# Patient Record
Sex: FEMALE | Race: BLACK OR AFRICAN AMERICAN | NOT HISPANIC OR LATINO | URBAN - METROPOLITAN AREA
[De-identification: names, ages, dates, MRNs, and addresses within clinical notes are randomized per-mention and may not be internally consistent; named-entity substitution may affect disease eponyms.]

---

## 2019-07-04 ENCOUNTER — EMERGENCY (EMERGENCY)
Facility: HOSPITAL | Age: 49
LOS: 1 days | Discharge: HOME | End: 2019-07-04
Attending: EMERGENCY MEDICINE | Admitting: EMERGENCY MEDICINE
Payer: OTHER MISCELLANEOUS

## 2019-07-04 VITALS
DIASTOLIC BLOOD PRESSURE: 84 MMHG | SYSTOLIC BLOOD PRESSURE: 130 MMHG | OXYGEN SATURATION: 100 % | TEMPERATURE: 98 F | RESPIRATION RATE: 16 BRPM | HEART RATE: 80 BPM

## 2019-07-04 VITALS
SYSTOLIC BLOOD PRESSURE: 136 MMHG | DIASTOLIC BLOOD PRESSURE: 83 MMHG | RESPIRATION RATE: 17 BRPM | TEMPERATURE: 98 F | HEART RATE: 75 BPM | OXYGEN SATURATION: 99 %

## 2019-07-04 LAB
ALBUMIN SERPL ELPH-MCNC: 4.5 G/DL — SIGNIFICANT CHANGE UP (ref 3.3–5)
ALP SERPL-CCNC: 137 U/L — HIGH (ref 40–120)
ALT FLD-CCNC: 20 U/L — SIGNIFICANT CHANGE UP (ref 4–33)
ANION GAP SERPL CALC-SCNC: 14 MMO/L — SIGNIFICANT CHANGE UP (ref 7–14)
AST SERPL-CCNC: 16 U/L — SIGNIFICANT CHANGE UP (ref 4–32)
BASE EXCESS BLDV CALC-SCNC: 6.1 MMOL/L — SIGNIFICANT CHANGE UP
BASOPHILS # BLD AUTO: 0.03 K/UL — SIGNIFICANT CHANGE UP (ref 0–0.2)
BASOPHILS NFR BLD AUTO: 0.4 % — SIGNIFICANT CHANGE UP (ref 0–2)
BILIRUB SERPL-MCNC: 0.3 MG/DL — SIGNIFICANT CHANGE UP (ref 0.2–1.2)
BLOOD GAS VENOUS - CREATININE: 0.51 MG/DL — SIGNIFICANT CHANGE UP (ref 0.5–1.3)
BLOOD GAS VENOUS - FIO2: 21 — SIGNIFICANT CHANGE UP
BUN SERPL-MCNC: 14 MG/DL — SIGNIFICANT CHANGE UP (ref 7–23)
CALCIUM SERPL-MCNC: 9.4 MG/DL — SIGNIFICANT CHANGE UP (ref 8.4–10.5)
CHLORIDE BLDV-SCNC: 101 MMOL/L — SIGNIFICANT CHANGE UP (ref 96–108)
CHLORIDE SERPL-SCNC: 98 MMOL/L — SIGNIFICANT CHANGE UP (ref 98–107)
CO2 SERPL-SCNC: 26 MMOL/L — SIGNIFICANT CHANGE UP (ref 22–31)
CREAT SERPL-MCNC: 0.46 MG/DL — LOW (ref 0.5–1.3)
EOSINOPHIL # BLD AUTO: 0.08 K/UL — SIGNIFICANT CHANGE UP (ref 0–0.5)
EOSINOPHIL NFR BLD AUTO: 1.1 % — SIGNIFICANT CHANGE UP (ref 0–6)
GAS PNL BLDV: 133 MMOL/L — LOW (ref 136–146)
GLUCOSE BLDV-MCNC: 338 MG/DL — HIGH (ref 70–99)
GLUCOSE SERPL-MCNC: 352 MG/DL — HIGH (ref 70–99)
HBA1C BLD-MCNC: 10.1 % — HIGH (ref 4–5.6)
HCG SERPL-ACNC: < 5 MIU/ML — SIGNIFICANT CHANGE UP
HCO3 BLDV-SCNC: 28 MMOL/L — HIGH (ref 20–27)
HCT VFR BLD CALC: 39.9 % — SIGNIFICANT CHANGE UP (ref 34.5–45)
HCT VFR BLDV CALC: 40 % — SIGNIFICANT CHANGE UP (ref 34.5–45)
HGB BLD-MCNC: 12.9 G/DL — SIGNIFICANT CHANGE UP (ref 11.5–15.5)
HGB BLDV-MCNC: 13 G/DL — SIGNIFICANT CHANGE UP (ref 11.5–15.5)
IMM GRANULOCYTES NFR BLD AUTO: 0.4 % — SIGNIFICANT CHANGE UP (ref 0–1.5)
LACTATE BLDV-MCNC: 2.2 MMOL/L — HIGH (ref 0.5–2)
LYMPHOCYTES # BLD AUTO: 1.9 K/UL — SIGNIFICANT CHANGE UP (ref 1–3.3)
LYMPHOCYTES # BLD AUTO: 25.5 % — SIGNIFICANT CHANGE UP (ref 13–44)
MCHC RBC-ENTMCNC: 25.6 PG — LOW (ref 27–34)
MCHC RBC-ENTMCNC: 32.3 % — SIGNIFICANT CHANGE UP (ref 32–36)
MCV RBC AUTO: 79.2 FL — LOW (ref 80–100)
MONOCYTES # BLD AUTO: 0.38 K/UL — SIGNIFICANT CHANGE UP (ref 0–0.9)
MONOCYTES NFR BLD AUTO: 5.1 % — SIGNIFICANT CHANGE UP (ref 2–14)
NEUTROPHILS # BLD AUTO: 5.04 K/UL — SIGNIFICANT CHANGE UP (ref 1.8–7.4)
NEUTROPHILS NFR BLD AUTO: 67.5 % — SIGNIFICANT CHANGE UP (ref 43–77)
NRBC # FLD: 0 K/UL — SIGNIFICANT CHANGE UP (ref 0–0)
PCO2 BLDV: 61 MMHG — HIGH (ref 41–51)
PH BLDV: 7.33 PH — SIGNIFICANT CHANGE UP (ref 7.32–7.43)
PLATELET # BLD AUTO: 181 K/UL — SIGNIFICANT CHANGE UP (ref 150–400)
PMV BLD: 12.2 FL — SIGNIFICANT CHANGE UP (ref 7–13)
PO2 BLDV: 32 MMHG — LOW (ref 35–40)
POTASSIUM BLDV-SCNC: SIGNIFICANT CHANGE UP MMOL/L (ref 3.4–4.5)
POTASSIUM SERPL-MCNC: 4.8 MMOL/L — SIGNIFICANT CHANGE UP (ref 3.5–5.3)
POTASSIUM SERPL-SCNC: 4.8 MMOL/L — SIGNIFICANT CHANGE UP (ref 3.5–5.3)
PROT SERPL-MCNC: 7 G/DL — SIGNIFICANT CHANGE UP (ref 6–8.3)
RBC # BLD: 5.04 M/UL — SIGNIFICANT CHANGE UP (ref 3.8–5.2)
RBC # FLD: 14.3 % — SIGNIFICANT CHANGE UP (ref 10.3–14.5)
SAO2 % BLDV: 55.3 % — LOW (ref 60–85)
SODIUM SERPL-SCNC: 138 MMOL/L — SIGNIFICANT CHANGE UP (ref 135–145)
WBC # BLD: 7.46 K/UL — SIGNIFICANT CHANGE UP (ref 3.8–10.5)
WBC # FLD AUTO: 7.46 K/UL — SIGNIFICANT CHANGE UP (ref 3.8–10.5)

## 2019-07-04 PROCEDURE — 73130 X-RAY EXAM OF HAND: CPT | Mod: 26,RT

## 2019-07-04 PROCEDURE — 99283 EMERGENCY DEPT VISIT LOW MDM: CPT

## 2019-07-04 PROCEDURE — 99053 MED SERV 10PM-8AM 24 HR FAC: CPT

## 2019-07-04 RX ORDER — ACETAMINOPHEN 500 MG
975 TABLET ORAL ONCE
Refills: 0 | Status: COMPLETED | OUTPATIENT
Start: 2019-07-04 | End: 2019-07-04

## 2019-07-04 RX ORDER — IBUPROFEN 200 MG
200 TABLET ORAL ONCE
Refills: 0 | Status: COMPLETED | OUTPATIENT
Start: 2019-07-04 | End: 2019-07-04

## 2019-07-04 RX ORDER — CYCLOBENZAPRINE HYDROCHLORIDE 10 MG/1
5 TABLET, FILM COATED ORAL ONCE
Refills: 0 | Status: COMPLETED | OUTPATIENT
Start: 2019-07-04 | End: 2019-07-04

## 2019-07-04 RX ORDER — SODIUM CHLORIDE 9 MG/ML
500 INJECTION INTRAMUSCULAR; INTRAVENOUS; SUBCUTANEOUS ONCE
Refills: 0 | Status: COMPLETED | OUTPATIENT
Start: 2019-07-04 | End: 2019-07-04

## 2019-07-04 RX ORDER — OXYCODONE HYDROCHLORIDE 5 MG/1
5 TABLET ORAL ONCE
Refills: 0 | Status: DISCONTINUED | OUTPATIENT
Start: 2019-07-04 | End: 2019-07-04

## 2019-07-04 RX ORDER — IBUPROFEN 200 MG
600 TABLET ORAL ONCE
Refills: 0 | Status: COMPLETED | OUTPATIENT
Start: 2019-07-04 | End: 2019-07-04

## 2019-07-04 RX ORDER — METFORMIN HYDROCHLORIDE 850 MG/1
1000 TABLET ORAL ONCE
Refills: 0 | Status: COMPLETED | OUTPATIENT
Start: 2019-07-04 | End: 2019-07-04

## 2019-07-04 RX ADMIN — METFORMIN HYDROCHLORIDE 1000 MILLIGRAM(S): 850 TABLET ORAL at 09:31

## 2019-07-04 RX ADMIN — Medication 600 MILLIGRAM(S): at 06:44

## 2019-07-04 RX ADMIN — SODIUM CHLORIDE 500 MILLILITER(S): 9 INJECTION INTRAMUSCULAR; INTRAVENOUS; SUBCUTANEOUS at 10:17

## 2019-07-04 RX ADMIN — Medication 200 MILLIGRAM(S): at 11:05

## 2019-07-04 RX ADMIN — Medication 975 MILLIGRAM(S): at 08:36

## 2019-07-04 RX ADMIN — Medication 600 MILLIGRAM(S): at 08:37

## 2019-07-04 RX ADMIN — Medication 975 MILLIGRAM(S): at 10:17

## 2019-07-04 RX ADMIN — SODIUM CHLORIDE 500 MILLILITER(S): 9 INJECTION INTRAMUSCULAR; INTRAVENOUS; SUBCUTANEOUS at 09:31

## 2019-07-04 RX ADMIN — OXYCODONE HYDROCHLORIDE 5 MILLIGRAM(S): 5 TABLET ORAL at 10:12

## 2019-07-04 RX ADMIN — OXYCODONE HYDROCHLORIDE 5 MILLIGRAM(S): 5 TABLET ORAL at 11:05

## 2019-07-04 RX ADMIN — Medication 200 MILLIGRAM(S): at 10:13

## 2019-07-04 RX ADMIN — SODIUM CHLORIDE 500 MILLILITER(S): 9 INJECTION INTRAMUSCULAR; INTRAVENOUS; SUBCUTANEOUS at 11:07

## 2019-07-04 RX ADMIN — CYCLOBENZAPRINE HYDROCHLORIDE 5 MILLIGRAM(S): 10 TABLET, FILM COATED ORAL at 06:43

## 2019-07-04 NOTE — ED PROVIDER NOTE - NSFOLLOWUPINSTRUCTIONS_ED_ALL_ED_FT
You were seen in the ER for right wrist/hand pain. We obtained an xray that showed no evidence of any broken bones. Based on your exam we believe your pain may be due to a mild wrist sprain. Please follow up with your Primary Care Doctor within 24-48 hours for further evaluation and management. You may take Tylenol up to 650 mg every 8 hours as needed for pain and/or Motrin up to 600 mg every 6 hours as needed for pain. Return to the ED if you develop new or worsening symptoms. Specific signs and symptoms to be vigilant of: numbness or tingling, weakness, fever, chills, inflammation of your joints, difficulty moving your joints, difficulty walking. Attached is referral to hand specialist for further evaluation and management if symptoms do not improve within the next week.    While here you were found to have elevated blood sugars. This is from your diabetes and that you have not been taking metformin consistently. You did not want insulin during this visit but your blood sugar improved with metformin dose and IV fluids. You had no other significant abnormalities on your labwork related to your diabetes. You should improve your diet as we discussed today, increase amount of time spent walking/exercising each day, and follow-up with your primary doctor within 24-48 hours for further evaluation and management of your diabetes/medications. Return to the hospital for any lightheadedness, difficulty breathing, abdominal pain, vomiting/inability to eat, or any other new or concerning symptoms.     Also, you mentioned your intermittent depressed mood while you were here due to life/social stressors. You did not feel you wanted to injure yourself or others. Provided in this paperwork is a referral to the [facility] for you to call for further discussion, evaluation and help with improving your mood. Return to the hospital immediately if you feel any worsening sadness/hopelessness, feelings of wanting to hurt yourself or others. You were seen in the ER for right wrist/hand pain. We obtained an xray that showed no evidence of any broken bones. Based on your exam we believe your pain may be due to a mild wrist sprain. Please follow up with your Primary Care Doctor within 24-48 hours for further evaluation and management. You may take Tylenol up to 650 mg every 8 hours as needed for pain and/or Motrin up to 600 mg every 6 hours as needed for pain. Return to the ED if you develop new or worsening symptoms. Specific signs and symptoms to be vigilant of: numbness or tingling, weakness, fever, chills, inflammation of your joints, difficulty moving your joints, difficulty walking. Attached is referral to hand specialist for further evaluation and management if symptoms do not improve within the next week.    While here you were found to have elevated blood sugars. This is from your diabetes and that you have not been taking metformin consistently. You did not want insulin during this visit but your blood sugar improved with metformin dose and IV fluids. You had no other significant abnormalities on your labwork related to your diabetes. You should improve your diet as we discussed today, increase amount of time spent walking/exercising each day, take your metformin as prescribed by your primary doctor, and follow-up with your primary doctor within 24-48 hours for further evaluation and management of your diabetes/medications. Return to the hospital for any lightheadedness, difficulty breathing, abdominal pain, vomiting/inability to eat, or any other new or concerning symptoms.     Also, you mentioned your intermittent depressed mood while you were here due to life/social stressors. You did not feel you wanted to injure yourself or others. Provided in this paperwork is a referral to the [facility] for you to call for further discussion, evaluation and help with improving your mood. Return to the hospital immediately if you feel any worsening sadness/hopelessness, feelings of wanting to hurt yourself or others.

## 2019-07-04 NOTE — ED PROVIDER NOTE - OBJECTIVE STATEMENT
47 y/o F PMH HTN, DM c/o cramping to rue digits 2-4 with pain radiating to wrist and distal forearm today. Pt states she was marinating chicken when she felt a pop in her hand and has not been able to move her 3 fingers or wrist without pain since. Denies fever, chills, CP, SOB, abdominal pain, n/v, numbness/tingling/weakness, trauma, h/o similar episode.

## 2019-07-04 NOTE — ED PROVIDER NOTE - ATTENDING CONTRIBUTION TO CARE
I performed a face to face evaluation of this patient and performed a full history and physical examination on the patient.  I agree with the resident's history, physical examination, and plan of the patient.  pt with acute cramping pain to right wrist/hand/fingers; possible secondary to strain after using hand  -pain control, xray, reassess  Also found with elevated glucose, nonadherence, and poor diabetes control- will check labs, ivf, patient declining insulin or cdu stay, understands risk of not giving meds to control or staying in observation unit for blood glucose monitoring and monitoring of hand/wrist.    Pt with hand/wrist FROM, no redness or step off, no ttp when distracted, rest of exam wnl, pt with obesity

## 2019-07-04 NOTE — ED ADULT NURSE REASSESSMENT NOTE - NS ED NURSE REASSESS COMMENT FT1
Receive pt. in Results Waiting alert and oriented x 4 with right hand swelling and c/o pain. Right hand is warm to touch mobile with limited mobility, medicated as ordered will continue to monitor.

## 2019-07-04 NOTE — ED ADULT NURSE NOTE - NSIMPLEMENTINTERV_GEN_ALL_ED
Implemented All Universal Safety Interventions:  [place] to call system. Call bell, personal items and telephone within reach. Instruct patient to call for assistance. Room bathroom lighting operational. Non-slip footwear when patient is off stretcher. Physically safe environment: no spills, clutter or unnecessary equipment. Stretcher in lowest position, wheels locked, appropriate side rails in place.

## 2019-07-04 NOTE — ED ADULT NURSE NOTE - OBJECTIVE STATEMENT
Received pt in intake. pt brought in by EMS. 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 Pt now c/o severe hand, wrist, middle and ring finger pain on the right hand. pt moaning, guarding hand. Pt appears uncomfortable but in no apparent distress. respirations are equal and unlabored, no distress noted. Denies any LOC or head trauma. pt stable, medicated as per orders. awaiting further plan

## 2019-07-04 NOTE — ED ADULT TRIAGE NOTE - CHIEF COMPLAINT QUOTE
Pt. fell on to R hand this morning while preparing to barbeque. Pt. unable to move middle and ring finger of R hand. Pt. fell on to R hand this morning while preparing to barbecue. Pt. unable to move middle and ring finger of R hand. Denies any LOC or head trauma.

## 2019-07-04 NOTE — ED PROVIDER NOTE - CARE PROVIDER_API CALL
Mitch Perry  [address], [place] [address]  Phone: [phone]  Fax: (   )    -  Follow Up Time:

## 2019-07-04 NOTE — ED ADULT NURSE REASSESSMENT NOTE - NS ED NURSE REASSESS COMMENT FT1
at this time, pt states she has not taken metformin x1wk, MD Goldberg aware. As per MD orders IV 20G was placed in LAC, labs drawn and sent. Pt stable and in NAD.

## 2019-07-04 NOTE — ED PROVIDER NOTE - CLINICAL SUMMARY MEDICAL DECISION MAKING FREE TEXT BOX
pt with acute cramping pain to right wrist/hand/fingers;  -pain control, xray, reassess pt with acute cramping pain to right wrist/hand/fingers; possible secondary to strain after using hand  -pain control, xray, reassess  Also found with elevated glucose, nonadherence, and poor diabetes control- will check labs, ivf, patient declining insulin or cdu stay, understands risk of not giving meds to control or staying in observation unit for blood glucose monitoring and monitoring of hand/wrist.    Pt with hand/wrist FROM, no redness or step off, no ttp when distracted, rest of exam wnl, pt with obesity

## 2019-07-04 NOTE — ED PROVIDER NOTE - UPPER EXTREMITY EXAM, RIGHT
no obvious swelling, erythema, or deformity, + TTP to generazlied wrist/hand and digits 2-4, FROM shoulder and elbow, limited ROM of wrist and digits 2-4, able to rnage digits 1 and 5 well, sensation and strength intact/LIMITED ROM/TENDERNESS

## 2019-07-04 NOTE — ED ADULT NURSE NOTE - CHIEF COMPLAINT QUOTE
Pt. fell on to R hand this morning while preparing to barbecue. Pt. unable to move middle and ring finger of R hand. Denies any LOC or head trauma.

## 2019-07-04 NOTE — ED PROVIDER NOTE - NSFOLLOWUPCLINICS_GEN_ALL_ED_FT
[facility] [place]  Orthopedics  .  [place]   Phone: [phone]  Fax:   Follow Up Time: Routine    [facility]  Psychiatry  [address] [place] [address]  Phone: [phone]  Fax:   Follow Up Time: 7-10 Days

## 2019-07-04 NOTE — ED PROVIDER NOTE - PROGRESS NOTE DETAILS
[facility] EM/IM PGY2: Pt signed out to me by overnight PA. Pt reports improved but persistent wrist and hand pain. No significant tenderness on my exam but some mild pain with ROM of R wrist. Given Tylenol with minimal relief. Will proceed with oxycodone 5mg. FS obtained 310. Pt refusing insulin at this time, will give home metformin dose 1000mg PO. Will add A1c onto labwork. Offered patient CDU observation for further monitoring of wrist/hand pain and endocrine follow-up for uncontrolled DM however pt prefers not to stay in the hospital tonight. Pt also endorses increased depressed mood 2/2 life/social stressors, however denies any SI/HI. Will likely d/c with referral for hand specialist, [facility] crisis center, and strict return precautions. [facility]ama EM/IM PGY2: Pt signed out to me by overnight PA. Pt reports improved but persistent wrist and hand pain. No significant tenderness on my exam but some mild pain with ROM of R wrist. Given Tylenol with minimal relief. Will proceed with oxycodone 5mg. FS obtained 310. Pt refusing insulin at this time, will give home metformin dose 1000mg PO (takes this inconsistently at home). Will add A1c onto labwork. Offered patient CDU observation for further monitoring of wrist/hand pain and endocrine follow-up for uncontrolled DM however pt prefers not to stay in the hospital tonight. Pt also endorses increased depressed mood 2/2 life/social stressors, however denies any SI/HI. Will likely d/c with referral for hand specialist, [facility] crisis center, and strict return precautions. Fama EM/IM PGY2: A1c 10.1. FS improved to 246 with metformin and fluids. Pt reports pain resolved with oxycodone. We advised pt to stay for further observation in the CDU, discussed with her the risk v benefit of receiving insulin, further observation in CDU v discharge home, pt still prefers to be discharged with outpatient follow-up with her PMD. Discussed the need and importance for improved diet, exercise and close follow-up with PMD for blood sugar management which pt verbalized understanding of and agrees with this assessment and plan. I instructed the patient to return to the emergency department with any alarming symptoms, any worsening symptoms, or any other concerning symptoms.  I instructed this patient to call their primary doctor today, to inform them of their visit to the emergency department, and to obtain a repeat evaluation from their primary doctor in the next 24-48 hours.  This patient understands and agrees with this plan for follow up and feels safe returning home.  At the time of discharge this patient remained in stable condition, remained in no acute distress, and their vital signs remained stable measured within normal limits.

## 2020-04-02 ENCOUNTER — INPATIENT (INPATIENT)
Facility: HOSPITAL | Age: 50
LOS: 4 days | Discharge: LEFT BEFORE DISCHARGE | End: 2020-04-07
Attending: INTERNAL MEDICINE | Admitting: INTERNAL MEDICINE
Payer: COMMERCIAL

## 2020-04-02 VITALS
SYSTOLIC BLOOD PRESSURE: 153 MMHG | TEMPERATURE: 100 F | RESPIRATION RATE: 28 BRPM | DIASTOLIC BLOOD PRESSURE: 76 MMHG | HEART RATE: 108 BPM | OXYGEN SATURATION: 69 %

## 2020-04-02 DIAGNOSIS — E11.9 TYPE 2 DIABETES MELLITUS WITHOUT COMPLICATIONS: ICD-10-CM

## 2020-04-02 DIAGNOSIS — I10 ESSENTIAL (PRIMARY) HYPERTENSION: ICD-10-CM

## 2020-04-02 DIAGNOSIS — J96.91 RESPIRATORY FAILURE, UNSPECIFIED WITH HYPOXIA: ICD-10-CM

## 2020-04-02 DIAGNOSIS — J96.01 ACUTE RESPIRATORY FAILURE WITH HYPOXIA: ICD-10-CM

## 2020-04-02 DIAGNOSIS — J45.909 UNSPECIFIED ASTHMA, UNCOMPLICATED: ICD-10-CM

## 2020-04-02 DIAGNOSIS — Z29.9 ENCOUNTER FOR PROPHYLACTIC MEASURES, UNSPECIFIED: ICD-10-CM

## 2020-04-02 DIAGNOSIS — Z02.9 ENCOUNTER FOR ADMINISTRATIVE EXAMINATIONS, UNSPECIFIED: ICD-10-CM

## 2020-04-02 LAB
ALBUMIN SERPL ELPH-MCNC: 4.1 G/DL — SIGNIFICANT CHANGE UP (ref 3.3–5)
ALP SERPL-CCNC: 68 U/L — SIGNIFICANT CHANGE UP (ref 40–120)
ALT FLD-CCNC: 29 U/L — SIGNIFICANT CHANGE UP (ref 4–33)
ANION GAP SERPL CALC-SCNC: 15 MMO/L — HIGH (ref 7–14)
APTT BLD: 30.7 SEC — SIGNIFICANT CHANGE UP (ref 27.5–36.3)
AST SERPL-CCNC: 32 U/L — SIGNIFICANT CHANGE UP (ref 4–32)
BASE EXCESS BLDV CALC-SCNC: 11.4 MMOL/L — SIGNIFICANT CHANGE UP
BASOPHILS # BLD AUTO: 0.02 K/UL — SIGNIFICANT CHANGE UP (ref 0–0.2)
BASOPHILS NFR BLD AUTO: 0.3 % — SIGNIFICANT CHANGE UP (ref 0–2)
BASOPHILS NFR SPEC: 0 % — SIGNIFICANT CHANGE UP (ref 0–2)
BILIRUB SERPL-MCNC: 0.6 MG/DL — SIGNIFICANT CHANGE UP (ref 0.2–1.2)
BLASTS # FLD: 0 % — SIGNIFICANT CHANGE UP (ref 0–0)
BLOOD GAS VENOUS - CREATININE: 0.46 MG/DL — LOW (ref 0.5–1.3)
BUN SERPL-MCNC: 8 MG/DL — SIGNIFICANT CHANGE UP (ref 7–23)
CALCIUM SERPL-MCNC: 9.3 MG/DL — SIGNIFICANT CHANGE UP (ref 8.4–10.5)
CHLORIDE BLDV-SCNC: 95 MMOL/L — LOW (ref 96–108)
CHLORIDE SERPL-SCNC: 89 MMOL/L — LOW (ref 98–107)
CK SERPL-CCNC: 165 U/L — SIGNIFICANT CHANGE UP (ref 25–170)
CO2 SERPL-SCNC: 31 MMOL/L — SIGNIFICANT CHANGE UP (ref 22–31)
CREAT SERPL-MCNC: 0.35 MG/DL — LOW (ref 0.5–1.3)
CRP SERPL-MCNC: 94.3 MG/L — HIGH
EOSINOPHIL # BLD AUTO: 0.16 K/UL — SIGNIFICANT CHANGE UP (ref 0–0.5)
EOSINOPHIL NFR BLD AUTO: 2.6 % — SIGNIFICANT CHANGE UP (ref 0–6)
EOSINOPHIL NFR FLD: 0 % — SIGNIFICANT CHANGE UP (ref 0–6)
ERYTHROCYTE [SEDIMENTATION RATE] IN BLOOD: 29 MM/HR — HIGH (ref 4–25)
FERRITIN SERPL-MCNC: 109.7 NG/ML — SIGNIFICANT CHANGE UP (ref 15–150)
GAS PNL BLDV: 134 MMOL/L — LOW (ref 136–146)
GIANT PLATELETS BLD QL SMEAR: PRESENT — SIGNIFICANT CHANGE UP
GLUCOSE BLDV-MCNC: 320 MG/DL — HIGH (ref 70–99)
GLUCOSE SERPL-MCNC: 318 MG/DL — HIGH (ref 70–99)
HCG SERPL-ACNC: < 5 MIU/ML — SIGNIFICANT CHANGE UP
HCO3 BLDV-SCNC: 33 MMOL/L — HIGH (ref 20–27)
HCT VFR BLD CALC: 40.9 % — SIGNIFICANT CHANGE UP (ref 34.5–45)
HCT VFR BLDV CALC: 40.8 % — SIGNIFICANT CHANGE UP (ref 34.5–45)
HGB BLD-MCNC: 13.1 G/DL — SIGNIFICANT CHANGE UP (ref 11.5–15.5)
HGB BLDV-MCNC: 13.3 G/DL — SIGNIFICANT CHANGE UP (ref 11.5–15.5)
IMM GRANULOCYTES NFR BLD AUTO: 0.5 % — SIGNIFICANT CHANGE UP (ref 0–1.5)
INR BLD: 1.2 — HIGH (ref 0.88–1.17)
LACTATE BLDV-MCNC: 2 MMOL/L — SIGNIFICANT CHANGE UP (ref 0.5–2)
LDH SERPL L TO P-CCNC: 446 U/L — HIGH (ref 135–225)
LYMPHOCYTES # BLD AUTO: 1.35 K/UL — SIGNIFICANT CHANGE UP (ref 1–3.3)
LYMPHOCYTES # BLD AUTO: 22.3 % — SIGNIFICANT CHANGE UP (ref 13–44)
LYMPHOCYTES NFR SPEC AUTO: 7.9 % — LOW (ref 13–44)
MACROCYTES BLD QL: SLIGHT — SIGNIFICANT CHANGE UP
MCHC RBC-ENTMCNC: 24.7 PG — LOW (ref 27–34)
MCHC RBC-ENTMCNC: 32 % — SIGNIFICANT CHANGE UP (ref 32–36)
MCV RBC AUTO: 77.2 FL — LOW (ref 80–100)
METAMYELOCYTES # FLD: 0 % — SIGNIFICANT CHANGE UP (ref 0–1)
MICROCYTES BLD QL: SIGNIFICANT CHANGE UP
MONOCYTES # BLD AUTO: 0.34 K/UL — SIGNIFICANT CHANGE UP (ref 0–0.9)
MONOCYTES NFR BLD AUTO: 5.6 % — SIGNIFICANT CHANGE UP (ref 2–14)
MONOCYTES NFR BLD: 6.1 % — SIGNIFICANT CHANGE UP (ref 2–9)
MYELOCYTES NFR BLD: 0 % — SIGNIFICANT CHANGE UP (ref 0–0)
NEUTROPHIL AB SER-ACNC: 71.1 % — SIGNIFICANT CHANGE UP (ref 43–77)
NEUTROPHILS # BLD AUTO: 4.16 K/UL — SIGNIFICANT CHANGE UP (ref 1.8–7.4)
NEUTROPHILS NFR BLD AUTO: 68.7 % — SIGNIFICANT CHANGE UP (ref 43–77)
NEUTS BAND # BLD: 7 % — HIGH (ref 0–6)
NRBC # BLD: 2 /100WBC — SIGNIFICANT CHANGE UP
NRBC # FLD: 0.03 K/UL — SIGNIFICANT CHANGE UP (ref 0–0)
NT-PROBNP SERPL-SCNC: 20.65 PG/ML — SIGNIFICANT CHANGE UP
OTHER - HEMATOLOGY %: 0 — SIGNIFICANT CHANGE UP
PCO2 BLDV: 67 MMHG — HIGH (ref 41–51)
PH BLDV: 7.37 PH — SIGNIFICANT CHANGE UP (ref 7.32–7.43)
PLATELET # BLD AUTO: 167 K/UL — SIGNIFICANT CHANGE UP (ref 150–400)
PLATELET COUNT - ESTIMATE: SIGNIFICANT CHANGE UP
PMV BLD: 11.5 FL — SIGNIFICANT CHANGE UP (ref 7–13)
PO2 BLDV: 60 MMHG — HIGH (ref 35–40)
POLYCHROMASIA BLD QL SMEAR: SLIGHT — SIGNIFICANT CHANGE UP
POTASSIUM BLDV-SCNC: 4.6 MMOL/L — HIGH (ref 3.4–4.5)
POTASSIUM SERPL-MCNC: 4.3 MMOL/L — SIGNIFICANT CHANGE UP (ref 3.5–5.3)
POTASSIUM SERPL-SCNC: 4.3 MMOL/L — SIGNIFICANT CHANGE UP (ref 3.5–5.3)
PROCALCITONIN SERPL-MCNC: 0.06 NG/ML — SIGNIFICANT CHANGE UP (ref 0.02–0.1)
PROMYELOCYTES # FLD: 0 % — SIGNIFICANT CHANGE UP (ref 0–0)
PROT SERPL-MCNC: 7.8 G/DL — SIGNIFICANT CHANGE UP (ref 6–8.3)
PROTHROM AB SERPL-ACNC: 13.7 SEC — HIGH (ref 9.8–13.1)
RBC # BLD: 5.3 M/UL — HIGH (ref 3.8–5.2)
RBC # FLD: 15.6 % — HIGH (ref 10.3–14.5)
REVIEW TO FOLLOW: YES — SIGNIFICANT CHANGE UP
SAO2 % BLDV: 88.7 % — HIGH (ref 60–85)
SARS-COV-2 RNA SPEC QL NAA+PROBE: DETECTED
SODIUM SERPL-SCNC: 135 MMOL/L — SIGNIFICANT CHANGE UP (ref 135–145)
TROPONIN T, HIGH SENSITIVITY: 10 NG/L — SIGNIFICANT CHANGE UP (ref ?–14)
TROPONIN T, HIGH SENSITIVITY: 10 NG/L — SIGNIFICANT CHANGE UP (ref ?–14)
VARIANT LYMPHS # BLD: 5.3 % — SIGNIFICANT CHANGE UP
WBC # BLD: 6.06 K/UL — SIGNIFICANT CHANGE UP (ref 3.8–10.5)
WBC # FLD AUTO: 6.06 K/UL — SIGNIFICANT CHANGE UP (ref 3.8–10.5)

## 2020-04-02 PROCEDURE — 71045 X-RAY EXAM CHEST 1 VIEW: CPT | Mod: 26

## 2020-04-02 RX ORDER — SITAGLIPTIN 50 MG/1
1 TABLET, FILM COATED ORAL
Qty: 0 | Refills: 0 | DISCHARGE

## 2020-04-02 RX ORDER — GLUCAGON INJECTION, SOLUTION 0.5 MG/.1ML
1 INJECTION, SOLUTION SUBCUTANEOUS ONCE
Refills: 0 | Status: DISCONTINUED | OUTPATIENT
Start: 2020-04-02 | End: 2020-04-07

## 2020-04-02 RX ORDER — ACETAMINOPHEN 500 MG
650 TABLET ORAL EVERY 6 HOURS
Refills: 0 | Status: DISCONTINUED | OUTPATIENT
Start: 2020-04-02 | End: 2020-04-07

## 2020-04-02 RX ORDER — BUDESONIDE AND FORMOTEROL FUMARATE DIHYDRATE 160; 4.5 UG/1; UG/1
2 AEROSOL RESPIRATORY (INHALATION)
Refills: 0 | Status: DISCONTINUED | OUTPATIENT
Start: 2020-04-02 | End: 2020-04-07

## 2020-04-02 RX ORDER — ENOXAPARIN SODIUM 100 MG/ML
40 INJECTION SUBCUTANEOUS DAILY
Refills: 0 | Status: DISCONTINUED | OUTPATIENT
Start: 2020-04-02 | End: 2020-04-07

## 2020-04-02 RX ORDER — ACETAMINOPHEN 500 MG
650 TABLET ORAL ONCE
Refills: 0 | Status: COMPLETED | OUTPATIENT
Start: 2020-04-02 | End: 2020-04-02

## 2020-04-02 RX ORDER — ALBUTEROL 90 UG/1
2 AEROSOL, METERED ORAL ONCE
Refills: 0 | Status: COMPLETED | OUTPATIENT
Start: 2020-04-02 | End: 2020-04-02

## 2020-04-02 RX ORDER — DEXTROSE 50 % IN WATER 50 %
15 SYRINGE (ML) INTRAVENOUS ONCE
Refills: 0 | Status: DISCONTINUED | OUTPATIENT
Start: 2020-04-02 | End: 2020-04-07

## 2020-04-02 RX ORDER — INSULIN LISPRO 100/ML
VIAL (ML) SUBCUTANEOUS AT BEDTIME
Refills: 0 | Status: DISCONTINUED | OUTPATIENT
Start: 2020-04-02 | End: 2020-04-03

## 2020-04-02 RX ORDER — FUROSEMIDE 40 MG
20 TABLET ORAL DAILY
Refills: 0 | Status: DISCONTINUED | OUTPATIENT
Start: 2020-04-03 | End: 2020-04-07

## 2020-04-02 RX ORDER — DEXTROSE 50 % IN WATER 50 %
12.5 SYRINGE (ML) INTRAVENOUS ONCE
Refills: 0 | Status: DISCONTINUED | OUTPATIENT
Start: 2020-04-02 | End: 2020-04-07

## 2020-04-02 RX ORDER — SODIUM CHLORIDE 9 MG/ML
1000 INJECTION, SOLUTION INTRAVENOUS
Refills: 0 | Status: DISCONTINUED | OUTPATIENT
Start: 2020-04-02 | End: 2020-04-07

## 2020-04-02 RX ORDER — SIMVASTATIN 20 MG/1
20 TABLET, FILM COATED ORAL AT BEDTIME
Refills: 0 | Status: DISCONTINUED | OUTPATIENT
Start: 2020-04-02 | End: 2020-04-07

## 2020-04-02 RX ORDER — ALBUTEROL 90 UG/1
2 AEROSOL, METERED ORAL EVERY 4 HOURS
Refills: 0 | Status: DISCONTINUED | OUTPATIENT
Start: 2020-04-02 | End: 2020-04-07

## 2020-04-02 RX ADMIN — ALBUTEROL 2 PUFF(S): 90 AEROSOL, METERED ORAL at 09:07

## 2020-04-02 RX ADMIN — BUDESONIDE AND FORMOTEROL FUMARATE DIHYDRATE 2 PUFF(S): 160; 4.5 AEROSOL RESPIRATORY (INHALATION) at 22:16

## 2020-04-02 RX ADMIN — Medication 650 MILLIGRAM(S): at 22:19

## 2020-04-02 RX ADMIN — SIMVASTATIN 20 MILLIGRAM(S): 20 TABLET, FILM COATED ORAL at 22:16

## 2020-04-02 RX ADMIN — Medication 650 MILLIGRAM(S): at 10:19

## 2020-04-02 NOTE — ED ADULT TRIAGE NOTE - NS ED TRIAGE AVPU SCALE
Alert-The patient is alert, awake and responds to voice. The patient is oriented to time, place, and person. The triage nurse is able to obtain subjective information. home/no skilled PT needs

## 2020-04-02 NOTE — ED PROVIDER NOTE - PHYSICAL EXAMINATION
Gen: AAOx3, NAD, obese  Head: NCAT  ENT: Airway patent, moist mucous membranes, nasal passageways clear, no pharyngeal erythema or exudates, no jvd  Cardiac: Normal rate, normal rhythm, no murmurs/rubs/gallops appreciated  Respiratory: basal crackles b/l; mild tachypnea, speaking in incomplete sentences but comfortable without accessory muscle use, O2 sat 89% on 2L; placed on 5L NC with improvement.   Gastrointestinal:  Abdomen soft, nontender, nondistended, no rebound, no guarding   MSK: No gross abnormalities, FROM of all four extremities, no edema  HEME: Extremities warm and well perfused, pulses intact  Skin: No rashes, no lesions  Neuro: No gross neurologic deficits

## 2020-04-02 NOTE — H&P ADULT - PROBLEM SELECTOR PLAN 6
Transitions of Care Status:  1.  Name of PCP:  2.  PCP Contacted on Admission: [ ] Y    [ x ] N    3.  PCP contacted at Discharge: [ ] Y    [ ] N    [ ] N/A  4.  Post-Discharge Appointment Date and Location:  5.  Summary of Handoff given to PCP:

## 2020-04-02 NOTE — ED ADULT TRIAGE NOTE - NSTRIAGECARE_GEN_A_ER
Face Mask/Supplemental Oxygen (see orders)/100 % NRB Face Mask/Supplemental Oxygen (see orders)/EKG/100 % NRB pt to Tra

## 2020-04-02 NOTE — H&P ADULT - NSHPREVIEWOFSYSTEMS_GEN_ALL_CORE
CONSTITUTIONAL: see above hpi   EYES/ENT: No visual changes;  no throat pain   NECK: No pain or stiffness  RESPIRATORY: see above hpi   CARDIOVASCULAR: No chest pain or palpitations  GASTROINTESTINAL: No abdominal or epigastric pain. No nausea, vomiting, or hematemesis; No diarrhea or constipation. No melena or hematochezia.  GENITOURINARY: No dysuria, change in frequency or hematuria  NEUROLOGICAL: No numbness or weakness  SKIN: No itching, burning, rashes, or lesions   Psych: No depression   MSK: no joint pain  Allergy: no urticaria

## 2020-04-02 NOTE — ED ADULT TRIAGE NOTE - CHIEF COMPLAINT QUOTE
Pt st" Since Friday I am coughing and coughing red with blood, I have fever....I am short of breath since yesterday...I have asthma. My Mother is intubated in hospital...she was tested + Covid....I was taking care of her." Pt appears very uncomfortable, restless, moaning difficulty talking in complete sentences..

## 2020-04-02 NOTE — ED PROVIDER NOTE - CARE PLAN
Principal Discharge DX:	Respiratory failure with hypoxia and hypercapnia  Secondary Diagnosis:	Pneumonia

## 2020-04-02 NOTE — ED PROVIDER NOTE - NS ED ROS FT
Gen: +fever  Eyes: No eye irritation or discharge  ENT: No ear pain, + congestion  Resp: + cough   Cardiovascular: No chest pain or palpitation  Gastroenteric: No nausea/vomiting, diarrhea, constipation  :  No change in urine output; no dysuria  MS: + myalgias   Skin: No rashes  Neuro: No headache; no abnormal movements  Remainder negative, except as per the HPI

## 2020-04-02 NOTE — ED PROVIDER NOTE - ATTENDING CONTRIBUTION TO CARE
50yo F morbidly obese, nonsmoker with PMHX asthma (no intubation hx), HTN, DM, p/w 1 week of worsening body aches, sob, fatigue, fevers, and dry cough.  Mother of patient is COVID+    In triage area O2 sat 69% on Room air  On 5L/min NC O2 mis-high 90s%    General: Patient in mild respiratory distress, AAO x 3  Skin: Dry and intact  HEENT: Head atraumatic. Oral mucosa moist. No pharyngeal exudates or tonsillar enlargement  Eyes: Conjunctiva normal  Cardiac: Regular rhythm and rate. No pretibial edema b/l  Respiratory: Lungs with bibasilar crackles, otherwise clear b/l and symmetric. Mild respiratory distress. Able to speak in complete sentences.  Gastrointestinal: Abdomen soft, nondistended, nontender. obese  Musculoskeletal: Moves all extremities spontaneously  Neurological: alert and oriented to person, place, and time  Psychiatric: Cooperative    a/p  concern for COVID-19 as pt lives in [facility] and +COVID19 contact  requiring oxygen so will require admission  COVID swab, cxr ,labs

## 2020-04-02 NOTE — ED ADULT NURSE REASSESSMENT NOTE - NS ED NURSE REASSESS COMMENT FT1
break coverage rn: patient continually asking to walk around and get out of stretcher. patient educated regarding importance of oxygen therapy as pt desaturated to 70% on room air. patient putting siderails down herself and standing, educated regarding risk for falls and risk for hypoxia, patient continually refusing to lay in stretcher despite being redirected and re-educated multiple times. nonrebreather back in place, patient now saturating 97%. call bell in reach, bed in lowest position will ctm closely. denies complaints of pain or discomfort.

## 2020-04-02 NOTE — ED ADULT NURSE NOTE - OBJECTIVE STATEMENT
Pt presents to the ED AxOx4, ambulatory, c/o shortness of breath, cough, fever since Friday. Pt states that she has been coughing up blood. Pt states her mom positive for Covid and is admitted to the hospital. Pt appears short of breath upon arrival to room. Pt placed on 5L nasal cannula. O2 sat 96%. Denies chest pain, nausea, vomiting. Respirations labored. Accessory muscles used. Pt leaning over when sitting on edge of the bed. Abdomen soft and non tender. Skin warm, dry and intact. 20 gauge IV placed in right hand. Blood drawn and sent to lab. Comfort measures provided. Awaiting further orders. Will continue to monitor.

## 2020-04-02 NOTE — ED PROVIDER NOTE - CLINICAL SUMMARY MEDICAL DECISION MAKING FREE TEXT BOX
49F p/w hypoxia, in setting of fevers, and myalgias 49F p/w hypoxia, in setting of fevers, and myalgias, suspect covid given significant hypoxia and preceding URI symptoms, will first eval w/ labs, cxr, and if inconsistent with viral pneumonia then will further eval other possibilities. ekg nonischemic.

## 2020-04-02 NOTE — ED PROVIDER NOTE - OBJECTIVE STATEMENT
49F hx of asthma, HTN, DM, presenting w/ myalgias, fevers, cough, without associated sob or chest pain, with cough improving with her inhalers at home, reportedly never intubated, does not use any cpap/bipap and has not come to the hospital before for an asthma exacerbation in the past. States her mother is intubated and was covid+. No n/v/d. Last tylenol taken this morning for fever. No travel. In waiting room, pt was hypoxic to 69% on room air and reportedly had difficulty breathing, on arrival to Trauma A, pt on nonrebreather and comfortable. 49F hx of asthma, HTN, DM, presenting w/ myalgias, fevers, cough, without associated sob or chest pain, with cough improving with her inhalers at home, is supposed to use a cpap at night but uses it occasionally, reportedly never intubated, and has not come to the hospital before for an asthma exacerbation in the past. States her mother is intubated and was covid+. No n/v/d. Last tylenol taken this morning for fever. No travel. In waiting room, pt was hypoxic to 69% on room air and reportedly had difficulty breathing, on arrival to Trauma A, pt on nonrebreather and comfortable. 49F hx of asthma, HTN, DM, presenting w/ myalgias, fevers, cough, without associated sob or chest pain, with cough improving with her inhalers at home, is supposed to use a cpap at night but uses it occasionally, reportedly never intubated, and has not come to the hospital before for an asthma exacerbation in the past. States her mother is intubated and was covid+. No n/v/d. Last tylenol taken this morning for fever. No travel. In waiting room, pt was hypoxic to 69% on room air and reportedly had difficulty breathing, on arrival to Trauma A, pt on nonrebreather and comfortable.    PMD: Dr Messina 49F hx of asthma, HTN, DM, presenting w/ myalgias, fevers, cough, x 4-5 days, without associated sob or chest pain, with cough improving with her inhalers at home, is supposed to use a cpap at night but uses it occasionally, reportedly never intubated, and has not come to the hospital before for an asthma exacerbation in the past. States her mother is intubated and was covid+. No n/v/d. Last tylenol taken this morning for fever. No travel. In waiting room, pt was hypoxic to 69% on room air and reportedly had difficulty breathing, on arrival to Trauma A, pt on nonrebreather and comfortable.    PMD: Dr Messina 49F hx of asthma, HTN, DM (metformin), presenting w/ myalgias, fevers, cough, x 4-5 days, without associated sob or chest pain, with cough improving with her inhalers at home, is supposed to use a cpap at night but uses it occasionally, reportedly never intubated, and has not come to the hospital before for an asthma exacerbation in the past. States her mother is intubated and was covid+. No n/v/d. Last tylenol taken this morning for fever. No travel. In waiting room, pt was hypoxic to 69% on room air and reportedly had difficulty breathing, on arrival to Trauma A, pt on nonrebreather and comfortable.    PMD: Dr Messina

## 2020-04-02 NOTE — H&P ADULT - HISTORY OF PRESENT ILLNESS
48 y/o F. PMH of Asthma, HTN, DM. Presenting with a chief complaint of shortness of breath Patient states that she has been experiencing myalgias, fevers, cough, x 4-5 days, without improving with her inhalers at home, is supposed to use a cpap at night but uses it occasionally, reportedly never intubated, and has not come to the hospital before for an asthma exacerbation in the past. States her mother is intubated and was covid+. No n/v/d. Last tylenol taken this morning for fever. No travel. In the ED pt was hypoxic to 69% on room air and reportedly had difficulty breathing.

## 2020-04-02 NOTE — ED PROVIDER NOTE - PROGRESS NOTE DETAILS
Ishan PGY3: pt stable on 5L NC, not c/o sob, Ishan PGY3: pt uptitrated to NRB for comfort as she desats with movement. [place] PGY3: pt reassessed, stable. tba Ishan PGY3: endorsed to hospitalist. Ishan PGY3: pt reassessed, stable. elevated glucose but pt is non insulin dependent diabetes, as she will be npo, will hold on insulin at current,  a [place] PGY3: can tolerate nasal canula when not moving, back on nrb now due to desats to 89 on NC

## 2020-04-02 NOTE — H&P ADULT - PROBLEM SELECTOR PLAN 1
Likely secondary to Viral URI, r/p COVID 19.   COVID 19 PCR Pending.  CXR showing mild to moderate bilateral infiltrates  Albuterol PRN SOB/WHeezing  Tylenol PRN Fever  Maintain O2% >90%, currently on NC. Likely secondary to Viral URI, r/p COVID 19.   COVID 19 PCR Pending.  CXR showing diffuse bilateral infiltrates R > L  Albuterol PRN SOB/WHeezing  Tylenol PRN Fever  Maintain O2% >90%, currently on NC.

## 2020-04-02 NOTE — H&P ADULT - ASSESSMENT
48 y/o female. PMH of Asthma, DM and HTN. Presenting with SOB, myalgias, fevers and cough. Found to be hypoxic in the ED Sat 69% on RA. Currently on a Nonrebreather Admitted for Acute Hypoxic respiratory failure r/o COVID 19.

## 2020-04-03 DIAGNOSIS — E78.3 HYPERCHYLOMICRONEMIA: ICD-10-CM

## 2020-04-03 DIAGNOSIS — B34.2 CORONAVIRUS INFECTION, UNSPECIFIED: ICD-10-CM

## 2020-04-03 DIAGNOSIS — E11.65 TYPE 2 DIABETES MELLITUS WITH HYPERGLYCEMIA: ICD-10-CM

## 2020-04-03 DIAGNOSIS — I10 ESSENTIAL (PRIMARY) HYPERTENSION: ICD-10-CM

## 2020-04-03 LAB
ALBUMIN SERPL ELPH-MCNC: 4.4 G/DL — SIGNIFICANT CHANGE UP (ref 3.3–5)
ALP SERPL-CCNC: 70 U/L — SIGNIFICANT CHANGE UP (ref 40–120)
ALT FLD-CCNC: 29 U/L — SIGNIFICANT CHANGE UP (ref 4–33)
ANION GAP SERPL CALC-SCNC: 11 MMO/L — SIGNIFICANT CHANGE UP (ref 7–14)
AST SERPL-CCNC: 28 U/L — SIGNIFICANT CHANGE UP (ref 4–32)
BASOPHILS # BLD AUTO: 0.03 K/UL — SIGNIFICANT CHANGE UP (ref 0–0.2)
BASOPHILS NFR BLD AUTO: 0.4 % — SIGNIFICANT CHANGE UP (ref 0–2)
BILIRUB SERPL-MCNC: 0.8 MG/DL — SIGNIFICANT CHANGE UP (ref 0.2–1.2)
BUN SERPL-MCNC: 10 MG/DL — SIGNIFICANT CHANGE UP (ref 7–23)
CALCIUM SERPL-MCNC: 9.7 MG/DL — SIGNIFICANT CHANGE UP (ref 8.4–10.5)
CHLORIDE SERPL-SCNC: 91 MMOL/L — LOW (ref 98–107)
CO2 SERPL-SCNC: 35 MMOL/L — HIGH (ref 22–31)
CREAT SERPL-MCNC: 0.48 MG/DL — LOW (ref 0.5–1.3)
EOSINOPHIL # BLD AUTO: 0.01 K/UL — SIGNIFICANT CHANGE UP (ref 0–0.5)
EOSINOPHIL NFR BLD AUTO: 0.1 % — SIGNIFICANT CHANGE UP (ref 0–6)
GLUCOSE SERPL-MCNC: 269 MG/DL — HIGH (ref 70–99)
HBA1C BLD-MCNC: 10.2 % — HIGH (ref 4–5.6)
HCT VFR BLD CALC: 44.7 % — SIGNIFICANT CHANGE UP (ref 34.5–45)
HGB BLD-MCNC: 13.7 G/DL — SIGNIFICANT CHANGE UP (ref 11.5–15.5)
IMM GRANULOCYTES NFR BLD AUTO: 0.3 % — SIGNIFICANT CHANGE UP (ref 0–1.5)
LYMPHOCYTES # BLD AUTO: 1.56 K/UL — SIGNIFICANT CHANGE UP (ref 1–3.3)
LYMPHOCYTES # BLD AUTO: 22.6 % — SIGNIFICANT CHANGE UP (ref 13–44)
MCHC RBC-ENTMCNC: 24.3 PG — LOW (ref 27–34)
MCHC RBC-ENTMCNC: 30.6 % — LOW (ref 32–36)
MCV RBC AUTO: 79.3 FL — LOW (ref 80–100)
MONOCYTES # BLD AUTO: 0.37 K/UL — SIGNIFICANT CHANGE UP (ref 0–0.9)
MONOCYTES NFR BLD AUTO: 5.4 % — SIGNIFICANT CHANGE UP (ref 2–14)
NEUTROPHILS # BLD AUTO: 4.92 K/UL — SIGNIFICANT CHANGE UP (ref 1.8–7.4)
NEUTROPHILS NFR BLD AUTO: 71.2 % — SIGNIFICANT CHANGE UP (ref 43–77)
NRBC # FLD: 0.02 K/UL — SIGNIFICANT CHANGE UP (ref 0–0)
PLATELET # BLD AUTO: 186 K/UL — SIGNIFICANT CHANGE UP (ref 150–400)
PMV BLD: 11.6 FL — SIGNIFICANT CHANGE UP (ref 7–13)
POTASSIUM SERPL-MCNC: 4.8 MMOL/L — SIGNIFICANT CHANGE UP (ref 3.5–5.3)
POTASSIUM SERPL-SCNC: 4.8 MMOL/L — SIGNIFICANT CHANGE UP (ref 3.5–5.3)
PROT SERPL-MCNC: 8.4 G/DL — HIGH (ref 6–8.3)
RBC # BLD: 5.64 M/UL — HIGH (ref 3.8–5.2)
RBC # FLD: 16.1 % — HIGH (ref 10.3–14.5)
SODIUM SERPL-SCNC: 137 MMOL/L — SIGNIFICANT CHANGE UP (ref 135–145)
WBC # BLD: 6.91 K/UL — SIGNIFICANT CHANGE UP (ref 3.8–10.5)
WBC # FLD AUTO: 6.91 K/UL — SIGNIFICANT CHANGE UP (ref 3.8–10.5)

## 2020-04-03 PROCEDURE — 99233 SBSQ HOSP IP/OBS HIGH 50: CPT

## 2020-04-03 PROCEDURE — 99222 1ST HOSP IP/OBS MODERATE 55: CPT

## 2020-04-03 PROCEDURE — 93010 ELECTROCARDIOGRAM REPORT: CPT

## 2020-04-03 RX ORDER — HYDROXYCHLOROQUINE SULFATE 200 MG
TABLET ORAL
Refills: 0 | Status: DISCONTINUED | OUTPATIENT
Start: 2020-04-03 | End: 2020-04-07

## 2020-04-03 RX ORDER — HYDROXYCHLOROQUINE SULFATE 200 MG
200 TABLET ORAL EVERY 12 HOURS
Refills: 0 | Status: DISCONTINUED | OUTPATIENT
Start: 2020-04-04 | End: 2020-04-07

## 2020-04-03 RX ORDER — HYDROXYCHLOROQUINE SULFATE 200 MG
400 TABLET ORAL EVERY 12 HOURS
Refills: 0 | Status: COMPLETED | OUTPATIENT
Start: 2020-04-03 | End: 2020-04-03

## 2020-04-03 RX ORDER — INSULIN LISPRO 100/ML
VIAL (ML) SUBCUTANEOUS
Refills: 0 | Status: DISCONTINUED | OUTPATIENT
Start: 2020-04-03 | End: 2020-04-04

## 2020-04-03 RX ORDER — INSULIN GLARGINE 100 [IU]/ML
25 INJECTION, SOLUTION SUBCUTANEOUS
Refills: 0 | Status: DISCONTINUED | OUTPATIENT
Start: 2020-04-03 | End: 2020-04-04

## 2020-04-03 RX ORDER — INSULIN LISPRO 100/ML
12 VIAL (ML) SUBCUTANEOUS
Refills: 0 | Status: DISCONTINUED | OUTPATIENT
Start: 2020-04-03 | End: 2020-04-05

## 2020-04-03 RX ADMIN — ENOXAPARIN SODIUM 40 MILLIGRAM(S): 100 INJECTION SUBCUTANEOUS at 11:24

## 2020-04-03 RX ADMIN — Medication 40 MILLIGRAM(S): at 13:13

## 2020-04-03 RX ADMIN — BUDESONIDE AND FORMOTEROL FUMARATE DIHYDRATE 2 PUFF(S): 160; 4.5 AEROSOL RESPIRATORY (INHALATION) at 21:00

## 2020-04-03 RX ADMIN — Medication 400 MILLIGRAM(S): at 13:14

## 2020-04-03 RX ADMIN — Medication 12 UNIT(S): at 18:22

## 2020-04-03 RX ADMIN — Medication 400 MILLIGRAM(S): at 22:19

## 2020-04-03 RX ADMIN — Medication 20 MILLIGRAM(S): at 06:29

## 2020-04-03 RX ADMIN — Medication 1: at 18:22

## 2020-04-03 RX ADMIN — Medication 40 MILLIGRAM(S): at 18:23

## 2020-04-03 RX ADMIN — Medication 650 MILLIGRAM(S): at 11:07

## 2020-04-03 RX ADMIN — INSULIN GLARGINE 25 UNIT(S): 100 INJECTION, SOLUTION SUBCUTANEOUS at 13:13

## 2020-04-03 RX ADMIN — SIMVASTATIN 20 MILLIGRAM(S): 20 TABLET, FILM COATED ORAL at 22:18

## 2020-04-03 RX ADMIN — Medication 12 UNIT(S): at 13:14

## 2020-04-03 NOTE — PROGRESS NOTE ADULT - PROBLEM SELECTOR PLAN 2
Not in exacerbation  Resuming home dose inhalers Albuterol and Symbicort  Albuterol PRN SOB/Wheezing

## 2020-04-03 NOTE — PROGRESS NOTE ADULT - ASSESSMENT
48 y/o female. PMH of Asthma, DM and HTN. Presenting with SOB, myalgias, fevers and cough. Found to be hypoxic in the ED Sat 69% on RA. Currently on a Nonrebreather Admitted for Acute Hypoxic respiratory failure secondary to COVID 19.

## 2020-04-03 NOTE — CONSULT NOTE ADULT - ATTENDING COMMENTS
Zac Holman MD  Please page [phone] with 10 digit call back number if any questions.    Temporary Work Cell ( via google voice) [phone]  For after hours or weekends please call [phone] or page fellow on call.

## 2020-04-03 NOTE — PROGRESS NOTE ADULT - PROBLEM SELECTOR PLAN 3
HbA1C 10.2  Starting ISS and Hypoglycemic Protocol  Endo recs appreciated- Lantus 25 units daily with Humalog TID

## 2020-04-03 NOTE — CONSULT NOTE ADULT - NS ED [facility] TELEPSYCH PROVIDER LOCATION
From: Suzanne Valencia  To: Leigh Ac DO  Sent: 9/4/2019 5:31 PM CDT  Subject: Medication Question    Hello, I just wanted to check and see if the medication request I sent in on the frida went through. Usually I send it in online VIA my computer and get a confirmation but I can't see anything that indicates it went through on the frida. I contacted my pharmacy Friday and again yesterday and they don't have a record of it. Thank you.  
CARMELO

## 2020-04-03 NOTE — CONSULT NOTE ADULT - SUBJECTIVE AND OBJECTIVE BOX
HPI:  48 y/o F. PMH of Asthma, HTN, DM. Presenting with a chief complaint of shortness of breath Patient states that she has been experiencing myalgias, fevers, cough, x 4-5 days, without improving with her inhalers at home, is supposed to use a cpap at night but uses it occasionally, reportedly never intubated, and has not come to the hospital before for an asthma exacerbation in the past. States her mother is intubated and was covid+. No n/v/d. Last tylenol taken this morning for fever. No travel. In the ED pt was hypoxic to 69% on room air and reportedly had difficulty breathing. (02 Apr 2020 18:41)    Per current hospital medicine emergency protocol, in an effort to reduce COVID exposures and also conserve PPE for necessary encounters, H&P below is obtained from chart review and nursing without direct patient contact unless acute changes.    I spoke with patient on the phone with her consent for telephonic interview    She was diagnosed with Type 2 DM for more than 5 years ago.  She follows with an endocrinologist, (unable to get her name due to poor phone reception).  She states that she has no diabetic retinopathy, neuropathy or nephropathy. EGFR 134  No history of CAD, CHF or CVA.    She checks her glucose twice daily, sometimes greater than 200s if she eats a lot of Carbs.  Tries to adhere to Carb consistent diet.   Patient lives alone.          PAST MEDICAL & SURGICAL HISTORY:  Asthma  DM (diabetes mellitus)  HTN (hypertension)  No significant past surgical history      FAMILY HISTORY:  Father - pacemaker  Mother - Diabetes mellitus.      Social History:  NO smoking  No alcohol  No drugs    Home Medications:  albuterol 90 mcg/inh inhalation aerosol: 2 puff(s) inhaled every 6 hours (02 Apr 2020 18:46)  furosemide 20 mg oral tablet: 1 tab(s) orally once a day (02 Apr 2020 18:46)  glimepiride 4 mg oral tablet: 1 tab(s) orally 2 times a day (02 Apr 2020 18:46)  metFORMIN 1000 mg oral tablet: 1 tab(s) orally 2 times a day (02 Apr 2020 18:46)  simvastatin 20 mg oral tablet: 1 tab(s) orally once a day (at bedtime) (02 Apr 2020 18:46)  Symbicort 80 mcg-4.5 mcg/inh inhalation aerosol: 2 puff(s) inhaled 2 times a day (02 Apr 2020 18:46)      MEDICATIONS  (STANDING):  budesonide  80 MICROgram(s)/formoterol 4.5 MICROgram(s) Inhaler 2 Puff(s) Inhalation two times a day  dextrose 5%. 1000 milliLiter(s) (50 mL/Hr) IV Continuous <Continuous>  dextrose 50% Injectable 12.5 Gram(s) IV Push once  enoxaparin Injectable 40 milliGRAM(s) SubCutaneous daily  furosemide    Tablet 20 milliGRAM(s) Oral daily  hydroxychloroquine   Oral   hydroxychloroquine 400 milliGRAM(s) Oral every 12 hours  insulin glargine Injectable (LANTUS) 25 Unit(s) SubCutaneous <User Schedule>  insulin lispro (HumaLOG) corrective regimen sliding scale   SubCutaneous three times a day before meals  insulin lispro Injectable (HumaLOG) 12 Unit(s) SubCutaneous three times a day with meals  methylPREDNISolone sodium succinate Injectable 40 milliGRAM(s) IV Push every 12 hours  simvastatin 20 milliGRAM(s) Oral at bedtime    MEDICATIONS  (PRN):  acetaminophen   Tablet .. 650 milliGRAM(s) Oral every 6 hours PRN Temp greater or equal to 38C (100.4F), Mild Pain (1 - 3)  ALBUTerol  90 MICROgram(s) HFA Inhaler - Peds 2 Puff(s) Inhalation every 4 hours PRN Shortness of Breath and/or Wheezing  dextrose 40% Gel 15 Gram(s) Oral once PRN Blood Glucose LESS THAN 70 milliGRAM(s)/deciliter  glucagon  Injectable 1 milliGRAM(s) IntraMuscular once PRN Glucose LESS THAN 70 milligrams/deciliter      Allergies    estrogens (Hives; Pruritus)  peanuts (Short breath)    Review of Systems:  Constitutional: No fever, she feels the room is warm.   Eyes: No blurry vision  Neuro: No tremors  HEENT: No pain  Cardiovascular: No chest pain, palpitations  Respiratory: mild SOB, no cough  GI: No nausea, vomiting, abdominal pain  : No dysuria  Skin: no rash  Psych: no depression  Endocrine: no polyuria, polydipsia  Hem/lymph: no swelling  Osteoporosis: no fractures    ALL OTHER SYSTEMS REVIEWED AND NEGATIVE      PHYSICAL EXAM:  -----------------------------  VITALS: T(C): 38.1 (04-03-20 @ 10:42)  T(F): 100.6 (04-03-20 @ 10:42), Max: 100.6 (04-03-20 @ 10:42)  HR: 79 (04-03-20 @ 10:42) (79 - 99)  BP: 142/82 (04-03-20 @ 10:42) (124/65 - 164/84)  RR:  (20 - 22)  SpO2:  (96% - 100%)  Wt(kg): --  Exam deferred       POCT Blood Glucose.: 228 mg/dL (04-03-20 @ 12:20)  POCT Blood Glucose.: 226 mg/dL (04-03-20 @ 09:50)  POCT Blood Glucose.: 207 mg/dL (04-02-20 @ 22:09)  POCT Blood Glucose.: 242 mg/dL (04-02-20 @ 18:48)               13.7   6.91  )-----------( 186      ( 03 Apr 2020 05:58 )             44.7       04-03    137  |  91<L>  |  10  ----------------------------<  269<H>  4.8   |  35<H>  |  0.48<L>    EGFR if : 134  EGFR if non : 116    Ca    9.7      04-03    TPro  8.4<H>  /  Alb  4.4  /  TBili  0.8  /  DBili  x   /  AST  28  /  ALT  29  /  AlkPhos  70  04-03    Thyroid Function Tests:      Hemoglobin A1C, Whole Blood: 10.2 % <H> [4.0 - 5.6] (04-03-20 @ 05:58)    Radiology:   ------------------------  < from: Xray Chest 1 View AP/PA (04.02.20 @ 09:41) >  EXAM:  XR CHEST AP OR PA 1V        PROCEDURE DATE:  Apr 2 2020         INTERPRETATION:  CHEST AP PORTABLE:    History: Shortness of Breath, Cough,  Fever.     Date and time of exam: 4/2/2020 9:40 AM.    Technique: A single AP view of the chest wasobtained.    Comparison exam: No prior exam.    Findings:  Diffuse bilateral interstitial infiltrates, right greater than left. Cardiac size cannot be evaluated in this projection. No definite hilar or mediastinal abnormality. No evidence of pleural effusion or pneumothorax..    Impression:  Diffuse bilateral interstitial infiltrates, right greater than left..    < end of copied text >

## 2020-04-03 NOTE — PROGRESS NOTE ADULT - PROBLEM SELECTOR PLAN 1
Secondary to COVID 19  CXR showing diffuse bilateral infiltrates R > L  Tylenol PRN Fever  Maintain O2% >90%, currently on NRB.  Hydroxychloroquine and methylprednisolone started

## 2020-04-03 NOTE — CONSULT NOTE ADULT - ASSESSMENT
FRANCESCA MCCORD  is a 50 yo F with uncontrolled Type 2 DM, HTN, HLD, asthma, presenting with COVID+, hypoxia. Endocrine consulted for hyperglycemia and planning to start steroid with Solumedrol 40mg bid.      #1 Type 2 DM  -Discussed with patient the importance of Carb consistent diet and exercise as tolerated.    -Recommend nutritional consultation  -Recommend DM education through RN staff (see physical to RN order)  -Discussed glycemic goal of a1c <7% to prevent microvascular complications of diabetes mellitus and reduce the risk of macrovascular complications.   -Recommend annual podiatry and ophthalmology follow up.   -Glucose goal of 80-180mg/dl while in patient.   -Recommend Lantus 25 now, can be given at noon with lunch to avoid additional exposure for nursing staff  -Recommend Humalog 12  tid with meals  -Recommend LDSS (1:50 above 150mg/dl)  -Will likely need less insulin once her steroid regimen is discontinued. Please contact endorine if change in her steroid regimen.      Discharge regimen:  Basal insulin plus metformin and glimepiride vs. resuming home regimen if her glucose is well controlled.  May have acute exacerbation of  A1C due to COVID19 infection.  Will discuss with patient further closer to discharge date.  Will have close outpatient follow up.     #2 HTN  BP goal 130/80  As per primary team     #3 HLD  Lipid goal <70mg/dl  May continue with statin therapy.

## 2020-04-03 NOTE — PROGRESS NOTE ADULT - SUBJECTIVE AND OBJECTIVE BOX
Patient is a 49y old  Female who presents with a chief complaint of Shortness of breath      SUBJECTIVE / OVERNIGHT EVENTS:  Remains SOB on nonbreather  + Fatigue and fever    MEDICATIONS  (STANDING):  budesonide  80 MICROgram(s)/formoterol 4.5 MICROgram(s) Inhaler 2 Puff(s) Inhalation two times a day  dextrose 5%. 1000 milliLiter(s) (50 mL/Hr) IV Continuous <Continuous>  dextrose 50% Injectable 12.5 Gram(s) IV Push once  enoxaparin Injectable 40 milliGRAM(s) SubCutaneous daily  furosemide    Tablet 20 milliGRAM(s) Oral daily  hydroxychloroquine   Oral   hydroxychloroquine 400 milliGRAM(s) Oral every 12 hours  insulin glargine Injectable (LANTUS) 25 Unit(s) SubCutaneous <User Schedule>  insulin lispro (HumaLOG) corrective regimen sliding scale   SubCutaneous three times a day before meals  insulin lispro Injectable (HumaLOG) 12 Unit(s) SubCutaneous three times a day with meals  methylPREDNISolone sodium succinate Injectable 40 milliGRAM(s) IV Push every 12 hours  simvastatin 20 milliGRAM(s) Oral at bedtime    MEDICATIONS  (PRN):  acetaminophen   Tablet .. 650 milliGRAM(s) Oral every 6 hours PRN Temp greater or equal to 38C (100.4F), Mild Pain (1 - 3)  ALBUTerol  90 MICROgram(s) HFA Inhaler - Peds 2 Puff(s) Inhalation every 4 hours PRN Shortness of Breath and/or Wheezing  dextrose 40% Gel 15 Gram(s) Oral once PRN Blood Glucose LESS THAN 70 milliGRAM(s)/deciliter  glucagon  Injectable 1 milliGRAM(s) IntraMuscular once PRN Glucose LESS THAN 70 milligrams/deciliter      T(C): 38.1 (04-03-20 @ 10:42), Max: 38.1 (04-03-20 @ 10:42)  HR: 79 (04-03-20 @ 10:42) (79 - 99)  BP: 142/82 (04-03-20 @ 10:42) (142/82 - 164/84)  RR: 20 (04-03-20 @ 10:42) (20 - 22)  SpO2: 99% (04-03-20 @ 10:42) (97% - 100%)  CAPILLARY BLOOD GLUCOSE      POCT Blood Glucose.: 228 mg/dL (03 Apr 2020 12:20)  POCT Blood Glucose.: 226 mg/dL (03 Apr 2020 09:50)  POCT Blood Glucose.: 207 mg/dL (02 Apr 2020 22:09)  POCT Blood Glucose.: 242 mg/dL (02 Apr 2020 18:48)    I&O's Summary      PHYSICAL EXAM:  GENERAL: morbidly obese  EYES: open, anicteric  CHEST/LUNG: tachypneic, speaking in full sentences with some difficulty  HEART: Not tachycardic, no lower extremity edema b/l  ABDOMEN: Soft, Nontender, Nondistended  EXTREMITIES: Warm and well perfused  NEUROLOGY: awake, alert, responds to Qs appropriately, no gross deficits  PSYCH: calm, cooperative  SKIN: No visible rashes or lesions    LABS:                        13.7   6.91  )-----------( 186      ( 03 Apr 2020 05:58 )             44.7     04-03    137  |  91<L>  |  10  ----------------------------<  269<H>  4.8   |  35<H>  |  0.48<L>    Ca    9.7      03 Apr 2020 05:58    TPro  8.4<H>  /  Alb  4.4  /  TBili  0.8  /  DBili  x   /  AST  28  /  ALT  29  /  AlkPhos  70  04-03    PT/INR - ( 02 Apr 2020 08:08 )   PT: 13.7 SEC;   INR: 1.20          PTT - ( 02 Apr 2020 08:08 )  PTT:30.7 SEC  CARDIAC MARKERS ( 02 Apr 2020 08:08 )  x     / x     / 165 u/L / x     / x              RADIOLOGY & ADDITIONAL TESTS:

## 2020-04-04 LAB
ALBUMIN SERPL ELPH-MCNC: 4 G/DL — SIGNIFICANT CHANGE UP (ref 3.3–5)
ALP SERPL-CCNC: 64 U/L — SIGNIFICANT CHANGE UP (ref 40–120)
ALT FLD-CCNC: 30 U/L — SIGNIFICANT CHANGE UP (ref 4–33)
ANION GAP SERPL CALC-SCNC: 10 MMO/L — SIGNIFICANT CHANGE UP (ref 7–14)
AST SERPL-CCNC: 30 U/L — SIGNIFICANT CHANGE UP (ref 4–32)
BILIRUB SERPL-MCNC: 0.7 MG/DL — SIGNIFICANT CHANGE UP (ref 0.2–1.2)
BUN SERPL-MCNC: 13 MG/DL — SIGNIFICANT CHANGE UP (ref 7–23)
CALCIUM SERPL-MCNC: 9.9 MG/DL — SIGNIFICANT CHANGE UP (ref 8.4–10.5)
CHLORIDE SERPL-SCNC: 89 MMOL/L — LOW (ref 98–107)
CO2 SERPL-SCNC: 39 MMOL/L — HIGH (ref 22–31)
CREAT SERPL-MCNC: 0.39 MG/DL — LOW (ref 0.5–1.3)
GLUCOSE SERPL-MCNC: 227 MG/DL — HIGH (ref 70–99)
HCT VFR BLD CALC: 42.2 % — SIGNIFICANT CHANGE UP (ref 34.5–45)
HGB BLD-MCNC: 12.9 G/DL — SIGNIFICANT CHANGE UP (ref 11.5–15.5)
MAGNESIUM SERPL-MCNC: 2.1 MG/DL — SIGNIFICANT CHANGE UP (ref 1.6–2.6)
MCHC RBC-ENTMCNC: 24.1 PG — LOW (ref 27–34)
MCHC RBC-ENTMCNC: 30.6 % — LOW (ref 32–36)
MCV RBC AUTO: 78.9 FL — LOW (ref 80–100)
NRBC # FLD: 0.02 K/UL — SIGNIFICANT CHANGE UP (ref 0–0)
PHOSPHATE SERPL-MCNC: 2.3 MG/DL — LOW (ref 2.5–4.5)
PLATELET # BLD AUTO: 214 K/UL — SIGNIFICANT CHANGE UP (ref 150–400)
PMV BLD: 12.2 FL — SIGNIFICANT CHANGE UP (ref 7–13)
POTASSIUM SERPL-MCNC: 4.7 MMOL/L — SIGNIFICANT CHANGE UP (ref 3.5–5.3)
POTASSIUM SERPL-SCNC: 4.7 MMOL/L — SIGNIFICANT CHANGE UP (ref 3.5–5.3)
PROT SERPL-MCNC: 7.7 G/DL — SIGNIFICANT CHANGE UP (ref 6–8.3)
RBC # BLD: 5.35 M/UL — HIGH (ref 3.8–5.2)
RBC # FLD: 15.9 % — HIGH (ref 10.3–14.5)
SODIUM SERPL-SCNC: 138 MMOL/L — SIGNIFICANT CHANGE UP (ref 135–145)
WBC # BLD: 6.03 K/UL — SIGNIFICANT CHANGE UP (ref 3.8–10.5)
WBC # FLD AUTO: 6.03 K/UL — SIGNIFICANT CHANGE UP (ref 3.8–10.5)

## 2020-04-04 PROCEDURE — 99233 SBSQ HOSP IP/OBS HIGH 50: CPT

## 2020-04-04 RX ORDER — INSULIN GLARGINE 100 [IU]/ML
27 INJECTION, SOLUTION SUBCUTANEOUS
Refills: 0 | Status: DISCONTINUED | OUTPATIENT
Start: 2020-04-05 | End: 2020-04-05

## 2020-04-04 RX ORDER — INSULIN LISPRO 100/ML
VIAL (ML) SUBCUTANEOUS
Refills: 0 | Status: DISCONTINUED | OUTPATIENT
Start: 2020-04-04 | End: 2020-04-07

## 2020-04-04 RX ADMIN — Medication 40 MILLIGRAM(S): at 18:28

## 2020-04-04 RX ADMIN — Medication 12 UNIT(S): at 18:28

## 2020-04-04 RX ADMIN — INSULIN GLARGINE 25 UNIT(S): 100 INJECTION, SOLUTION SUBCUTANEOUS at 13:10

## 2020-04-04 RX ADMIN — Medication 3: at 13:11

## 2020-04-04 RX ADMIN — BUDESONIDE AND FORMOTEROL FUMARATE DIHYDRATE 2 PUFF(S): 160; 4.5 AEROSOL RESPIRATORY (INHALATION) at 23:17

## 2020-04-04 RX ADMIN — Medication 2: at 10:15

## 2020-04-04 RX ADMIN — Medication 200 MILLIGRAM(S): at 13:10

## 2020-04-04 RX ADMIN — BUDESONIDE AND FORMOTEROL FUMARATE DIHYDRATE 2 PUFF(S): 160; 4.5 AEROSOL RESPIRATORY (INHALATION) at 13:10

## 2020-04-04 RX ADMIN — Medication 20 MILLIGRAM(S): at 06:00

## 2020-04-04 RX ADMIN — Medication 12 UNIT(S): at 10:15

## 2020-04-04 RX ADMIN — SIMVASTATIN 20 MILLIGRAM(S): 20 TABLET, FILM COATED ORAL at 23:17

## 2020-04-04 RX ADMIN — Medication 12 UNIT(S): at 13:11

## 2020-04-04 RX ADMIN — Medication 40 MILLIGRAM(S): at 06:00

## 2020-04-04 RX ADMIN — ENOXAPARIN SODIUM 40 MILLIGRAM(S): 100 INJECTION SUBCUTANEOUS at 13:10

## 2020-04-04 RX ADMIN — Medication 6: at 18:27

## 2020-04-04 RX ADMIN — Medication 200 MILLIGRAM(S): at 23:17

## 2020-04-04 NOTE — CHART NOTE - NSCHARTNOTEFT_GEN_A_CORE
FRANCESCA MCCORD  is a 48 yo F with uncontrolled Type 2 DM, HTN, HLD, asthma, presenting with COVID+, hypoxia. Endocrine consulted for hyperglycemia, on high dose steroids - Solumedrol 40mg BID    Patient remains ordered for steroids, Solumedrol 40 mg IV BID; however, noted she did not receive morning dose of Solumedrol at 6 AM  Most recent  mg/dl. Will adjust insulin regimen- plan outlined below    MEDICATIONS  (STANDING):  budesonide  80 MICROgram(s)/formoterol 4.5 MICROgram(s) Inhaler 2 Puff(s) Inhalation two times a day  dextrose 5%. 1000 milliLiter(s) (50 mL/Hr) IV Continuous <Continuous>  dextrose 50% Injectable 12.5 Gram(s) IV Push once  enoxaparin Injectable 40 milliGRAM(s) SubCutaneous daily  furosemide    Tablet 20 milliGRAM(s) Oral daily  hydroxychloroquine   Oral   hydroxychloroquine 200 milliGRAM(s) Oral every 12 hours  insulin glargine Injectable (LANTUS) 25 Unit(s) SubCutaneous <User Schedule>  insulin lispro (HumaLOG) corrective regimen sliding scale   SubCutaneous three times a day before meals  insulin lispro Injectable (HumaLOG) 12 Unit(s) SubCutaneous three times a day with meals  methylPREDNISolone sodium succinate Injectable 40 milliGRAM(s) IV Push every 12 hours  simvastatin 20 milliGRAM(s) Oral at bedtime    CAPILLARY BLOOD GLUCOSE    POCT Blood Glucose.: 256 mg/dL (04 Apr 2020 12:58)  POCT Blood Glucose.: 215 mg/dL (04 Apr 2020 10:09)  POCT Blood Glucose.: 218 mg/dL (03 Apr 2020 21:29)  POCT Blood Glucose.: 191 mg/dL (03 Apr 2020 17:28)    04-04    138  |  89<L>  |  13  ----------------------------<  227<H>  4.7   |  39<H>  |  0.39<L>    Ca    9.9      04 Apr 2020 07:26  Phos  2.3     04-04  Mg     2.1     04-04    TPro  7.7  /  Alb  4.0  /  TBili  0.7  /  DBili  x   /  AST  30  /  ALT  30  /  AlkPhos  64  04-04      Hemoglobin A1C, Whole Blood: 10.2 % <H> [4.0 - 5.6] (04-03-20 @ 05:58)    DM 2 follow up   Patient with uncontrolled DM 2, A1c 10.2%. Goal less than 7%. Patient currently with hyperglycemia exacerbated by steroids  Currently on Solumedrol 40 mg IV BID. Please contact endocrine if change in her steroid regimen.    Note- appears patient did not receive this AM dose of Solumedrol   Inpatient BG target 100-180 mg/dl, patient above target today  Will adjust Humalog correctional scale to MODERATE before meals  Ok to eliminate bedtime blood glucose check and bedtime correctional scale for now   Increase Lantus to 27 units SQ daily (given at lunchtime), starting tomorrow  Check BG before meals  Consistent carbohydrate diet, registered dieitian consult while inpatient if possible   Likely patient will need at least basal insulin on discharge, given HbA1c. Patient will be new to insulin. Please start insulin PEN teaching and glucometer teaching, as able.     Discharge Planning:  Basal + oral VS resuming home regimen (Metformin/Glimepiride) if off steroids and glucose is well controlled. May have acute exacerbation of  A1C due to COVID19 infection.  Regimen TBD. Will discuss with patient further closer to discharge date.   Followup with [facility], [address] [place] [address], [address], [facility] [address], [phone] FRANCESCA MCCORD  is a 48 yo F with uncontrolled Type 2 DM, HTN, HLD, asthma, presenting with COVID+, hypoxia. Endocrine consulted for hyperglycemia, on high dose steroids - Solumedrol 40mg BID    Patient remains ordered for steroids, Solumedrol 40 mg IV BID; however, noted she did not receive morning dose of Solumedrol at 6 AM  Most recent  mg/dl. Will adjust insulin regimen- plan outlined below    MEDICATIONS  (STANDING):  budesonide  80 MICROgram(s)/formoterol 4.5 MICROgram(s) Inhaler 2 Puff(s) Inhalation two times a day  dextrose 5%. 1000 milliLiter(s) (50 mL/Hr) IV Continuous <Continuous>  dextrose 50% Injectable 12.5 Gram(s) IV Push once  enoxaparin Injectable 40 milliGRAM(s) SubCutaneous daily  furosemide    Tablet 20 milliGRAM(s) Oral daily  hydroxychloroquine   Oral   hydroxychloroquine 200 milliGRAM(s) Oral every 12 hours  insulin glargine Injectable (LANTUS) 25 Unit(s) SubCutaneous <User Schedule>  insulin lispro (HumaLOG) corrective regimen sliding scale   SubCutaneous three times a day before meals  insulin lispro Injectable (HumaLOG) 12 Unit(s) SubCutaneous three times a day with meals  methylPREDNISolone sodium succinate Injectable 40 milliGRAM(s) IV Push every 12 hours  simvastatin 20 milliGRAM(s) Oral at bedtime    CAPILLARY BLOOD GLUCOSE    POCT Blood Glucose.: 256 mg/dL (04 Apr 2020 12:58)  POCT Blood Glucose.: 215 mg/dL (04 Apr 2020 10:09)  POCT Blood Glucose.: 218 mg/dL (03 Apr 2020 21:29)  POCT Blood Glucose.: 191 mg/dL (03 Apr 2020 17:28)    04-04    138  |  89<L>  |  13  ----------------------------<  227<H>  4.7   |  39<H>  |  0.39<L>    Ca    9.9      04 Apr 2020 07:26  Phos  2.3     04-04  Mg     2.1     04-04    TPro  7.7  /  Alb  4.0  /  TBili  0.7  /  DBili  x   /  AST  30  /  ALT  30  /  AlkPhos  64  04-04      Hemoglobin A1C, Whole Blood: 10.2 % <H> [4.0 - 5.6] (04-03-20 @ 05:58)    DM 2 follow up   Patient with uncontrolled DM 2, A1c 10.2%. Goal less than 7%. Patient currently with hyperglycemia exacerbated by steroids  Currently on Solumedrol 40 mg IV BID. Please contact endocrine if change in her steroid regimen.    Note- appears patient did not receive this AM dose of Solumedrol   Inpatient BG target 100-180 mg/dl, patient above target today  Will adjust Humalog correctional scale to MODERATE before meals  Continue Humalog 12 units SQ TID before meals (Hold if NPO/not eating meal)   Ok to eliminate bedtime blood glucose check and bedtime correctional scale for now   Increase Lantus to 27 units SQ daily (given at lunchtime), starting tomorrow  Check BG before meals  Consistent carbohydrate diet, registered dietician consult while inpatient if possible   Likely patient will need at least basal insulin on discharge, given HbA1c. Patient will be new to insulin. Please start insulin PEN teaching and glucometer teaching, as able.     Discharge Planning:  Basal + oral VS resuming home regimen (Metformin/Glimepiride) if off steroids and glucose is well controlled. May have acute exacerbation of  A1C due to COVID19 infection.  Regimen TBD. Will discuss with patient further closer to discharge date.   Followup with [facility], [address] [place] [address], [address], [facility] [address], [phone]

## 2020-04-04 NOTE — PROGRESS NOTE ADULT - PROBLEM SELECTOR PLAN 3
HbA1C 10.2  Starting ISS and Hypoglycemic Protocol  Endo recs appreciated- Lantus 25 units daily with Humalog TID  Monitor FS

## 2020-04-04 NOTE — DIETITIAN INITIAL EVALUATION ADULT. - ADD RECOMMEND
1. Monitor weights, labs, BM's, skin integrity, p.o. intake. 2. Rec add Low sodium restriction to current diet order (per patients request). 3. RD remains available, re-consult as needed.

## 2020-04-04 NOTE — PROGRESS NOTE ADULT - PROBLEM SELECTOR PLAN 1
Secondary to COVID 19  CXR showing diffuse bilateral infiltrates R > L  Tylenol PRN Fever  Maintain O2% >90%, currently on nasal cannula  Continue Hydroxychloroquine (5 days) and methylprednisolone (3-5 days)  Patient was advised that discharge can be planned when she can maintain O2 sats >90% on room air.  She was not previously using home O2.

## 2020-04-04 NOTE — DIETITIAN INITIAL EVALUATION ADULT. - PERTINENT MEDS FT
MEDICATIONS  (STANDING):  budesonide  80 MICROgram(s)/formoterol 4.5 MICROgram(s) Inhaler 2 Puff(s) Inhalation two times a day  dextrose 5%. 1000 milliLiter(s) (50 mL/Hr) IV Continuous <Continuous>  dextrose 50% Injectable 12.5 Gram(s) IV Push once  enoxaparin Injectable 40 milliGRAM(s) SubCutaneous daily  furosemide    Tablet 20 milliGRAM(s) Oral daily  hydroxychloroquine   Oral   hydroxychloroquine 200 milliGRAM(s) Oral every 12 hours  insulin lispro (HumaLOG) corrective regimen sliding scale   SubCutaneous three times a day before meals  insulin lispro Injectable (HumaLOG) 12 Unit(s) SubCutaneous three times a day with meals  methylPREDNISolone sodium succinate Injectable 40 milliGRAM(s) IV Push every 12 hours  simvastatin 20 milliGRAM(s) Oral at bedtime    MEDICATIONS  (PRN):  acetaminophen   Tablet .. 650 milliGRAM(s) Oral every 6 hours PRN Temp greater or equal to 38C (100.4F), Mild Pain (1 - 3)  ALBUTerol  90 MICROgram(s) HFA Inhaler - Peds 2 Puff(s) Inhalation every 4 hours PRN Shortness of Breath and/or Wheezing  dextrose 40% Gel 15 Gram(s) Oral once PRN Blood Glucose LESS THAN 70 milliGRAM(s)/deciliter  glucagon  Injectable 1 milliGRAM(s) IntraMuscular once PRN Glucose LESS THAN 70 milligrams/deciliter

## 2020-04-04 NOTE — DIETITIAN INITIAL EVALUATION ADULT. - PROBLEM SELECTOR PLAN 1
Likely secondary to Viral URI, r/p COVID 19.   COVID 19 PCR Pending.  CXR showing diffuse bilateral infiltrates R > L  Albuterol PRN SOB/WHeezing  Tylenol PRN Fever  Maintain O2% >90%, currently on NC.

## 2020-04-04 NOTE — PROGRESS NOTE ADULT - SUBJECTIVE AND OBJECTIVE BOX
Patient is a 49y old  Female who presents with a chief complaint of Shortness of breath    SUBJECTIVE / OVERNIGHT EVENTS:  Remains SOB but currently greater than 90% on nasal cannula (6L)  Patient wants to go home- uncle passed away and mother is in hospital from coronavirus  Tolerating PO  Denies diarrhea    MEDICATIONS  (STANDING):  budesonide  80 MICROgram(s)/formoterol 4.5 MICROgram(s) Inhaler 2 Puff(s) Inhalation two times a day  dextrose 5%. 1000 milliLiter(s) (50 mL/Hr) IV Continuous <Continuous>  dextrose 50% Injectable 12.5 Gram(s) IV Push once  enoxaparin Injectable 40 milliGRAM(s) SubCutaneous daily  furosemide    Tablet 20 milliGRAM(s) Oral daily  hydroxychloroquine   Oral   hydroxychloroquine 200 milliGRAM(s) Oral every 12 hours  insulin lispro (HumaLOG) corrective regimen sliding scale   SubCutaneous three times a day before meals  insulin lispro Injectable (HumaLOG) 12 Unit(s) SubCutaneous three times a day with meals  methylPREDNISolone sodium succinate Injectable 40 milliGRAM(s) IV Push every 12 hours  simvastatin 20 milliGRAM(s) Oral at bedtime    MEDICATIONS  (PRN):  acetaminophen   Tablet .. 650 milliGRAM(s) Oral every 6 hours PRN Temp greater or equal to 38C (100.4F), Mild Pain (1 - 3)  ALBUTerol  90 MICROgram(s) HFA Inhaler - Peds 2 Puff(s) Inhalation every 4 hours PRN Shortness of Breath and/or Wheezing  dextrose 40% Gel 15 Gram(s) Oral once PRN Blood Glucose LESS THAN 70 milliGRAM(s)/deciliter  glucagon  Injectable 1 milliGRAM(s) IntraMuscular once PRN Glucose LESS THAN 70 milligrams/deciliter    Vital Signs Last 24 Hrs  T(C): 36.9 (04 Apr 2020 09:30), Max: 36.9 (04 Apr 2020 09:30)  T(F): 98.5 (04 Apr 2020 09:30), Max: 98.5 (04 Apr 2020 09:30)  HR: 70 (04 Apr 2020 09:30) (70 - 86)  BP: 162/79 (04 Apr 2020 09:30) (161/87 - 162/79)  BP(mean): --  RR: 22 (04 Apr 2020 09:30) (20 - 22)  SpO2: 93% (04 Apr 2020 09:30) (93% - 94%)      PHYSICAL EXAM:  GENERAL: morbidly obese  EYES: open, anicteric  CHEST/LUNG: tachypneic, speaking in full sentences with some difficulty  HEART: Not tachycardic, no lower extremity edema b/l  ABDOMEN: Soft, Nontender, Nondistended  EXTREMITIES: Warm and well perfused  NEUROLOGY: awake, alert, responds to Qs appropriately, no gross deficits  PSYCH: calm, cooperative  SKIN: No visible rashes or lesions    LABS:                                   12.9   6.03  )-----------( 214      ( 04 Apr 2020 07:26 )             42.2     04-04    138  |  89<L>  |  13  ----------------------------<  227<H>  4.7   |  39<H>  |  0.39<L>    Ca    9.9      04 Apr 2020 07:26  Phos  2.3     04-04  Mg     2.1     04-04    TPro  7.7  /  Alb  4.0  /  TBili  0.7  /  DBili  x   /  AST  30  /  ALT  30  /  AlkPhos  64  04-04      RADIOLOGY & ADDITIONAL TESTS:

## 2020-04-04 NOTE — PROGRESS NOTE ADULT - ASSESSMENT
50 y/o female. PMH of Asthma, DM and HTN. Presenting with SOB, myalgias, fevers and cough. Found to be hypoxic in the ED Sat 69% on RA. Initially on a Nonrebreather Admitted for Acute Hypoxic respiratory failure secondary to COVID 19.

## 2020-04-04 NOTE — DIETITIAN INITIAL EVALUATION ADULT. - PERTINENT LABORATORY DATA
04-04 Na138 mmol/L Glu 227 mg/dL<H> K+ 4.7 mmol/L Cr  0.39 mg/dL<L> BUN 13 mg/dL 04-04 Phos 2.3 mg/dL<L> 04-04 Alb 4.0 g/dL 04-03 [id number] 10.2 %<H>

## 2020-04-04 NOTE — DIETITIAN INITIAL EVALUATION ADULT. - OTHER INFO
50 y/o female. PMH of Asthma, DM and HTN. Presenting with SOB, myalgias, fevers and cough. Found to be hypoxic in the ED Sat 69% on RA. Currently on a Nonrebreather Admitted for Acute Hypoxic respiratory failure secondary to COVID 19+. Unable to conduct a face to face interview or nutrition-focused physical exam due to limited contact restrictions related to patient's medical condition and isolation precautions. Obtained subjective information from extensive chart review. Spoke with patient on her cellphone [phone]. Per patient poor PO intake x 2 weeks PTA and at this time eating </=50% of meals due to lack of appetite and persistent coughing. Patient endorses UBW of 335 lbs and reports a recent weight loss of 15lbs. Current weight (4/3/20) 320lbs. 15lb weight loss noted.  Patient denies any nausea/vomiting/diarrhea/constipation or difficulty chewing and swallowing. Last BM 4/2/20. patient denies need for oral nutrition supplementation and requesting addition of low sodium diet 2/2 "the food is too salty". Patient agreeable to DM education. Provided verbal & printed nutrition education via Email re: therapeutic diet.  Discussed carbohydrate food sources, consistent [fiber dense] carbohydrate intake & carb. counting, hypoglycemia prevention/management,&  nutrition label reading.  Discouraged consumption of concentrated sweetened beverages.  Also encouraged heart healthy food choices, self glucose monitoring and emphasized endocrinology f/u.

## 2020-04-05 LAB
ALBUMIN SERPL ELPH-MCNC: 4.1 G/DL — SIGNIFICANT CHANGE UP (ref 3.3–5)
ALP SERPL-CCNC: 65 U/L — SIGNIFICANT CHANGE UP (ref 40–120)
ALT FLD-CCNC: 27 U/L — SIGNIFICANT CHANGE UP (ref 4–33)
ANION GAP SERPL CALC-SCNC: 11 MMO/L — SIGNIFICANT CHANGE UP (ref 7–14)
AST SERPL-CCNC: 21 U/L — SIGNIFICANT CHANGE UP (ref 4–32)
BASOPHILS # BLD AUTO: 0.02 K/UL — SIGNIFICANT CHANGE UP (ref 0–0.2)
BASOPHILS NFR BLD AUTO: 0.3 % — SIGNIFICANT CHANGE UP (ref 0–2)
BILIRUB SERPL-MCNC: 0.7 MG/DL — SIGNIFICANT CHANGE UP (ref 0.2–1.2)
BUN SERPL-MCNC: 16 MG/DL — SIGNIFICANT CHANGE UP (ref 7–23)
CALCIUM SERPL-MCNC: 10 MG/DL — SIGNIFICANT CHANGE UP (ref 8.4–10.5)
CHLORIDE SERPL-SCNC: 86 MMOL/L — LOW (ref 98–107)
CO2 SERPL-SCNC: 39 MMOL/L — HIGH (ref 22–31)
CREAT SERPL-MCNC: 0.5 MG/DL — SIGNIFICANT CHANGE UP (ref 0.5–1.3)
CRP SERPL-MCNC: 52.7 MG/L — HIGH
EOSINOPHIL # BLD AUTO: 0 K/UL — SIGNIFICANT CHANGE UP (ref 0–0.5)
EOSINOPHIL NFR BLD AUTO: 0 % — SIGNIFICANT CHANGE UP (ref 0–6)
FERRITIN SERPL-MCNC: 164.5 NG/ML — HIGH (ref 15–150)
GLUCOSE SERPL-MCNC: 285 MG/DL — HIGH (ref 70–99)
HCT VFR BLD CALC: 43.6 % — SIGNIFICANT CHANGE UP (ref 34.5–45)
HGB BLD-MCNC: 13.4 G/DL — SIGNIFICANT CHANGE UP (ref 11.5–15.5)
IMM GRANULOCYTES NFR BLD AUTO: 0.3 % — SIGNIFICANT CHANGE UP (ref 0–1.5)
LDH SERPL L TO P-CCNC: 371 U/L — HIGH (ref 135–225)
LYMPHOCYTES # BLD AUTO: 1.34 K/UL — SIGNIFICANT CHANGE UP (ref 1–3.3)
LYMPHOCYTES # BLD AUTO: 20.5 % — SIGNIFICANT CHANGE UP (ref 13–44)
MAGNESIUM SERPL-MCNC: 2 MG/DL — SIGNIFICANT CHANGE UP (ref 1.6–2.6)
MANUAL SMEAR VERIFICATION: SIGNIFICANT CHANGE UP
MCHC RBC-ENTMCNC: 23.9 PG — LOW (ref 27–34)
MCHC RBC-ENTMCNC: 30.7 % — LOW (ref 32–36)
MCV RBC AUTO: 77.9 FL — LOW (ref 80–100)
MONOCYTES # BLD AUTO: 0.28 K/UL — SIGNIFICANT CHANGE UP (ref 0–0.9)
MONOCYTES NFR BLD AUTO: 4.3 % — SIGNIFICANT CHANGE UP (ref 2–14)
NEUTROPHILS # BLD AUTO: 4.88 K/UL — SIGNIFICANT CHANGE UP (ref 1.8–7.4)
NEUTROPHILS NFR BLD AUTO: 74.6 % — SIGNIFICANT CHANGE UP (ref 43–77)
NRBC # FLD: 0.03 K/UL — SIGNIFICANT CHANGE UP (ref 0–0)
PHOSPHATE SERPL-MCNC: 2.4 MG/DL — LOW (ref 2.5–4.5)
PLATELET # BLD AUTO: 233 K/UL — SIGNIFICANT CHANGE UP (ref 150–400)
PMV BLD: 11.3 FL — SIGNIFICANT CHANGE UP (ref 7–13)
POTASSIUM SERPL-MCNC: 4.3 MMOL/L — SIGNIFICANT CHANGE UP (ref 3.5–5.3)
POTASSIUM SERPL-SCNC: 4.3 MMOL/L — SIGNIFICANT CHANGE UP (ref 3.5–5.3)
PROT SERPL-MCNC: 7.9 G/DL — SIGNIFICANT CHANGE UP (ref 6–8.3)
RBC # BLD: 5.6 M/UL — HIGH (ref 3.8–5.2)
RBC # FLD: 15.8 % — HIGH (ref 10.3–14.5)
SODIUM SERPL-SCNC: 136 MMOL/L — SIGNIFICANT CHANGE UP (ref 135–145)
WBC # BLD: 6.54 K/UL — SIGNIFICANT CHANGE UP (ref 3.8–10.5)
WBC # FLD AUTO: 6.54 K/UL — SIGNIFICANT CHANGE UP (ref 3.8–10.5)

## 2020-04-05 PROCEDURE — 99233 SBSQ HOSP IP/OBS HIGH 50: CPT

## 2020-04-05 RX ORDER — INSULIN LISPRO 100/ML
16 VIAL (ML) SUBCUTANEOUS
Refills: 0 | Status: DISCONTINUED | OUTPATIENT
Start: 2020-04-05 | End: 2020-04-07

## 2020-04-05 RX ORDER — INSULIN GLARGINE 100 [IU]/ML
30 INJECTION, SOLUTION SUBCUTANEOUS
Refills: 0 | Status: DISCONTINUED | OUTPATIENT
Start: 2020-04-05 | End: 2020-04-07

## 2020-04-05 RX ADMIN — BUDESONIDE AND FORMOTEROL FUMARATE DIHYDRATE 2 PUFF(S): 160; 4.5 AEROSOL RESPIRATORY (INHALATION) at 22:38

## 2020-04-05 RX ADMIN — Medication 6: at 09:40

## 2020-04-05 RX ADMIN — Medication 16 UNIT(S): at 14:41

## 2020-04-05 RX ADMIN — INSULIN GLARGINE 30 UNIT(S): 100 INJECTION, SOLUTION SUBCUTANEOUS at 14:38

## 2020-04-05 RX ADMIN — Medication 16 UNIT(S): at 17:11

## 2020-04-05 RX ADMIN — Medication 20 MILLIGRAM(S): at 05:49

## 2020-04-05 RX ADMIN — Medication 40 MILLIGRAM(S): at 05:49

## 2020-04-05 RX ADMIN — Medication 12 UNIT(S): at 09:39

## 2020-04-05 RX ADMIN — ENOXAPARIN SODIUM 40 MILLIGRAM(S): 100 INJECTION SUBCUTANEOUS at 14:41

## 2020-04-05 RX ADMIN — Medication 200 MILLIGRAM(S): at 22:39

## 2020-04-05 RX ADMIN — Medication 6: at 14:40

## 2020-04-05 RX ADMIN — Medication 40 MILLIGRAM(S): at 17:10

## 2020-04-05 RX ADMIN — Medication 6: at 17:12

## 2020-04-05 RX ADMIN — BUDESONIDE AND FORMOTEROL FUMARATE DIHYDRATE 2 PUFF(S): 160; 4.5 AEROSOL RESPIRATORY (INHALATION) at 09:41

## 2020-04-05 RX ADMIN — SIMVASTATIN 20 MILLIGRAM(S): 20 TABLET, FILM COATED ORAL at 22:39

## 2020-04-05 RX ADMIN — Medication 200 MILLIGRAM(S): at 14:42

## 2020-04-05 NOTE — PROGRESS NOTE ADULT - ASSESSMENT
48 y/o female. PMH of Asthma, DM and HTN. Presenting with SOB, myalgias, fevers and cough. Found to be hypoxic in the ED Sat 69% on RA. Initially on a Nonrebreather Admitted for Acute Hypoxic respiratory failure secondary to COVID 19.

## 2020-04-05 NOTE — PROGRESS NOTE ADULT - SUBJECTIVE AND OBJECTIVE BOX
Patient is a 49y old  Female who presents with a chief complaint of Shortness of breath    SUBJECTIVE / OVERNIGHT EVENTS:  - Mother  of COVID-19 at 8AM this morning at OSH.  - Patient wants to go home to take care of her  arrangements.  - She reports that she has been eating poorly and unable to sleep since she has been in the hospital for 3 days.  - Patient reports that she previously had home O2 and requesting it to be reinitiated as she feels she will need it even after recovery.    MEDICATIONS  (STANDING):  budesonide  80 MICROgram(s)/formoterol 4.5 MICROgram(s) Inhaler 2 Puff(s) Inhalation two times a day  dextrose 5%. 1000 milliLiter(s) (50 mL/Hr) IV Continuous <Continuous>  dextrose 50% Injectable 12.5 Gram(s) IV Push once  enoxaparin Injectable 40 milliGRAM(s) SubCutaneous daily  furosemide    Tablet 20 milliGRAM(s) Oral daily  hydroxychloroquine   Oral   hydroxychloroquine 200 milliGRAM(s) Oral every 12 hours  insulin glargine Injectable (LANTUS) 30 Unit(s) SubCutaneous <User Schedule>  insulin lispro (HumaLOG) corrective regimen sliding scale   SubCutaneous three times a day before meals  insulin lispro Injectable (HumaLOG) 16 Unit(s) SubCutaneous three times a day before meals  methylPREDNISolone sodium succinate Injectable 40 milliGRAM(s) IV Push every 12 hours  simvastatin 20 milliGRAM(s) Oral at bedtime    MEDICATIONS  (PRN):  acetaminophen   Tablet .. 650 milliGRAM(s) Oral every 6 hours PRN Temp greater or equal to 38C (100.4F), Mild Pain (1 - 3)  ALBUTerol  90 MICROgram(s) HFA Inhaler - Peds 2 Puff(s) Inhalation every 4 hours PRN Shortness of Breath and/or Wheezing  dextrose 40% Gel 15 Gram(s) Oral once PRN Blood Glucose LESS THAN 70 milliGRAM(s)/deciliter  glucagon  Injectable 1 milliGRAM(s) IntraMuscular once PRN Glucose LESS THAN 70 milligrams/deciliter    Vital Signs Last 24 Hrs  T(C): 37.2 (2020 14:36), Max: 37.3 (2020 23:12)  T(F): 98.9 (2020 14:36), Max: 99.1 (2020 23:12)  HR: 72 (2020 14:36) (72 - 85)  BP: 153/80 (2020 14:36) (153/80 - 154/59)  BP(mean): --  RR: 20 (2020 23:12) (20 - 22)  SpO2: 97% (2020 14:36) (92% - 97%)      PHYSICAL EXAM:  GENERAL: morbidly obese  EYES: open, anicteric  CHEST/LUNG: tachypneic, speaking in full sentences with some difficulty  HEART: Not tachycardic, no lower extremity edema b/l  ABDOMEN: Soft, Nontender, Nondistended  EXTREMITIES: Warm and well perfused  NEUROLOGY: awake, alert, responds to Qs appropriately, no gross deficits  PSYCH: calm, cooperative  SKIN: No visible rashes or lesions    LABS:                                   13.4   6.54  )-----------( 233      ( 2020 08:00 )             43.6     04-05    136  |  86<L>  |  16  ----------------------------<  285<H>  4.3   |  39<H>  |  0.50    Ca    10.0      2020 08:00  Phos  2.4     04-05  Mg     2.0     04-05    TPro  7.9  /  Alb  4.1  /  TBili  0.7  /  DBili  x   /  AST  21  /  ALT  27  /  AlkPhos  65  04-05        RADIOLOGY & ADDITIONAL TESTS:

## 2020-04-05 NOTE — PROGRESS NOTE ADULT - PROBLEM SELECTOR PLAN 3
HbA1C 10.2  Starting ISS and Hypoglycemic Protocol  Endo recs appreciated- insulin dosing increased due to steroid induced hyperglycemia, will readjust when methylprednisolone stopped

## 2020-04-05 NOTE — PROGRESS NOTE ADULT - PROBLEM SELECTOR PLAN 1
- Secondary to COVID 19, CXR showing diffuse bilateral infiltrates R > L  - Attempted to walk patient off O2 but saturation dropped to 70%.  - Tylenol PRN Fever  - Maintain O2% >90%, currently on 6L nasal cannula  - Continue Hydroxychloroquine (5 days) and methylprednisolone (3-5 days)  - Patient was advised that discharge can be planned when she can maintain O2 sats >90% on room air but patient insisting on going home to arrange mother's .  Patient agreeable to spend an additional day in the hospital.  However, she is requesting home O2 to be reinitiated.  She reports that she had been on it in the past, but has not used home O2 recently.

## 2020-04-05 NOTE — CHART NOTE - NSCHARTNOTEFT_GEN_A_CORE
FRANCESCA MCCORD  is a 50 yo F with uncontrolled Type 2 DM, HTN, HLD, asthma, presenting with COVID+, hypoxia. Endocrine consulted for hyperglycemia, on high dose steroids - Solumedrol 40mg BID    Patient remains ordered for steroids, Solumedrol 40 mg IV BID, with steroid induced hyperglycemia.  Most recent  mg/dl. Will adjust insulin regimen- plan outlined below    MEDICATIONS  (STANDING):  budesonide  80 MICROgram(s)/formoterol 4.5 MICROgram(s) Inhaler 2 Puff(s) Inhalation two times a day  dextrose 5%. 1000 milliLiter(s) (50 mL/Hr) IV Continuous <Continuous>  dextrose 50% Injectable 12.5 Gram(s) IV Push once  enoxaparin Injectable 40 milliGRAM(s) SubCutaneous daily  furosemide    Tablet 20 milliGRAM(s) Oral daily  hydroxychloroquine   Oral   hydroxychloroquine 200 milliGRAM(s) Oral every 12 hours  insulin glargine Injectable (LANTUS) 27 Unit(s) SubCutaneous <User Schedule>  insulin lispro (HumaLOG) corrective regimen sliding scale   SubCutaneous three times a day before meals  insulin lispro Injectable (HumaLOG) 12 Unit(s) SubCutaneous three times a day with meals  methylPREDNISolone sodium succinate Injectable 40 milliGRAM(s) IV Push every 12 hours  simvastatin 20 milliGRAM(s) Oral at bedtime    CAPILLARY BLOOD GLUCOSE    POCT Blood Glucose.: 260 mg/dL (05 Apr 2020 09:39)  POCT Blood Glucose.: 271 mg/dL (04 Apr 2020 21:27)  POCT Blood Glucose.: 268 mg/dL (04 Apr 2020 18:23)  POCT Blood Glucose.: 256 mg/dL (04 Apr 2020 12:58)    04-05    136  |  86<L>  |  16  ----------------------------<  285<H>  4.3   |  39<H>  |  0.50    Ca    10.0      05 Apr 2020 08:00  Phos  2.4     04-05  Mg     2.0     04-05    TPro  7.9  /  Alb  4.1  /  TBili  0.7  /  DBili  x   /  AST  21  /  ALT  27  /  AlkPhos  65  04-05      Hemoglobin A1C, Whole Blood: 10.2 % <H> [4.0 - 5.6] (04-03-20 @ 05:58)    DM 2 follow up   Patient with uncontrolled DM 2, A1c 10.2%. Goal less than 7%. Patient currently with hyperglycemia exacerbated by steroids  Currently on Solumedrol 40 mg IV BID. Please contact endocrine if change in her steroid regimen.    Inpatient BG target 100-180 mg/dl, patient above target today  Recommend:  Increase Humalog to 16 units SQ TID before meals (Hold if NPO/not eating meal)  Increase Lantus to 30 units SQ daily (give at lunchtime)   Continue Humalog correctional scale MODERATE before meals  Ok to eliminate bedtime blood glucose check and bedtime correctional scale for now to better cluster care  Check BG before meals  Consistent carbohydrate diet, registered dietician consult done  Likely patient will need at least basal insulin on discharge, given HbA1c. Patient will be new to insulin. Please start insulin PEN teaching and glucometer teaching, as able.     Discharge Planning:  Basal + oral VS resuming home regimen (Metformin/Glimepiride) if off steroids and glucose is well controlled. May have acute exacerbation of A1C due to COVID19 infection.  Regimen TBD. Will discuss with patient further closer to discharge date.   Followup with [facility], [address] [place] [address], [address], [facility] [address], [phone].

## 2020-04-06 LAB
ALBUMIN SERPL ELPH-MCNC: 4.3 G/DL — SIGNIFICANT CHANGE UP (ref 3.3–5)
ALP SERPL-CCNC: 64 U/L — SIGNIFICANT CHANGE UP (ref 40–120)
ALT FLD-CCNC: 26 U/L — SIGNIFICANT CHANGE UP (ref 4–33)
ANION GAP SERPL CALC-SCNC: 13 MMO/L — SIGNIFICANT CHANGE UP (ref 7–14)
AST SERPL-CCNC: 22 U/L — SIGNIFICANT CHANGE UP (ref 4–32)
BILIRUB SERPL-MCNC: 0.7 MG/DL — SIGNIFICANT CHANGE UP (ref 0.2–1.2)
BUN SERPL-MCNC: 14 MG/DL — SIGNIFICANT CHANGE UP (ref 7–23)
CALCIUM SERPL-MCNC: 10.1 MG/DL — SIGNIFICANT CHANGE UP (ref 8.4–10.5)
CHLORIDE SERPL-SCNC: 92 MMOL/L — LOW (ref 98–107)
CO2 SERPL-SCNC: 36 MMOL/L — HIGH (ref 22–31)
CREAT SERPL-MCNC: 0.52 MG/DL — SIGNIFICANT CHANGE UP (ref 0.5–1.3)
GLUCOSE SERPL-MCNC: 116 MG/DL — HIGH (ref 70–99)
HCT VFR BLD CALC: 46.1 % — HIGH (ref 34.5–45)
HGB BLD-MCNC: 14.4 G/DL — SIGNIFICANT CHANGE UP (ref 11.5–15.5)
MAGNESIUM SERPL-MCNC: 2.1 MG/DL — SIGNIFICANT CHANGE UP (ref 1.6–2.6)
MCHC RBC-ENTMCNC: 24.2 PG — LOW (ref 27–34)
MCHC RBC-ENTMCNC: 31.2 % — LOW (ref 32–36)
MCV RBC AUTO: 77.3 FL — LOW (ref 80–100)
NRBC # FLD: 0.02 K/UL — SIGNIFICANT CHANGE UP (ref 0–0)
PHOSPHATE SERPL-MCNC: 4.2 MG/DL — SIGNIFICANT CHANGE UP (ref 2.5–4.5)
PLATELET # BLD AUTO: 295 K/UL — SIGNIFICANT CHANGE UP (ref 150–400)
PMV BLD: 11.7 FL — SIGNIFICANT CHANGE UP (ref 7–13)
POTASSIUM SERPL-MCNC: 3.8 MMOL/L — SIGNIFICANT CHANGE UP (ref 3.5–5.3)
POTASSIUM SERPL-SCNC: 3.8 MMOL/L — SIGNIFICANT CHANGE UP (ref 3.5–5.3)
PROT SERPL-MCNC: 7.8 G/DL — SIGNIFICANT CHANGE UP (ref 6–8.3)
RBC # BLD: 5.96 M/UL — HIGH (ref 3.8–5.2)
RBC # FLD: 16.2 % — HIGH (ref 10.3–14.5)
SODIUM SERPL-SCNC: 141 MMOL/L — SIGNIFICANT CHANGE UP (ref 135–145)
WBC # BLD: 6.54 K/UL — SIGNIFICANT CHANGE UP (ref 3.8–10.5)
WBC # FLD AUTO: 6.54 K/UL — SIGNIFICANT CHANGE UP (ref 3.8–10.5)

## 2020-04-06 RX ADMIN — Medication 16 UNIT(S): at 13:21

## 2020-04-06 RX ADMIN — Medication 200 MILLIGRAM(S): at 13:21

## 2020-04-06 RX ADMIN — INSULIN GLARGINE 30 UNIT(S): 100 INJECTION, SOLUTION SUBCUTANEOUS at 13:21

## 2020-04-06 RX ADMIN — BUDESONIDE AND FORMOTEROL FUMARATE DIHYDRATE 2 PUFF(S): 160; 4.5 AEROSOL RESPIRATORY (INHALATION) at 09:45

## 2020-04-06 RX ADMIN — Medication 16 UNIT(S): at 16:59

## 2020-04-06 RX ADMIN — Medication 40 MILLIGRAM(S): at 05:38

## 2020-04-06 RX ADMIN — Medication 40 MILLIGRAM(S): at 16:59

## 2020-04-06 RX ADMIN — BUDESONIDE AND FORMOTEROL FUMARATE DIHYDRATE 2 PUFF(S): 160; 4.5 AEROSOL RESPIRATORY (INHALATION) at 23:43

## 2020-04-06 RX ADMIN — Medication 200 MILLIGRAM(S): at 23:43

## 2020-04-06 RX ADMIN — Medication 16 UNIT(S): at 09:42

## 2020-04-06 RX ADMIN — Medication 20 MILLIGRAM(S): at 05:38

## 2020-04-06 RX ADMIN — SIMVASTATIN 20 MILLIGRAM(S): 20 TABLET, FILM COATED ORAL at 23:43

## 2020-04-06 NOTE — PROGRESS NOTE ADULT - ASSESSMENT
49F w/ hx of asthma, DMII, HTN, MENA on CPAP at home, p/w SOB, fevers, myalgia, cough, satting 69% RA in ED. COVID-19 positive.     COVID-19 Test: + 4/2  Oxygen Support: room air  Hydroxychloroquine: starting 4/4  Steroids: starting 4/3  [place]: n/a    Problem/Plan:  #COVID-19 Infection  - ambulate off O2, if O2 sat > 85-90% can be discharged home w/ home O2 (pt previously on 4L), if > 90% can be discharged off O2  - supplemental O2 PRN while inpatient  - Tylenol PRN  - Albuterol PRN    #Asthma – no exacerbation  - albuterol PRN    #Chronic Medical Problems:  - DM: hold PO meds, ISS, f/u FS, f/u endo recs  - HTN: c/w home Lasix    #FEN/PPx  - lovenox SubQ  - regular diet    #FULL CODE  #Discharge planning: Criteria: if O2 sat > 85-90% can be discharged home w/ home O2 (pt previously on 4L), if > 90% can be discharged off O2; Meds: discharge without hydroxychloroquine or steroids, restart all home meds (including home diabetes regimen)    Gerardo Jain  Acting Medicine Hospitalist  (c) [phone]  (p) [phone]

## 2020-04-06 NOTE — PROGRESS NOTE ADULT - SUBJECTIVE AND OBJECTIVE BOX
Subjective/Interval Events: Pt feeling well, very upset about death of her mother. Worried that her father (hospitalized at OSH) will also pass away soon. Wants to leave. Satting 92% at rest on     Hospital Medications:  acetaminophen   Tablet .. 650 milliGRAM(s) Oral every 6 hours PRN  ALBUTerol  90 MICROgram(s) HFA Inhaler - Peds 2 Puff(s) Inhalation every 4 hours PRN  budesonide  80 MICROgram(s)/formoterol 4.5 MICROgram(s) Inhaler 2 Puff(s) Inhalation two times a day  dextrose 40% Gel 15 Gram(s) Oral once PRN  dextrose 5%. 1000 milliLiter(s) IV Continuous <Continuous>  dextrose 50% Injectable 12.5 Gram(s) IV Push once  enoxaparin Injectable 40 milliGRAM(s) SubCutaneous daily  furosemide    Tablet 20 milliGRAM(s) Oral daily  glucagon  Injectable 1 milliGRAM(s) IntraMuscular once PRN  hydroxychloroquine   Oral   hydroxychloroquine 200 milliGRAM(s) Oral every 12 hours  insulin glargine Injectable (LANTUS) 30 Unit(s) SubCutaneous <User Schedule>  insulin lispro (HumaLOG) corrective regimen sliding scale   SubCutaneous three times a day before meals  insulin lispro Injectable (HumaLOG) 16 Unit(s) SubCutaneous three times a day before meals  methylPREDNISolone sodium succinate Injectable 40 milliGRAM(s) IV Push every 12 hours  simvastatin 20 milliGRAM(s) Oral at bedtime      Physical Exam:  T(C): 37 (04-06-20 @ 13:24), Max: 37.2 (04-05-20 @ 14:36)  HR: 81 (04-06-20 @ 13:24) (66 - 81)  BP: 130/81 (04-06-20 @ 13:24) (130/81 - 153/80)  RR: 19 (04-06-20 @ 13:32) (19 - 19)  SpO2: 95% (04-06-20 @ 13:32) (85% - 99%)    Comfortable appearing, obese  On nasal cannula  No respiratory distress    Labs: reviewed                        14.4   6.54  )-----------( 295      ( 06 Apr 2020 06:45 )             46.1     04-06    141  |  92<L>  |  14  ----------------------------<  116<H>  3.8   |  36<H>  |  0.52    Ca    10.1      06 Apr 2020 06:45  Phos  4.2     04-06  Mg     2.1     04-06    TPro  7.8  /  Alb  4.3  /  TBili  0.7  /  DBili  x   /  AST  22  /  ALT  26  /  AlkPhos  64  04-06    LIVER FUNCTIONS - ( 06 Apr 2020 06:45 )  Alb: 4.3 g/dL / Pro: 7.8 g/dL / ALK PHOS: 64 u/L / ALT: 26 u/L / AST: 22 u/L / GGT: x               Diagnostic Studies: reviewed

## 2020-04-07 ENCOUNTER — TRANSCRIPTION ENCOUNTER (OUTPATIENT)
Age: 50
End: 2020-04-07

## 2020-04-07 VITALS — OXYGEN SATURATION: 82 % | RESPIRATION RATE: 19 BRPM

## 2020-04-07 LAB
ANION GAP SERPL CALC-SCNC: 13 MMO/L — SIGNIFICANT CHANGE UP (ref 7–14)
BUN SERPL-MCNC: 21 MG/DL — SIGNIFICANT CHANGE UP (ref 7–23)
CALCIUM SERPL-MCNC: 10 MG/DL — SIGNIFICANT CHANGE UP (ref 8.4–10.5)
CHLORIDE SERPL-SCNC: 90 MMOL/L — LOW (ref 98–107)
CO2 SERPL-SCNC: 34 MMOL/L — HIGH (ref 22–31)
CREAT SERPL-MCNC: 0.59 MG/DL — SIGNIFICANT CHANGE UP (ref 0.5–1.3)
GLUCOSE SERPL-MCNC: 130 MG/DL — HIGH (ref 70–99)
HCT VFR BLD CALC: 49.7 % — HIGH (ref 34.5–45)
HGB BLD-MCNC: 15.3 G/DL — SIGNIFICANT CHANGE UP (ref 11.5–15.5)
MAGNESIUM SERPL-MCNC: 2.1 MG/DL — SIGNIFICANT CHANGE UP (ref 1.6–2.6)
MCHC RBC-ENTMCNC: 24.1 PG — LOW (ref 27–34)
MCHC RBC-ENTMCNC: 30.8 % — LOW (ref 32–36)
MCV RBC AUTO: 78.1 FL — LOW (ref 80–100)
NRBC # FLD: 0 K/UL — SIGNIFICANT CHANGE UP (ref 0–0)
PHOSPHATE SERPL-MCNC: 5.5 MG/DL — HIGH (ref 2.5–4.5)
PLATELET # BLD AUTO: 290 K/UL — SIGNIFICANT CHANGE UP (ref 150–400)
PMV BLD: 11 FL — SIGNIFICANT CHANGE UP (ref 7–13)
POTASSIUM SERPL-MCNC: 3.8 MMOL/L — SIGNIFICANT CHANGE UP (ref 3.5–5.3)
POTASSIUM SERPL-SCNC: 3.8 MMOL/L — SIGNIFICANT CHANGE UP (ref 3.5–5.3)
RBC # BLD: 6.36 M/UL — HIGH (ref 3.8–5.2)
RBC # FLD: 17.3 % — HIGH (ref 10.3–14.5)
SODIUM SERPL-SCNC: 137 MMOL/L — SIGNIFICANT CHANGE UP (ref 135–145)
WBC # BLD: 7.23 K/UL — SIGNIFICANT CHANGE UP (ref 3.8–10.5)
WBC # FLD AUTO: 7.23 K/UL — SIGNIFICANT CHANGE UP (ref 3.8–10.5)

## 2020-04-07 PROCEDURE — 99231 SBSQ HOSP IP/OBS SF/LOW 25: CPT

## 2020-04-07 PROCEDURE — 99238 HOSP IP/OBS DSCHRG MGMT 30/<: CPT

## 2020-04-07 PROCEDURE — 93010 ELECTROCARDIOGRAM REPORT: CPT

## 2020-04-07 RX ORDER — INSULIN DETEMIR 100/ML (3)
20 INSULIN PEN (ML) SUBCUTANEOUS
Qty: 5 | Refills: 0
Start: 2020-04-07 | End: 2020-05-06

## 2020-04-07 RX ORDER — ALBUTEROL 90 UG/1
2 AEROSOL, METERED ORAL
Qty: 1 | Refills: 0
Start: 2020-04-07 | End: 2020-05-06

## 2020-04-07 RX ORDER — ENOXAPARIN SODIUM 100 MG/ML
15 INJECTION SUBCUTANEOUS
Qty: 5 | Refills: 0
Start: 2020-04-07 | End: 2020-05-06

## 2020-04-07 RX ORDER — ALBUTEROL 90 UG/1
2 AEROSOL, METERED ORAL
Qty: 0 | Refills: 0 | DISCHARGE

## 2020-04-07 RX ORDER — ACETAMINOPHEN 500 MG
2 TABLET ORAL
Qty: 0 | Refills: 0 | DISCHARGE
Start: 2020-04-07

## 2020-04-07 RX ORDER — ISOPROPYL ALCOHOL, BENZOCAINE .7; .06 ML/ML; ML/ML
1 SWAB TOPICAL
Qty: 100 | Refills: 1
Start: 2020-04-07 | End: 2020-05-26

## 2020-04-07 RX ORDER — SITAGLIPTIN 50 MG/1
1 TABLET, FILM COATED ORAL
Qty: 30 | Refills: 0
Start: 2020-04-07 | End: 2020-05-06

## 2020-04-07 RX ORDER — ACETAMINOPHEN 500 MG
1 TABLET ORAL
Qty: 90 | Refills: 0
Start: 2020-04-07 | End: 2020-05-06

## 2020-04-07 RX ORDER — GLIMEPIRIDE 1 MG
1 TABLET ORAL
Qty: 0 | Refills: 0 | DISCHARGE

## 2020-04-07 RX ORDER — ENOXAPARIN SODIUM 100 MG/ML
20 INJECTION SUBCUTANEOUS
Qty: 5 | Refills: 0
Start: 2020-04-07 | End: 2020-05-06

## 2020-04-07 RX ADMIN — Medication 16 UNIT(S): at 12:40

## 2020-04-07 RX ADMIN — INSULIN GLARGINE 30 UNIT(S): 100 INJECTION, SOLUTION SUBCUTANEOUS at 12:40

## 2020-04-07 RX ADMIN — Medication 2: at 12:40

## 2020-04-07 RX ADMIN — Medication 16 UNIT(S): at 09:39

## 2020-04-07 RX ADMIN — ENOXAPARIN SODIUM 40 MILLIGRAM(S): 100 INJECTION SUBCUTANEOUS at 12:38

## 2020-04-07 RX ADMIN — Medication 200 MILLIGRAM(S): at 12:38

## 2020-04-07 RX ADMIN — BUDESONIDE AND FORMOTEROL FUMARATE DIHYDRATE 2 PUFF(S): 160; 4.5 AEROSOL RESPIRATORY (INHALATION) at 09:39

## 2020-04-07 NOTE — CHART NOTE - NSCHARTNOTEFT_GEN_A_CORE
Discussed discharge w/ patient for the last 3 days. Pt understands that she continues to be hypoxic on room air and that prolonged hypoxia can lead to cardiac arrest and death. She is able to explain these risks back to me but feels that she would rather recover at home regardless. We discussed that if her shortness of breath got worse or if she felt very light-headed she should return to the hospital via 911 ambulance.     Pt is cleared for discharge given that she clearly understands the risks and is aware of what symptoms to monitor and when to return to the hospital.    Gerardo Jain MD  Acting Hopsitalist  (p) [phone]  (c) [phone]

## 2020-04-07 NOTE — ADVANCED PRACTICE NURSE CONSULT - ASSESSMENT
She was diagnosed with Type 2 DM for more than 5 years ago.  She follows with an endocrinologist, (unable to get her name due to poor phone reception).  She states that she has no diabetic retinopathy, neuropathy or nephropathy. EGFR 134  No history of CAD, CHF or CVA.    She checks her glucose twice daily, sometimes greater than 200s if she eats a lot of Carbs. Diet reivewed. Pat states she skips meals often and tries to adhere to Carb consistent diet.   Patient lives alone.    Patient is signing out AMA. Patient's mother recently passed from COVID 19 and wants to recover at home. Diabetes self management education provided including how to use an insulin pen, when to check blood sugars, location of injection sites and importance of rotating sites, s/s and proper treatment of hypoglycemia. portion sizes of foods, and A1C and blood sugar target. Patient demonstrated understanding of information taught and proper use of insulin pen through teach back. Case discussed with endocrine and primary RN Joaquín. She was diagnosed with Type 2 DM for more than 5 years ago.  She follows with an endocrinologist, (unable to get her name due to poor phone reception).  She states that she has no diabetic retinopathy, neuropathy or nephropathy. EGFR 134  No history of CAD, CHF or CVA.    She checks her glucose twice daily, sometimes greater than 200s if she eats a lot of Carbs. Diet reivewed. Pat states she skips meals often and tries to adhere to Carb consistent diet.   Patient lives alone.    Patient is signing out AMA. Patient's mother recently passed from COVID 19 and wants to recover at home. Diabetes self management education provided including how to use an insulin pen, when to check blood sugars, location of injection sites and importance of rotating sites, s/s and proper treatment of hypoglycemia. portion sizes of foods, and A1C and blood sugar target. Patient to be discharged on Lantus 20 units QHS (start on 4/8/20) and Metformin 1000 mg BID and Tradjenta 5 mg QD (or Januvia 100 mg QD), stop Glimepiride. Would not recommend Glimepiride with basal insulin given risk of hypoglycemia. Check FS 2-3x/day at home for goal  or .   Patient demonstrated understanding of information taught and proper use of insulin pen through teach back. Case discussed with endocrine and primary RN Joaquín.

## 2020-04-07 NOTE — DISCHARGE NOTE PROVIDER - NSFOLLOWUPCLINICS_GEN_ALL_ED_FT
[facility] General Internal Medicine  General Internal Medicine  [address] [place] [address] [id number]  Phone: [phone]  Fax:   Follow Up Time:

## 2020-04-07 NOTE — ADVANCED PRACTICE NURSE CONSULT - RECOMMEDATIONS
Patient to be discharged today. Patient instructed to follow up with MD. RN to reinforce diabetes education.

## 2020-04-07 NOTE — DISCHARGE NOTE PROVIDER - NSDCFUADDINST_GEN_ALL_CORE_FT
Follow up your PCP within1 -2 weeks; call for appointment.     Followup with [facility], [address], [address], [facility] [address], [phone].    You have been diagnosed with the COVID-19 virus during your hospital stay. You must self quarantine to complete a 14 day time period.  Monitor for fevers, shortness of breath and cough primarily.  Monitor your temperature daily to not any changes and increases.      It has been determined that you no longer need hospitalization and can recover while remaining in self-quarantine at home. You should follow the prevention steps below until a healthcare provider or local or state health department says you can return to your normal activities.    1. You should restrict activities outside your home, except for getting medical care.  2. Do not go to work, school, or public areas.  3. Avoid using public transportation, ride-sharing, or taxis.  4. Separate yourself from other people and animals in your home.  5. Call ahead before visiting your doctor.  6. Wear a facemask.  7. Cover your coughs and sneezes.  8. Clean your hands often.  9. Avoid sharing personal household items.  10. Clean all “high-touch” surfaces everyday.  11. Monitor your symptoms.  If you have a medical emergency and need to call 911, notify the dispatch personnel that you have COVID-19 If possible, put on a facemask before emergency medical services arrive.  12. Stopping home isolation.  Patients with confirmed COVID-19 should remain under home isolation precautions for 14 days since the positive COVID-19 test and until the risk of secondary transmission to others is thought to be low. The decision to discontinue home isolation precautions should be made on a case-by-case basis, in consultation with healthcare providers and state and local health departments. Your [facility] Department of Health can be reached at [phone] for further information about COVID-19.

## 2020-04-07 NOTE — DISCHARGE NOTE PROVIDER - PROVIDER TOKENS
FREE:[LAST:[[facility]],PHONE:[phone]],FAX:[(   )    -],ADDRESS:[[address] [place] [address], [place] [address]]]

## 2020-04-07 NOTE — DISCHARGE NOTE PROVIDER - NSDCMRMEDTOKEN_GEN_ALL_CORE_FT
acetaminophen 325 mg oral tablet: 2 tab(s) orally every 6 hours, As needed, Temp greater or equal to 38C (100.4F), Mild Pain (1 - 3)  albuterol 90 mcg/inh inhalation aerosol: 2 puff(s) inhaled every 6 hours  furosemide 20 mg oral tablet: 1 tab(s) orally once a day  glimepiride 4 mg oral tablet: 1 tab(s) orally 2 times a day  metFORMIN 1000 mg oral tablet: 1 tab(s) orally 2 times a day  simvastatin 20 mg oral tablet: 1 tab(s) orally once a day (at bedtime)  Symbicort 80 mcg-4.5 mcg/inh inhalation aerosol: 2 puff(s) inhaled 2 times a day acetaminophen 500 mg oral tablet: 1-2 tab(s) orally every 8 hours, As Needed for pain or fever  alcohol swabs : Apply topically to affected area 4 times a day   furosemide 20 mg oral tablet: 1 tab(s) orally once a day  glucometer (per patient&#x27;s insurance): Test blood sugars four times a day. Dispense #1 glucometer.  Insulin Pen Needles, 4mm: 1 application subcutaneously once a day (at bedtime) . ** Use with insulin pen **   Januvia 100 mg oral tablet: 1 tab(s) orally once a day   lancets: 1 application subcutaneously 4 times a day   Lantus Solostar Pen 100 units/mL subcutaneous solution: 20 unit(s) subcutaneous once a day (at bedtime)   metFORMIN 1000 mg oral tablet: 1 tab(s) orally 2 times a day  simvastatin 20 mg oral tablet: 1 tab(s) orally once a day (at bedtime)  Symbicort 80 mcg-4.5 mcg/inh inhalation aerosol: 2 puff(s) inhaled 2 times a day  test strips (per patient&#x27;s insurance): 1 application subcutaneously 4 times a day. ** Compatible with patient&#x27;s glucometer **  Ventolin HFA 90 mcg/inh inhalation aerosol: 2 puff(s) inhaled every 6 hours, As Needed -for shortness of breath and/or wheezing acetaminophen 500 mg oral tablet: 1-2 tab(s) orally every 8 hours, As Needed for pain or fever  alcohol swabs : Apply topically to affected area 4 times a day   furosemide 20 mg oral tablet: 1 tab(s) orally once a day  glucometer (per patient&#x27;s insurance): Test blood sugars four times a day. Dispense #1 glucometer.  insulin detemir 100 units/mL subcutaneous solution: 20 unit(s) subcutaneous once a day (at bedtime)   Insulin Pen Needles, 4mm: 1 application subcutaneously once a day (at bedtime) . ** Use with insulin pen **   Januvia 100 mg oral tablet: 1 tab(s) orally once a day   lancets: 1 application subcutaneously 4 times a day   metFORMIN 1000 mg oral tablet: 1 tab(s) orally 2 times a day  simvastatin 20 mg oral tablet: 1 tab(s) orally once a day (at bedtime)  Symbicort 80 mcg-4.5 mcg/inh inhalation aerosol: 2 puff(s) inhaled 2 times a day  test strips (per patient&#x27;s insurance): 1 application subcutaneously 4 times a day. ** Compatible with patient&#x27;s glucometer **  Ventolin HFA 90 mcg/inh inhalation aerosol: 2 puff(s) inhaled every 6 hours, As Needed -for shortness of breath and/or wheezing

## 2020-04-07 NOTE — DISCHARGE NOTE PROVIDER - CARE PROVIDER_API CALL
[facility],   [address] [place] [address], [address], [facility] [address]  Phone: [phone]  Fax: (   )    -  Follow Up Time:

## 2020-04-07 NOTE — CHART NOTE - NSCHARTNOTEFT_GEN_A_CORE
Spoke with primary team PA. Patient planned for discharge to home today vs. leaving AMA. T2DM with A1c 10.2% on oral agents at home (metformin and glimepiride). While in patient glucose are at goal 100-180 while on basal-bolus insulin (lantus 30 units QHS and humalog 16 units tid and moderate coverage) while also on solumedrol 40 mg BID. Plan is to discontinue steroids prior to discharge. At this time, unable to anticipate her glucose levels without steroids; however given A1c 10.2%, recommend basal + oral agent as best possible. Recommend Lantus 20 units QHS (start on 4/8/20) and Metformin 1000 mg BID and Tradjenta 5 mg QD (or Januvia 100 mg QD), stop Glimepiride. Would not recommend Glimepiride with basal insulin given risk of hypoglycemia. Check FS 2-3x/day at home for goal  or . As per initial consult note, she may have endocrinologist, recommend f/u after discharge. If no endocrinologist, recommend call [phone] for apt. Discussed with PA regarding insulin pen teaching by RN at bedside.     Landen Brown DO  Pager 9AM-5PM: [phone]  Pager Nights and Weekends: [phone]

## 2020-04-07 NOTE — ADVANCED PRACTICE NURSE CONSULT - REASON FOR CONSULT
50 y/o F. PMH of Asthma, HTN, DM. Presenting with a chief complaint of shortness of breath Patient states that she has been experiencing myalgias, fevers, cough, x 4-5 days, without improving with her inhalers at home, is supposed to use a cpap at night but uses it occasionally, reportedly never intubated, and has not come to the hospital before for an asthma exacerbation in the past. States her mother is intubated and was covid+. No n/v/d. Last tylenol taken this morning for fever. No travel. In the ED pt was hypoxic to 69% on room air and reportedly had difficulty breathing. (02 Apr 2020 18:41)    Patient referred for diabetes education bc pt will be new to insulin.

## 2020-04-07 NOTE — DISCHARGE NOTE PROVIDER - HOSPITAL COURSE
49F w/ hx of asthma, DMII, HTN, MENA on CPAP at home, p/w SOB, fevers, myalgia, cough, satting 69% RA in ED. COVID-19 positive.         COVID-19 Test: + 4/2    Oxygen Support: room air    Hydroxychloroquine: starting 4/4    Steroids: starting 4/3    Anakinra: n/a        Problem/Plan:    #COVID-19 Infection    - ambulate off O2, if O2 sat > 85-90% can be discharged home w/ home O2 (pt previously on 4L), if > 90% can be discharged off O2    - supplemental O2 PRN while inpatient    - Tylenol PRN    - Albuterol PRN        #Asthma – no exacerbation    - albuterol PRN        #Chronic Medical Problems:    - DM: hold PO meds, ISS, f/u FS, f/u endo recs    - HTN: c/w home Lasix        #FEN/PPx    - lovenox SubQ    - regular diet        #FULL CODE    #Discharge planning: Criteria: if O2 sat > 85-90% can be discharged home w/ home O2 (pt previously on 4L), if > 90% can be discharged off O2; Meds: discharge without hydroxychloroquine or steroids, restart all home meds (including home diabetes regimen)        Evaluated by endocrine for uncontrolled DM2 - HgA1C 10.2.     Discharge Planning:    Basal + oral VS resuming home regimen (Metformin/Glimepiride) if off steroids and glucose is well controlled. May have acute exacerbation of A1C due to COVID19 infection.  Regimen TBD. Will discuss with patient further closer to discharge date.     Followup with [facility], [address] [place] [address], [address], [facility] [address], [phone].        INCOMPLETE     Patient to f/u with PCP & endocrine. 49F w/ hx of asthma, DMII, HTN, MENA on CPAP at home, p/w SOB, fevers, myalgia, cough, satting 69% RA in ED. COVID-19 positive.         COVID-19 Test: + 4/2    Oxygen Support: room air    Hydroxychloroquine: starting 4/4    Steroids: starting 4/3    Anakinra: n/a        Problem/Plan:    #COVID-19 Infection    - ambulate off O2, if O2 sat > 85-90% can be discharged home w/ home O2 (pt previously on 4L), if > 90% can be discharged off O2    - supplemental O2 PRN while inpatient    - Tylenol PRN    - Albuterol PRN        #Asthma – no exacerbation    - albuterol PRN        #Chronic Medical Problems:    - DM: hold PO meds, ISS, f/u FS, f/u endo recs    - HTN: c/w home Lasix        #FEN/PPx    - lovenox SubQ    - regular diet        #FULL CODE    #Discharge planning: Criteria: if O2 sat > 85-90% can be discharged home w/ home O2 (pt previously on 4L), if > 90% can be discharged off O2; Meds: discharge without hydroxychloroquine or steroids, restart all home meds (including home diabetes regimen)        Evaluated by endocrine for uncontrolled DM2. Patient planned for discharge to home today vs. leaving AMA. T2DM with A1c 10.2% on oral agents at home (metformin and glimepiride). While in patient glucose are at goal 100-180 while on basal-bolus insulin (lantus 30 units QHS and humalog 16 units tid and moderate coverage) while also on solumedrol 40 mg BID. Plan is to discontinue steroids prior to discharge. At this time, unable to anticipate her glucose levels without steroids; however given A1c 10.2%, recommend basal + oral agent as best possible. Recommend Lantus 20 units QHS (start on 4/8/20) and Metformin 1000 mg BID and Tradjenta 5 mg QD (or Januvia 100 mg QD), stop Glimepiride. Would not recommend Glimepiride with basal insulin given risk of hypoglycemia. Check FS 2-3x/day at home for goal  or . As per initial consult note, she may have endocrinologist, recommend f/u after discharge. If no endocrinologist, recommend call [phone] for apt. Followup with [facility], [address], [address], [facility] [address], [phone].        INCOMPLETE     Patient to f/u with PCP & endocrine. 49F w/ hx of asthma, DMII, HTN, MENA on CPAP at home, p/w SOB, fevers, myalgia, cough, satting 69% RA in ED. COVID-19 positive.         COVID-19 Test: + 4/2    Hydroxychloroquine: starting 4/4    Steroids: starting 4/3    Anakinra: n/a        Problem/Plan:    #COVID-19 Infection    - treated with Plaquenil & IV steroids while inpatient. discharge off steroids as per attending MD.     - supplemental O2 PRN while inpatient    - Tylenol PRN    - Albuterol PRN        #Asthma – no exacerbation    - albuterol PRN        #Chronic Medical Problems:    - DM: hold PO meds, ISS, f/u FS, f/u endo recs    - HTN: c/w home Lasix        #FEN/PPx    - lovenox SubQ    - regular diet        #FULL CODE        Evaluated by endocrine for uncontrolled DM2. Patient planned for discharge to home today vs. leaving AMA. T2DM with A1c 10.2% on oral agents at home (metformin and glimepiride). While in patient glucose are at goal 100-180 while on basal-bolus insulin (lantus 30 units QHS and humalog 16 units tid and moderate coverage) while also on solumedrol 40 mg BID. Plan is to discontinue steroids prior to discharge. At this time, unable to anticipate her glucose levels without steroids; however given A1c 10.2%, recommend basal + oral agent as best possible. Recommend Lantus 20 units QHS (start on 4/8/20) and Metformin 1000 mg BID and Tradjenta 5 mg QD (or Januvia 100 mg QD), stop Glimepiride. Would not recommend Glimepiride with basal insulin given risk of hypoglycemia. Check FS 2-3x/day at home for goal  or . As per initial consult note, she may have endocrinologist, recommend f/u after discharge. If no endocrinologist, recommend call [phone] for apt. Followup with [facility], [address] [place] [address], [address], [facility] [address], [phone].        Patient wishes to leave the hospital against medical advice.  She is hypoxic on room air to 82%.  She does not qualify for home oxygen as per .  She understands the risks of leaving the hospital including further hypoxia and even sudden cardiac death.  Patient is A&O x3 and has full capacity to make her own medical decisions. Pt was informed of their medical conditions, benefits, and alternatives to treatment as well as the risks of refusing treatment and the seriousness of leaving against medical advice such as the risks of heart attack, stroke, seizure, and even death were fully explained to the patient.  After expressing full understanding, patient then signs out against medical advice witnessed by the nurse.  Attending Dr. Jain made aware. 49F w/ hx of asthma, DMII, HTN, MENA on CPAP at home, p/w SOB, fevers, myalgia, cough, satting 69% RA in ED. COVID-19 positive.         COVID-19 Test: + 4/2    Hydroxychloroquine: starting 4/4    Steroids: starting 4/3    Anakinra: n/a        Problem/Plan:    #COVID-19 Infection    - treated with Plaquenil & IV steroids while inpatient. discharge off steroids as per attending MD.     - supplemental O2 PRN while inpatient    - Tylenol PRN    - Albuterol PRN        #Asthma – no exacerbation    - albuterol PRN        #Chronic Medical Problems:    - DM: hold PO meds, ISS, f/u FS, f/u endo recs (see below)    - HTN: c/w home Lasix        #FEN/PPx    - lovenox SubQ    - regular diet        #FULL CODE        Evaluated by endocrine for uncontrolled DM2. Patient planned for discharge to home today vs. leaving AMA. T2DM with A1c 10.2% on oral agents at home (metformin and glimepiride). While in patient glucose are at goal 100-180 while on basal-bolus insulin (lantus 30 units QHS and humalog 16 units tid and moderate coverage) while also on solumedrol 40 mg BID. Plan is to discontinue steroids prior to discharge. At this time, unable to anticipate her glucose levels without steroids; however given A1c 10.2%, recommend basal + oral agent as best possible. Recommend Lantus 20 units QHS (start on 4/8/20) and Metformin 1000 mg BID and Tradjenta 5 mg QD (or Januvia 100 mg QD), stop Glimepiride. Would not recommend Glimepiride with basal insulin given risk of hypoglycemia. Check FS 2-3x/day at home for goal  or . As per initial consult note, she may have endocrinologist, recommend f/u after discharge. If no endocrinologist, recommend call [phone] for apt. Followup with [facility], [address] [place] [address], [address], [facility] [address], [phone].    Lantus not covered by patient insurance, rather levemir is.  Patient discharged on levemir 20units SQ QHS.        Patient wishes to leave the hospital against medical advice.  She is hypoxic on room air to 82%.  She does not qualify for home oxygen as per .  She understands the risks of leaving the hospital including further hypoxia and even sudden cardiac death.  Patient is A&O x3 and has full capacity to make her own medical decisions. Pt was informed of their medical conditions, benefits, and alternatives to treatment as well as the risks of refusing treatment and the seriousness of leaving against medical advice such as the risks of heart attack, stroke, seizure, and even death were fully explained to the patient.  After expressing full understanding, patient then signs out against medical advice witnessed by the nurse.  Attending Dr. Jain made aware. 49F w/ hx of asthma, DMII, HTN, MENA on CPAP at home, p/w SOB, fevers, myalgia, cough, satting 69% RA in ED. COVID-19 positive.         COVID-19 Test: + 4/2    Hydroxychloroquine: starting 4/4    Steroids: starting 4/3    Anakinra: n/a        Problem/Plan:    #COVID-19 Infection    - treated with Plaquenil & IV steroids while inpatient. discharge off steroids as per attending MD.     - supplemental O2 PRN while inpatient.  Patient still requiring oxygen via nasal cannula (3L).  She desaturates to 82% on room air.  Patient wants to sign out against medical advice.     - Tylenol PRN    - Albuterol PRN        #Asthma – no exacerbation    - albuterol PRN        #Chronic Medical Problems:    - DM: hold PO meds, ISS, f/u FS, f/u endo recs (see below)    - HTN: c/w home Lasix        #FEN/PPx    - lovenox SubQ    - regular diet        #FULL CODE        Evaluated by endocrine for uncontrolled DM2. Patient leaving AMA. T2DM with A1c 10.2% on oral agents at home (metformin and glimepiride). While in patient glucose are at goal 100-180 while on basal-bolus insulin (lantus 30 units QHS and humalog 16 units tid and moderate coverage) while also on solumedrol 40 mg BID. Plan is to discontinue steroids prior to discharge. At this time, unable to anticipate her glucose levels without steroids; however given A1c 10.2%, recommend basal + oral agent as best possible. Recommend Lantus 20 units QHS (start on 4/8/20) and Metformin 1000 mg BID and Januvia 100 mg QD, stop Glimepiride. Would not recommend Glimepiride with basal insulin given risk of hypoglycemia. Check FS 2-3x/day at home for goal  or . As per initial consult note, she may have endocrinologist, recommend f/u after discharge. If no endocrinologist, recommend call [phone] for apt. Followup with [facility], [address] [place] [address], [address], [place] [address], [phone].    Lantus not covered by patient insurance, rather levemir is.  Patient discharged on levemir 20units SQ QHS.        Patient wishes to leave the hospital against medical advice.  She is hypoxic on room air to 82%.  She does not qualify for home oxygen as per .  She understands the risks of leaving the hospital including further hypoxia and even sudden cardiac death.  Patient is A&O x3 and has full capacity to make her own medical decisions. Pt was informed of their medical conditions, benefits, and alternatives to treatment as well as the risks of refusing treatment and the seriousness of leaving against medical advice such as the risks of heart attack, stroke, seizure, and even death were fully explained to the patient.  After expressing full understanding, patient then signs out against medical advice witnessed by the nurse.  Attending Dr. Jain made aware.

## 2020-04-07 NOTE — DISCHARGE NOTE PROVIDER - NSDCCPCAREPLAN_GEN_ALL_CORE_FT
PRINCIPAL DISCHARGE DIAGNOSIS  Diagnosis: Infection due to 2019 novel coronavirus  Assessment and Plan of Treatment: You have been diagnosed with the COVID-19 virus during your hospital stay. You must self quarantine to complete a 14 day time period.  Monitor for fevers, shortness of breath and cough primarily.  Monitor your temperature daily to not any changes and increases.    It has been determined that you no longer need hospitalization and can recover while remaining in self-quarantine at home. You should follow the prevention steps below until a healthcare provider or local or state health department says you can return to your normal activities.  1. You should restrict activities outside your home, except for getting medical care.  2. Do not go to work, school, or public areas.  3. Avoid using public transportation, ride-sharing, or taxis.  4. Separate yourself from other people and animals in your home.  5. Call ahead before visiting your doctor.  6. Wear a facemask.  7. Cover your coughs and sneezes.  8. Clean your hands often.  9. Avoid sharing personal household items.  10. Clean all “high-touch” surfaces everyday.  11. Monitor your symptoms.  If you have a medical emergency and need to call 911, notify the dispatch personnel that you have COVID-19 If possible, put on a facemask before emergency medical services arrive.  12. Stopping home isolation.  Patients with confirmed COVID-19 should remain under home isolation precautions for 14 days since the positive COVID-19 test and until the risk of secondary transmission to others is thought to be low. The decision to discontinue home isolation precautions should be made on a case-by-case basis, in consultation with healthcare providers and state and local health departments. Your [facility] Department of Health can be reached at [phone] for further information about COVID-19.      SECONDARY DISCHARGE DIAGNOSES  Diagnosis: DM (diabetes mellitus)  Assessment and Plan of Treatment: Monitor finger sticks pre-meal and bedtime, low salt, fat and carbohydrate diet, minimize glucose intake.  Exercise daily for at least 30 minutes and weight loss.  Follow up with primary care physician and endocrinologist for routine Hemoglobin A1C checks and management.  Follow up with your ophthalmologist for routine yearly vision exams.   Followup with [facility], [address] [place] [address], [address], [facility] [address], [phone]. PRINCIPAL DISCHARGE DIAGNOSIS  Diagnosis: Infection due to 2019 novel coronavirus  Assessment and Plan of Treatment: You have been diagnosed with the COVID-19 virus during your hospital stay. You must self quarantine to complete a 14 day time period.  Monitor for fevers, shortness of breath and cough primarily.  Monitor your temperature daily to not any changes and increases.    It has been determined that you no longer need hospitalization and can recover while remaining in self-quarantine at home. You should follow the prevention steps below until a healthcare provider or local or state health department says you can return to your normal activities.  1. You should restrict activities outside your home, except for getting medical care.  2. Do not go to work, school, or public areas.  3. Avoid using public transportation, ride-sharing, or taxis.  4. Separate yourself from other people and animals in your home.  5. Call ahead before visiting your doctor.  6. Wear a facemask.  7. Cover your coughs and sneezes.  8. Clean your hands often.  9. Avoid sharing personal household items.  10. Clean all “high-touch” surfaces everyday.  11. Monitor your symptoms.  If you have a medical emergency and need to call 911, notify the dispatch personnel that you have COVID-19 If possible, put on a facemask before emergency medical services arrive.  12. Stopping home isolation.  Patients with confirmed COVID-19 should remain under home isolation precautions for 14 days since the positive COVID-19 test and until the risk of secondary transmission to others is thought to be low. The decision to discontinue home isolation precautions should be made on a case-by-case basis, in consultation with healthcare providers and state and local health departments. Your [facility] Department of Health can be reached at [phone] for further information about COVID-19.      SECONDARY DISCHARGE DIAGNOSES  Diagnosis: DM (diabetes mellitus)  Assessment and Plan of Treatment: Take medications as prescribed:  -Lantus 20 units subcutaneously at bedtime (start on 4/8/20)  -Metformin 1000mg by mouth every 12 hours  -Januvia 100mg by mouth daily  STOP taking glimepiride.  Monitor finger sticks pre-meal and bedtime for goal fingerstick of . , low salt, fat and carbohydrate diet, minimize glucose intake.  Exercise daily for at least 30 minutes and weight loss.  Follow up with primary care physician and endocrinologist for routine Hemoglobin A1C checks and management.  Follow up with your ophthalmologist for routine yearly vision exams.   Followup with [facility] within 1-2 weeks, [address], [address] [address], [phone]. PRINCIPAL DISCHARGE DIAGNOSIS  Diagnosis: Infection due to 2019 novel coronavirus  Assessment and Plan of Treatment: Your oxygen saturation was low on room air (off oxygen).  You opted to sign out against medical advice and understand the risks of doing so including sudden cardiac death.  Please return to the emergency department and call 911 is you have worsening symptoms including shortness of breath.    You have been diagnosed with the COVID-19 virus during your hospital stay. You must self quarantine to complete a 14 day time period.  Monitor for fevers, shortness of breath and cough primarily.  Monitor your temperature daily to not any changes and increases.    It has been determined that you no longer need hospitalization and can recover while remaining in self-quarantine at home. You should follow the prevention steps below until a healthcare provider or local or state health department says you can return to your normal activities.  1. You should restrict activities outside your home, except for getting medical care.  2. Do not go to work, school, or public areas.  3. Avoid using public transportation, ride-sharing, or taxis.  4. Separate yourself from other people and animals in your home.  5. Call ahead before visiting your doctor.  6. Wear a facemask.  7. Cover your coughs and sneezes.  8. Clean your hands often.  9. Avoid sharing personal household items.  10. Clean all “high-touch” surfaces everyday.  11. Monitor your symptoms.  If you have a medical emergency and need to call 911, notify the dispatch personnel that you have COVID-19 If possible, put on a facemask before emergency medical services arrive.  12. Stopping home isolation.  Patients with confirmed COVID-19 should remain under home isolation precautions for 14 days since the positive COVID-19 test and until the risk of secondary transmission to others is thought to be low. The decision to discontinue home isolation precautions should be made on a case-by-case basis, in consultation with healthcare providers and state and local health departments. Your [facility] Department of Health can be reached at [phone]      SECONDARY DISCHARGE DIAGNOSES  Diagnosis: DM (diabetes mellitus)  Assessment and Plan of Treatment: Take medications as prescribed:  -Lantus 20 units subcutaneously at bedtime (start on 4/8/20)  -Metformin 1000mg by mouth every 12 hours  -Januvia 100mg by mouth daily  STOP taking glimepiride.  Monitor finger sticks pre-meal and bedtime for goal fingerstick of . , low salt, fat and carbohydrate diet, minimize glucose intake.  Exercise daily for at least 30 minutes and weight loss.  Follow up with primary care physician and endocrinologist for routine Hemoglobin A1C checks and management.  Follow up with your ophthalmologist for routine yearly vision exams.   Followup with [facility] within 1-2 weeks, [address], [address], [facility] [address], [phone]. PRINCIPAL DISCHARGE DIAGNOSIS  Diagnosis: Infection due to 2019 novel coronavirus  Assessment and Plan of Treatment: Your oxygen saturation was low on room air (off oxygen).  You opted to sign out against medical advice and understand the risks of doing so including sudden cardiac death.  Please return to the emergency department and call 911 is you have worsening symptoms including shortness of breath.    You have been diagnosed with the COVID-19 virus during your hospital stay. You must self quarantine to complete a 14 day time period.  Monitor for fevers, shortness of breath and cough primarily.  Monitor your temperature daily to not any changes and increases.    It has been determined that you no longer need hospitalization and can recover while remaining in self-quarantine at home. You should follow the prevention steps below until a healthcare provider or local or state health department says you can return to your normal activities.  1. You should restrict activities outside your home, except for getting medical care.  2. Do not go to work, school, or public areas.  3. Avoid using public transportation, ride-sharing, or taxis.  4. Separate yourself from other people and animals in your home.  5. Call ahead before visiting your doctor.  6. Wear a facemask.  7. Cover your coughs and sneezes.  8. Clean your hands often.  9. Avoid sharing personal household items.  10. Clean all “high-touch” surfaces everyday.  11. Monitor your symptoms.  If you have a medical emergency and need to call 911, notify the dispatch personnel that you have COVID-19 If possible, put on a facemask before emergency medical services arrive.  12. Stopping home isolation.  Patients with confirmed COVID-19 should remain under home isolation precautions for 14 days since the positive COVID-19 test and until the risk of secondary transmission to others is thought to be low. The decision to discontinue home isolation precautions should be made on a case-by-case basis, in consultation with healthcare providers and state and local health departments. Your [facility] Department of Health can be reached at [phone]      SECONDARY DISCHARGE DIAGNOSES  Diagnosis: DM (diabetes mellitus)  Assessment and Plan of Treatment: Take medications as prescribed:  -Levemir 20 units subcutaneously at bedtime (start on 4/8/20)  -Metformin 1000mg by mouth every 12 hours  -Januvia 100mg by mouth daily  STOP taking glimepiride.  Monitor finger sticks pre-meal and bedtime for goal fingerstick of . , low salt, fat and carbohydrate diet, minimize glucose intake.  Exercise daily for at least 30 minutes and weight loss.  Follow up with primary care physician and endocrinologist for routine Hemoglobin A1C checks and management.  Follow up with your ophthalmologist for routine yearly vision exams.   Followup with [facility] within 1-2 weeks, [address], [address], [facility] [address], [phone].

## 2021-10-07 ENCOUNTER — INPATIENT (INPATIENT)
Facility: HOSPITAL | Age: 51
LOS: 5 days | Discharge: HOME | End: 2021-10-13
Attending: STUDENT IN AN ORGANIZED HEALTH CARE EDUCATION/TRAINING PROGRAM | Admitting: STUDENT IN AN ORGANIZED HEALTH CARE EDUCATION/TRAINING PROGRAM
Payer: MEDICAID

## 2021-10-07 VITALS
HEART RATE: 104 BPM | SYSTOLIC BLOOD PRESSURE: 136 MMHG | DIASTOLIC BLOOD PRESSURE: 95 MMHG | OXYGEN SATURATION: 100 % | TEMPERATURE: 98 F | RESPIRATION RATE: 16 BRPM | HEIGHT: 62 IN

## 2021-10-07 DIAGNOSIS — L03.90 CELLULITIS, UNSPECIFIED: ICD-10-CM

## 2021-10-07 LAB
ALBUMIN SERPL ELPH-MCNC: 4.6 G/DL — SIGNIFICANT CHANGE UP (ref 3.3–5)
ALP SERPL-CCNC: 110 U/L — SIGNIFICANT CHANGE UP (ref 40–120)
ALT FLD-CCNC: 31 U/L — SIGNIFICANT CHANGE UP (ref 4–33)
ANION GAP SERPL CALC-SCNC: 16 MMOL/L — HIGH (ref 7–14)
AST SERPL-CCNC: 26 U/L — SIGNIFICANT CHANGE UP (ref 4–32)
BASOPHILS # BLD AUTO: 0.05 K/UL — SIGNIFICANT CHANGE UP (ref 0–0.2)
BASOPHILS NFR BLD AUTO: 0.6 % — SIGNIFICANT CHANGE UP (ref 0–2)
BILIRUB SERPL-MCNC: 0.3 MG/DL — SIGNIFICANT CHANGE UP (ref 0.2–1.2)
BUN SERPL-MCNC: 11 MG/DL — SIGNIFICANT CHANGE UP (ref 7–23)
CALCIUM SERPL-MCNC: 9.6 MG/DL — SIGNIFICANT CHANGE UP (ref 8.4–10.5)
CHLORIDE SERPL-SCNC: 95 MMOL/L — LOW (ref 98–107)
CO2 SERPL-SCNC: 28 MMOL/L — SIGNIFICANT CHANGE UP (ref 22–31)
CREAT SERPL-MCNC: 0.69 MG/DL — SIGNIFICANT CHANGE UP (ref 0.5–1.3)
CRP SERPL-MCNC: 28.2 MG/L — HIGH
EOSINOPHIL # BLD AUTO: 0.14 K/UL — SIGNIFICANT CHANGE UP (ref 0–0.5)
EOSINOPHIL NFR BLD AUTO: 1.8 % — SIGNIFICANT CHANGE UP (ref 0–6)
ERYTHROCYTE [SEDIMENTATION RATE] IN BLOOD: 16 MM/HR — SIGNIFICANT CHANGE UP (ref 4–25)
GLUCOSE SERPL-MCNC: 297 MG/DL — HIGH (ref 70–99)
HCG SERPL-ACNC: <5 MIU/ML — SIGNIFICANT CHANGE UP
HCT VFR BLD CALC: 36.1 % — SIGNIFICANT CHANGE UP (ref 34.5–45)
HGB BLD-MCNC: 11.7 G/DL — SIGNIFICANT CHANGE UP (ref 11.5–15.5)
IANC: 3.73 K/UL — SIGNIFICANT CHANGE UP (ref 1.5–8.5)
IMM GRANULOCYTES NFR BLD AUTO: 0.1 % — SIGNIFICANT CHANGE UP (ref 0–1.5)
LYMPHOCYTES # BLD AUTO: 3.21 K/UL — SIGNIFICANT CHANGE UP (ref 1–3.3)
LYMPHOCYTES # BLD AUTO: 41.2 % — SIGNIFICANT CHANGE UP (ref 13–44)
MAGNESIUM SERPL-MCNC: 1.4 MG/DL — LOW (ref 1.6–2.6)
MCHC RBC-ENTMCNC: 25 PG — LOW (ref 27–34)
MCHC RBC-ENTMCNC: 32.4 GM/DL — SIGNIFICANT CHANGE UP (ref 32–36)
MCV RBC AUTO: 77.1 FL — LOW (ref 80–100)
MONOCYTES # BLD AUTO: 0.65 K/UL — SIGNIFICANT CHANGE UP (ref 0–0.9)
MONOCYTES NFR BLD AUTO: 8.3 % — SIGNIFICANT CHANGE UP (ref 2–14)
NEUTROPHILS # BLD AUTO: 3.73 K/UL — SIGNIFICANT CHANGE UP (ref 1.8–7.4)
NEUTROPHILS NFR BLD AUTO: 48 % — SIGNIFICANT CHANGE UP (ref 43–77)
NRBC # BLD: 0 /100 WBCS — SIGNIFICANT CHANGE UP
NRBC # FLD: 0 K/UL — SIGNIFICANT CHANGE UP
PHOSPHATE SERPL-MCNC: 3.1 MG/DL — SIGNIFICANT CHANGE UP (ref 2.5–4.5)
PLATELET # BLD AUTO: 393 K/UL — SIGNIFICANT CHANGE UP (ref 150–400)
POTASSIUM SERPL-MCNC: 4.2 MMOL/L — SIGNIFICANT CHANGE UP (ref 3.5–5.3)
POTASSIUM SERPL-SCNC: 4.2 MMOL/L — SIGNIFICANT CHANGE UP (ref 3.5–5.3)
PROT SERPL-MCNC: 8.1 G/DL — SIGNIFICANT CHANGE UP (ref 6–8.3)
RBC # BLD: 4.68 M/UL — SIGNIFICANT CHANGE UP (ref 3.8–5.2)
RBC # FLD: 15.3 % — HIGH (ref 10.3–14.5)
SODIUM SERPL-SCNC: 139 MMOL/L — SIGNIFICANT CHANGE UP (ref 135–145)
WBC # BLD: 7.79 K/UL — SIGNIFICANT CHANGE UP (ref 3.8–10.5)
WBC # FLD AUTO: 7.79 K/UL — SIGNIFICANT CHANGE UP (ref 3.8–10.5)

## 2021-10-07 PROCEDURE — 73560 X-RAY EXAM OF KNEE 1 OR 2: CPT | Mod: 26,LT

## 2021-10-07 PROCEDURE — 99285 EMERGENCY DEPT VISIT HI MDM: CPT

## 2021-10-07 PROCEDURE — 73590 X-RAY EXAM OF LOWER LEG: CPT | Mod: 26,LT

## 2021-10-07 PROCEDURE — 73600 X-RAY EXAM OF ANKLE: CPT | Mod: 26,LT

## 2021-10-07 PROCEDURE — 73620 X-RAY EXAM OF FOOT: CPT | Mod: 26,LT

## 2021-10-07 PROCEDURE — 99223 1ST HOSP IP/OBS HIGH 75: CPT

## 2021-10-07 PROCEDURE — 93971 EXTREMITY STUDY: CPT | Mod: 26,LT

## 2021-10-07 RX ORDER — VANCOMYCIN HCL 1 G
1000 VIAL (EA) INTRAVENOUS ONCE
Refills: 0 | Status: COMPLETED | OUTPATIENT
Start: 2021-10-07 | End: 2021-10-07

## 2021-10-07 RX ORDER — INSULIN LISPRO 100/ML
4 VIAL (ML) SUBCUTANEOUS ONCE
Refills: 0 | Status: COMPLETED | OUTPATIENT
Start: 2021-10-07 | End: 2021-10-07

## 2021-10-07 RX ADMIN — Medication 250 MILLIGRAM(S): at 22:46

## 2021-10-07 RX ADMIN — Medication 4 UNIT(S): at 23:09

## 2021-10-07 NOTE — ED ADULT TRIAGE NOTE - CHIEF COMPLAINT QUOTE
Pt morbidly obese c/o pain to Lt leg, pt has been treated for skin infection to Lt leg since 9/25/2021, pt has been compliant with antibitoic regimen but no relief in leg discomfort. Pt endorses pain to Lt leg and skin peeling and boiling, area is warm to touch.

## 2021-10-07 NOTE — ED PROVIDER NOTE - ATTENDING CONTRIBUTION TO CARE
Pt w/ h/o LE edema on torsemide, DM , asthma presents with LLE edema and erythema c/f cellulitis despite being on clindamycin. No c/f necrotizing fasciitis given well appearing, symptoms ongoing for 1 almost 2 weeks, no crepitus, no lactate, XR w/o gas. Plan for IV abx, admit, derm consult.

## 2021-10-07 NOTE — ED PROVIDER NOTE - EYES, MLM
----- Message from Ed Min MD sent at 11/3/2020  3:25 PM EST -----  Tell patient that the biopsies taken in her stomach showed gastritis/inflammation. No infection was noted. She should continue the pantoprazole 40 mg daily.
I called and spoke with Jaret Rivera, explained that Helen's  test showed that she has gastritis, no infection. Continue Pantoprazole. She said ok. She said Cynthia Armstrong was doing well. She will call if she needs appt before 12-7-2020.
Clear bilaterally, pupils equal, round and reactive to light.

## 2021-10-07 NOTE — ED ADULT NURSE NOTE - NSIMPLEMENTINTERV_GEN_ALL_ED
Implemented All Universal Safety Interventions:  [place] to call system. Call bell, personal items and telephone within reach. Instruct patient to call for assistance. Room bathroom lighting operational. Non-slip footwear when patient is off stretcher. Physically safe environment: no spills, clutter or unnecessary equipment. Stretcher in lowest position, wheels locked, appropriate side rails in place.

## 2021-10-07 NOTE — H&P ADULT - HISTORY OF PRESENT ILLNESS
52 yo f obestiy, kylee on cpap, reactive airway, DM2,  disease presenting with distal left leg swelling/erythema. Had been on clindamycin for several days and reports taking as prescribed with no improvement. Had been hospitalized at [facility] for 1 day of IV abx . Was discharged on cefpodoxime , reports taking one dose and shortly after developed diffuse scaling pruritic rash as well as focal blistering near cellulitic lesion. Subsequently switched to clindamycin. Denies fever, chills, nausea. Plain film of left leg with soft tissue swelling but no soft tissue gas 52 yo f obestiy, kylee on cpap, reactive airway, DM2,  disease presenting with distal left leg swelling/erythema. Had been on clindamycin for several days and reports taking as prescribed with no improvement. Had been hospitalized at [facility] for 1 day of IV abx . Was discharged on cefpodoxime , reports taking one dose and shortly after developed diffuse scaling pruritic rash as well as focal blistering near cellulitic lesion. Subsequently switched to clindamycin. Denies fever, chills, nausea. Plain film of left leg with soft tissue swelling but no soft tissue gas. Duplex negative for dvt

## 2021-10-07 NOTE — ED PROVIDER NOTE - OBJECTIVE STATEMENT
51F PMH DM and Asthma presents for rash. Rash started on 9/18. She also has bilateral lower extremity swelling and is on Torsemide for that which she says inst helping. Patient also got IV ABx and oral ABx (cefpodoxime) of which she only took one dose.  (9/24-9/25). OSH was treating her for presumed cellulitis but its unclear? 51F PMH DM and Asthma presents for left lower extremity swelling, redness and full body rash. Rash started on 9/18. She also has bilateral lower extremity swelling and is on Torsemide for that which she says inst helping. Patient also got IV ABx and oral ABx (cefpodoxime) of which she only took one dose, got an itchy rash within minutes, and self d'c'd from (9/24-9/25). Patient has been on clindamycin 450TID with no improvement in left leg swelling and redness. 51F PMH DM and Asthma presents for left lower extremity swelling, redness and pain. Rash started on 9/18. She also has bilateral lower extremity swelling and is on Torsemide for that which she says inst helping. Patient went to [facility] on 9/24 and got IV ABx and oral ABx clindamycin (also had taken one dose of cefpodoxime), got an itchy rash within minutes which is why she stopped the cefpodoxime, and self d'c'd from (9/24-9/25). Patient has been on clindamycin 450TID with no improvement in left leg swelling and redness. She noticed her skin on her legs has started to become flaky and peel. Denies mucosal lesions, fevers, sob, chest pain, abd pain, n/v.

## 2021-10-07 NOTE — ED PROVIDER NOTE - SKIN, MLM
Skin normal color for race, warm, dry and intact. Left lower leg: redness, swelling, ttp. Redness extends up to left thigh. No crepitus or gas on palpation.

## 2021-10-07 NOTE — ED PROVIDER NOTE - PROGRESS NOTE DETAILS
Stef- will cdu got iv abx for cellulitis as pt has diabetes and cellulitis covers a large area of pain. coffee/tea

## 2021-10-07 NOTE — ED PROVIDER NOTE - CLINICAL SUMMARY MEDICAL DECISION MAKING FREE TEXT BOX
R/o DVT, cellulitis, nec fasc. SJS/TEN  unlikely given time line. Patient appears with left lower extremity cellulitis. will R/o DVT, nec fasc. SJS/TEN  unlikely given time line of when she started Abx. pt also well appearing, non toxic. Patient appears with left lower extremity cellulitis. will R/o DVT, nec fasc. SJS/TEN  unlikely given time line of when she started Abx and pt very well appearing, non toxic, no mucosal lesions.

## 2021-10-07 NOTE — ED ADULT NURSE NOTE - OBJECTIVE STATEMENT
50y/o female A&ox4, ambulatory received in [facility]. pt c/o L lower extremity infection at this time. recently got toe nails done and now c/o skin peeling. compliant with abx regimen prescibred but pt notices little relief at this time. left LE red, swollen at this time. no open wounds noted, denies fevers at home. denies sob, chest pain, dizziness, lightheadedness, chills. 20g iv placed in L hand, labs drawn and sent. md at bedside for eval. bed in lowest position, side rails up, call bell in hand, safety maintained. awaiting further orders. will continue to monitor.

## 2021-10-07 NOTE — H&P ADULT - NSHPLABSRESULTS_GEN_ALL_CORE
11.7   7.79  )-----------( 393      ( 07 Oct 2021 19:42 )             36.1     10-07    139  |  95<L>  |  11  ----------------------------<  297<H>  4.2   |  28  |  0.69    Ca    9.6      07 Oct 2021 19:42  Phos  3.1     10-07  Mg     1.40     10-07    TPro  8.1  /  Alb  4.6  /  TBili  0.3  /  DBili  x   /  AST  26  /  ALT  31  /  AlkPhos  110  10-07    CAPILLARY BLOOD GLUCOSE      POCT Blood Glucose.: 237 mg/dL (07 Oct 2021 23:06)  POCT Blood Glucose.: 315 mg/dL (07 Oct 2021 18:25)        Vital Signs Last 24 Hrs  T(C): 36.9 (07 Oct 2021 18:21), Max: 36.9 (07 Oct 2021 18:21)  T(F): 98.5 (07 Oct 2021 18:21), Max: 98.5 (07 Oct 2021 18:21)  HR: 92 (07 Oct 2021 21:52) (92 - 104)  BP: 115/74 (07 Oct 2021 21:52) (115/74 - 136/95)  BP(mean): --  RR: 16 (07 Oct 2021 21:52) (16 - 16)  SpO2: 100% (07 Oct 2021 21:52) (100% - 100%)

## 2021-10-07 NOTE — H&P ADULT - NSHPREVIEWOFSYSTEMS_GEN_ALL_CORE
Review of Systems:   CONSTITUTIONAL: No fever, weight loss, or fatigue  EYES: No eye pain, visual disturbances, or discharge  ENMT:  No difficulty hearing, tinnitus, vertigo; No sinus or throat pain  NECK: No pain or stiffness  RESPIRATORY: No cough, wheezing, chills or hemoptysis; No shortness of breath  CARDIOVASCULAR: No chest pain, palpitations, dizziness, or leg swelling  GASTROINTESTINAL: No abdominal or epigastric pain. No nausea, vomiting, or hematemesis; No diarrhea or constipation. No melena or hematochezia.  GENITOURINARY: No dysuria, frequency, hematuria, or incontinence  NEUROLOGICAL: No headaches, memory loss, loss of strength, numbness, or tremors  SKIN: itchy rash  LYMPH NODES: No enlarged glands  ENDOCRINE: No heat or cold intolerance; No hair loss  MUSCULOSKELETAL: No joint pain or swelling; No muscle, back, or extremity pain

## 2021-10-07 NOTE — H&P ADULT - NSHPPHYSICALEXAM_GEN_ALL_CORE
PHYSICAL EXAM:      Constitutional: NAD, well-groomed, well-developed  HEENT: EOMI, Normal Hearing  Neck: No LAD, No JVD  Back: Normal spine flexure, No CVA tenderness  Respiratory: CTAB  Cardiovascular: S1 and S2, RRR  Gastrointestinal: BS+, soft, NT/ND  Vascular: 2+ peripheral pulses  Neurological: A/O x 3, no focal deficits  Psychiatric: Normal mood, normal affect  Musculoskeletal: 5/5 strength b/l upper and lower extremities  Skin: diffuse scaling pruritic rash as well as focal blistering near cellulitic lesion

## 2021-10-08 ENCOUNTER — TRANSCRIPTION ENCOUNTER (OUTPATIENT)
Age: 51
End: 2021-10-08

## 2021-10-08 DIAGNOSIS — E11.9 TYPE 2 DIABETES MELLITUS WITHOUT COMPLICATIONS: ICD-10-CM

## 2021-10-08 DIAGNOSIS — G47.33 OBSTRUCTIVE SLEEP APNEA (ADULT) (PEDIATRIC): ICD-10-CM

## 2021-10-08 DIAGNOSIS — L03.90 CELLULITIS, UNSPECIFIED: ICD-10-CM

## 2021-10-08 DIAGNOSIS — Z29.9 ENCOUNTER FOR PROPHYLACTIC MEASURES, UNSPECIFIED: ICD-10-CM

## 2021-10-08 PROBLEM — J45.909 UNSPECIFIED ASTHMA, UNCOMPLICATED: Chronic | Status: ACTIVE | Noted: 2020-04-02

## 2021-10-08 LAB
A1C WITH ESTIMATED AVERAGE GLUCOSE RESULT: 10.3 % — HIGH (ref 4–5.6)
ALBUMIN SERPL ELPH-MCNC: 4.2 G/DL — SIGNIFICANT CHANGE UP (ref 3.3–5)
ALP SERPL-CCNC: 101 U/L — SIGNIFICANT CHANGE UP (ref 40–120)
ALT FLD-CCNC: 30 U/L — SIGNIFICANT CHANGE UP (ref 4–33)
ANION GAP SERPL CALC-SCNC: 13 MMOL/L — SIGNIFICANT CHANGE UP (ref 7–14)
AST SERPL-CCNC: 30 U/L — SIGNIFICANT CHANGE UP (ref 4–32)
BASOPHILS # BLD AUTO: 0.04 K/UL — SIGNIFICANT CHANGE UP (ref 0–0.2)
BASOPHILS NFR BLD AUTO: 0.6 % — SIGNIFICANT CHANGE UP (ref 0–2)
BILIRUB SERPL-MCNC: 0.4 MG/DL — SIGNIFICANT CHANGE UP (ref 0.2–1.2)
BUN SERPL-MCNC: 10 MG/DL — SIGNIFICANT CHANGE UP (ref 7–23)
CALCIUM SERPL-MCNC: 9.7 MG/DL — SIGNIFICANT CHANGE UP (ref 8.4–10.5)
CHLORIDE SERPL-SCNC: 92 MMOL/L — LOW (ref 98–107)
CO2 SERPL-SCNC: 31 MMOL/L — SIGNIFICANT CHANGE UP (ref 22–31)
CREAT SERPL-MCNC: 0.55 MG/DL — SIGNIFICANT CHANGE UP (ref 0.5–1.3)
EOSINOPHIL # BLD AUTO: 0.1 K/UL — SIGNIFICANT CHANGE UP (ref 0–0.5)
EOSINOPHIL NFR BLD AUTO: 1.6 % — SIGNIFICANT CHANGE UP (ref 0–6)
ESTIMATED AVERAGE GLUCOSE: 249 — SIGNIFICANT CHANGE UP
GLUCOSE BLDC GLUCOMTR-MCNC: 229 MG/DL — HIGH (ref 70–99)
GLUCOSE BLDC GLUCOMTR-MCNC: 277 MG/DL — HIGH (ref 70–99)
GLUCOSE BLDC GLUCOMTR-MCNC: 298 MG/DL — HIGH (ref 70–99)
GLUCOSE BLDC GLUCOMTR-MCNC: 354 MG/DL — HIGH (ref 70–99)
GLUCOSE SERPL-MCNC: 340 MG/DL — HIGH (ref 70–99)
HCT VFR BLD CALC: 35.5 % — SIGNIFICANT CHANGE UP (ref 34.5–45)
HGB BLD-MCNC: 11.4 G/DL — LOW (ref 11.5–15.5)
IANC: 2.95 K/UL — SIGNIFICANT CHANGE UP (ref 1.5–8.5)
IMM GRANULOCYTES NFR BLD AUTO: 0.2 % — SIGNIFICANT CHANGE UP (ref 0–1.5)
LYMPHOCYTES # BLD AUTO: 2.61 K/UL — SIGNIFICANT CHANGE UP (ref 1–3.3)
LYMPHOCYTES # BLD AUTO: 41.4 % — SIGNIFICANT CHANGE UP (ref 13–44)
MAGNESIUM SERPL-MCNC: 1.5 MG/DL — LOW (ref 1.6–2.6)
MCHC RBC-ENTMCNC: 25 PG — LOW (ref 27–34)
MCHC RBC-ENTMCNC: 32.1 GM/DL — SIGNIFICANT CHANGE UP (ref 32–36)
MCV RBC AUTO: 77.9 FL — LOW (ref 80–100)
MONOCYTES # BLD AUTO: 0.59 K/UL — SIGNIFICANT CHANGE UP (ref 0–0.9)
MONOCYTES NFR BLD AUTO: 9.4 % — SIGNIFICANT CHANGE UP (ref 2–14)
NEUTROPHILS # BLD AUTO: 2.95 K/UL — SIGNIFICANT CHANGE UP (ref 1.8–7.4)
NEUTROPHILS NFR BLD AUTO: 46.8 % — SIGNIFICANT CHANGE UP (ref 43–77)
NRBC # BLD: 0 /100 WBCS — SIGNIFICANT CHANGE UP
NRBC # FLD: 0 K/UL — SIGNIFICANT CHANGE UP
PHOSPHATE SERPL-MCNC: 3.1 MG/DL — SIGNIFICANT CHANGE UP (ref 2.5–4.5)
PLATELET # BLD AUTO: 318 K/UL — SIGNIFICANT CHANGE UP (ref 150–400)
POTASSIUM SERPL-MCNC: 3.8 MMOL/L — SIGNIFICANT CHANGE UP (ref 3.5–5.3)
POTASSIUM SERPL-SCNC: 3.8 MMOL/L — SIGNIFICANT CHANGE UP (ref 3.5–5.3)
PROT SERPL-MCNC: 7.5 G/DL — SIGNIFICANT CHANGE UP (ref 6–8.3)
RBC # BLD: 4.56 M/UL — SIGNIFICANT CHANGE UP (ref 3.8–5.2)
RBC # FLD: 15.1 % — HIGH (ref 10.3–14.5)
SARS-COV-2 RNA SPEC QL NAA+PROBE: SIGNIFICANT CHANGE UP
SODIUM SERPL-SCNC: 136 MMOL/L — SIGNIFICANT CHANGE UP (ref 135–145)
WBC # BLD: 6.3 K/UL — SIGNIFICANT CHANGE UP (ref 3.8–10.5)
WBC # FLD AUTO: 6.3 K/UL — SIGNIFICANT CHANGE UP (ref 3.8–10.5)

## 2021-10-08 PROCEDURE — ZZZZZ: CPT

## 2021-10-08 PROCEDURE — 99233 SBSQ HOSP IP/OBS HIGH 50: CPT | Mod: GC

## 2021-10-08 RX ORDER — VANCOMYCIN HCL 1 G
1000 VIAL (EA) INTRAVENOUS EVERY 12 HOURS
Refills: 0 | Status: DISCONTINUED | OUTPATIENT
Start: 2021-10-08 | End: 2021-10-08

## 2021-10-08 RX ORDER — ACETAMINOPHEN 500 MG
650 TABLET ORAL ONCE
Refills: 0 | Status: COMPLETED | OUTPATIENT
Start: 2021-10-08 | End: 2021-10-08

## 2021-10-08 RX ORDER — LISINOPRIL 2.5 MG/1
20 TABLET ORAL DAILY
Refills: 0 | Status: DISCONTINUED | OUTPATIENT
Start: 2021-10-08 | End: 2021-10-13

## 2021-10-08 RX ORDER — DEXTROSE 50 % IN WATER 50 %
15 SYRINGE (ML) INTRAVENOUS ONCE
Refills: 0 | Status: DISCONTINUED | OUTPATIENT
Start: 2021-10-08 | End: 2021-10-13

## 2021-10-08 RX ORDER — VANCOMYCIN HCL 1 G
1500 VIAL (EA) INTRAVENOUS EVERY 12 HOURS
Refills: 0 | Status: DISCONTINUED | OUTPATIENT
Start: 2021-10-08 | End: 2021-10-09

## 2021-10-08 RX ORDER — MUPIROCIN 20 MG/G
1 OINTMENT TOPICAL
Refills: 0 | Status: DISCONTINUED | OUTPATIENT
Start: 2021-10-08 | End: 2021-10-13

## 2021-10-08 RX ORDER — DEXTROSE 50 % IN WATER 50 %
25 SYRINGE (ML) INTRAVENOUS ONCE
Refills: 0 | Status: DISCONTINUED | OUTPATIENT
Start: 2021-10-08 | End: 2021-10-13

## 2021-10-08 RX ORDER — ENOXAPARIN SODIUM 100 MG/ML
40 INJECTION SUBCUTANEOUS DAILY
Refills: 0 | Status: DISCONTINUED | OUTPATIENT
Start: 2021-10-08 | End: 2021-10-13

## 2021-10-08 RX ORDER — MAGNESIUM SULFATE 500 MG/ML
1 VIAL (ML) INJECTION ONCE
Refills: 0 | Status: COMPLETED | OUTPATIENT
Start: 2021-10-08 | End: 2021-10-08

## 2021-10-08 RX ORDER — GLUCAGON INJECTION, SOLUTION 0.5 MG/.1ML
1 INJECTION, SOLUTION SUBCUTANEOUS ONCE
Refills: 0 | Status: DISCONTINUED | OUTPATIENT
Start: 2021-10-08 | End: 2021-10-13

## 2021-10-08 RX ORDER — SODIUM CHLORIDE 9 MG/ML
1000 INJECTION, SOLUTION INTRAVENOUS
Refills: 0 | Status: DISCONTINUED | OUTPATIENT
Start: 2021-10-08 | End: 2021-10-13

## 2021-10-08 RX ORDER — DEXTROSE 50 % IN WATER 50 %
12.5 SYRINGE (ML) INTRAVENOUS ONCE
Refills: 0 | Status: DISCONTINUED | OUTPATIENT
Start: 2021-10-08 | End: 2021-10-13

## 2021-10-08 RX ORDER — BUDESONIDE AND FORMOTEROL FUMARATE DIHYDRATE 160; 4.5 UG/1; UG/1
2 AEROSOL RESPIRATORY (INHALATION)
Refills: 0 | Status: DISCONTINUED | OUTPATIENT
Start: 2021-10-08 | End: 2021-10-13

## 2021-10-08 RX ORDER — DIPHENHYDRAMINE HCL 50 MG
25 CAPSULE ORAL EVERY 4 HOURS
Refills: 0 | Status: DISCONTINUED | OUTPATIENT
Start: 2021-10-08 | End: 2021-10-13

## 2021-10-08 RX ORDER — PETROLATUM,WHITE
1 JELLY (GRAM) TOPICAL THREE TIMES A DAY
Refills: 0 | Status: DISCONTINUED | OUTPATIENT
Start: 2021-10-08 | End: 2021-10-13

## 2021-10-08 RX ORDER — INFLUENZA VIRUS VACCINE 15; 15; 15; 15 UG/.5ML; UG/.5ML; UG/.5ML; UG/.5ML
0.5 SUSPENSION INTRAMUSCULAR ONCE
Refills: 0 | Status: DISCONTINUED | OUTPATIENT
Start: 2021-10-08 | End: 2021-10-13

## 2021-10-08 RX ORDER — SOD,AMMONIUM,POTASSIUM LACTATE
1 CREAM (GRAM) TOPICAL
Refills: 0 | Status: DISCONTINUED | OUTPATIENT
Start: 2021-10-08 | End: 2021-10-13

## 2021-10-08 RX ORDER — INSULIN GLARGINE 100 [IU]/ML
4 INJECTION, SOLUTION SUBCUTANEOUS AT BEDTIME
Refills: 0 | Status: DISCONTINUED | OUTPATIENT
Start: 2021-10-08 | End: 2021-10-10

## 2021-10-08 RX ORDER — INSULIN LISPRO 100/ML
VIAL (ML) SUBCUTANEOUS AT BEDTIME
Refills: 0 | Status: DISCONTINUED | OUTPATIENT
Start: 2021-10-08 | End: 2021-10-11

## 2021-10-08 RX ORDER — FUROSEMIDE 40 MG
1 TABLET ORAL
Qty: 0 | Refills: 0 | DISCHARGE

## 2021-10-08 RX ORDER — INSULIN LISPRO 100/ML
VIAL (ML) SUBCUTANEOUS
Refills: 0 | Status: DISCONTINUED | OUTPATIENT
Start: 2021-10-08 | End: 2021-10-11

## 2021-10-08 RX ORDER — SIMVASTATIN 20 MG/1
20 TABLET, FILM COATED ORAL AT BEDTIME
Refills: 0 | Status: DISCONTINUED | OUTPATIENT
Start: 2021-10-08 | End: 2021-10-13

## 2021-10-08 RX ADMIN — Medication 100 GRAM(S): at 12:50

## 2021-10-08 RX ADMIN — Medication 1 APPLICATION(S): at 18:25

## 2021-10-08 RX ADMIN — Medication 300 MILLIGRAM(S): at 18:25

## 2021-10-08 RX ADMIN — Medication 1 APPLICATION(S): at 13:12

## 2021-10-08 RX ADMIN — Medication 650 MILLIGRAM(S): at 04:27

## 2021-10-08 RX ADMIN — Medication 3: at 22:29

## 2021-10-08 RX ADMIN — INSULIN GLARGINE 4 UNIT(S): 100 INJECTION, SOLUTION SUBCUTANEOUS at 22:27

## 2021-10-08 RX ADMIN — ENOXAPARIN SODIUM 40 MILLIGRAM(S): 100 INJECTION SUBCUTANEOUS at 12:54

## 2021-10-08 RX ADMIN — Medication 10 MILLIGRAM(S): at 05:58

## 2021-10-08 RX ADMIN — Medication 650 MILLIGRAM(S): at 22:07

## 2021-10-08 RX ADMIN — BUDESONIDE AND FORMOTEROL FUMARATE DIHYDRATE 2 PUFF(S): 160; 4.5 AEROSOL RESPIRATORY (INHALATION) at 12:51

## 2021-10-08 RX ADMIN — SIMVASTATIN 20 MILLIGRAM(S): 20 TABLET, FILM COATED ORAL at 21:24

## 2021-10-08 RX ADMIN — Medication 3: at 09:40

## 2021-10-08 RX ADMIN — BUDESONIDE AND FORMOTEROL FUMARATE DIHYDRATE 2 PUFF(S): 160; 4.5 AEROSOL RESPIRATORY (INHALATION) at 21:24

## 2021-10-08 RX ADMIN — MUPIROCIN 1 APPLICATION(S): 20 OINTMENT TOPICAL at 18:25

## 2021-10-08 RX ADMIN — Medication 300 MILLIGRAM(S): at 05:34

## 2021-10-08 RX ADMIN — Medication 2: at 18:24

## 2021-10-08 RX ADMIN — Medication 3: at 12:50

## 2021-10-08 RX ADMIN — Medication 1 APPLICATION(S): at 22:08

## 2021-10-08 RX ADMIN — LISINOPRIL 20 MILLIGRAM(S): 2.5 TABLET ORAL at 05:58

## 2021-10-08 NOTE — DISCHARGE NOTE PROVIDER - PROVIDER TOKENS
PROVIDER:[TOKEN:[[id number]],SCHEDULEDAPPT:[10/15/2021],SCHEDULEDAPPTTIME:[11:00 AM],ESTABLISHEDPATIENT:[T]]

## 2021-10-08 NOTE — CONSULT NOTE ADULT - ASSESSMENT
#bullae, LLE  likely 2/2 edema due to underlying cellulitis  LLE doppler 10/7 w/ no e/o DVT  s/p 7 day course clindamycin  currently on vanc  we have cleansed one intact bulla on LLE with alcohol pad and swabbed for bacterial cx and HSV/VSV PCR    At this time recommend:  - f/u results of bacterial cx and HSV/VZV pcr obtained 10/8  - apply mupirocin BID to open areas of skin on LLE until healed   - can cover in non stick dressings  - compression stockings/device as tolerated  - frequent leg elevation  - abx per primary team    #superficial desquamation without inflammation on trunk and extremities  Since the primary preceding rash has already resolved, leaving behind superficial desquamation, it is difficult to determine etiology of preceding rash. Favor Resolving id 2/2 cellulitis vs resolving morbilliform eruption 2/2 cefpodoxim or other abx given at [facility]    At this time:  -since rash is mostly resolved, and minimally pruritic, recommend applying triamcinolone 0.1% ointment BID to affected areas on trunk and extremities (aside from the LLE) for 1 week    The patient's chart was reviewed in addition to being seen and discussed with the dermatology attending Dr. Karol Chapa.  Recommendations were communicated with the primary team.  Please page [phone] for further related questions.    Katina Troy MD  Resident Physician, PGY3  [facility]  Pager: [phone]  Office: [phone]   #bullae, LLE  likely 2/2 edema due to underlying cellulitis  LLE doppler 10/7 w/ no e/o DVT  s/p 7 day course clindamycin  currently on vanc  we have cleansed one intact bulla on LLE with alcohol pad and swabbed for bacterial cx and HSV/VSV PCR    At this time recommend:  - f/u results of bacterial cx and HSV/VZV pcr obtained 10/8  - apply mupirocin BID to open areas of skin on LLE until healed   - can cover in non stick dressings  - compression stockings/device as tolerated  - frequent leg elevation  - abx per primary team    #superficial desquamation without inflammation on trunk and extremities  Since the primary preceding rash has already resolved, leaving behind superficial desquamation, it is difficult to determine etiology of preceding rash. Favor Resolving id 2/2 cellulitis vs resolving morbilliform eruption 2/2 cefpodoxim or other abx given at [facility]    At this time:  -since rash is mostly resolved, and minimally pruritic, recommend applying triamcinolone 0.1% ointment BID to affected areas on trunk and extremities (aside from the LLE) for 1 week    The patient's chart and photos taken with patient's permission were  reviewed in addition to being seen and discussed with the dermatology attending Dr. Karol Chapa.  Recommendations were communicated with the primary team.  Please page [phone] for further related questions.    Katina Troy MD  Resident Physician, PGY3  [facility]  Pager: [phone]  Office: [phone]   #bullae, LLE  likely 2/2 edema due to underlying cellulitis  LLE doppler 10/7 w/ no e/o DVT  s/p 7 day course clindamycin  currently on vanc  we have cleansed one intact bulla on LLE with alcohol pad and swabbed for bacterial cx and HSV/VSV PCR    At this time recommend:  - f/u results of bacterial cx and HSV/VZV pcr obtained 10/8  - apply mupirocin BID to open areas of skin on LLE until healed   - can cover in non stick dressings  - compression stockings/device as tolerated  - frequent leg elevation  - abx per primary team for likely underlying cellulitis    #superficial desquamation without inflammation on trunk and extremities  Since the primary preceding rash has already resolved, leaving behind superficial desquamation, it is difficult to determine etiology of preceding rash. Favor Resolving id 2/2 cellulitis vs resolving morbilliform eruption 2/2 cefpodoxim or other abx given at [facility]    At this time:  -since rash is mostly resolved, and minimally pruritic, recommend applying triamcinolone 0.1% ointment BID to affected areas on trunk and extremities (aside from the LLE) for 1 week    The patient's chart and photos taken with patient's permission were  reviewed in addition to being seen and discussed with the dermatology attending Dr. Karol Chapa.  Recommendations were communicated with the primary team.  Please page [phone] for further related questions.    Katina Troy MD  Resident Physician, PGY3  [facility]  Pager: [phone]  Office: [phone]   1. Bullae, LLE   Likely 2/2 edema due to underlying cellulitis, with overlying superinfection  LLE doppler 10/7/21 without e/o DVT  s/p 7 day course PO clindamycin  currently on vanc  - We have cleansed one intact bulla on LLE with alcohol pad and swabbed for bacterial cx and HSV/VSV PCR.     At this time recommend:  - f/u results of bacterial cx and viral PCR HSV/VZV obtained on 10/8/21  - Apply mupirocin BID to open areas of skin on LLE until healed, OK to cover with non-stick dressing  - Compression stockings/device as tolerated  - Frequent leg elevation  - Defer systemic abx per primary team for likely underlying cellulitis    2. Dermatitis/Superficial desquamation without inflammation on trunk and extremities  Since the primary preceding rash has already resolved, leaving behind superficial desquamation, it is difficult to determine etiology of preceding rash. DDx includes resolving id reaction 2/2 cellulitis vs. resolving morbilliform eruption 2/2 ?antibiotic given at [facility]    At this time recommend:  - Since rash is mostly resolved, and minimally pruritic, recommend applying triamcinolone 0.1% ointment BID to affected areas on trunk and extremities (avoid application on LLE) for 1 week, then STOP.     The patient's chart and photos taken with patient's permission were  reviewed in addition to being seen and discussed with the dermatology attending Dr. Karol Chapa.  Recommendations were communicated with the primary team.  Please page [phone] for further related questions.    Katina Troy MD  Resident Physician, PGY3  [facility]  Pager: [phone]  Office: [phone]

## 2021-10-08 NOTE — CONSULT NOTE ADULT - TIME BILLING
Nephrology To summarize, favor LLE cellulitis and resolving dermatitis on torso/UE.   - Will follow bacterial and viral swabs from LLE blisters  - Defer antibiotics to primary team  - Triamcinolone ointment BID PRN itch to resolving rash on torso and arms for 1 week    Patient was examined directly by dermatology resident physician Dr. Troy. Photos were taken with patient's permission, and shared with myself. Case and photos reviewed; amended treatment plan above.   This was an interprofessional telehealth encounter in which the patient was located in the hospital and I was located at home/office. Photos and chart were reviewed remotely via HIPAA-secure platform. The results of this consultation were communicated to the primary team.  Karol Chapa MD  Assistant Clinical Professor of Dermatology  [employer] [facility]

## 2021-10-08 NOTE — DISCHARGE NOTE PROVIDER - NSDCCPCAREPLAN_GEN_ALL_CORE_FT
PRINCIPAL DISCHARGE DIAGNOSIS  Diagnosis: Cellulitis  Assessment and Plan of Treatment: You presented with cellulitis. You were treated with IV antibiotics. You were also followed by the dermatology team, who_       PRINCIPAL DISCHARGE DIAGNOSIS  Diagnosis: Cellulitis  Assessment and Plan of Treatment: You presented with cellulitis. You were treated with IV antibiotics. The two bacteria found in your wound are called pseudomonas and group b strep. Pseudomonas is grows in tubs of water, please refrain from soaking your feet in water for long periods of time. Please finish your course of antibiotics at home.      SECONDARY DISCHARGE DIAGNOSES  Diagnosis: DM2 (diabetes mellitus, type 2)  Assessment and Plan of Treatment: During your stay your finger sticks were very high. We changed some of your diabetes medications during your stay.  Please start taking the trulicity injection once a week. This medication is associated with decreased appetite and helps pts with weightloss. You will continue to take your metformin and your glimeperide at x dose. Please stop taking your januvia. Please follow up with endocrinology outpatient further optimize your medication management.     PRINCIPAL DISCHARGE DIAGNOSIS  Diagnosis: Cellulitis  Assessment and Plan of Treatment: You presented with cellulitis. You were treated with IV antibiotics. The two bacteria found in your wound are called pseudomonas and group b strep. Pseudomonas is grows in tubs of water, please refrain from soaking your feet in water for long periods of time. Please finish your course of antibiotics (ciprofloxacin and ampicillin) until 10/20 as directed at home.      SECONDARY DISCHARGE DIAGNOSES  Diagnosis: DM2 (diabetes mellitus, type 2)  Assessment and Plan of Treatment: During your stay your finger sticks were very high. We changed some of your diabetes medications during your stay. Please follow up with your PCP about dietary changes to help control your diabetes. Please record your fingersticks at home, we have sent the materials to [facility].  Please start taking the trulicity injection once a week. This medication is associated with decreased appetite and helps pts with weightloss. You will continue to take your metformin 100 mg BID. Your glimepiride was decreased to 2mg. Please stop taking your januvia. Please follow up with endocrinology outpatient further optimize your medication management.     PRINCIPAL DISCHARGE DIAGNOSIS  Diagnosis: Cellulitis  Assessment and Plan of Treatment: You presented with cellulitis. You were treated with IV antibiotics. The two bacteria found in your wound are called pseudomonas and group b strep. Pseudomonas is grows in tubs of water, please refrain from soaking your feet in water for long periods of time. Please finish your course of antibiotics (ciprofloxacin and ampicillin) until 10/20 as directed at home.      SECONDARY DISCHARGE DIAGNOSES  Diagnosis: DM2 (diabetes mellitus, type 2)  Assessment and Plan of Treatment: During your stay your finger sticks were very high. We changed some of your diabetes medications during your stay. Please follow up with your PCP about dietary changes to help control your diabetes. Please record your fingersticks at home, we have sent the materials to [facility].  Please start taking the 0.75 mg trulicity injection once a week. This medication is associated with decreased appetite and helps pts with weightloss. You will continue to take your metformin 1000 mg BID. Your glimepiride was decreased to 2mg. Please stop taking your januvia. Please follow up with endocrinology outpatient further optimize your medication management.     PRINCIPAL DISCHARGE DIAGNOSIS  Diagnosis: Cellulitis  Assessment and Plan of Treatment: You presented with cellulitis. You were treated with IV antibiotics. The two bacteria found in your wound are called pseudomonas and group b strep. Pseudomonas is grows in tubs of water, please refrain from soaking your feet in water for long periods of time. Please finish your course of antibiotics (ciprofloxacin and ampicillin) until 10/20 as directed at home.  If you notice worsening of your skin infection, severe pain, increased redness, fever, please call your doctor right away and or return to the hospital.      SECONDARY DISCHARGE DIAGNOSES  Diagnosis: DM2 (diabetes mellitus, type 2)  Assessment and Plan of Treatment: During your stay your finger sticks were very high. We changed some of your diabetes medications during your stay. Please follow up with your PCP about dietary changes to help control your diabetes. Please record your fingersticks at home, we have sent the materials to [facility].  Please start taking the 0.75 mg trulicity injection once a week. This medication is associated with decreased appetite and helps pts with weightloss. You will continue to take your metformin 1000 mg BID. Your glimepiride was decreased to 2mg. Please stop taking your januvia. Please follow up with endocrinology outpatient further optimize your medication management.

## 2021-10-08 NOTE — DISCHARGE NOTE PROVIDER - NSFOLLOWUPCLINICS_GEN_ALL_ED_FT
[facility] Endocr[facility]ology  Endocrinology  [address] [place] [address]  Phone: [phone]  Fax:

## 2021-10-08 NOTE — CONSULT NOTE ADULT - SUBJECTIVE AND OBJECTIVE BOX
HPI:  HPI: 52 yo f obestiy, kylee on cpap, reactive airway, DM2, disease presenting with distal left leg swelling/erythema. She reports getting a pedicure on 9/18 and shortly after on 9/24 developed F/C. She visited [facility] and found to have LLE edema. She was given abx while in the ED and was then sent home with cefpodoxime. A half hour after taking the abx, she reports developing sudden full body pruritus and felt “whole body on fire”. She stopped taking the cefpodoxime, and subsequently developed worsening of her LLE edema that was warm but otherwise asx. She visited her PCP on 9/29 who referred her back to [facility] to r/o DVT. She was there for 1 day and was told she did not have DVT, was given clindamycin and completed a 7 day course. On 9/30 she applied a full leg compression device that was tight, and the next day she noticed new painless water blisters on her LLE. She endorses continued worsening of LLE edema, erythema, warmth while on the clindamycin. She also c/o continued pruritus on her trunk, extremities since she developed full body rash after taking cefpodoxime. Dermatology consulted for above blisters to r/o worsening cellulitis vs drug rash.        PAST MEDICAL & SURGICAL HISTORY:  HTN (hypertension)    DM (diabetes mellitus)    Asthma    No significant past surgical history      Review of Systems:  Constitutional: denies fevers, chills  HEENT: denies odynophagia or dysphagia  Cardiovascular: denies palpitations  Respiratory: denies SOB, wheezing  Gastrointestinal: denies N/V/D, abdominal pain  : denies dysuria  MSK: denies weakness, joint pain  Skin: denies new rashes or masses unless otherwise specified in hpi    MEDICATIONS  (STANDING):  AQUAPHOR (petrolatum Ointment) 1 Application(s) Topical three times a day  budesonide 160 MICROgram(s)/formoterol 4.5 MICROgram(s) Inhaler 2 Puff(s) Inhalation two times a day  dextrose 40% Gel 15 Gram(s) Oral once  dextrose 5%. 1000 milliLiter(s) IV Continuous <Continuous>  dextrose 5%. 1000 milliLiter(s) IV Continuous <Continuous>  dextrose 50% Injectable 25 Gram(s) IV Push once  dextrose 50% Injectable 12.5 Gram(s) IV Push once  dextrose 50% Injectable 25 Gram(s) IV Push once  enoxaparin Injectable 40 milliGRAM(s) SubCutaneous daily  glucagon  Injectable 1 milliGRAM(s) IntraMuscular once  influenza   Vaccine 0.5 milliLiter(s) IntraMuscular once  insulin lispro (ADMELOG) corrective regimen sliding scale   SubCutaneous three times a day before meals  insulin lispro (ADMELOG) corrective regimen sliding scale   SubCutaneous at bedtime  lisinopril 20 milliGRAM(s) Oral daily  simvastatin 20 milliGRAM(s) Oral at bedtime  torsemide 10 milliGRAM(s) Oral daily  vancomycin  IVPB 1500 milliGRAM(s) IV Intermittent every 12 hours    ALLERGIES: cefpodoxime  estrogens  peanuts    SOCIAL HISTORY: denies drug use  FAMILY HISTORY:    VITAL SIGNS LAST 24 HOURS:  T(F): 98.7 (10-08 @ 07:39), Max: 98.7 (10-08 @ 07:39)  HR: 77 (10-08 @ 07:39) (77 - 104)  BP: 137/89 (10-08 @ 07:39) (114/70 - 137/89)  RR: 18 (10-08 @ 07:39) (16 - 19)    PHYSICAL EXAM:      In no apparent distress. Oropharynx showed no ulcerations. No visible lymphadenopathy. Conjunctiva were non-injected. No clubbing or edema of extremities. The scalp, hair, face, eyebrows, lips, oropharynx , neck, chest, back, buttocks, extremities X 4, hands, feet, nails were examined. No hyperhidrosis or bromhidrosis.     Of note on skin exam:  Marked LLE edema, erythema  extending distally from her foot to her proximal thigh, with overlying serous vesicles and bullae on her L lower leg. Vesicles nontender with no scalloped borders  Superficial desquamation on b/l arms, chest, back, proximal thighs without active inflammation  No facial edema or appreciable LAD      LABS:  WBC Count : 6.30 K/uL   Hemoglobin : 11.4 g/dL   Hematocrit : 35.5 %   Platelet Count - Automated : 318 K/uL    136  |  92<L>  |  10  ----------------------------<  340<H>  3.8   |  31  |  0.55    TPro  7.5  /  Alb  4.2  /  TBili  0.4  /  DBili  x   /  AST  30  /  ALT  30  /  AlkPhos  101  10-08  RADIOLOGY & ADDITIONAL STUDIES: no relevant studies

## 2021-10-08 NOTE — DISCHARGE NOTE PROVIDER - CARE PROVIDER_API CALL
NAYLA HUFF.  Internal Medicine  [address] [place] [address]  Phone: [phone]  Fax: [phone]  Established Patient  Scheduled Appointment: 10/15/2021 11:00 AM

## 2021-10-08 NOTE — DISCHARGE NOTE PROVIDER - HOSPITAL COURSE
HPI: 50 yo f obestiy, kylee on cpap, reactive airway, DM2,  disease presenting with distal left leg swelling/erythema. Had been on clindamycin for several days and reports taking as prescribed with no improvement. Had been hospitalized at [facility] for 1 day of IV abx . Was discharged on cefpodoxime , reports taking one dose and shortly after developed diffuse scaling pruritic rash as well as focal blistering near cellulitic lesion. Subsequently switched to clindamycin. Denies fever, chills, nausea. Plain film of left leg with soft tissue swelling but no soft tissue gas. Duplex negative for dvt    Hospital Course: patient was started on vancomycin. She was evaluated by dermatology, who recommended__ HPI: 50 yo F obestiy, kylee on cpap, reactive airway, uncontrolled DM2,  disease presenting with distal left leg swelling/erythema. Prior to arrival had been on clinda w.o improvement, hospitilized at [facility] w/ 1 day IV abx. D/Enio on cefpodoxime, developed  scaling pruritic rash as well as focal blistering near cellulitic lesion after 1 day. Switched to clinda. Denied fever, chills, nausea. Plain film of left leg with soft tissue swelling but no soft tissue gas. Duplex negative for dvt    Hospital Course: On 10/8 pt was started on vancomycin, her wound cx was + for pseudomonas and strep b, her abx were changed 10/10 to zosyn.10/11 ID was following pt and she was switched to cipro 400q8 and amp 1g q6, she was placed on a 10 day course to be completed 10/20. While in the hospital her Hgb A1C was found t be 10.4. Pt explained she doesn't like taking her insulin and really enjoys bread. Her lipid panel showed Cholesterol, Serum: 157 mg/dL, Triglycerides, Serum: 99 mg/dL, HDL Cholesterol, Serum: 31 mg/dL   Non HDL Cholesterol: 126. Pt would greatly benefit from nutritional and diabetes education, to be followed up by PCP. During her stay endocrine increased her lantus to 36 and pre meal insulin to 12 units. We will be discharging the patient on a GLP-1 agonist Trulicity, glimepiride 2mg, and metformin and will be d/cing her Jardiance. She should be switched from glimepiride to an SGLT-2 inhibitor once the cellulitis fully resolves on endocrinology outpatient f/up. Pt was seen by physical therapy and found to have no deficits.    Pt is medically stable and able to be discharged home to complete her course of abx and follow up with endo outpatient. HPI: 50 yo F obestiy, kylee on cpap, reactive airway, uncontrolled DM2,  disease presenting with distal left leg swelling/erythema. Prior to arrival had been on clinda w.o improvement, hospitilized at [facility] w/ 1 day IV abx. D/Enio on cefpodoxime, developed  scaling pruritic rash as well as focal blistering near cellulitic lesion after 1 day. Switched to clinda. Denied fever, chills, nausea. Plain film of left leg with soft tissue swelling but no soft tissue gas. Duplex negative for dvt    Hospital Course: On 10/8 pt was started on vancomycin, her wound cx was + for pseudomonas and strep b, her abx were changed 10/10 to zosyn. On 10/11 ID was following pt and she was switched to cipro 400q8 and amp 1g q6, she was placed on a 10 day course to be completed 10/20. While in the hospital her Hgb A1C was found t be 10.4. Pt explained she doesn't like taking her insulin and really enjoys bread. Her lipid panel showed Cholesterol, Serum: 157 mg/dL, Triglycerides, Serum: 99 mg/dL, HDL Cholesterol, Serum: 31 mg/dL   Non HDL Cholesterol: 126. Pt would greatly benefit from nutritional and diabetes education, to be followed up by PCP. During her stay endocrine increased her lantus to 36 and pre meal insulin to 12 units. We will be discharging the patient on a GLP-1 agonist Trulicity .75 1x a week, glimepiride 2mg, and metformin 100 BID and will be d/cing her Jardiance. The pateint was educated on how to use her trulicity pen while inpt. She should be switched from glimepiride to an SGLT-2 inhibitor once the cellulitis fully resolves on endocrinology outpatient f/up. Pt was seen by physical therapy and found to have no deficits.    Pt is medically stable and able to be discharged home to complete her course of abx and follow up with endo outpatient. HPI: 52 yo F obestiy, kylee on cpap, reactive airway, uncontrolled DM2,  disease presenting with distal left leg swelling/erythema. Prior to arrival had been on clinda w.o improvement, hospitilized at [facility] w/ 1 day IV abx. D/Enio on cefpodoxime, developed  scaling pruritic rash as well as focal blistering near cellulitic lesion after 1 day. Switched to clinda. Denied fever, chills, nausea. Plain film of left leg with soft tissue swelling but no soft tissue gas. Duplex negative for dvt    Hospital Course: On 10/8 pt was started on vancomycin, her wound cx was + for pseudomonas and strep b, her abx were changed 10/10 to zosyn. On 10/11 ID was following pt and she was switched to cipro 400q8 and amp 1g q6, she was placed on a 10 day course to be completed 10/20. While in the hospital her Hgb A1C was found to be 10.4. Pt explained she doesn't like taking her insulin and really enjoys bread. Her lipid panel showed Cholesterol, Serum: 157 mg/dL, Triglycerides, Serum: 99 mg/dL, HDL Cholesterol, Serum: 31 mg/dL   Non HDL Cholesterol: 126. Pt would greatly benefit from nutritional and diabetes education, to be followed up by PCP. During her stay endocrine increased her lantus to 36 and pre meal insulin to 12 units. We will be discharging the patient on a GLP-1 agonist Trulicity .75mg 1x a week, glimepiride 2mg qD, and metformin 1000 BID and will be d/cing her Jardiance. The pateint was educated on how to use her trulicity pen while inpt. She should be switched from glimepiride to an SGLT-2 inhibitor once the cellulitis fully resolves on endocrinology outpatient f/up. Pt was seen by physical therapy and found to have no skilled needs.    Pt is medically stable and able to be discharged home to complete her course of abx and follow up with endo outpatient.

## 2021-10-08 NOTE — DISCHARGE NOTE PROVIDER - NSDCMRMEDTOKEN_GEN_ALL_CORE_FT
acetaminophen 500 mg oral tablet: 1-2 tab(s) orally every 8 hours, As Needed for pain or fever  alcohol swabs : Apply topically to affected area 4 times a day   glimepiride 4 mg oral tablet: 1 tab(s) orally once a day  glucometer (per patient&#x27;s insurance): Test blood sugars four times a day. Dispense #1 glucometer.  Januvia 100 mg oral tablet: 1 tab(s) orally once a day   lancets: 1 application subcutaneously 4 times a day   lisinopril 20 mg oral tablet: 1 tab(s) orally once a day  metFORMIN 1000 mg oral tablet: 1 tab(s) orally 2 times a day  simvastatin 20 mg oral tablet: 1 tab(s) orally once a day (at bedtime)  Symbicort 80 mcg-4.5 mcg/inh inhalation aerosol: 2 puff(s) inhaled 2 times a day  test strips (per patient&#x27;s insurance): 1 application subcutaneously 4 times a day. ** Compatible with patient&#x27;s glucometer **  torsemide 10 mg oral tablet: 1 tab(s) orally once a day  Ventolin HFA 90 mcg/inh inhalation aerosol: 2 puff(s) inhaled every 6 hours, As Needed -for shortness of breath and/or wheezing    acetaminophen 500 mg oral tablet: 1-2 tab(s) orally every 8 hours, As Needed for pain or fever  alcohol swabs : Apply topically to affected area 4 times a day   glimepiride 4 mg oral tablet: 1 tab(s) orally once a day  glucometer (per patient&#x27;s insurance): Test blood sugars four times a day. Dispense #1 glucometer.  lancets: 1 application subcutaneously 4 times a day   lisinopril 20 mg oral tablet: 1 tab(s) orally once a day  metFORMIN 1000 mg oral tablet: 1 tab(s) orally 2 times a day  simvastatin 20 mg oral tablet: 1 tab(s) orally once a day (at bedtime)  Symbicort 80 mcg-4.5 mcg/inh inhalation aerosol: 2 puff(s) inhaled 2 times a day  test strips (per patient&#x27;s insurance): 1 application subcutaneously 4 times a day. ** Compatible with patient&#x27;s glucometer **  torsemide 10 mg oral tablet: 1 tab(s) orally once a day  Trulicity Pen 0.75 mg/0.5 mL subcutaneous solution: 0.75 milligram(s) subcutaneously once a week   Ventolin HFA 90 mcg/inh inhalation aerosol: 2 puff(s) inhaled every 6 hours, As Needed -for shortness of breath and/or wheezing    acetaminophen 500 mg oral tablet: 1-2 tab(s) orally every 8 hours, As Needed for pain or fever  alcohol swabs : Apply topically to affected area 4 times a day   ammonium lactate 12% topical lotion: 1 application topically 2 times a day to affected areas.  amoxicillin 500 mg oral tablet: 1 tab(s) orally 3 times a day to complete course on 10/20/2021  ciprofloxacin 750 mg oral tablet: 1 tab(s) orally 2 times a day to complete course on 10/20/2021.  glimepiride 2 mg oral tablet: 1 tab(s) orally once a day  glucometer (per patient&#x27;s insurance): Test blood sugars four times a day. Dispense #1 glucometer.  lancets: 1 application subcutaneously 4 times a day   lisinopril 20 mg oral tablet: 1 tab(s) orally once a day  metFORMIN 1000 mg oral tablet: 1 tab(s) orally 2 times a day  mupirocin 2% topical ointment: 1 application topically 2 times a day to affected areas  petrolatum topical ointment: 1 application topically 3 times a day to affected areas.  simvastatin 20 mg oral tablet: 1 tab(s) orally once a day (at bedtime)  Symbicort 80 mcg-4.5 mcg/inh inhalation aerosol: 2 puff(s) inhaled 2 times a day  test strips (per patient&#x27;s insurance): 1 application subcutaneously 4 times a day. ** Compatible with patient&#x27;s glucometer **  torsemide 10 mg oral tablet: 1 tab(s) orally once a day  triamcinolone 0.1% topical ointment: 1 application topically 2 times a day to affected areas.  Trulicity Pen 0.75 mg/0.5 mL subcutaneous solution: 0.75 milligram(s) subcutaneously once a week   Ventolin HFA 90 mcg/inh inhalation aerosol: 2 puff(s) inhaled every 6 hours, As Needed -for shortness of breath and/or wheezing

## 2021-10-08 NOTE — PROGRESS NOTE ADULT - SUBJECTIVE AND OBJECTIVE BOX
PROGRESS NOTE:   Authored by Sulaiman Fay MD  Pager: [facility] [phone]; [facility] [phone]    Patient is a 51y old  Female who presents with a chief complaint of cellulitis (07 Oct 2021 23:21)      SUBJECTIVE / OVERNIGHT EVENTS:  No acute events overnight. Pt still having LLE pain,  swelling and focal blistering. Denies any other symptoms     ADDITIONAL REVIEW OF SYSTEMS:    MEDICATIONS  (STANDING):  AQUAPHOR (petrolatum Ointment) 1 Application(s) Topical three times a day  budesonide 160 MICROgram(s)/formoterol 4.5 MICROgram(s) Inhaler 2 Puff(s) Inhalation two times a day  dextrose 40% Gel 15 Gram(s) Oral once  dextrose 5%. 1000 milliLiter(s) (50 mL/Hr) IV Continuous <Continuous>  dextrose 5%. 1000 milliLiter(s) (100 mL/Hr) IV Continuous <Continuous>  dextrose 50% Injectable 25 Gram(s) IV Push once  dextrose 50% Injectable 12.5 Gram(s) IV Push once  dextrose 50% Injectable 25 Gram(s) IV Push once  enoxaparin Injectable 40 milliGRAM(s) SubCutaneous daily  glucagon  Injectable 1 milliGRAM(s) IntraMuscular once  influenza   Vaccine 0.5 milliLiter(s) IntraMuscular once  insulin lispro (ADMELOG) corrective regimen sliding scale   SubCutaneous three times a day before meals  insulin lispro (ADMELOG) corrective regimen sliding scale   SubCutaneous at bedtime  lisinopril 20 milliGRAM(s) Oral daily  simvastatin 20 milliGRAM(s) Oral at bedtime  torsemide 10 milliGRAM(s) Oral daily  vancomycin  IVPB 1500 milliGRAM(s) IV Intermittent every 12 hours    MEDICATIONS  (PRN):  diphenhydrAMINE 25 milliGRAM(s) Oral every 4 hours PRN Rash and/or Itching      CAPILLARY BLOOD GLUCOSE      POCT Blood Glucose.: 298 mg/dL (08 Oct 2021 08:57)  POCT Blood Glucose.: 237 mg/dL (07 Oct 2021 23:06)  POCT Blood Glucose.: 315 mg/dL (07 Oct 2021 18:25)    I&O's Summary      PHYSICAL EXAM:  Vital Signs Last 24 Hrs  T(C): 37.1 (08 Oct 2021 07:39), Max: 37.1 (08 Oct 2021 07:39)  T(F): 98.7 (08 Oct 2021 07:39), Max: 98.7 (08 Oct 2021 07:39)  HR: 77 (08 Oct 2021 07:39) (77 - 104)  BP: 137/89 (08 Oct 2021 07:39) (114/70 - 137/89)  BP(mean): --  RR: 18 (08 Oct 2021 07:39) (16 - 19)  SpO2: 99% (08 Oct 2021 07:39) (95% - 100%)    GENERAL: No acute distress, well-developed  HEAD:  Atraumatic, Normocephalic  EYES: EOMI, PERRLA, conjunctiva and sclera clear  NECK: Supple, no lymphadenopathy, no JVD  CHEST/LUNG: CTAB; No wheezes, rales, or rhonchi  HEART: Regular rate and rhythm; No murmurs, rubs, or gallops  ABDOMEN: Soft, non-tender, non-distended; normal bowel sounds, no organomegaly  EXTREMITIES:  2+ peripheral pulses b/  NEUROLOGY: A&O x 3, no focal deficits  SKIN: Skin: diffuse scaling pruritic rash as well as focal blistering near cellulitic lesion    LABS:                        11.7   7.79  )-----------( 393      ( 07 Oct 2021 19:42 )             36.1     10-07    139  |  95<L>  |  11  ----------------------------<  297<H>  4.2   |  28  |  0.69    Ca    9.6      07 Oct 2021 19:42  Phos  3.1     10-07  Mg     1.40     10-07    TPro  8.1  /  Alb  4.6  /  TBili  0.3  /  DBili  x   /  AST  26  /  ALT  31  /  AlkPhos  110  10-07                RADIOLOGY & ADDITIONAL TESTS:  < from: Xray Tibia + Fibula 2 Views, Left (10.07.21 @ 20:07) >  IMPRESSION:  1. Soft tissue prominence/edema with predominance of the forefoot without appreciated gas.    < end of copied text >    < from: US Duplex Venous Lower Ext Ltd, Left (10.07.21 @ 21:25) >  IMPRESSION:  No evidence of left lower extremity deep venous thrombosis.    < end of copied text >

## 2021-10-09 LAB
A1C WITH ESTIMATED AVERAGE GLUCOSE RESULT: 10.4 % — HIGH (ref 4–5.6)
ALBUMIN SERPL ELPH-MCNC: 4.3 G/DL — SIGNIFICANT CHANGE UP (ref 3.3–5)
ALP SERPL-CCNC: 88 U/L — SIGNIFICANT CHANGE UP (ref 40–120)
ALT FLD-CCNC: 31 U/L — SIGNIFICANT CHANGE UP (ref 4–33)
ANION GAP SERPL CALC-SCNC: 13 MMOL/L — SIGNIFICANT CHANGE UP (ref 7–14)
AST SERPL-CCNC: 25 U/L — SIGNIFICANT CHANGE UP (ref 4–32)
BASOPHILS # BLD AUTO: 0.03 K/UL — SIGNIFICANT CHANGE UP (ref 0–0.2)
BASOPHILS NFR BLD AUTO: 0.5 % — SIGNIFICANT CHANGE UP (ref 0–2)
BILIRUB SERPL-MCNC: 0.4 MG/DL — SIGNIFICANT CHANGE UP (ref 0.2–1.2)
BUN SERPL-MCNC: 11 MG/DL — SIGNIFICANT CHANGE UP (ref 7–23)
CALCIUM SERPL-MCNC: 9.3 MG/DL — SIGNIFICANT CHANGE UP (ref 8.4–10.5)
CHLORIDE SERPL-SCNC: 93 MMOL/L — LOW (ref 98–107)
CO2 SERPL-SCNC: 32 MMOL/L — HIGH (ref 22–31)
COVID-19 SPIKE DOMAIN AB INTERP: POSITIVE
COVID-19 SPIKE DOMAIN ANTIBODY RESULT: >250 U/ML — HIGH
CREAT SERPL-MCNC: 0.52 MG/DL — SIGNIFICANT CHANGE UP (ref 0.5–1.3)
EOSINOPHIL # BLD AUTO: 0.13 K/UL — SIGNIFICANT CHANGE UP (ref 0–0.5)
EOSINOPHIL NFR BLD AUTO: 2 % — SIGNIFICANT CHANGE UP (ref 0–6)
ESTIMATED AVERAGE GLUCOSE: 252 — SIGNIFICANT CHANGE UP
GLUCOSE BLDC GLUCOMTR-MCNC: 283 MG/DL — HIGH (ref 70–99)
GLUCOSE BLDC GLUCOMTR-MCNC: 301 MG/DL — HIGH (ref 70–99)
GLUCOSE BLDC GLUCOMTR-MCNC: 316 MG/DL — HIGH (ref 70–99)
GLUCOSE BLDC GLUCOMTR-MCNC: 319 MG/DL — HIGH (ref 70–99)
GLUCOSE SERPL-MCNC: 280 MG/DL — HIGH (ref 70–99)
HCT VFR BLD CALC: 36 % — SIGNIFICANT CHANGE UP (ref 34.5–45)
HGB BLD-MCNC: 12.1 G/DL — SIGNIFICANT CHANGE UP (ref 11.5–15.5)
HSV+VZV DNA SPEC QL NAA+PROBE: SIGNIFICANT CHANGE UP
IANC: 2.92 K/UL — SIGNIFICANT CHANGE UP (ref 1.5–8.5)
IMM GRANULOCYTES NFR BLD AUTO: 0.2 % — SIGNIFICANT CHANGE UP (ref 0–1.5)
LYMPHOCYTES # BLD AUTO: 2.71 K/UL — SIGNIFICANT CHANGE UP (ref 1–3.3)
LYMPHOCYTES # BLD AUTO: 41.9 % — SIGNIFICANT CHANGE UP (ref 13–44)
MAGNESIUM SERPL-MCNC: 1.7 MG/DL — SIGNIFICANT CHANGE UP (ref 1.6–2.6)
MCHC RBC-ENTMCNC: 25.9 PG — LOW (ref 27–34)
MCHC RBC-ENTMCNC: 33.6 GM/DL — SIGNIFICANT CHANGE UP (ref 32–36)
MCV RBC AUTO: 76.9 FL — LOW (ref 80–100)
MONOCYTES # BLD AUTO: 0.67 K/UL — SIGNIFICANT CHANGE UP (ref 0–0.9)
MONOCYTES NFR BLD AUTO: 10.4 % — SIGNIFICANT CHANGE UP (ref 2–14)
NEUTROPHILS # BLD AUTO: 2.92 K/UL — SIGNIFICANT CHANGE UP (ref 1.8–7.4)
NEUTROPHILS NFR BLD AUTO: 45 % — SIGNIFICANT CHANGE UP (ref 43–77)
NRBC # BLD: 0 /100 WBCS — SIGNIFICANT CHANGE UP
NRBC # FLD: 0 K/UL — SIGNIFICANT CHANGE UP
PHOSPHATE SERPL-MCNC: 3.4 MG/DL — SIGNIFICANT CHANGE UP (ref 2.5–4.5)
PLATELET # BLD AUTO: 339 K/UL — SIGNIFICANT CHANGE UP (ref 150–400)
POTASSIUM SERPL-MCNC: 3.9 MMOL/L — SIGNIFICANT CHANGE UP (ref 3.5–5.3)
POTASSIUM SERPL-SCNC: 3.9 MMOL/L — SIGNIFICANT CHANGE UP (ref 3.5–5.3)
PROT SERPL-MCNC: 7.5 G/DL — SIGNIFICANT CHANGE UP (ref 6–8.3)
RBC # BLD: 4.68 M/UL — SIGNIFICANT CHANGE UP (ref 3.8–5.2)
RBC # FLD: 15.2 % — HIGH (ref 10.3–14.5)
SARS-COV-2 IGG+IGM SERPL QL IA: >250 U/ML — HIGH
SARS-COV-2 IGG+IGM SERPL QL IA: POSITIVE
SODIUM SERPL-SCNC: 138 MMOL/L — SIGNIFICANT CHANGE UP (ref 135–145)
SPECIMEN SOURCE: SIGNIFICANT CHANGE UP
VANCOMYCIN TROUGH SERPL-MCNC: 8.3 UG/ML — LOW (ref 10–20)
WBC # BLD: 6.47 K/UL — SIGNIFICANT CHANGE UP (ref 3.8–10.5)
WBC # FLD AUTO: 6.47 K/UL — SIGNIFICANT CHANGE UP (ref 3.8–10.5)

## 2021-10-09 PROCEDURE — 99233 SBSQ HOSP IP/OBS HIGH 50: CPT | Mod: GC

## 2021-10-09 RX ORDER — ACETAMINOPHEN 500 MG
650 TABLET ORAL EVERY 6 HOURS
Refills: 0 | Status: DISCONTINUED | OUTPATIENT
Start: 2021-10-09 | End: 2021-10-13

## 2021-10-09 RX ORDER — VANCOMYCIN HCL 1 G
1250 VIAL (EA) INTRAVENOUS ONCE
Refills: 0 | Status: COMPLETED | OUTPATIENT
Start: 2021-10-09 | End: 2021-10-09

## 2021-10-09 RX ORDER — VANCOMYCIN HCL 1 G
1250 VIAL (EA) INTRAVENOUS EVERY 8 HOURS
Refills: 0 | Status: DISCONTINUED | OUTPATIENT
Start: 2021-10-10 | End: 2021-10-10

## 2021-10-09 RX ORDER — KETOROLAC TROMETHAMINE 30 MG/ML
15 SYRINGE (ML) INJECTION EVERY 6 HOURS
Refills: 0 | Status: DISCONTINUED | OUTPATIENT
Start: 2021-10-09 | End: 2021-10-13

## 2021-10-09 RX ORDER — VANCOMYCIN HCL 1 G
VIAL (EA) INTRAVENOUS
Refills: 0 | Status: DISCONTINUED | OUTPATIENT
Start: 2021-10-09 | End: 2021-10-10

## 2021-10-09 RX ORDER — SENNA PLUS 8.6 MG/1
2 TABLET ORAL AT BEDTIME
Refills: 0 | Status: DISCONTINUED | OUTPATIENT
Start: 2021-10-09 | End: 2021-10-13

## 2021-10-09 RX ORDER — POLYETHYLENE GLYCOL 3350 17 G/17G
17 POWDER, FOR SOLUTION ORAL DAILY
Refills: 0 | Status: DISCONTINUED | OUTPATIENT
Start: 2021-10-09 | End: 2021-10-13

## 2021-10-09 RX ADMIN — BUDESONIDE AND FORMOTEROL FUMARATE DIHYDRATE 2 PUFF(S): 160; 4.5 AEROSOL RESPIRATORY (INHALATION) at 10:07

## 2021-10-09 RX ADMIN — Medication 1 APPLICATION(S): at 18:11

## 2021-10-09 RX ADMIN — MUPIROCIN 1 APPLICATION(S): 20 OINTMENT TOPICAL at 18:11

## 2021-10-09 RX ADMIN — Medication 1 APPLICATION(S): at 06:08

## 2021-10-09 RX ADMIN — Medication 650 MILLIGRAM(S): at 23:08

## 2021-10-09 RX ADMIN — SENNA PLUS 2 TABLET(S): 8.6 TABLET ORAL at 21:52

## 2021-10-09 RX ADMIN — Medication 10 MILLIGRAM(S): at 06:07

## 2021-10-09 RX ADMIN — Medication 650 MILLIGRAM(S): at 11:07

## 2021-10-09 RX ADMIN — Medication 4: at 09:01

## 2021-10-09 RX ADMIN — BUDESONIDE AND FORMOTEROL FUMARATE DIHYDRATE 2 PUFF(S): 160; 4.5 AEROSOL RESPIRATORY (INHALATION) at 21:53

## 2021-10-09 RX ADMIN — MUPIROCIN 1 APPLICATION(S): 20 OINTMENT TOPICAL at 06:08

## 2021-10-09 RX ADMIN — Medication 1 APPLICATION(S): at 06:07

## 2021-10-09 RX ADMIN — INSULIN GLARGINE 4 UNIT(S): 100 INJECTION, SOLUTION SUBCUTANEOUS at 22:11

## 2021-10-09 RX ADMIN — Medication 4: at 12:53

## 2021-10-09 RX ADMIN — Medication 300 MILLIGRAM(S): at 06:07

## 2021-10-09 RX ADMIN — ENOXAPARIN SODIUM 40 MILLIGRAM(S): 100 INJECTION SUBCUTANEOUS at 12:53

## 2021-10-09 RX ADMIN — POLYETHYLENE GLYCOL 3350 17 GRAM(S): 17 POWDER, FOR SOLUTION ORAL at 12:56

## 2021-10-09 RX ADMIN — Medication 4: at 18:11

## 2021-10-09 RX ADMIN — Medication 25 MILLIGRAM(S): at 21:52

## 2021-10-09 RX ADMIN — Medication 1 APPLICATION(S): at 22:08

## 2021-10-09 RX ADMIN — SIMVASTATIN 20 MILLIGRAM(S): 20 TABLET, FILM COATED ORAL at 21:52

## 2021-10-09 RX ADMIN — Medication 650 MILLIGRAM(S): at 22:08

## 2021-10-09 RX ADMIN — LISINOPRIL 20 MILLIGRAM(S): 2.5 TABLET ORAL at 06:07

## 2021-10-09 RX ADMIN — Medication 650 MILLIGRAM(S): at 10:07

## 2021-10-09 RX ADMIN — Medication 166.67 MILLIGRAM(S): at 21:51

## 2021-10-09 RX ADMIN — Medication 25 MILLIGRAM(S): at 10:07

## 2021-10-09 RX ADMIN — Medication 1: at 22:09

## 2021-10-09 NOTE — PROGRESS NOTE ADULT - ASSESSMENT
52 yo f with PMHos HTN, asthma T2DM and MENA on CPAP p/w distla left leg swelling and erythema  No complaints

## 2021-10-09 NOTE — PROGRESS NOTE ADULT - PROBLEM SELECTOR PLAN 1
-Pt started on vancomycin, switched to cefepime per ID recs   -Will f/u cultures  -Benadryl prn for pruritus  -Aquaphor for dry skin

## 2021-10-10 LAB
-  AMIKACIN: SIGNIFICANT CHANGE UP
-  AZTREONAM: SIGNIFICANT CHANGE UP
-  CEFEPIME: SIGNIFICANT CHANGE UP
-  CEFTAZIDIME: SIGNIFICANT CHANGE UP
-  CIPROFLOXACIN: SIGNIFICANT CHANGE UP
-  GENTAMICIN: SIGNIFICANT CHANGE UP
-  IMIPENEM: SIGNIFICANT CHANGE UP
-  LEVOFLOXACIN: SIGNIFICANT CHANGE UP
-  MEROPENEM: SIGNIFICANT CHANGE UP
-  PIPERACILLIN/TAZOBACTAM: SIGNIFICANT CHANGE UP
-  TOBRAMYCIN: SIGNIFICANT CHANGE UP
GLUCOSE BLDC GLUCOMTR-MCNC: 274 MG/DL — HIGH (ref 70–99)
GLUCOSE BLDC GLUCOMTR-MCNC: 276 MG/DL — HIGH (ref 70–99)
GLUCOSE BLDC GLUCOMTR-MCNC: 322 MG/DL — HIGH (ref 70–99)
GLUCOSE BLDC GLUCOMTR-MCNC: 349 MG/DL — HIGH (ref 70–99)
METHOD TYPE: SIGNIFICANT CHANGE UP

## 2021-10-10 PROCEDURE — 99233 SBSQ HOSP IP/OBS HIGH 50: CPT | Mod: GC

## 2021-10-10 RX ORDER — PIPERACILLIN AND TAZOBACTAM 4; .5 G/20ML; G/20ML
3.38 INJECTION, POWDER, LYOPHILIZED, FOR SOLUTION INTRAVENOUS EVERY 8 HOURS
Refills: 0 | Status: DISCONTINUED | OUTPATIENT
Start: 2021-10-11 | End: 2021-10-11

## 2021-10-10 RX ORDER — INSULIN GLARGINE 100 [IU]/ML
9 INJECTION, SOLUTION SUBCUTANEOUS AT BEDTIME
Refills: 0 | Status: DISCONTINUED | OUTPATIENT
Start: 2021-10-10 | End: 2021-10-11

## 2021-10-10 RX ORDER — INSULIN LISPRO 100/ML
3 VIAL (ML) SUBCUTANEOUS
Refills: 0 | Status: DISCONTINUED | OUTPATIENT
Start: 2021-10-10 | End: 2021-10-11

## 2021-10-10 RX ORDER — PIPERACILLIN AND TAZOBACTAM 4; .5 G/20ML; G/20ML
3.38 INJECTION, POWDER, LYOPHILIZED, FOR SOLUTION INTRAVENOUS ONCE
Refills: 0 | Status: DISCONTINUED | OUTPATIENT
Start: 2021-10-10 | End: 2021-10-10

## 2021-10-10 RX ORDER — PIPERACILLIN AND TAZOBACTAM 4; .5 G/20ML; G/20ML
3.38 INJECTION, POWDER, LYOPHILIZED, FOR SOLUTION INTRAVENOUS ONCE
Refills: 0 | Status: COMPLETED | OUTPATIENT
Start: 2021-10-10 | End: 2021-10-10

## 2021-10-10 RX ORDER — PIPERACILLIN AND TAZOBACTAM 4; .5 G/20ML; G/20ML
3.38 INJECTION, POWDER, LYOPHILIZED, FOR SOLUTION INTRAVENOUS EVERY 8 HOURS
Refills: 0 | Status: DISCONTINUED | OUTPATIENT
Start: 2021-10-10 | End: 2021-10-10

## 2021-10-10 RX ADMIN — Medication 1 APPLICATION(S): at 05:50

## 2021-10-10 RX ADMIN — Medication 650 MILLIGRAM(S): at 09:45

## 2021-10-10 RX ADMIN — Medication 25 MILLIGRAM(S): at 05:49

## 2021-10-10 RX ADMIN — Medication 650 MILLIGRAM(S): at 10:34

## 2021-10-10 RX ADMIN — Medication 166.67 MILLIGRAM(S): at 15:53

## 2021-10-10 RX ADMIN — INSULIN GLARGINE 9 UNIT(S): 100 INJECTION, SOLUTION SUBCUTANEOUS at 22:45

## 2021-10-10 RX ADMIN — BUDESONIDE AND FORMOTEROL FUMARATE DIHYDRATE 2 PUFF(S): 160; 4.5 AEROSOL RESPIRATORY (INHALATION) at 23:06

## 2021-10-10 RX ADMIN — MUPIROCIN 1 APPLICATION(S): 20 OINTMENT TOPICAL at 18:49

## 2021-10-10 RX ADMIN — Medication 25 MILLIGRAM(S): at 20:43

## 2021-10-10 RX ADMIN — LISINOPRIL 20 MILLIGRAM(S): 2.5 TABLET ORAL at 05:46

## 2021-10-10 RX ADMIN — Medication 4: at 18:49

## 2021-10-10 RX ADMIN — Medication 3 UNIT(S): at 18:51

## 2021-10-10 RX ADMIN — Medication 1: at 22:46

## 2021-10-10 RX ADMIN — Medication 1 APPLICATION(S): at 18:48

## 2021-10-10 RX ADMIN — ENOXAPARIN SODIUM 40 MILLIGRAM(S): 100 INJECTION SUBCUTANEOUS at 13:42

## 2021-10-10 RX ADMIN — Medication 1 APPLICATION(S): at 22:44

## 2021-10-10 RX ADMIN — Medication 1 APPLICATION(S): at 18:49

## 2021-10-10 RX ADMIN — Medication 1 APPLICATION(S): at 17:59

## 2021-10-10 RX ADMIN — Medication 166.67 MILLIGRAM(S): at 05:52

## 2021-10-10 RX ADMIN — SIMVASTATIN 20 MILLIGRAM(S): 20 TABLET, FILM COATED ORAL at 22:44

## 2021-10-10 RX ADMIN — POLYETHYLENE GLYCOL 3350 17 GRAM(S): 17 POWDER, FOR SOLUTION ORAL at 13:43

## 2021-10-10 RX ADMIN — BUDESONIDE AND FORMOTEROL FUMARATE DIHYDRATE 2 PUFF(S): 160; 4.5 AEROSOL RESPIRATORY (INHALATION) at 09:29

## 2021-10-10 RX ADMIN — PIPERACILLIN AND TAZOBACTAM 200 GRAM(S): 4; .5 INJECTION, POWDER, LYOPHILIZED, FOR SOLUTION INTRAVENOUS at 23:53

## 2021-10-10 RX ADMIN — Medication 3: at 13:48

## 2021-10-10 RX ADMIN — Medication 10 MILLIGRAM(S): at 05:46

## 2021-10-10 RX ADMIN — Medication 4: at 09:27

## 2021-10-10 RX ADMIN — SENNA PLUS 2 TABLET(S): 8.6 TABLET ORAL at 22:44

## 2021-10-10 NOTE — PROGRESS NOTE ADULT - SUBJECTIVE AND OBJECTIVE BOX
Elinor Lopez PGY2  [phone]    Patient is a 51y old  Female who presents with a chief complaint of cellulitis (09 Oct 2021 10:38)      SUBJECTIVE / OVERNIGHT EVENTS:  Patient seen and examined at bedside.  Refused CPAP overnight. No other events.     Other Review of Systems Negative.    MEDICATIONS  (STANDING):  ammonium lactate 12% Lotion 1 Application(s) Topical two times a day  AQUAPHOR (petrolatum Ointment) 1 Application(s) Topical three times a day  budesonide 160 MICROgram(s)/formoterol 4.5 MICROgram(s) Inhaler 2 Puff(s) Inhalation two times a day  dextrose 40% Gel 15 Gram(s) Oral once  dextrose 5%. 1000 milliLiter(s) (50 mL/Hr) IV Continuous <Continuous>  dextrose 5%. 1000 milliLiter(s) (100 mL/Hr) IV Continuous <Continuous>  dextrose 50% Injectable 25 Gram(s) IV Push once  dextrose 50% Injectable 12.5 Gram(s) IV Push once  dextrose 50% Injectable 25 Gram(s) IV Push once  enoxaparin Injectable 40 milliGRAM(s) SubCutaneous daily  glucagon  Injectable 1 milliGRAM(s) IntraMuscular once  influenza   Vaccine 0.5 milliLiter(s) IntraMuscular once  insulin glargine Injectable (LANTUS) 4 Unit(s) SubCutaneous at bedtime  insulin lispro (ADMELOG) corrective regimen sliding scale   SubCutaneous three times a day before meals  insulin lispro (ADMELOG) corrective regimen sliding scale   SubCutaneous at bedtime  lisinopril 20 milliGRAM(s) Oral daily  mupirocin 2% Ointment 1 Application(s) Topical two times a day  polyethylene glycol 3350 17 Gram(s) Oral daily  senna 2 Tablet(s) Oral at bedtime  simvastatin 20 milliGRAM(s) Oral at bedtime  torsemide 10 milliGRAM(s) Oral daily  triamcinolone 0.1% Ointment 1 Application(s) Topical two times a day  vancomycin  IVPB      vancomycin  IVPB 1250 milliGRAM(s) IV Intermittent every 8 hours    MEDICATIONS  (PRN):  acetaminophen   Tablet .. 650 milliGRAM(s) Oral every 6 hours PRN Mild Pain (1 - 3), Moderate Pain (4 - 6)  diphenhydrAMINE 25 milliGRAM(s) Oral every 4 hours PRN Rash and/or Itching  ketorolac   Injectable 15 milliGRAM(s) IV Push every 6 hours PRN Severe Pain (7 - 10)      OBJECTIVE:    Vital Signs Last 24 Hrs  T(C): 36.5 (10 Oct 2021 05:45), Max: 37.1 (09 Oct 2021 15:28)  T(F): 97.7 (10 Oct 2021 05:45), Max: 98.7 (09 Oct 2021 15:28)  HR: 83 (10 Oct 2021 07:54) (83 - 97)  BP: 125/79 (10 Oct 2021 05:45) (118/77 - 129/77)  BP(mean): --  RR: 18 (10 Oct 2021 05:45) (17 - 18)  SpO2: 98% (10 Oct 2021 07:54) (95% - 98%)    CAPILLARY BLOOD GLUCOSE      POCT Blood Glucose.: 349 mg/dL (10 Oct 2021 08:32)  POCT Blood Glucose.: 283 mg/dL (09 Oct 2021 21:46)  POCT Blood Glucose.: 316 mg/dL (09 Oct 2021 18:02)  POCT Blood Glucose.: 301 mg/dL (09 Oct 2021 12:15)  POCT Blood Glucose.: 319 mg/dL (09 Oct 2021 08:54)    I&O's Summary      PHYSICAL EXAM:  GENERAL: NAD, well-developed  HEAD:  Atraumatic, Normocephalic  EYES: EOMI, PERRLA, conjunctiva and sclera clear  NECK: Supple, No JVD  CHEST/LUNG: Clear to auscultation bilaterally; No wheeze  HEART: Regular rate and rhythm; No murmurs, rubs, or gallops  ABDOMEN: Soft, Nontender, Nondistended; Bowel sounds present  EXTREMITIES:  2+ Peripheral Pulses, No clubbing, cyanosis, or edema  PSYCH: AAOx3  NEUROLOGY: non-focal  SKIN: No rashes or lesions    LABS:                        12.1   6.47  )-----------( 339      ( 09 Oct 2021 06:59 )             36.0     Auto Eosinophil # 0.13  / Auto Eosinophil % 2.0   / Auto Neutrophil # 2.92  / Auto Neutrophil % 45.0  / BANDS % x                            11.4   6.30  )-----------( 318      ( 08 Oct 2021 11:53 )             35.5     Auto Eosinophil # 0.10  / Auto Eosinophil % 1.6   / Auto Neutrophil # 2.95  / Auto Neutrophil % 46.8  / BANDS % x        10-09    138  |  93<L>  |  11  ----------------------------<  280<H>  3.9   |  32<H>  |  0.52  10-08    136  |  92<L>  |  10  ----------------------------<  340<H>  3.8   |  31  |  0.55    Ca    9.3      09 Oct 2021 06:59  Mg     1.70     10-09  Phos  3.4     10-09  TPro  7.5  /  Alb  4.3  /  TBili  0.4  /  DBili  x   /  AST  25  /  ALT  31  /  AlkPhos  88  10-09  TPro  7.5  /  Alb  4.2  /  TBili  0.4  /  DBili  x   /  AST  30  /  ALT  30  /  AlkPhos  101  10-08                  ABG:     VBG:       Care Discussed with Consultants/Other Providers:    RADIOLOGY & ADDITIONAL TESTS:  (Imaging Personally Reviewed)       Elinor Lopez PGY2  [phone]    Patient is a 51y old  Female who presents with a chief complaint of cellulitis (09 Oct 2021 10:38)      SUBJECTIVE / OVERNIGHT EVENTS:  Patient seen and examined at bedside.  Refused CPAP overnight. Abscess cultures + pseudomonas group B strep. Switched vancomycin to Zosyn.     Other Review of Systems Negative.    MEDICATIONS  (STANDING):  ammonium lactate 12% Lotion 1 Application(s) Topical two times a day  AQUAPHOR (petrolatum Ointment) 1 Application(s) Topical three times a day  budesonide 160 MICROgram(s)/formoterol 4.5 MICROgram(s) Inhaler 2 Puff(s) Inhalation two times a day  dextrose 40% Gel 15 Gram(s) Oral once  dextrose 5%. 1000 milliLiter(s) (50 mL/Hr) IV Continuous <Continuous>  dextrose 5%. 1000 milliLiter(s) (100 mL/Hr) IV Continuous <Continuous>  dextrose 50% Injectable 25 Gram(s) IV Push once  dextrose 50% Injectable 12.5 Gram(s) IV Push once  dextrose 50% Injectable 25 Gram(s) IV Push once  enoxaparin Injectable 40 milliGRAM(s) SubCutaneous daily  glucagon  Injectable 1 milliGRAM(s) IntraMuscular once  influenza   Vaccine 0.5 milliLiter(s) IntraMuscular once  insulin glargine Injectable (LANTUS) 4 Unit(s) SubCutaneous at bedtime  insulin lispro (ADMELOG) corrective regimen sliding scale   SubCutaneous three times a day before meals  insulin lispro (ADMELOG) corrective regimen sliding scale   SubCutaneous at bedtime  lisinopril 20 milliGRAM(s) Oral daily  mupirocin 2% Ointment 1 Application(s) Topical two times a day  polyethylene glycol 3350 17 Gram(s) Oral daily  senna 2 Tablet(s) Oral at bedtime  simvastatin 20 milliGRAM(s) Oral at bedtime  torsemide 10 milliGRAM(s) Oral daily  triamcinolone 0.1% Ointment 1 Application(s) Topical two times a day  vancomycin  IVPB      vancomycin  IVPB 1250 milliGRAM(s) IV Intermittent every 8 hours    MEDICATIONS  (PRN):  acetaminophen   Tablet .. 650 milliGRAM(s) Oral every 6 hours PRN Mild Pain (1 - 3), Moderate Pain (4 - 6)  diphenhydrAMINE 25 milliGRAM(s) Oral every 4 hours PRN Rash and/or Itching  ketorolac   Injectable 15 milliGRAM(s) IV Push every 6 hours PRN Severe Pain (7 - 10)      OBJECTIVE:    Vital Signs Last 24 Hrs  T(C): 36.5 (10 Oct 2021 05:45), Max: 37.1 (09 Oct 2021 15:28)  T(F): 97.7 (10 Oct 2021 05:45), Max: 98.7 (09 Oct 2021 15:28)  HR: 83 (10 Oct 2021 07:54) (83 - 97)  BP: 125/79 (10 Oct 2021 05:45) (118/77 - 129/77)  BP(mean): --  RR: 18 (10 Oct 2021 05:45) (17 - 18)  SpO2: 98% (10 Oct 2021 07:54) (95% - 98%)    CAPILLARY BLOOD GLUCOSE      POCT Blood Glucose.: 349 mg/dL (10 Oct 2021 08:32)  POCT Blood Glucose.: 283 mg/dL (09 Oct 2021 21:46)  POCT Blood Glucose.: 316 mg/dL (09 Oct 2021 18:02)  POCT Blood Glucose.: 301 mg/dL (09 Oct 2021 12:15)  POCT Blood Glucose.: 319 mg/dL (09 Oct 2021 08:54)    I&O's Summary      PHYSICAL EXAM:  GENERAL: NAD, well-developed  HEAD:  Atraumatic, Normocephalic  EYES: EOMI, PERRLA, conjunctiva and sclera clear  NECK: Supple, No JVD  CHEST/LUNG: Clear to auscultation bilaterally; No wheeze  HEART: Regular rate and rhythm; No murmurs, rubs, or gallops  ABDOMEN: Soft, Nontender, Nondistended; Bowel sounds present  EXTREMITIES:  2+ Peripheral Pulses, No clubbing, cyanosis, or edema  PSYCH: AAOx3  NEUROLOGY: non-focal  SKIN: No rashes or lesions    LABS:                        12.1   6.47  )-----------( 339      ( 09 Oct 2021 06:59 )             36.0     Auto Eosinophil # 0.13  / Auto Eosinophil % 2.0   / Auto Neutrophil # 2.92  / Auto Neutrophil % 45.0  / BANDS % x                            11.4   6.30  )-----------( 318      ( 08 Oct 2021 11:53 )             35.5     Auto Eosinophil # 0.10  / Auto Eosinophil % 1.6   / Auto Neutrophil # 2.95  / Auto Neutrophil % 46.8  / BANDS % x        10-09    138  |  93<L>  |  11  ----------------------------<  280<H>  3.9   |  32<H>  |  0.52  10-08    136  |  92<L>  |  10  ----------------------------<  340<H>  3.8   |  31  |  0.55    Ca    9.3      09 Oct 2021 06:59  Mg     1.70     10-09  Phos  3.4     10-09  TPro  7.5  /  Alb  4.3  /  TBili  0.4  /  DBili  x   /  AST  25  /  ALT  31  /  AlkPhos  88  10-09  TPro  7.5  /  Alb  4.2  /  TBili  0.4  /  DBili  x   /  AST  30  /  ALT  30  /  AlkPhos  101  10-08                  ABG:     VBG:       Care Discussed with Consultants/Other Providers:    RADIOLOGY & ADDITIONAL TESTS:  (Imaging Personally Reviewed)

## 2021-10-10 NOTE — PROGRESS NOTE ADULT - PROBLEM SELECTOR PLAN 1
-Pt started on vancomycin, switched to cefepime per ID recs   -abscess cultures + pseudamonas, group B strep   -Benadryl prn for pruritus  -Aquaphor for dry skin -Pt  vancomycin  -abscess cultures + pseudamonas, group B strep   -Benadryl prn for pruritus  -Aquaphor for dry skin -switched vanco--> Zosyn 10/10   -abscess cultures + pseudamonas, group B strep   -Benadryl prn for pruritus  -Aquaphor for dry skin

## 2021-10-11 LAB
ANION GAP SERPL CALC-SCNC: 14 MMOL/L — SIGNIFICANT CHANGE UP (ref 7–14)
BUN SERPL-MCNC: 20 MG/DL — SIGNIFICANT CHANGE UP (ref 7–23)
CALCIUM SERPL-MCNC: 9.5 MG/DL — SIGNIFICANT CHANGE UP (ref 8.4–10.5)
CHLORIDE SERPL-SCNC: 88 MMOL/L — LOW (ref 98–107)
CO2 SERPL-SCNC: 31 MMOL/L — SIGNIFICANT CHANGE UP (ref 22–31)
CREAT SERPL-MCNC: 0.78 MG/DL — SIGNIFICANT CHANGE UP (ref 0.5–1.3)
GLUCOSE BLDC GLUCOMTR-MCNC: 270 MG/DL — HIGH (ref 70–99)
GLUCOSE BLDC GLUCOMTR-MCNC: 295 MG/DL — HIGH (ref 70–99)
GLUCOSE BLDC GLUCOMTR-MCNC: 320 MG/DL — HIGH (ref 70–99)
GLUCOSE BLDC GLUCOMTR-MCNC: 382 MG/DL — HIGH (ref 70–99)
GLUCOSE SERPL-MCNC: 388 MG/DL — HIGH (ref 70–99)
HCT VFR BLD CALC: 36.1 % — SIGNIFICANT CHANGE UP (ref 34.5–45)
HGB BLD-MCNC: 11.8 G/DL — SIGNIFICANT CHANGE UP (ref 11.5–15.5)
MAGNESIUM SERPL-MCNC: 1.9 MG/DL — SIGNIFICANT CHANGE UP (ref 1.6–2.6)
MCHC RBC-ENTMCNC: 24.9 PG — LOW (ref 27–34)
MCHC RBC-ENTMCNC: 32.7 GM/DL — SIGNIFICANT CHANGE UP (ref 32–36)
MCV RBC AUTO: 76.2 FL — LOW (ref 80–100)
NRBC # BLD: 0 /100 WBCS — SIGNIFICANT CHANGE UP
NRBC # FLD: 0 K/UL — SIGNIFICANT CHANGE UP
PHOSPHATE SERPL-MCNC: 3.4 MG/DL — SIGNIFICANT CHANGE UP (ref 2.5–4.5)
PLATELET # BLD AUTO: 330 K/UL — SIGNIFICANT CHANGE UP (ref 150–400)
POTASSIUM SERPL-MCNC: 3.7 MMOL/L — SIGNIFICANT CHANGE UP (ref 3.5–5.3)
POTASSIUM SERPL-SCNC: 3.7 MMOL/L — SIGNIFICANT CHANGE UP (ref 3.5–5.3)
RBC # BLD: 4.74 M/UL — SIGNIFICANT CHANGE UP (ref 3.8–5.2)
RBC # FLD: 14.9 % — HIGH (ref 10.3–14.5)
SODIUM SERPL-SCNC: 133 MMOL/L — LOW (ref 135–145)
WBC # BLD: 6.92 K/UL — SIGNIFICANT CHANGE UP (ref 3.8–10.5)
WBC # FLD AUTO: 6.92 K/UL — SIGNIFICANT CHANGE UP (ref 3.8–10.5)

## 2021-10-11 PROCEDURE — 99223 1ST HOSP IP/OBS HIGH 75: CPT

## 2021-10-11 PROCEDURE — 99233 SBSQ HOSP IP/OBS HIGH 50: CPT | Mod: GC

## 2021-10-11 PROCEDURE — 93010 ELECTROCARDIOGRAM REPORT: CPT

## 2021-10-11 RX ORDER — INSULIN LISPRO 100/ML
VIAL (ML) SUBCUTANEOUS
Refills: 0 | Status: DISCONTINUED | OUTPATIENT
Start: 2021-10-11 | End: 2021-10-12

## 2021-10-11 RX ORDER — INSULIN LISPRO 100/ML
10 VIAL (ML) SUBCUTANEOUS
Refills: 0 | Status: DISCONTINUED | OUTPATIENT
Start: 2021-10-11 | End: 2021-10-12

## 2021-10-11 RX ORDER — AMPICILLIN TRIHYDRATE 250 MG
1 CAPSULE ORAL EVERY 6 HOURS
Refills: 0 | Status: DISCONTINUED | OUTPATIENT
Start: 2021-10-12 | End: 2021-10-13

## 2021-10-11 RX ORDER — CIPROFLOXACIN LACTATE 400MG/40ML
200 VIAL (ML) INTRAVENOUS EVERY 8 HOURS
Refills: 0 | Status: DISCONTINUED | OUTPATIENT
Start: 2021-10-11 | End: 2021-10-12

## 2021-10-11 RX ORDER — AMPICILLIN TRIHYDRATE 250 MG
1 CAPSULE ORAL ONCE
Refills: 0 | Status: COMPLETED | OUTPATIENT
Start: 2021-10-11 | End: 2021-10-11

## 2021-10-11 RX ORDER — CIPROFLOXACIN LACTATE 400MG/40ML
VIAL (ML) INTRAVENOUS
Refills: 0 | Status: DISCONTINUED | OUTPATIENT
Start: 2021-10-11 | End: 2021-10-12

## 2021-10-11 RX ORDER — INSULIN GLARGINE 100 [IU]/ML
15 INJECTION, SOLUTION SUBCUTANEOUS AT BEDTIME
Refills: 0 | Status: DISCONTINUED | OUTPATIENT
Start: 2021-10-11 | End: 2021-10-12

## 2021-10-11 RX ORDER — INSULIN LISPRO 100/ML
5 VIAL (ML) SUBCUTANEOUS
Refills: 0 | Status: DISCONTINUED | OUTPATIENT
Start: 2021-10-11 | End: 2021-10-11

## 2021-10-11 RX ORDER — INSULIN LISPRO 100/ML
VIAL (ML) SUBCUTANEOUS AT BEDTIME
Refills: 0 | Status: DISCONTINUED | OUTPATIENT
Start: 2021-10-11 | End: 2021-10-12

## 2021-10-11 RX ORDER — CIPROFLOXACIN LACTATE 400MG/40ML
400 VIAL (ML) INTRAVENOUS ONCE
Refills: 0 | Status: COMPLETED | OUTPATIENT
Start: 2021-10-11 | End: 2021-10-11

## 2021-10-11 RX ORDER — AMPICILLIN TRIHYDRATE 250 MG
CAPSULE ORAL
Refills: 0 | Status: DISCONTINUED | OUTPATIENT
Start: 2021-10-11 | End: 2021-10-13

## 2021-10-11 RX ADMIN — Medication 2: at 22:18

## 2021-10-11 RX ADMIN — Medication 200 MILLIGRAM(S): at 18:56

## 2021-10-11 RX ADMIN — SENNA PLUS 2 TABLET(S): 8.6 TABLET ORAL at 22:17

## 2021-10-11 RX ADMIN — BUDESONIDE AND FORMOTEROL FUMARATE DIHYDRATE 2 PUFF(S): 160; 4.5 AEROSOL RESPIRATORY (INHALATION) at 09:38

## 2021-10-11 RX ADMIN — Medication 3 UNIT(S): at 09:37

## 2021-10-11 RX ADMIN — Medication 650 MILLIGRAM(S): at 06:25

## 2021-10-11 RX ADMIN — Medication 10: at 17:43

## 2021-10-11 RX ADMIN — Medication 108 GRAM(S): at 18:16

## 2021-10-11 RX ADMIN — Medication 10 UNIT(S): at 17:43

## 2021-10-11 RX ADMIN — Medication 3: at 09:37

## 2021-10-11 RX ADMIN — BUDESONIDE AND FORMOTEROL FUMARATE DIHYDRATE 2 PUFF(S): 160; 4.5 AEROSOL RESPIRATORY (INHALATION) at 22:16

## 2021-10-11 RX ADMIN — Medication 650 MILLIGRAM(S): at 22:23

## 2021-10-11 RX ADMIN — Medication 10 MILLIGRAM(S): at 13:14

## 2021-10-11 RX ADMIN — Medication 1 APPLICATION(S): at 22:16

## 2021-10-11 RX ADMIN — MUPIROCIN 1 APPLICATION(S): 20 OINTMENT TOPICAL at 17:42

## 2021-10-11 RX ADMIN — Medication 25 MILLIGRAM(S): at 05:26

## 2021-10-11 RX ADMIN — Medication 4: at 13:12

## 2021-10-11 RX ADMIN — Medication 650 MILLIGRAM(S): at 23:00

## 2021-10-11 RX ADMIN — Medication 1 APPLICATION(S): at 13:14

## 2021-10-11 RX ADMIN — Medication 650 MILLIGRAM(S): at 05:25

## 2021-10-11 RX ADMIN — ENOXAPARIN SODIUM 40 MILLIGRAM(S): 100 INJECTION SUBCUTANEOUS at 13:13

## 2021-10-11 RX ADMIN — Medication 5 UNIT(S): at 13:12

## 2021-10-11 RX ADMIN — Medication 1 APPLICATION(S): at 17:43

## 2021-10-11 RX ADMIN — POLYETHYLENE GLYCOL 3350 17 GRAM(S): 17 POWDER, FOR SOLUTION ORAL at 13:14

## 2021-10-11 RX ADMIN — Medication 1 APPLICATION(S): at 17:42

## 2021-10-11 RX ADMIN — INSULIN GLARGINE 15 UNIT(S): 100 INJECTION, SOLUTION SUBCUTANEOUS at 22:17

## 2021-10-11 RX ADMIN — SIMVASTATIN 20 MILLIGRAM(S): 20 TABLET, FILM COATED ORAL at 22:17

## 2021-10-11 RX ADMIN — LISINOPRIL 20 MILLIGRAM(S): 2.5 TABLET ORAL at 06:20

## 2021-10-11 RX ADMIN — Medication 100 MILLIGRAM(S): at 22:15

## 2021-10-11 RX ADMIN — PIPERACILLIN AND TAZOBACTAM 25 GRAM(S): 4; .5 INJECTION, POWDER, LYOPHILIZED, FOR SOLUTION INTRAVENOUS at 07:36

## 2021-10-11 RX ADMIN — Medication 1 APPLICATION(S): at 05:26

## 2021-10-11 NOTE — PROVIDER CONTACT NOTE (OTHER) - ACTION/TREATMENT ORDERED:
Provider notified - will adjust the next dose.
Nieves PIRES aware and stated to recheck in one hour, continue to monitor the patient
no interventions stated at this time, night float team aware

## 2021-10-11 NOTE — PROGRESS NOTE ADULT - PROBLEM SELECTOR PLAN 2
-hold home oral medication  -increasing lantus to 9U at bedtime, 3U lispro premeal -hold home oral medication  - Endo consult placed   -increasing lantus to 15 unites and lispro to 10 per endo recommendations  - Moderate sliding scale

## 2021-10-11 NOTE — CONSULT NOTE ADULT - ASSESSMENT
51F with uncontrolled diabetes and morbid obesity.   Nonpurulent cellulitis left lower leg.   Associated bullae, swab grew GBS and Pseudomonas, now ruptured.   Improving and not systemically ill.   May have been provoked by a recent pedicure.   Outpatient developed a rash and peeling after Cefpodoxime - also improving.     Suggest  -Ciprofloxacin 200mg IV q8h and Ampicillin 1GM IV q6h   -anticipate eventually discharging on PO Cipro and Amoxicillin     Discussed with medicine     Silas Seaman MD   Infectious Disease   Pager [phone]   After 5PM and on weekends please page fellow on call or call [phone]

## 2021-10-11 NOTE — CONSULT NOTE ADULT - SUBJECTIVE AND OBJECTIVE BOX
HPI:  51F with uncontrolled diabetes and morbid obesity.   Had a pedicure done 9/18.   Developed generalized malaise 9/23.   Went to the ED 9/25, went home without a clear diagnosis, was prescribed Cefpodoxime by her PMD on 9/26 at [facility] which caused a rash all over her legs and was changed to Clindamycin 9/30.   At some point developed redness, pain and swelling of the left lower leg.   Admitted 10/7 with bullae and cellulitis of the leg.   Better now.   No significant water exposure - outdoor or recreational etc.     PAST MEDICAL & SURGICAL HISTORY:  HTN (hypertension)    DM (diabetes mellitus)    Asthma    No significant past surgical history        Allergies    cefpodoxime (Rash)  estrogens (Hives; Pruritus)  peanuts (Short breath)    Intolerances        ANTIMICROBIALS:  ampicillin  IVPB    ciprofloxacin   IVPB 200 every 8 hours  ciprofloxacin   IVPB        OTHER MEDS:  acetaminophen   Tablet .. 650 milliGRAM(s) Oral every 6 hours PRN  ammonium lactate 12% Lotion 1 Application(s) Topical two times a day  AQUAPHOR (petrolatum Ointment) 1 Application(s) Topical three times a day  budesonide 160 MICROgram(s)/formoterol 4.5 MICROgram(s) Inhaler 2 Puff(s) Inhalation two times a day  dextrose 40% Gel 15 Gram(s) Oral once  dextrose 5%. 1000 milliLiter(s) IV Continuous <Continuous>  dextrose 5%. 1000 milliLiter(s) IV Continuous <Continuous>  dextrose 50% Injectable 25 Gram(s) IV Push once  dextrose 50% Injectable 12.5 Gram(s) IV Push once  dextrose 50% Injectable 25 Gram(s) IV Push once  diphenhydrAMINE 25 milliGRAM(s) Oral every 4 hours PRN  enoxaparin Injectable 40 milliGRAM(s) SubCutaneous daily  glucagon  Injectable 1 milliGRAM(s) IntraMuscular once  influenza   Vaccine 0.5 milliLiter(s) IntraMuscular once  insulin glargine Injectable (LANTUS) 15 Unit(s) SubCutaneous at bedtime  insulin lispro (ADMELOG) corrective regimen sliding scale   SubCutaneous three times a day before meals  insulin lispro (ADMELOG) corrective regimen sliding scale   SubCutaneous at bedtime  insulin lispro Injectable (ADMELOG) 10 Unit(s) SubCutaneous three times a day before meals  ketorolac   Injectable 15 milliGRAM(s) IV Push every 6 hours PRN  lisinopril 20 milliGRAM(s) Oral daily  mupirocin 2% Ointment 1 Application(s) Topical two times a day  polyethylene glycol 3350 17 Gram(s) Oral daily  senna 2 Tablet(s) Oral at bedtime  simvastatin 20 milliGRAM(s) Oral at bedtime  torsemide 10 milliGRAM(s) Oral daily  triamcinolone 0.1% Ointment 1 Application(s) Topical two times a day      SOCIAL HISTORY: Nonsmoker     FAMILY HISTORY: Noncontributory     ROS:  All other systems negative   Constitutional: subjective fever   Eye: no vision changes  ENT: no sore throat, no rhinorrhea  Cardiovascular: no chest pain, no palpitation  Respiratory: no SOB, no cough  GI:  no abd pain, no vomiting, no diarrhea  urinary: no dysuria, no hematuria, no flank pain  musculoskeletal: no joint pain, no joint swelling  skin: per HPI   neurology: no change in mental status  psych: no anxiety    Physical Exam:  General: awake, alert, non toxic, obese   Head: atraumatic, normocephalic  Eyes: normal sclera and conjunctiva  ENT: neck supple  Cardio: regular rate   Respiratory: nonlabored on room air  abd: soft, not tender  : no pringle  Musculoskeletal: no joint swelling  Skin: erythema and warmth of left lower leg, no fluctuance, no pus. scaly dry changes   vascular: no phlebitis  Neurologic: no focal deficits  psych: normal affect       Drug Dosing Weight  Height (cm): 157.5 (07 Oct 2021 18:21)  Weight (kg): 145.15 (08 Oct 2021 03:09)  BMI (kg/m2): 58.5 (08 Oct 2021 03:09)  BSA (m2): 2.33 (08 Oct 2021 03:09)    Vital Signs Last 24 Hrs  T(F): 98.1 (10-11-21 @ 14:29), Max: 98.9 (10-10-21 @ 22:30)    Vital Signs Last 24 Hrs  HR: 82 (10-11-21 @ 14:29) (82 - 98)  BP: 124/67 (10-11-21 @ 14:29) (92/50 - 124/67)  RR: 17 (10-11-21 @ 14:29)  SpO2: 97% (10-11-21 @ 14:29) (93% - 97%)  Wt(kg): --                          11.8   6.92  )-----------( 330      ( 11 Oct 2021 15:31 )             36.1       10-11    133<L>  |  88<L>  |  20  ----------------------------<  388<H>  3.7   |  31  |  0.78    Ca    9.5      11 Oct 2021 15:31  Phos  3.4     10-11  Mg     1.90     10-11            MICROBIOLOGY:  Culture - Abscess with Gram Stain (collected 10-08-21 @ 22:47)  Source: .Abscess skin L leg  Preliminary Report (10-09-21 @ 22:39):    Numerous Pseudomonas aeruginosa    Numerous Streptococcus agalactiae (Group B) isolated    Group B streptococci are susceptible to ampicillin,    penicillin and cefazolin, but may be resistant to    erythromycin and clindamycin.    Recommendations for intrapartum prophylaxis for Group B    streptococci are penicillin or ampicillin.  Organism: Pseudomonas aeruginosa (10-10-21 @ 16:12)  Organism: Pseudomonas aeruginosa (10-10-21 @ 16:12)      -  Amikacin: S <=16      -  Aztreonam: S <=4      -  Cefepime: S <=2      -  Ceftazidime: S <=1      -  Ciprofloxacin: S <=0.25      -  Gentamicin: S <=2      -  Imipenem: S <=1      -  Levofloxacin: S <=0.5      -  Meropenem: S <=1      -  Piperacillin/Tazobactam: S <=8      -  Tobramycin: S <=2      Method Type: BERNICE    Culture - Blood (collected 10-08-21 @ 00:35)  Source: .Blood Blood-Venous  Preliminary Report (10-09-21 @ 01:02):    No growth to date.    Culture - Blood (collected 10-08-21 @ 00:35)  Source: .Blood Blood-Peripheral  Preliminary Report (10-09-21 @ 01:02):    No growth to date.      RADIOLOGY:  Images below reviewed personally  Xray Tibia + Fibula 2 Views, Left (10.07.21 @ 20:07)   1. Soft tissue prominence/edema with predominance of the forefoot without appreciated gas.    US Duplex Venous Lower Ext Ltd, Left (10.07.21 @ 21:25)   No evidence of left lower extremity deep venous thrombosis.

## 2021-10-11 NOTE — PROGRESS NOTE ADULT - SUBJECTIVE AND OBJECTIVE BOX
Patient is a 51y old  Female who presents with a chief complaint of cellulitis (10 Oct 2021 08:49)      SUBJECTIVE / OVERNIGHT EVENTS: Patient seen and examined at bedside.    MEDICATIONS  (STANDING):  ammonium lactate 12% Lotion 1 Application(s) Topical two times a day  AQUAPHOR (petrolatum Ointment) 1 Application(s) Topical three times a day  budesonide 160 MICROgram(s)/formoterol 4.5 MICROgram(s) Inhaler 2 Puff(s) Inhalation two times a day  dextrose 40% Gel 15 Gram(s) Oral once  dextrose 5%. 1000 milliLiter(s) (50 mL/Hr) IV Continuous <Continuous>  dextrose 5%. 1000 milliLiter(s) (100 mL/Hr) IV Continuous <Continuous>  dextrose 50% Injectable 25 Gram(s) IV Push once  dextrose 50% Injectable 12.5 Gram(s) IV Push once  dextrose 50% Injectable 25 Gram(s) IV Push once  enoxaparin Injectable 40 milliGRAM(s) SubCutaneous daily  glucagon  Injectable 1 milliGRAM(s) IntraMuscular once  influenza   Vaccine 0.5 milliLiter(s) IntraMuscular once  insulin glargine Injectable (LANTUS) 9 Unit(s) SubCutaneous at bedtime  insulin lispro (ADMELOG) corrective regimen sliding scale   SubCutaneous three times a day before meals  insulin lispro (ADMELOG) corrective regimen sliding scale   SubCutaneous at bedtime  insulin lispro Injectable (ADMELOG) 3 Unit(s) SubCutaneous three times a day before meals  lisinopril 20 milliGRAM(s) Oral daily  mupirocin 2% Ointment 1 Application(s) Topical two times a day  piperacillin/tazobactam IVPB.. 3.375 Gram(s) IV Intermittent every 8 hours  polyethylene glycol 3350 17 Gram(s) Oral daily  senna 2 Tablet(s) Oral at bedtime  simvastatin 20 milliGRAM(s) Oral at bedtime  torsemide 10 milliGRAM(s) Oral daily  triamcinolone 0.1% Ointment 1 Application(s) Topical two times a day    MEDICATIONS  (PRN):  acetaminophen   Tablet .. 650 milliGRAM(s) Oral every 6 hours PRN Mild Pain (1 - 3), Moderate Pain (4 - 6)  diphenhydrAMINE 25 milliGRAM(s) Oral every 4 hours PRN Rash and/or Itching  ketorolac   Injectable 15 milliGRAM(s) IV Push every 6 hours PRN Severe Pain (7 - 10)      Vital Signs Last 24 Hrs  T(C): 36.5 (11 Oct 2021 04:55), Max: 37.2 (10 Oct 2021 22:30)  T(F): 97.7 (11 Oct 2021 04:55), Max: 98.9 (10 Oct 2021 22:30)  HR: 88 (11 Oct 2021 07:15) (83 - 98)  BP: 111/68 (11 Oct 2021 05:55) (92/50 - 123/76)  BP(mean): --  RR: 18 (11 Oct 2021 04:55) (18 - 18)  SpO2: 96% (11 Oct 2021 07:15) (93% - 98%)  CAPILLARY BLOOD GLUCOSE      POCT Blood Glucose.: 274 mg/dL (10 Oct 2021 22:05)  POCT Blood Glucose.: 322 mg/dL (10 Oct 2021 18:24)  POCT Blood Glucose.: 276 mg/dL (10 Oct 2021 13:41)  POCT Blood Glucose.: 349 mg/dL (10 Oct 2021 08:32)    I&O's Summary    10 Oct 2021 07:01  -  11 Oct 2021 07:00  --------------------------------------------------------  IN: 350 mL / OUT: 0 mL / NET: 350 mL        PHYSICAL EXAM:      LABS:                    RADIOLOGY & ADDITIONAL TESTS:    Imaging Personally Reviewed:    Consultant(s) Notes Reviewed:      Care Discussed with Consultants/Other Providers:    Bibi Padgett, PGY-1; [facility] Pager: [phone]; [facility] Pager: [phone]   Patient is a 51y old  Female who presents with a chief complaint of cellulitis (10 Oct 2021 08:49)      SUBJECTIVE / OVERNIGHT EVENTS: Patient seen and examined at bedside. Pt has no acute complaints     MEDICATIONS  (STANDING):  ammonium lactate 12% Lotion 1 Application(s) Topical two times a day  AQUAPHOR (petrolatum Ointment) 1 Application(s) Topical three times a day  budesonide 160 MICROgram(s)/formoterol 4.5 MICROgram(s) Inhaler 2 Puff(s) Inhalation two times a day  dextrose 40% Gel 15 Gram(s) Oral once  dextrose 5%. 1000 milliLiter(s) (50 mL/Hr) IV Continuous <Continuous>  dextrose 5%. 1000 milliLiter(s) (100 mL/Hr) IV Continuous <Continuous>  dextrose 50% Injectable 25 Gram(s) IV Push once  dextrose 50% Injectable 12.5 Gram(s) IV Push once  dextrose 50% Injectable 25 Gram(s) IV Push once  enoxaparin Injectable 40 milliGRAM(s) SubCutaneous daily  glucagon  Injectable 1 milliGRAM(s) IntraMuscular once  influenza   Vaccine 0.5 milliLiter(s) IntraMuscular once  insulin glargine Injectable (LANTUS) 9 Unit(s) SubCutaneous at bedtime  insulin lispro (ADMELOG) corrective regimen sliding scale   SubCutaneous three times a day before meals  insulin lispro (ADMELOG) corrective regimen sliding scale   SubCutaneous at bedtime  insulin lispro Injectable (ADMELOG) 3 Unit(s) SubCutaneous three times a day before meals  lisinopril 20 milliGRAM(s) Oral daily  mupirocin 2% Ointment 1 Application(s) Topical two times a day  piperacillin/tazobactam IVPB.. 3.375 Gram(s) IV Intermittent every 8 hours  polyethylene glycol 3350 17 Gram(s) Oral daily  senna 2 Tablet(s) Oral at bedtime  simvastatin 20 milliGRAM(s) Oral at bedtime  torsemide 10 milliGRAM(s) Oral daily  triamcinolone 0.1% Ointment 1 Application(s) Topical two times a day    MEDICATIONS  (PRN):  acetaminophen   Tablet .. 650 milliGRAM(s) Oral every 6 hours PRN Mild Pain (1 - 3), Moderate Pain (4 - 6)  diphenhydrAMINE 25 milliGRAM(s) Oral every 4 hours PRN Rash and/or Itching  ketorolac   Injectable 15 milliGRAM(s) IV Push every 6 hours PRN Severe Pain (7 - 10)      Vital Signs Last 24 Hrs  T(C): 36.5 (11 Oct 2021 04:55), Max: 37.2 (10 Oct 2021 22:30)  T(F): 97.7 (11 Oct 2021 04:55), Max: 98.9 (10 Oct 2021 22:30)  HR: 88 (11 Oct 2021 07:15) (83 - 98)  BP: 111/68 (11 Oct 2021 05:55) (92/50 - 123/76)  BP(mean): --  RR: 18 (11 Oct 2021 04:55) (18 - 18)  SpO2: 96% (11 Oct 2021 07:15) (93% - 98%)  CAPILLARY BLOOD GLUCOSE      POCT Blood Glucose.: 274 mg/dL (10 Oct 2021 22:05)  POCT Blood Glucose.: 322 mg/dL (10 Oct 2021 18:24)  POCT Blood Glucose.: 276 mg/dL (10 Oct 2021 13:41)  POCT Blood Glucose.: 349 mg/dL (10 Oct 2021 08:32)    I&O's Summary    10 Oct 2021 07:01  -  11 Oct 2021 07:00  --------------------------------------------------------  IN: 350 mL / OUT: 0 mL / NET: 350 mL        PHYSICAL EXAM:    Vital Signs Last 24 Hrs  T(C): 36.5 (11 Oct 2021 04:55), Max: 37.2 (10 Oct 2021 22:30)  T(F): 97.7 (11 Oct 2021 04:55), Max: 98.9 (10 Oct 2021 22:30)  HR: 88 (11 Oct 2021 07:15) (88 - 98)  BP: 111/68 (11 Oct 2021 05:55) (92/50 - 123/76)  BP(mean): --  RR: 18 (11 Oct 2021 04:55) (18 - 18)  SpO2: 96% (11 Oct 2021 07:15) (93% - 98%)    PHYSICAL EXAM:  GENERAL: No acute distress, well-developed  HEAD:  Atraumatic, Normocephalic  EYES: EOMI, PERRLA, conjunctiva and sclera clear  NECK: Supple, no lymphadenopathy, no JVD  CHEST/LUNG: CTAB; No wheezes, rales, or rhonchi  HEART: Regular rate and rhythm; No murmurs, rubs, or gallops  ABDOMEN: Soft, non-tender, non-distended; normal bowel sounds, no organomegaly  EXTREMITIES:  2+ peripheral pulses b/  NEUROLOGY: A&O x 3, no focal deficits  SKIN:  red non raised rash present over L lower extremity, beginning medially and extending above the knees on the sides of the L leg, peeling skin present b/l, wet changes px btwn toes    LABS:    CAPILLARY BLOOD GLUCOSE      POCT Blood Glucose.: 320 mg/dL (11 Oct 2021 12:48)  POCT Blood Glucose.: 295 mg/dL (11 Oct 2021 09:16)  POCT Blood Glucose.: 274 mg/dL (10 Oct 2021 22:05)  POCT Blood Glucose.: 322 mg/dL (10 Oct 2021 18:24)    BLOOD CULTURE  10-08 @ 22:47   Numerous Pseudomonas aeruginosa  Numerous Streptococcus agalactiae (Group B) isolated  Group B streptococci are susceptible to ampicillin,  penicillin and cefazolin, but may be resistant to  erythromycin and clindamycin.  Recommendations for intrapartum prophylaxis for Group B  streptococci are penicillin or ampicillin.  --  Pseudomonas aeruginosa  10-08 @ 00:35   No growth to date.  --  --    RADIOLOGY & ADDITIONAL TESTS:    Imaging Personally Reviewed:  [ ] YES                   RADIOLOGY & ADDITIONAL TESTS:    Imaging Personally Reviewed:    Consultant(s) Notes Reviewed:      Care Discussed with Consultants/Other Providers:    Bibi Padgett, PGY-1; [facility] Pager: [phone]; [facility] Pager: [phone]   Patient is a 51y old  Female who presents with a chief complaint of cellulitis (10 Oct 2021 08:49)      SUBJECTIVE / OVERNIGHT EVENTS: Patient seen and examined at bedside. Pt has no acute complaints     MEDICATIONS  (STANDING):  ammonium lactate 12% Lotion 1 Application(s) Topical two times a day  AQUAPHOR (petrolatum Ointment) 1 Application(s) Topical three times a day  budesonide 160 MICROgram(s)/formoterol 4.5 MICROgram(s) Inhaler 2 Puff(s) Inhalation two times a day  dextrose 40% Gel 15 Gram(s) Oral once  dextrose 5%. 1000 milliLiter(s) (50 mL/Hr) IV Continuous <Continuous>  dextrose 5%. 1000 milliLiter(s) (100 mL/Hr) IV Continuous <Continuous>  dextrose 50% Injectable 25 Gram(s) IV Push once  dextrose 50% Injectable 12.5 Gram(s) IV Push once  dextrose 50% Injectable 25 Gram(s) IV Push once  enoxaparin Injectable 40 milliGRAM(s) SubCutaneous daily  glucagon  Injectable 1 milliGRAM(s) IntraMuscular once  influenza   Vaccine 0.5 milliLiter(s) IntraMuscular once  insulin glargine Injectable (LANTUS) 9 Unit(s) SubCutaneous at bedtime  insulin lispro (ADMELOG) corrective regimen sliding scale   SubCutaneous three times a day before meals  insulin lispro (ADMELOG) corrective regimen sliding scale   SubCutaneous at bedtime  insulin lispro Injectable (ADMELOG) 3 Unit(s) SubCutaneous three times a day before meals  lisinopril 20 milliGRAM(s) Oral daily  mupirocin 2% Ointment 1 Application(s) Topical two times a day  piperacillin/tazobactam IVPB.. 3.375 Gram(s) IV Intermittent every 8 hours  polyethylene glycol 3350 17 Gram(s) Oral daily  senna 2 Tablet(s) Oral at bedtime  simvastatin 20 milliGRAM(s) Oral at bedtime  torsemide 10 milliGRAM(s) Oral daily  triamcinolone 0.1% Ointment 1 Application(s) Topical two times a day    MEDICATIONS  (PRN):  acetaminophen   Tablet .. 650 milliGRAM(s) Oral every 6 hours PRN Mild Pain (1 - 3), Moderate Pain (4 - 6)  diphenhydrAMINE 25 milliGRAM(s) Oral every 4 hours PRN Rash and/or Itching  ketorolac   Injectable 15 milliGRAM(s) IV Push every 6 hours PRN Severe Pain (7 - 10)      Vital Signs Last 24 Hrs  T(C): 36.5 (11 Oct 2021 04:55), Max: 37.2 (10 Oct 2021 22:30)  T(F): 97.7 (11 Oct 2021 04:55), Max: 98.9 (10 Oct 2021 22:30)  HR: 88 (11 Oct 2021 07:15) (83 - 98)  BP: 111/68 (11 Oct 2021 05:55) (92/50 - 123/76)  BP(mean): --  RR: 18 (11 Oct 2021 04:55) (18 - 18)  SpO2: 96% (11 Oct 2021 07:15) (93% - 98%)  CAPILLARY BLOOD GLUCOSE      POCT Blood Glucose.: 274 mg/dL (10 Oct 2021 22:05)  POCT Blood Glucose.: 322 mg/dL (10 Oct 2021 18:24)  POCT Blood Glucose.: 276 mg/dL (10 Oct 2021 13:41)  POCT Blood Glucose.: 349 mg/dL (10 Oct 2021 08:32)    I&O's Summary    10 Oct 2021 07:01  -  11 Oct 2021 07:00  --------------------------------------------------------  IN: 350 mL / OUT: 0 mL / NET: 350 mL        PHYSICAL EXAM:    Vital Signs Last 24 Hrs  T(C): 36.5 (11 Oct 2021 04:55), Max: 37.2 (10 Oct 2021 22:30)  T(F): 97.7 (11 Oct 2021 04:55), Max: 98.9 (10 Oct 2021 22:30)  HR: 88 (11 Oct 2021 07:15) (88 - 98)  BP: 111/68 (11 Oct 2021 05:55) (92/50 - 123/76)  BP(mean): --  RR: 18 (11 Oct 2021 04:55) (18 - 18)  SpO2: 96% (11 Oct 2021 07:15) (93% - 98%)    PHYSICAL EXAM:  GENERAL: No acute distress, well-developed  HEAD:  Atraumatic, Normocephalic  EYES: EOMI, PERRLA, conjunctiva and sclera clear  NECK: Supple, no lymphadenopathy, no JVD  CHEST/LUNG: CTAB; No wheezes, rales, or rhonchi  HEART: Regular rate and rhythm; No murmurs, rubs, or gallops  ABDOMEN: Soft, non-tender, non-distended; normal bowel sounds, no organomegaly  EXTREMITIES:  2+ peripheral pulses b/  NEUROLOGY: A&O x 3, no focal deficits  SKIN:  red non raised rash present over L lower extremity, beginning medially and extending above the knees on the sides of the L leg, peeling skin present b/l, wet changes px btwn toes    LABS:    CAPILLARY BLOOD GLUCOSE      POCT Blood Glucose.: 320 mg/dL (11 Oct 2021 12:48)  POCT Blood Glucose.: 295 mg/dL (11 Oct 2021 09:16)  POCT Blood Glucose.: 274 mg/dL (10 Oct 2021 22:05)  POCT Blood Glucose.: 322 mg/dL (10 Oct 2021 18:24)    Wound CULTURE  10-08 @ 22:47   Numerous Pseudomonas aeruginosa  Numerous Streptococcus agalactiae (Group B) isolated  Group B streptococci are susceptible to ampicillin,  penicillin and cefazolin, but may be resistant to  erythromycin and clindamycin.  Recommendations for intrapartum prophylaxis for Group B  streptococci are penicillin or ampicillin.  --  Pseudomonas aeruginosa  10-08 @ 00:35   No growth to date.  --  --    RADIOLOGY & ADDITIONAL TESTS:    Imaging Personally Reviewed:  [ ] YES                   RADIOLOGY & ADDITIONAL TESTS:    Imaging Personally Reviewed:    Consultant(s) Notes Reviewed:      Care Discussed with Consultants/Other Providers:    Bibi Padgett, PGY-1; [facility] Pager: [phone]; [facility] Pager: [phone]

## 2021-10-12 DIAGNOSIS — E78.5 HYPERLIPIDEMIA, UNSPECIFIED: ICD-10-CM

## 2021-10-12 DIAGNOSIS — I10 ESSENTIAL (PRIMARY) HYPERTENSION: ICD-10-CM

## 2021-10-12 DIAGNOSIS — E66.01 MORBID (SEVERE) OBESITY DUE TO EXCESS CALORIES: ICD-10-CM

## 2021-10-12 LAB
ANION GAP SERPL CALC-SCNC: 13 MMOL/L — SIGNIFICANT CHANGE UP (ref 7–14)
BUN SERPL-MCNC: 15 MG/DL — SIGNIFICANT CHANGE UP (ref 7–23)
CALCIUM SERPL-MCNC: 9.9 MG/DL — SIGNIFICANT CHANGE UP (ref 8.4–10.5)
CHLORIDE SERPL-SCNC: 93 MMOL/L — LOW (ref 98–107)
CO2 SERPL-SCNC: 32 MMOL/L — HIGH (ref 22–31)
CREAT SERPL-MCNC: 0.53 MG/DL — SIGNIFICANT CHANGE UP (ref 0.5–1.3)
GLUCOSE BLDC GLUCOMTR-MCNC: 255 MG/DL — HIGH (ref 70–99)
GLUCOSE BLDC GLUCOMTR-MCNC: 256 MG/DL — HIGH (ref 70–99)
GLUCOSE SERPL-MCNC: 241 MG/DL — HIGH (ref 70–99)
HCT VFR BLD CALC: 37.1 % — SIGNIFICANT CHANGE UP (ref 34.5–45)
HGB BLD-MCNC: 12.6 G/DL — SIGNIFICANT CHANGE UP (ref 11.5–15.5)
MAGNESIUM SERPL-MCNC: 2 MG/DL — SIGNIFICANT CHANGE UP (ref 1.6–2.6)
MCHC RBC-ENTMCNC: 26.2 PG — LOW (ref 27–34)
MCHC RBC-ENTMCNC: 34 GM/DL — SIGNIFICANT CHANGE UP (ref 32–36)
MCV RBC AUTO: 77.1 FL — LOW (ref 80–100)
NRBC # BLD: 0 /100 WBCS — SIGNIFICANT CHANGE UP
NRBC # FLD: 0 K/UL — SIGNIFICANT CHANGE UP
PHOSPHATE SERPL-MCNC: 3.6 MG/DL — SIGNIFICANT CHANGE UP (ref 2.5–4.5)
PLATELET # BLD AUTO: 346 K/UL — SIGNIFICANT CHANGE UP (ref 150–400)
POTASSIUM SERPL-MCNC: 3.8 MMOL/L — SIGNIFICANT CHANGE UP (ref 3.5–5.3)
POTASSIUM SERPL-SCNC: 3.8 MMOL/L — SIGNIFICANT CHANGE UP (ref 3.5–5.3)
RBC # BLD: 4.81 M/UL — SIGNIFICANT CHANGE UP (ref 3.8–5.2)
RBC # FLD: 15.4 % — HIGH (ref 10.3–14.5)
SODIUM SERPL-SCNC: 138 MMOL/L — SIGNIFICANT CHANGE UP (ref 135–145)
WBC # BLD: 8.02 K/UL — SIGNIFICANT CHANGE UP (ref 3.8–10.5)
WBC # FLD AUTO: 8.02 K/UL — SIGNIFICANT CHANGE UP (ref 3.8–10.5)

## 2021-10-12 PROCEDURE — 99232 SBSQ HOSP IP/OBS MODERATE 35: CPT

## 2021-10-12 PROCEDURE — 99233 SBSQ HOSP IP/OBS HIGH 50: CPT | Mod: GC

## 2021-10-12 PROCEDURE — 99223 1ST HOSP IP/OBS HIGH 75: CPT | Mod: GC

## 2021-10-12 RX ORDER — INSULIN LISPRO 100/ML
12 VIAL (ML) SUBCUTANEOUS
Refills: 0 | Status: DISCONTINUED | OUTPATIENT
Start: 2021-10-12 | End: 2021-10-13

## 2021-10-12 RX ORDER — INSULIN LISPRO 100/ML
VIAL (ML) SUBCUTANEOUS AT BEDTIME
Refills: 0 | Status: DISCONTINUED | OUTPATIENT
Start: 2021-10-12 | End: 2021-10-13

## 2021-10-12 RX ORDER — INSULIN GLARGINE 100 [IU]/ML
21 INJECTION, SOLUTION SUBCUTANEOUS AT BEDTIME
Refills: 0 | Status: DISCONTINUED | OUTPATIENT
Start: 2021-10-12 | End: 2021-10-12

## 2021-10-12 RX ORDER — INSULIN GLARGINE 100 [IU]/ML
36 INJECTION, SOLUTION SUBCUTANEOUS AT BEDTIME
Refills: 0 | Status: DISCONTINUED | OUTPATIENT
Start: 2021-10-12 | End: 2021-10-13

## 2021-10-12 RX ORDER — CIPROFLOXACIN LACTATE 400MG/40ML
400 VIAL (ML) INTRAVENOUS EVERY 8 HOURS
Refills: 0 | Status: DISCONTINUED | OUTPATIENT
Start: 2021-10-12 | End: 2021-10-13

## 2021-10-12 RX ORDER — INSULIN LISPRO 100/ML
VIAL (ML) SUBCUTANEOUS
Refills: 0 | Status: DISCONTINUED | OUTPATIENT
Start: 2021-10-12 | End: 2021-10-13

## 2021-10-12 RX ADMIN — Medication 1 APPLICATION(S): at 21:34

## 2021-10-12 RX ADMIN — Medication 200 MILLIGRAM(S): at 21:47

## 2021-10-12 RX ADMIN — Medication 1 APPLICATION(S): at 05:44

## 2021-10-12 RX ADMIN — Medication 12 UNIT(S): at 18:09

## 2021-10-12 RX ADMIN — Medication 2: at 22:43

## 2021-10-12 RX ADMIN — Medication 12 UNIT(S): at 13:36

## 2021-10-12 RX ADMIN — Medication 100 MILLIGRAM(S): at 05:43

## 2021-10-12 RX ADMIN — Medication 10 MILLIGRAM(S): at 05:43

## 2021-10-12 RX ADMIN — Medication 6: at 18:10

## 2021-10-12 RX ADMIN — Medication 6: at 13:33

## 2021-10-12 RX ADMIN — Medication 1 APPLICATION(S): at 18:10

## 2021-10-12 RX ADMIN — Medication 6: at 09:12

## 2021-10-12 RX ADMIN — BUDESONIDE AND FORMOTEROL FUMARATE DIHYDRATE 2 PUFF(S): 160; 4.5 AEROSOL RESPIRATORY (INHALATION) at 21:33

## 2021-10-12 RX ADMIN — Medication 1 APPLICATION(S): at 13:37

## 2021-10-12 RX ADMIN — Medication 108 GRAM(S): at 18:09

## 2021-10-12 RX ADMIN — SIMVASTATIN 20 MILLIGRAM(S): 20 TABLET, FILM COATED ORAL at 21:34

## 2021-10-12 RX ADMIN — POLYETHYLENE GLYCOL 3350 17 GRAM(S): 17 POWDER, FOR SOLUTION ORAL at 18:11

## 2021-10-12 RX ADMIN — Medication 108 GRAM(S): at 01:08

## 2021-10-12 RX ADMIN — Medication 108 GRAM(S): at 05:43

## 2021-10-12 RX ADMIN — SENNA PLUS 2 TABLET(S): 8.6 TABLET ORAL at 21:34

## 2021-10-12 RX ADMIN — Medication 108 GRAM(S): at 23:07

## 2021-10-12 RX ADMIN — BUDESONIDE AND FORMOTEROL FUMARATE DIHYDRATE 2 PUFF(S): 160; 4.5 AEROSOL RESPIRATORY (INHALATION) at 09:12

## 2021-10-12 RX ADMIN — Medication 1 APPLICATION(S): at 18:12

## 2021-10-12 RX ADMIN — Medication 108 GRAM(S): at 13:37

## 2021-10-12 RX ADMIN — LISINOPRIL 20 MILLIGRAM(S): 2.5 TABLET ORAL at 05:43

## 2021-10-12 RX ADMIN — MUPIROCIN 1 APPLICATION(S): 20 OINTMENT TOPICAL at 05:45

## 2021-10-12 RX ADMIN — MUPIROCIN 1 APPLICATION(S): 20 OINTMENT TOPICAL at 18:10

## 2021-10-12 RX ADMIN — Medication 10 UNIT(S): at 09:13

## 2021-10-12 RX ADMIN — Medication 200 MILLIGRAM(S): at 14:39

## 2021-10-12 RX ADMIN — ENOXAPARIN SODIUM 40 MILLIGRAM(S): 100 INJECTION SUBCUTANEOUS at 13:37

## 2021-10-12 RX ADMIN — INSULIN GLARGINE 36 UNIT(S): 100 INJECTION, SOLUTION SUBCUTANEOUS at 22:42

## 2021-10-12 NOTE — CONSULT NOTE ADULT - ATTENDING COMMENTS
Uncontrolled DM2, history of needle phobia (brought home insulin prescription last admission but never used it, but is accepting of insulin doses in hospital), morbid obesity.  Had lost insurance x 2 months in summer was off januvia but remained on metformin.  HbA1c 10.3%.  Patient is considering once weekly GLP1RA, please provide injection education to see if she will be able to do it. If not consider rybelsus which is oral daily GLP1RA. If starting GLP1RA stop Januvia. Can resume metformin for dc. Likely to need third agent may consider glimepiride 2mg daily (consider SGLT2i as outpatient after healing of lower extremity ulcer).    Yahir Kennedy MD  Division of Endocrinology  Pager: [phone]    If after 6PM or before 9AM, or on weekends/holidays, please call endocrine answering service for assistance ([phone]).  For nonurgent matters email [email or web address] for assistance.

## 2021-10-12 NOTE — CONSULT NOTE ADULT - ASSESSMENT
50 yo f obesity kylee on cpap, reactive airway, DM2,  disease presenting with distal left leg swelling/erythema. Admitted for cellulitis. Endocrinology consulted for uncontrolled DM  52 yo f obesity kylee on cpap, reactive airway, DM2,  disease presenting with distal left leg swelling/erythema. Admitted for cellulitis. Endocrinology consulted for uncontrolled DM2

## 2021-10-12 NOTE — PROGRESS NOTE ADULT - ASSESSMENT
Nonpurulent left lower leg cellulitis in the setting of uncontrolled diabetes and morbid obesity.   Associated edema, weeping and bullae formation, now ruptured and dry, grew GBS and Pseudomonas.   May have been provoked by a recent pedicure.   Outpatient developed a rash and peeling after Cefpodoxime - also improving. Tolerating Ampicillin.   Improving and not systemically ill.     Suggest  -Ciprofloxacin 400mg IV q8h and Ampicillin 1GM IV q6h   -can discharge on PO Cipro 750mg BID and Amoxicillin 500mg TID through Wed 10/20 for a 10 day course     Discussed with medicine     Silas Seaman MD   Infectious Disease   Pager [phone]   After 5PM and on weekends please page fellow on call or call [phone]

## 2021-10-12 NOTE — CONSULT NOTE ADULT - PROBLEM SELECTOR RECOMMENDATION 9
Pt has 9 yr hx of DM, uncontrolled, non-compliant, needle phobia   Got total 71 Units of insulin in 24 hrs  Will divide dose 50/50% in basal-bolus insulin   Lantus increases to 36 Units  Continue with Admelog 12 Units before meals with Mod sliding scale   Continue with Carb consistent diet  Monitor Finger sticks achs   SW consult for financial and emotional support     Tentative discharge recommendations:  Oral GLP-1 agonists, Sulphonylurea and metformin   Outpatient follow up with [facility]   Possible switch from GLP-1 agonist to SGLT-2 inhibitors outpatient once LLE infection gets resolved Pt has 9 yr hx of DM2, uncontrolled, non-compliant, needle phobia   Got total 71 Units of insulin in 24 hrs  Will divide dose 50/50% in basal-bolus insulin   Lantus increases to 36 Units qhs  Continue with Admelog 12 Units before meals with Mod sliding scale   Continue with Carb consistent diet  Monitor Finger sticks achs   SW consult for financial and emotional support     Tentative discharge recommendations:  Oral GLP-1 agonists, Sulfonylurea and metformin   Outpatient follow up with [facility]   Possible switch from sulfonylurea to SGLT-2 inhibitors outpatient once LLE infection gets resolved

## 2021-10-12 NOTE — PHARMACOTHERAPY INTERVENTION NOTE - COMMENTS
51 year old F being treated for cellulitis, cultures grew group B Streptococcus and Pseudomonas.     Recommendations:  1) Suggest switching ciprofloxacin  mg every 8 hours to ciprofloxacin  mg every 8 hours    Bob Low  PGY-1 Pharmacy Resident  Spectra [id number]

## 2021-10-12 NOTE — PROGRESS NOTE ADULT - PROBLEM SELECTOR PLAN 1
- abscess cultures + pseudamonas, group B strep   - switched vanco--> Zosyn   - ID consult placed, will f/up ID recommendations --> switch pt to cipro 400mg q8, amp1g q6  -Benadryl prn for pruritus  -Aquaphor for dry skin - abscess cultures + pseudamonas, group B strep   - switched vanco--> Zosyn   - ID consult placed, will f/up ID recommendations --> switch pt to cipro 400mg q8, amp1g q6  - continue abx until 10/20 (10 day course)  -Benadryl prn for pruritus  -Aquaphor for dry skin

## 2021-10-12 NOTE — CONSULT NOTE ADULT - SUBJECTIVE AND OBJECTIVE BOX
HPI:  52 yo f obesity kylee on cpap, reactive airway, DM2,  disease presenting with distal left leg swelling/erythema. Had been on clindamycin for several days and reports taking as prescribed with no improvement. Had been hospitalized at [facility] in [place] for 1 day of IV abx . Was discharged on cefpodoxime , reports taking one dose and shortly after developed diffuse scaling pruritic rash as well as focal blistering near cellulitic lesion. Subsequently switched to clindamycin. Denies fever, chills, nausea. Plain film of left leg with soft tissue swelling but no soft tissue gas. Duplex negative for dvt (07 Oct 2021 23:21). Endocrinology consulted for uncontrolled DM. Pt denied to have neuropathy, retinopathy or any cardiac issues. She endorsed to watch her diet but doesn't do any exercises. She is not interested in taking insulin at home. Pt has been educated       PAST MEDICAL & SURGICAL HISTORY:  HTN (hypertension)    DM (diabetes mellitus)    Asthma    No significant past surgical history        FAMILY HISTORY:      SOCIAL HISTORY:    REVIEW OF SYSTEMS:    Constitutional: No fever, no chills, no change in weight.    Eyes: No eye swelling, no blurry vision, no redness, no loss of vision.    Neck: No neck pain, no change in voice.    Lungs: No shortness of breath, no wheezing, no cough    CV: No chest pain, no palpitations, no pain with walking.    GI: No nausea, no vomiting, no constipation, no diarrhea, no abdominal pain    : No urinary frequency, no blood in urine, no urinary burning, no difficulty voiding.    Musculoskeletal: No muscle pain, no joint pain, no swelling.    Skin: +ve rash, no infections.    Neurologic: No headaches, no weakness, no pain in feet, no tremor, +ve burning in her LLE    Endocrine: No heat intolerance, no cold intolerance, no increased sweating, no shakiness between meals.    Psych: No depression, no anxiety, no trouble concentrating    MEDICATIONS  (STANDING):  ammonium lactate 12% Lotion 1 Application(s) Topical two times a day  ampicillin  IVPB      ampicillin  IVPB 1 Gram(s) IV Intermittent every 6 hours  AQUAPHOR (petrolatum Ointment) 1 Application(s) Topical three times a day  budesonide 160 MICROgram(s)/formoterol 4.5 MICROgram(s) Inhaler 2 Puff(s) Inhalation two times a day  ciprofloxacin   IVPB 400 milliGRAM(s) IV Intermittent every 8 hours  dextrose 40% Gel 15 Gram(s) Oral once  dextrose 5%. 1000 milliLiter(s) (50 mL/Hr) IV Continuous <Continuous>  dextrose 5%. 1000 milliLiter(s) (100 mL/Hr) IV Continuous <Continuous>  dextrose 50% Injectable 25 Gram(s) IV Push once  dextrose 50% Injectable 12.5 Gram(s) IV Push once  dextrose 50% Injectable 25 Gram(s) IV Push once  enoxaparin Injectable 40 milliGRAM(s) SubCutaneous daily  glucagon  Injectable 1 milliGRAM(s) IntraMuscular once  influenza   Vaccine 0.5 milliLiter(s) IntraMuscular once  insulin glargine Injectable (LANTUS) 21 Unit(s) SubCutaneous at bedtime  insulin lispro (ADMELOG) corrective regimen sliding scale   SubCutaneous three times a day before meals  insulin lispro (ADMELOG) corrective regimen sliding scale   SubCutaneous at bedtime  insulin lispro Injectable (ADMELOG) 12 Unit(s) SubCutaneous two times a day with meals  lisinopril 20 milliGRAM(s) Oral daily  mupirocin 2% Ointment 1 Application(s) Topical two times a day  polyethylene glycol 3350 17 Gram(s) Oral daily  senna 2 Tablet(s) Oral at bedtime  simvastatin 20 milliGRAM(s) Oral at bedtime  torsemide 10 milliGRAM(s) Oral daily  triamcinolone 0.1% Ointment 1 Application(s) Topical two times a day    MEDICATIONS  (PRN):  acetaminophen   Tablet .. 650 milliGRAM(s) Oral every 6 hours PRN Mild Pain (1 - 3), Moderate Pain (4 - 6)  diphenhydrAMINE 25 milliGRAM(s) Oral every 4 hours PRN Rash and/or Itching  ketorolac   Injectable 15 milliGRAM(s) IV Push every 6 hours PRN Severe Pain (7 - 10)      Allergies    cefpodoxime (Rash)  estrogens (Hives; Pruritus)  peanuts (Short breath)    Intolerances          PHYSICAL EXAM:    Vital Signs Last 24 Hrs  T(C): 36.7 (12 Oct 2021 05:35), Max: 37.2 (11 Oct 2021 21:20)  T(F): 98 (12 Oct 2021 05:35), Max: 99 (11 Oct 2021 21:20)  HR: 87 (12 Oct 2021 05:35) (82 - 100)  BP: 120/74 (12 Oct 2021 05:35) (120/74 - 128/82)  BP(mean): --  RR: 16 (12 Oct 2021 05:35) (16 - 18)  SpO2: 95% (12 Oct 2021 05:35) (95% - 99%)    General appearance: Morbidly obese female, lying comfortably on bed     Eyes: Pupils equal and reactive to light. EOM full. No exophthalmos.    Neck: Trachea midline. No thyroid enlargement.    Lungs: Normal respiratory excursion. Lungs clear.    CV: Regular cardiac rhythm. No murmur. Carotid and pedal pulses intact.    Abdomen: Soft, distended, +ve umbilical hernia     Musculoskeletal: RLE : Dry and scaly   LLE: Erythematous from ankle to half shin, Ulcer Stage I, tender and warm on touch     Skin: Dry,  rash, acanthosis nigricans +ve     Neuro: Cranial nerves intact. Normal motor and sensory function. DTR's normal.    Psych: Slightly depressed          LABS:                        12.6   8.02  )-----------( 346      ( 12 Oct 2021 07:04 )             37.1     10-12    138  |  93<L>  |  15  ----------------------------<  241<H>  3.8   |  32<H>  |  0.53    Ca    9.9      12 Oct 2021 07:04  Phos  3.6     10-12  Mg     2.00     10-12                POCT Blood Glucose.: 255 mg/dL (10-12-21 @ 08:43)  POCT Blood Glucose.: 270 mg/dL (10-11-21 @ 22:11)  POCT Blood Glucose.: 382 mg/dL (10-11-21 @ 17:30)  POCT Blood Glucose.: 320 mg/dL (10-11-21 @ 12:48)  POCT Blood Glucose.: 295 mg/dL (10-11-21 @ 09:16)  POCT Blood Glucose.: 274 mg/dL (10-10-21 @ 22:05)  POCT Blood Glucose.: 322 mg/dL (10-10-21 @ 18:24)  POCT Blood Glucose.: 276 mg/dL (10-10-21 @ 13:41)  POCT Blood Glucose.: 349 mg/dL (10-10-21 @ 08:32)  POCT Blood Glucose.: 283 mg/dL (10-09-21 @ 21:46)  POCT Blood Glucose.: 316 mg/dL (10-09-21 @ 18:02)  POCT Blood Glucose.: 301 mg/dL (10-09-21 @ 12:15)      RADIOLOGY & ADDITIONAL STUDIES:     HPI:  52 yo f obesity kylee on cpap, reactive airway, DM2,  disease presenting with distal left leg swelling/erythema. Admitted for cellulitis. Endocrinology consulted for uncontrolled DM. Home medications Glimepiride 4mg OD, Januvia 100mg OD and Metformin 1000mg   BID. Diagnosed with DM 9 years ago. Pt denied to have neuropathy, retinopathy or any cardiac issues. She endorsed that she takes Carb consistent diet at home but denied doing exercises. Pt is non complaint, also her insurance changed recently, didn't have insurance last month and was unable to take Januvia due to no refills. She checks her blood sugars not very often. She doesn't follow any endocrinologist outpatient. She sees ophthalmologist twice annually. Next appointment in November. Last seen by podiatrist in 2019. Pt has needle phobia and alot of time was spent to educate her on diabetes risks, complications and control with medications. Discuss about the insulin given her elevated hgb A1C but pt was persistently refusing insulin or any sort of injections.     PAST MEDICAL & SURGICAL HISTORY:  HTN (hypertension)    DM (diabetes mellitus)    Asthma    No significant past surgical history        FAMILY HISTORY:      SOCIAL HISTORY:    REVIEW OF SYSTEMS:    Constitutional: No fever, no chills, no change in weight.    Eyes: No eye swelling, no blurry vision, no redness, no loss of vision.    Neck: No neck pain, no change in voice.    Lungs: No shortness of breath, no wheezing, no cough    CV: No chest pain, no palpitations, no pain with walking.    GI: No nausea, no vomiting, no constipation, no diarrhea, no abdominal pain    : No urinary frequency, no blood in urine, no urinary burning, no difficulty voiding.    Musculoskeletal: No muscle pain, no joint pain, no swelling.    Skin: +ve rash, +ve itching, no infections.    Neurologic: No headaches, no weakness, no pain in feet, no tremor, +ve burning in her LLE    Endocrine: No heat intolerance, no cold intolerance, no increased sweating, no shakiness between meals.    Psych: No depression, no anxiety, no trouble concentrating    MEDICATIONS  (STANDING):  ammonium lactate 12% Lotion 1 Application(s) Topical two times a day  ampicillin  IVPB      ampicillin  IVPB 1 Gram(s) IV Intermittent every 6 hours  AQUAPHOR (petrolatum Ointment) 1 Application(s) Topical three times a day  budesonide 160 MICROgram(s)/formoterol 4.5 MICROgram(s) Inhaler 2 Puff(s) Inhalation two times a day  ciprofloxacin   IVPB 400 milliGRAM(s) IV Intermittent every 8 hours  dextrose 40% Gel 15 Gram(s) Oral once  dextrose 5%. 1000 milliLiter(s) (50 mL/Hr) IV Continuous <Continuous>  dextrose 5%. 1000 milliLiter(s) (100 mL/Hr) IV Continuous <Continuous>  dextrose 50% Injectable 25 Gram(s) IV Push once  dextrose 50% Injectable 12.5 Gram(s) IV Push once  dextrose 50% Injectable 25 Gram(s) IV Push once  enoxaparin Injectable 40 milliGRAM(s) SubCutaneous daily  glucagon  Injectable 1 milliGRAM(s) IntraMuscular once  influenza   Vaccine 0.5 milliLiter(s) IntraMuscular once  insulin glargine Injectable (LANTUS) 21 Unit(s) SubCutaneous at bedtime  insulin lispro (ADMELOG) corrective regimen sliding scale   SubCutaneous three times a day before meals  insulin lispro (ADMELOG) corrective regimen sliding scale   SubCutaneous at bedtime  insulin lispro Injectable (ADMELOG) 12 Unit(s) SubCutaneous two times a day with meals  lisinopril 20 milliGRAM(s) Oral daily  mupirocin 2% Ointment 1 Application(s) Topical two times a day  polyethylene glycol 3350 17 Gram(s) Oral daily  senna 2 Tablet(s) Oral at bedtime  simvastatin 20 milliGRAM(s) Oral at bedtime  torsemide 10 milliGRAM(s) Oral daily  triamcinolone 0.1% Ointment 1 Application(s) Topical two times a day    MEDICATIONS  (PRN):  acetaminophen   Tablet .. 650 milliGRAM(s) Oral every 6 hours PRN Mild Pain (1 - 3), Moderate Pain (4 - 6)  diphenhydrAMINE 25 milliGRAM(s) Oral every 4 hours PRN Rash and/or Itching  ketorolac   Injectable 15 milliGRAM(s) IV Push every 6 hours PRN Severe Pain (7 - 10)      Allergies    cefpodoxime (Rash)  estrogens (Hives; Pruritus)  peanuts (Short breath)    Intolerances          PHYSICAL EXAM:    Vital Signs Last 24 Hrs  T(C): 36.7 (12 Oct 2021 05:35), Max: 37.2 (11 Oct 2021 21:20)  T(F): 98 (12 Oct 2021 05:35), Max: 99 (11 Oct 2021 21:20)  HR: 87 (12 Oct 2021 05:35) (82 - 100)  BP: 120/74 (12 Oct 2021 05:35) (120/74 - 128/82)  BP(mean): --  RR: 16 (12 Oct 2021 05:35) (16 - 18)  SpO2: 95% (12 Oct 2021 05:35) (95% - 99%)    General appearance: Morbidly obese female, lying comfortably on bed     Eyes: Pupils equal and reactive to light. EOM full. No exophthalmos.    Neck: Trachea midline. No thyroid enlargement.    Lungs: Normal respiratory excursion. Lungs clear.    CV: Regular cardiac rhythm. No murmur. Carotid and pedal pulses intact.    Abdomen: Soft, distended, +ve umbilical hernia     Musculoskeletal: RLE : Dry and scaly   LLE: Erythematous from ankle to half shin, Ulcer Stage I, tender and warm on touch     Skin: Dry,  rash, acanthosis nigricans +ve     Neuro: Cranial nerves intact. Normal motor and sensory function. DTR's normal.    Psych: Slightly depressed          LABS:                        12.6   8.02  )-----------( 346      ( 12 Oct 2021 07:04 )             37.1     10-12    138  |  93<L>  |  15  ----------------------------<  241<H>  3.8   |  32<H>  |  0.53    Ca    9.9      12 Oct 2021 07:04  Phos  3.6     10-12  Mg     2.00     10-12                POCT Blood Glucose.: 255 mg/dL (10-12-21 @ 08:43)  POCT Blood Glucose.: 270 mg/dL (10-11-21 @ 22:11)  POCT Blood Glucose.: 382 mg/dL (10-11-21 @ 17:30)  POCT Blood Glucose.: 320 mg/dL (10-11-21 @ 12:48)  POCT Blood Glucose.: 295 mg/dL (10-11-21 @ 09:16)  POCT Blood Glucose.: 274 mg/dL (10-10-21 @ 22:05)  POCT Blood Glucose.: 322 mg/dL (10-10-21 @ 18:24)  POCT Blood Glucose.: 276 mg/dL (10-10-21 @ 13:41)  POCT Blood Glucose.: 349 mg/dL (10-10-21 @ 08:32)  POCT Blood Glucose.: 283 mg/dL (10-09-21 @ 21:46)  POCT Blood Glucose.: 316 mg/dL (10-09-21 @ 18:02)  POCT Blood Glucose.: 301 mg/dL (10-09-21 @ 12:15)      RADIOLOGY & ADDITIONAL STUDIES:     HPI:  50 yo f obesity kylee on cpap, reactive airway, DM2,  disease presenting with distal left leg swelling/erythema. Admitted for cellulitis. Endocrinology consulted for uncontrolled DM. Home medications Glimepiride 4mg daily, Januvia 100mg daily and Metformin 1000mg BID. Diagnosed with DM2 9 years ago. Pt denied to have neuropathy, retinopathy or any cardiac issues. She endorsed that she takes Carb consistent diet at home but denied doing exercises. Pt is non complaint, also her insurance changed recently, didn't have insurance last month and was unable to take Januvia due to no refills. She checks her blood sugars not very often. She doesn't follow any endocrinologist outpatient. She sees ophthalmologist twice annually. Next appointment in November. Last seen by podiatrist in 2019. Pt has needle phobia and alot of time was spent to educate her on diabetes risks, complications and control with medications. Discuss about the insulin given her elevated hgb A1C but pt was persistently refusing insulin or any sort of injections.     PAST MEDICAL & SURGICAL HISTORY:  HTN (hypertension)    DM (diabetes mellitus)    Asthma    No significant past surgical history        FAMILY HISTORY: unable to obtain      SOCIAL HISTORY: lives alone  denies toxic habits    REVIEW OF SYSTEMS:    Constitutional: No fever, no chills, no change in weight.    Eyes: No eye swelling, no blurry vision, no redness, no loss of vision.    Neck: No neck pain, no change in voice.    Lungs: No shortness of breath, no wheezing, no cough    CV: No chest pain, no palpitations, no pain with walking.    GI: No nausea, no vomiting, no constipation, no diarrhea, no abdominal pain    : No urinary frequency, no blood in urine, no urinary burning, no difficulty voiding.    Musculoskeletal: No muscle pain, no joint pain, no swelling.    Skin: +ve rash, +ve itching, no infections.    Neurologic: No headaches, no weakness, no pain in feet, no tremor, +ve burning in her LLE    Endocrine: No heat intolerance, no cold intolerance, no increased sweating, no shakiness between meals.    Psych: No depression, no anxiety, no trouble concentrating    MEDICATIONS  (STANDING):  ammonium lactate 12% Lotion 1 Application(s) Topical two times a day  ampicillin  IVPB      ampicillin  IVPB 1 Gram(s) IV Intermittent every 6 hours  AQUAPHOR (petrolatum Ointment) 1 Application(s) Topical three times a day  budesonide 160 MICROgram(s)/formoterol 4.5 MICROgram(s) Inhaler 2 Puff(s) Inhalation two times a day  ciprofloxacin   IVPB 400 milliGRAM(s) IV Intermittent every 8 hours  dextrose 40% Gel 15 Gram(s) Oral once  dextrose 5%. 1000 milliLiter(s) (50 mL/Hr) IV Continuous <Continuous>  dextrose 5%. 1000 milliLiter(s) (100 mL/Hr) IV Continuous <Continuous>  dextrose 50% Injectable 25 Gram(s) IV Push once  dextrose 50% Injectable 12.5 Gram(s) IV Push once  dextrose 50% Injectable 25 Gram(s) IV Push once  enoxaparin Injectable 40 milliGRAM(s) SubCutaneous daily  glucagon  Injectable 1 milliGRAM(s) IntraMuscular once  influenza   Vaccine 0.5 milliLiter(s) IntraMuscular once  insulin glargine Injectable (LANTUS) 21 Unit(s) SubCutaneous at bedtime  insulin lispro (ADMELOG) corrective regimen sliding scale   SubCutaneous three times a day before meals  insulin lispro (ADMELOG) corrective regimen sliding scale   SubCutaneous at bedtime  insulin lispro Injectable (ADMELOG) 12 Unit(s) SubCutaneous two times a day with meals  lisinopril 20 milliGRAM(s) Oral daily  mupirocin 2% Ointment 1 Application(s) Topical two times a day  polyethylene glycol 3350 17 Gram(s) Oral daily  senna 2 Tablet(s) Oral at bedtime  simvastatin 20 milliGRAM(s) Oral at bedtime  torsemide 10 milliGRAM(s) Oral daily  triamcinolone 0.1% Ointment 1 Application(s) Topical two times a day    MEDICATIONS  (PRN):  acetaminophen   Tablet .. 650 milliGRAM(s) Oral every 6 hours PRN Mild Pain (1 - 3), Moderate Pain (4 - 6)  diphenhydrAMINE 25 milliGRAM(s) Oral every 4 hours PRN Rash and/or Itching  ketorolac   Injectable 15 milliGRAM(s) IV Push every 6 hours PRN Severe Pain (7 - 10)      Allergies    cefpodoxime (Rash)  estrogens (Hives; Pruritus)  peanuts (Short breath)    Intolerances          PHYSICAL EXAM:    Vital Signs Last 24 Hrs  T(C): 36.7 (12 Oct 2021 05:35), Max: 37.2 (11 Oct 2021 21:20)  T(F): 98 (12 Oct 2021 05:35), Max: 99 (11 Oct 2021 21:20)  HR: 87 (12 Oct 2021 05:35) (82 - 100)  BP: 120/74 (12 Oct 2021 05:35) (120/74 - 128/82)  BP(mean): --  RR: 16 (12 Oct 2021 05:35) (16 - 18)  SpO2: 95% (12 Oct 2021 05:35) (95% - 99%)    General appearance: Morbidly obese female, lying comfortably on bed     Eyes: Pupils equal and reactive to light. EOM full. No exophthalmos.    Neck: Trachea midline. No thyroid enlargement.    Lungs: Normal respiratory excursion. Lungs clear.    CV: Regular cardiac rhythm. No murmur. Carotid and pedal pulses intact.    Abdomen: Soft, distended, +ve umbilical hernia     Musculoskeletal: RLE : Dry and scaly   LLE: Erythematous from ankle to half shin, Ulcer Stage I, tender and warm on touch     Skin: Dry,  rash, acanthosis nigricans +ve     Neuro: Cranial nerves intact. Normal motor and sensory function. DTR's normal.    Psych: Slightly depressed          LABS:                        12.6   8.02  )-----------( 346      ( 12 Oct 2021 07:04 )             37.1     10-12    138  |  93<L>  |  15  ----------------------------<  241<H>  3.8   |  32<H>  |  0.53    Ca    9.9      12 Oct 2021 07:04  Phos  3.6     10-12  Mg     2.00     10-12                POCT Blood Glucose.: 255 mg/dL (10-12-21 @ 08:43)  POCT Blood Glucose.: 270 mg/dL (10-11-21 @ 22:11)  POCT Blood Glucose.: 382 mg/dL (10-11-21 @ 17:30)  POCT Blood Glucose.: 320 mg/dL (10-11-21 @ 12:48)  POCT Blood Glucose.: 295 mg/dL (10-11-21 @ 09:16)  POCT Blood Glucose.: 274 mg/dL (10-10-21 @ 22:05)  POCT Blood Glucose.: 322 mg/dL (10-10-21 @ 18:24)  POCT Blood Glucose.: 276 mg/dL (10-10-21 @ 13:41)  POCT Blood Glucose.: 349 mg/dL (10-10-21 @ 08:32)  POCT Blood Glucose.: 283 mg/dL (10-09-21 @ 21:46)  POCT Blood Glucose.: 316 mg/dL (10-09-21 @ 18:02)  POCT Blood Glucose.: 301 mg/dL (10-09-21 @ 12:15)      RADIOLOGY & ADDITIONAL STUDIES:

## 2021-10-12 NOTE — PROGRESS NOTE ADULT - SUBJECTIVE AND OBJECTIVE BOX
Patient is a 51y old  Female who presents with a chief complaint of cellulitis (11 Oct 2021 19:16)      SUBJECTIVE / OVERNIGHT EVENTS: Patient seen and examined at bedside.    MEDICATIONS  (STANDING):  ammonium lactate 12% Lotion 1 Application(s) Topical two times a day  ampicillin  IVPB      ampicillin  IVPB 1 Gram(s) IV Intermittent every 6 hours  AQUAPHOR (petrolatum Ointment) 1 Application(s) Topical three times a day  budesonide 160 MICROgram(s)/formoterol 4.5 MICROgram(s) Inhaler 2 Puff(s) Inhalation two times a day  ciprofloxacin   IVPB 200 milliGRAM(s) IV Intermittent every 8 hours  ciprofloxacin   IVPB      dextrose 40% Gel 15 Gram(s) Oral once  dextrose 5%. 1000 milliLiter(s) (50 mL/Hr) IV Continuous <Continuous>  dextrose 5%. 1000 milliLiter(s) (100 mL/Hr) IV Continuous <Continuous>  dextrose 50% Injectable 25 Gram(s) IV Push once  dextrose 50% Injectable 12.5 Gram(s) IV Push once  dextrose 50% Injectable 25 Gram(s) IV Push once  enoxaparin Injectable 40 milliGRAM(s) SubCutaneous daily  glucagon  Injectable 1 milliGRAM(s) IntraMuscular once  influenza   Vaccine 0.5 milliLiter(s) IntraMuscular once  insulin glargine Injectable (LANTUS) 15 Unit(s) SubCutaneous at bedtime  insulin lispro (ADMELOG) corrective regimen sliding scale   SubCutaneous three times a day before meals  insulin lispro (ADMELOG) corrective regimen sliding scale   SubCutaneous at bedtime  insulin lispro Injectable (ADMELOG) 10 Unit(s) SubCutaneous three times a day before meals  lisinopril 20 milliGRAM(s) Oral daily  mupirocin 2% Ointment 1 Application(s) Topical two times a day  polyethylene glycol 3350 17 Gram(s) Oral daily  senna 2 Tablet(s) Oral at bedtime  simvastatin 20 milliGRAM(s) Oral at bedtime  torsemide 10 milliGRAM(s) Oral daily  triamcinolone 0.1% Ointment 1 Application(s) Topical two times a day    MEDICATIONS  (PRN):  acetaminophen   Tablet .. 650 milliGRAM(s) Oral every 6 hours PRN Mild Pain (1 - 3), Moderate Pain (4 - 6)  diphenhydrAMINE 25 milliGRAM(s) Oral every 4 hours PRN Rash and/or Itching  ketorolac   Injectable 15 milliGRAM(s) IV Push every 6 hours PRN Severe Pain (7 - 10)      Vital Signs Last 24 Hrs  T(C): 36.7 (12 Oct 2021 05:35), Max: 37.2 (11 Oct 2021 21:20)  T(F): 98 (12 Oct 2021 05:35), Max: 99 (11 Oct 2021 21:20)  HR: 87 (12 Oct 2021 05:35) (82 - 100)  BP: 120/74 (12 Oct 2021 05:35) (120/74 - 128/82)  BP(mean): --  RR: 16 (12 Oct 2021 05:35) (16 - 18)  SpO2: 95% (12 Oct 2021 05:35) (95% - 99%)  CAPILLARY BLOOD GLUCOSE      POCT Blood Glucose.: 270 mg/dL (11 Oct 2021 22:11)  POCT Blood Glucose.: 382 mg/dL (11 Oct 2021 17:30)  POCT Blood Glucose.: 320 mg/dL (11 Oct 2021 12:48)  POCT Blood Glucose.: 295 mg/dL (11 Oct 2021 09:16)    I&O's Summary      PHYSICAL EXAM:      LABS:                        11.8   6.92  )-----------( 330      ( 11 Oct 2021 15:31 )             36.1     10-11    133<L>  |  88<L>  |  20  ----------------------------<  388<H>  3.7   |  31  |  0.78    Ca    9.5      11 Oct 2021 15:31  Phos  3.4     10-11  Mg     1.90     10-11                RADIOLOGY & ADDITIONAL TESTS:    Imaging Personally Reviewed:    Consultant(s) Notes Reviewed:      Care Discussed with Consultants/Other Providers:    Bibi Padgett, PGY-1; [facility] Pager: [phone]; [facility] Pager: [phone]   Patient is a 51y old  Female who presents with a chief complaint of cellulitis (11 Oct 2021 19:16)      SUBJECTIVE / OVERNIGHT EVENTS: Patient seen and examined at bedside.    MEDICATIONS  (STANDING):  ammonium lactate 12% Lotion 1 Application(s) Topical two times a day  ampicillin  IVPB      ampicillin  IVPB 1 Gram(s) IV Intermittent every 6 hours  AQUAPHOR (petrolatum Ointment) 1 Application(s) Topical three times a day  budesonide 160 MICROgram(s)/formoterol 4.5 MICROgram(s) Inhaler 2 Puff(s) Inhalation two times a day  ciprofloxacin   IVPB 200 milliGRAM(s) IV Intermittent every 8 hours  ciprofloxacin   IVPB      dextrose 40% Gel 15 Gram(s) Oral once  dextrose 5%. 1000 milliLiter(s) (50 mL/Hr) IV Continuous <Continuous>  dextrose 5%. 1000 milliLiter(s) (100 mL/Hr) IV Continuous <Continuous>  dextrose 50% Injectable 25 Gram(s) IV Push once  dextrose 50% Injectable 12.5 Gram(s) IV Push once  dextrose 50% Injectable 25 Gram(s) IV Push once  enoxaparin Injectable 40 milliGRAM(s) SubCutaneous daily  glucagon  Injectable 1 milliGRAM(s) IntraMuscular once  influenza   Vaccine 0.5 milliLiter(s) IntraMuscular once  insulin glargine Injectable (LANTUS) 15 Unit(s) SubCutaneous at bedtime  insulin lispro (ADMELOG) corrective regimen sliding scale   SubCutaneous three times a day before meals  insulin lispro (ADMELOG) corrective regimen sliding scale   SubCutaneous at bedtime  insulin lispro Injectable (ADMELOG) 10 Unit(s) SubCutaneous three times a day before meals  lisinopril 20 milliGRAM(s) Oral daily  mupirocin 2% Ointment 1 Application(s) Topical two times a day  polyethylene glycol 3350 17 Gram(s) Oral daily  senna 2 Tablet(s) Oral at bedtime  simvastatin 20 milliGRAM(s) Oral at bedtime  torsemide 10 milliGRAM(s) Oral daily  triamcinolone 0.1% Ointment 1 Application(s) Topical two times a day    MEDICATIONS  (PRN):  acetaminophen   Tablet .. 650 milliGRAM(s) Oral every 6 hours PRN Mild Pain (1 - 3), Moderate Pain (4 - 6)  diphenhydrAMINE 25 milliGRAM(s) Oral every 4 hours PRN Rash and/or Itching  ketorolac   Injectable 15 milliGRAM(s) IV Push every 6 hours PRN Severe Pain (7 - 10)      Vital Signs Last 24 Hrs  T(C): 36.7 (12 Oct 2021 05:35), Max: 37.2 (11 Oct 2021 21:20)  T(F): 98 (12 Oct 2021 05:35), Max: 99 (11 Oct 2021 21:20)  HR: 87 (12 Oct 2021 05:35) (82 - 100)  BP: 120/74 (12 Oct 2021 05:35) (120/74 - 128/82)  BP(mean): --  RR: 16 (12 Oct 2021 05:35) (16 - 18)  SpO2: 95% (12 Oct 2021 05:35) (95% - 99%)  CAPILLARY BLOOD GLUCOSE      POCT Blood Glucose.: 270 mg/dL (11 Oct 2021 22:11)  POCT Blood Glucose.: 382 mg/dL (11 Oct 2021 17:30)  POCT Blood Glucose.: 320 mg/dL (11 Oct 2021 12:48)  POCT Blood Glucose.: 295 mg/dL (11 Oct 2021 09:16)    I&O's Summary      PHYSICAL EXAM:  GENERAL: Pt upset due to diabetes diagnosis, does not feel like taking care of herself due to recent death of parents and feeling alone  HEAD:  Atraumatic, Normocephalic  EYES: EOMI, PERRLA, conjunctiva and sclera clear  NECK: Supple, no lymphadenopathy, no JVD  CHEST/LUNG: CTAB; No wheezes, rales, or rhonchi  HEART: Regular rate and rhythm; No murmurs, rubs, or gallops  ABDOMEN: Soft, non-tender, non-distended; normal bowel sounds, no organomegaly  EXTREMITIES:  2+ peripheral pulses b/  SKIN:  no changes since yesterday, red non raised rash present over L lower extremity, beginning medially and extending above the knees on the sides of the L leg, peeling skin present b/l, wet changes px btwn toes    LABS:                        11.8   6.92  )-----------( 330      ( 11 Oct 2021 15:31 )             36.1     10-11    133<L>  |  88<L>  |  20  ----------------------------<  388<H>  3.7   |  31  |  0.78    Ca    9.5      11 Oct 2021 15:31  Phos  3.4     10-11  Mg     1.90     10-11                RADIOLOGY & ADDITIONAL TESTS:    Imaging Personally Reviewed:    Consultant(s) Notes Reviewed:      Care Discussed with Consultants/Other Providers:    Bibi Padgett, PGY-1; [facility] Pager: [phone]; [facility] Pager: [phone]

## 2021-10-12 NOTE — PROGRESS NOTE ADULT - PROBLEM SELECTOR PLAN 2
-hold home oral medication  - Endo consult placed   - Nutrional consult placed  -increasing lantus to 15 unites and lispro to 10 per endo recommendations  - Moderate sliding scale -hold home oral medication  - Endo consult placed   - Nutrional consult placed  -increasing lantus to 21, premeal to 12, will f/up endo recs  - Moderate sliding scale

## 2021-10-12 NOTE — CONSULT NOTE ADULT - PROBLEM SELECTOR RECOMMENDATION 4
Nutrition Consult    Consult   Will be benefited with diabetic medications which reduce weight   Eventually pt will need surgery, Out patient follow ups  Rest of management as per primary team

## 2021-10-12 NOTE — PROGRESS NOTE ADULT - SUBJECTIVE AND OBJECTIVE BOX
Follow Up:      Interval History/ROS:     Allergies  cefpodoxime (Rash)  estrogens (Hives; Pruritus)  peanuts (Short breath)        ANTIMICROBIALS:  ampicillin  IVPB 1 every 6 hours  ampicillin  IVPB    ciprofloxacin   IVPB 400 every 8 hours      OTHER MEDS:  acetaminophen   Tablet .. 650 milliGRAM(s) Oral every 6 hours PRN  ammonium lactate 12% Lotion 1 Application(s) Topical two times a day  AQUAPHOR (petrolatum Ointment) 1 Application(s) Topical three times a day  budesonide 160 MICROgram(s)/formoterol 4.5 MICROgram(s) Inhaler 2 Puff(s) Inhalation two times a day  dextrose 40% Gel 15 Gram(s) Oral once  dextrose 5%. 1000 milliLiter(s) IV Continuous <Continuous>  dextrose 5%. 1000 milliLiter(s) IV Continuous <Continuous>  dextrose 50% Injectable 25 Gram(s) IV Push once  dextrose 50% Injectable 12.5 Gram(s) IV Push once  dextrose 50% Injectable 25 Gram(s) IV Push once  diphenhydrAMINE 25 milliGRAM(s) Oral every 4 hours PRN  enoxaparin Injectable 40 milliGRAM(s) SubCutaneous daily  glucagon  Injectable 1 milliGRAM(s) IntraMuscular once  influenza   Vaccine 0.5 milliLiter(s) IntraMuscular once  insulin glargine Injectable (LANTUS) 36 Unit(s) SubCutaneous at bedtime  insulin lispro (ADMELOG) corrective regimen sliding scale   SubCutaneous three times a day before meals  insulin lispro (ADMELOG) corrective regimen sliding scale   SubCutaneous at bedtime  insulin lispro Injectable (ADMELOG) 12 Unit(s) SubCutaneous two times a day with meals  ketorolac   Injectable 15 milliGRAM(s) IV Push every 6 hours PRN  lisinopril 20 milliGRAM(s) Oral daily  mupirocin 2% Ointment 1 Application(s) Topical two times a day  polyethylene glycol 3350 17 Gram(s) Oral daily  senna 2 Tablet(s) Oral at bedtime  simvastatin 20 milliGRAM(s) Oral at bedtime  torsemide 10 milliGRAM(s) Oral daily  triamcinolone 0.1% Ointment 1 Application(s) Topical two times a day      Vital Signs Last 24 Hrs  T(C): 36.7 (12 Oct 2021 05:35), Max: 37.2 (11 Oct 2021 21:20)  T(F): 98 (12 Oct 2021 05:35), Max: 99 (11 Oct 2021 21:20)  HR: 87 (12 Oct 2021 05:35) (87 - 100)  BP: 120/74 (12 Oct 2021 05:35) (120/74 - 128/82)  BP(mean): --  RR: 16 (12 Oct 2021 05:35) (16 - 18)  SpO2: 95% (12 Oct 2021 05:35) (95% - 99%)    Physical Exam:  General: awake, alert, non toxic  Head: atraumatic, normocephalic  Eye: normal sclera and conjunctiva  ENT: no oropharyngeal lesions, no cervical lymphadenopathy   Cardio: regular rate and rhythm   Respiratory: nonlabored on room air, clear bilaterally, no wheezing  abd: soft, bowel sounds present, no tenderness  : no CVAT, no suprapubic tenderness, no pringle  Musculoskeletal: no focal joint swelling, no edema  vascular: no phlebitis   Skin: no rash  Neurologic: no focal deficit  psych: normal affect                          12.6   8.02  )-----------( 346      ( 12 Oct 2021 07:04 )             37.1       10-12    138  |  93<L>  |  15  ----------------------------<  241<H>  3.8   |  32<H>  |  0.53    Ca    9.9      12 Oct 2021 07:04  Phos  3.6     10-12  Mg     2.00     10-12            MICROBIOLOGY:  v          RADIOLOGY:  Images below reviewed personally   Follow Up:  Cellulitis     Interval History/ROS: Feels much better today. No diarrhea. No fevers.     Allergies  cefpodoxime (Rash)  estrogens (Hives; Pruritus)  peanuts (Short breath)        ANTIMICROBIALS:  ampicillin  IVPB 1 every 6 hours  ampicillin  IVPB    ciprofloxacin   IVPB 400 every 8 hours      OTHER MEDS:  acetaminophen   Tablet .. 650 milliGRAM(s) Oral every 6 hours PRN  ammonium lactate 12% Lotion 1 Application(s) Topical two times a day  AQUAPHOR (petrolatum Ointment) 1 Application(s) Topical three times a day  budesonide 160 MICROgram(s)/formoterol 4.5 MICROgram(s) Inhaler 2 Puff(s) Inhalation two times a day  dextrose 40% Gel 15 Gram(s) Oral once  dextrose 5%. 1000 milliLiter(s) IV Continuous <Continuous>  dextrose 5%. 1000 milliLiter(s) IV Continuous <Continuous>  dextrose 50% Injectable 25 Gram(s) IV Push once  dextrose 50% Injectable 12.5 Gram(s) IV Push once  dextrose 50% Injectable 25 Gram(s) IV Push once  diphenhydrAMINE 25 milliGRAM(s) Oral every 4 hours PRN  enoxaparin Injectable 40 milliGRAM(s) SubCutaneous daily  glucagon  Injectable 1 milliGRAM(s) IntraMuscular once  influenza   Vaccine 0.5 milliLiter(s) IntraMuscular once  insulin glargine Injectable (LANTUS) 36 Unit(s) SubCutaneous at bedtime  insulin lispro (ADMELOG) corrective regimen sliding scale   SubCutaneous three times a day before meals  insulin lispro (ADMELOG) corrective regimen sliding scale   SubCutaneous at bedtime  insulin lispro Injectable (ADMELOG) 12 Unit(s) SubCutaneous two times a day with meals  ketorolac   Injectable 15 milliGRAM(s) IV Push every 6 hours PRN  lisinopril 20 milliGRAM(s) Oral daily  mupirocin 2% Ointment 1 Application(s) Topical two times a day  polyethylene glycol 3350 17 Gram(s) Oral daily  senna 2 Tablet(s) Oral at bedtime  simvastatin 20 milliGRAM(s) Oral at bedtime  torsemide 10 milliGRAM(s) Oral daily  triamcinolone 0.1% Ointment 1 Application(s) Topical two times a day      Vital Signs Last 24 Hrs  T(C): 36.7 (12 Oct 2021 05:35), Max: 37.2 (11 Oct 2021 21:20)  T(F): 98 (12 Oct 2021 05:35), Max: 99 (11 Oct 2021 21:20)  HR: 87 (12 Oct 2021 05:35) (87 - 100)  BP: 120/74 (12 Oct 2021 05:35) (120/74 - 128/82)  BP(mean): --  RR: 16 (12 Oct 2021 05:35) (16 - 18)  SpO2: 95% (12 Oct 2021 05:35) (95% - 99%)    Physical Exam:  General: awake, alert, non toxic, obese   Head: atraumatic, normocephalic  Eye: normal sclera and conjunctiva  Cardio: regular rate   Respiratory: nonlabored on room air  abd: nondistended   Skin: LLE erythema/warmth/tenderness are stable/improved compared to yesterday   psych: normal affect                          12.6   8.02  )-----------( 346      ( 12 Oct 2021 07:04 )             37.1       10-12    138  |  93<L>  |  15  ----------------------------<  241<H>  3.8   |  32<H>  |  0.53    Ca    9.9      12 Oct 2021 07:04  Phos  3.6     10-12  Mg     2.00     10-12            MICROBIOLOGY:  Culture - Abscess with Gram Stain (collected 10-08-21 @ 22:47)  Source: .Abscess skin L leg  Preliminary Report (10-09-21 @ 22:39):    Numerous Pseudomonas aeruginosa    Numerous Streptococcus agalactiae (Group B) isolated    Group B streptococci are susceptible to ampicillin,    penicillin and cefazolin, but may be resistant to    erythromycin and clindamycin.    Recommendations for intrapartum prophylaxis for Group B    streptococci are penicillin or ampicillin.  Organism: Pseudomonas aeruginosa (10-10-21 @ 16:12)  Organism: Pseudomonas aeruginosa (10-10-21 @ 16:12)      -  Amikacin: S <=16      -  Aztreonam: S <=4      -  Cefepime: S <=2      -  Ceftazidime: S <=1      -  Ciprofloxacin: S <=0.25      -  Gentamicin: S <=2      -  Imipenem: S <=1      -  Levofloxacin: S <=0.5      -  Meropenem: S <=1      -  Piperacillin/Tazobactam: S <=8      -  Tobramycin: S <=2      Method Type: BERNICE    Culture - Blood (collected 10-08-21 @ 00:35)  Source: .Blood Blood-Venous  Preliminary Report (10-09-21 @ 01:02):    No growth to date.    Culture - Blood (collected 10-08-21 @ 00:35)  Source: .Blood Blood-Peripheral  Preliminary Report (10-09-21 @ 01:02):    No growth to date.    RADIOLOGY:  Images below reviewed personally  US Duplex Venous Lower Ext Ltd, Left (10.07.21 @ 21:25)   No evidence of left lower extremity deep venous thrombosis.    Xray Tibia + Fibula 2 Views, Left (10.07.21 @ 20:07)   1. Soft tissue prominence/edema with predominance of the forefoot without appreciated gas.

## 2021-10-13 ENCOUNTER — TRANSCRIPTION ENCOUNTER (OUTPATIENT)
Age: 51
End: 2021-10-13

## 2021-10-13 VITALS
SYSTOLIC BLOOD PRESSURE: 111 MMHG | OXYGEN SATURATION: 97 % | RESPIRATION RATE: 18 BRPM | HEART RATE: 102 BPM | DIASTOLIC BLOOD PRESSURE: 72 MMHG | TEMPERATURE: 99 F

## 2021-10-13 LAB
-  AMPICILLIN: SIGNIFICANT CHANGE UP
-  TETRACYCLINE: SIGNIFICANT CHANGE UP
-  VANCOMYCIN: SIGNIFICANT CHANGE UP
ALBUMIN SERPL ELPH-MCNC: 4.2 G/DL — SIGNIFICANT CHANGE UP (ref 3.3–5)
ALP SERPL-CCNC: 78 U/L — SIGNIFICANT CHANGE UP (ref 40–120)
ALT FLD-CCNC: 33 U/L — SIGNIFICANT CHANGE UP (ref 4–33)
ANION GAP SERPL CALC-SCNC: 11 MMOL/L — SIGNIFICANT CHANGE UP (ref 7–14)
AST SERPL-CCNC: 30 U/L — SIGNIFICANT CHANGE UP (ref 4–32)
BILIRUB SERPL-MCNC: 0.5 MG/DL — SIGNIFICANT CHANGE UP (ref 0.2–1.2)
BUN SERPL-MCNC: 16 MG/DL — SIGNIFICANT CHANGE UP (ref 7–23)
CALCIUM SERPL-MCNC: 9.4 MG/DL — SIGNIFICANT CHANGE UP (ref 8.4–10.5)
CHLORIDE SERPL-SCNC: 93 MMOL/L — LOW (ref 98–107)
CHOLEST SERPL-MCNC: 157 MG/DL — SIGNIFICANT CHANGE UP
CO2 SERPL-SCNC: 32 MMOL/L — HIGH (ref 22–31)
CREAT SERPL-MCNC: 0.6 MG/DL — SIGNIFICANT CHANGE UP (ref 0.5–1.3)
CULTURE RESULTS: SIGNIFICANT CHANGE UP
GLUCOSE BLDC GLUCOMTR-MCNC: 136 MG/DL — HIGH (ref 70–99)
GLUCOSE BLDC GLUCOMTR-MCNC: 238 MG/DL — HIGH (ref 70–99)
GLUCOSE BLDC GLUCOMTR-MCNC: 251 MG/DL — HIGH (ref 70–99)
GLUCOSE BLDC GLUCOMTR-MCNC: 262 MG/DL — HIGH (ref 70–99)
GLUCOSE SERPL-MCNC: 251 MG/DL — HIGH (ref 70–99)
HCT VFR BLD CALC: 37 % — SIGNIFICANT CHANGE UP (ref 34.5–45)
HDLC SERPL-MCNC: 31 MG/DL — LOW
HGB BLD-MCNC: 12.1 G/DL — SIGNIFICANT CHANGE UP (ref 11.5–15.5)
LIPID PNL WITH DIRECT LDL SERPL: 106 MG/DL — HIGH
MAGNESIUM SERPL-MCNC: 1.8 MG/DL — SIGNIFICANT CHANGE UP (ref 1.6–2.6)
MCHC RBC-ENTMCNC: 25.2 PG — LOW (ref 27–34)
MCHC RBC-ENTMCNC: 32.7 GM/DL — SIGNIFICANT CHANGE UP (ref 32–36)
MCV RBC AUTO: 76.9 FL — LOW (ref 80–100)
METHOD TYPE: SIGNIFICANT CHANGE UP
NON HDL CHOLESTEROL: 126 MG/DL — SIGNIFICANT CHANGE UP
NRBC # BLD: 0 /100 WBCS — SIGNIFICANT CHANGE UP
NRBC # FLD: 0 K/UL — SIGNIFICANT CHANGE UP
ORGANISM # SPEC MICROSCOPIC CNT: SIGNIFICANT CHANGE UP
PHOSPHATE SERPL-MCNC: 3.1 MG/DL — SIGNIFICANT CHANGE UP (ref 2.5–4.5)
PLATELET # BLD AUTO: 295 K/UL — SIGNIFICANT CHANGE UP (ref 150–400)
POTASSIUM SERPL-MCNC: 3.6 MMOL/L — SIGNIFICANT CHANGE UP (ref 3.5–5.3)
POTASSIUM SERPL-SCNC: 3.6 MMOL/L — SIGNIFICANT CHANGE UP (ref 3.5–5.3)
PROT SERPL-MCNC: 7.6 G/DL — SIGNIFICANT CHANGE UP (ref 6–8.3)
RBC # BLD: 4.81 M/UL — SIGNIFICANT CHANGE UP (ref 3.8–5.2)
RBC # FLD: 15.2 % — HIGH (ref 10.3–14.5)
SODIUM SERPL-SCNC: 136 MMOL/L — SIGNIFICANT CHANGE UP (ref 135–145)
SPECIMEN SOURCE: SIGNIFICANT CHANGE UP
TRIGL SERPL-MCNC: 99 MG/DL — SIGNIFICANT CHANGE UP
WBC # BLD: 6.67 K/UL — SIGNIFICANT CHANGE UP (ref 3.8–10.5)
WBC # FLD AUTO: 6.67 K/UL — SIGNIFICANT CHANGE UP (ref 3.8–10.5)

## 2021-10-13 PROCEDURE — 99233 SBSQ HOSP IP/OBS HIGH 50: CPT | Mod: GC

## 2021-10-13 PROCEDURE — 99232 SBSQ HOSP IP/OBS MODERATE 35: CPT

## 2021-10-13 PROCEDURE — 99239 HOSP IP/OBS DSCHRG MGMT >30: CPT | Mod: GC

## 2021-10-13 RX ORDER — PETROLATUM,WHITE
1 JELLY (GRAM) TOPICAL
Qty: 42 | Refills: 0
Start: 2021-10-13 | End: 2021-10-26

## 2021-10-13 RX ORDER — GLIMEPIRIDE 1 MG
1 TABLET ORAL
Qty: 30 | Refills: 0
Start: 2021-10-13 | End: 2021-11-11

## 2021-10-13 RX ORDER — DULAGLUTIDE 4.5 MG/.5ML
0.75 INJECTION, SOLUTION SUBCUTANEOUS
Qty: 4 | Refills: 0
Start: 2021-10-13 | End: 2021-11-11

## 2021-10-13 RX ORDER — GLIMEPIRIDE 1 MG
1 TABLET ORAL
Qty: 0 | Refills: 0 | DISCHARGE

## 2021-10-13 RX ORDER — ISOPROPYL ALCOHOL, BENZOCAINE .7; .06 ML/ML; ML/ML
1 SWAB TOPICAL
Qty: 100 | Refills: 1
Start: 2021-10-13 | End: 2021-12-01

## 2021-10-13 RX ORDER — AMOXICILLIN 250 MG/5ML
1 SUSPENSION, RECONSTITUTED, ORAL (ML) ORAL
Qty: 24 | Refills: 0
Start: 2021-10-13 | End: 2021-10-20

## 2021-10-13 RX ORDER — INSULIN LISPRO 100/ML
16 VIAL (ML) SUBCUTANEOUS
Refills: 0 | Status: DISCONTINUED | OUTPATIENT
Start: 2021-10-13 | End: 2021-10-13

## 2021-10-13 RX ORDER — INSULIN GLARGINE 100 [IU]/ML
42 INJECTION, SOLUTION SUBCUTANEOUS AT BEDTIME
Refills: 0 | Status: DISCONTINUED | OUTPATIENT
Start: 2021-10-13 | End: 2021-10-13

## 2021-10-13 RX ORDER — METFORMIN HYDROCHLORIDE 850 MG/1
1 TABLET ORAL
Qty: 0 | Refills: 0 | DISCHARGE

## 2021-10-13 RX ORDER — DULAGLUTIDE 4.5 MG/.5ML
0.75 INJECTION, SOLUTION SUBCUTANEOUS
Qty: 4 | Refills: 0
Start: 2021-10-13 | End: 2022-11-06

## 2021-10-13 RX ORDER — SOD,AMMONIUM,POTASSIUM LACTATE
1 CREAM (GRAM) TOPICAL
Qty: 1 | Refills: 0
Start: 2021-10-13 | End: 2021-10-26

## 2021-10-13 RX ORDER — CIPROFLOXACIN LACTATE 400MG/40ML
1 VIAL (ML) INTRAVENOUS
Qty: 16 | Refills: 0
Start: 2021-10-13 | End: 2021-10-20

## 2021-10-13 RX ORDER — METFORMIN HYDROCHLORIDE 850 MG/1
1 TABLET ORAL
Qty: 60 | Refills: 0
Start: 2021-10-13

## 2021-10-13 RX ORDER — MUPIROCIN 20 MG/G
1 OINTMENT TOPICAL
Qty: 28 | Refills: 0
Start: 2021-10-13 | End: 2021-10-26

## 2021-10-13 RX ADMIN — POLYETHYLENE GLYCOL 3350 17 GRAM(S): 17 POWDER, FOR SOLUTION ORAL at 12:59

## 2021-10-13 RX ADMIN — MUPIROCIN 1 APPLICATION(S): 20 OINTMENT TOPICAL at 05:57

## 2021-10-13 RX ADMIN — Medication 6: at 12:58

## 2021-10-13 RX ADMIN — Medication 108 GRAM(S): at 12:58

## 2021-10-13 RX ADMIN — Medication 108 GRAM(S): at 17:48

## 2021-10-13 RX ADMIN — Medication 10 MILLIGRAM(S): at 05:58

## 2021-10-13 RX ADMIN — Medication 16 UNIT(S): at 17:56

## 2021-10-13 RX ADMIN — Medication 200 MILLIGRAM(S): at 06:03

## 2021-10-13 RX ADMIN — Medication 4: at 09:04

## 2021-10-13 RX ADMIN — Medication 1 APPLICATION(S): at 17:49

## 2021-10-13 RX ADMIN — BUDESONIDE AND FORMOTEROL FUMARATE DIHYDRATE 2 PUFF(S): 160; 4.5 AEROSOL RESPIRATORY (INHALATION) at 09:05

## 2021-10-13 RX ADMIN — Medication 1 APPLICATION(S): at 05:58

## 2021-10-13 RX ADMIN — Medication 1 APPLICATION(S): at 14:10

## 2021-10-13 RX ADMIN — Medication 1 APPLICATION(S): at 06:07

## 2021-10-13 RX ADMIN — Medication 108 GRAM(S): at 05:55

## 2021-10-13 RX ADMIN — Medication 200 MILLIGRAM(S): at 14:08

## 2021-10-13 RX ADMIN — Medication 25 MILLIGRAM(S): at 03:01

## 2021-10-13 RX ADMIN — LISINOPRIL 20 MILLIGRAM(S): 2.5 TABLET ORAL at 05:58

## 2021-10-13 RX ADMIN — ENOXAPARIN SODIUM 40 MILLIGRAM(S): 100 INJECTION SUBCUTANEOUS at 12:59

## 2021-10-13 RX ADMIN — Medication 12 UNIT(S): at 09:04

## 2021-10-13 RX ADMIN — Medication 1 APPLICATION(S): at 05:57

## 2021-10-13 RX ADMIN — MUPIROCIN 1 APPLICATION(S): 20 OINTMENT TOPICAL at 17:50

## 2021-10-13 NOTE — PROGRESS NOTE ADULT - SUBJECTIVE AND OBJECTIVE BOX
Patient is a 51y old  Female who presents with a chief complaint of cellulitis (12 Oct 2021 14:56)      SUBJECTIVE / OVERNIGHT EVENTS: Patient seen and examined at bedside.    MEDICATIONS  (STANDING):  ammonium lactate 12% Lotion 1 Application(s) Topical two times a day  ampicillin  IVPB 1 Gram(s) IV Intermittent every 6 hours  ampicillin  IVPB      AQUAPHOR (petrolatum Ointment) 1 Application(s) Topical three times a day  budesonide 160 MICROgram(s)/formoterol 4.5 MICROgram(s) Inhaler 2 Puff(s) Inhalation two times a day  ciprofloxacin   IVPB 400 milliGRAM(s) IV Intermittent every 8 hours  dextrose 40% Gel 15 Gram(s) Oral once  dextrose 5%. 1000 milliLiter(s) (50 mL/Hr) IV Continuous <Continuous>  dextrose 5%. 1000 milliLiter(s) (100 mL/Hr) IV Continuous <Continuous>  dextrose 50% Injectable 25 Gram(s) IV Push once  dextrose 50% Injectable 12.5 Gram(s) IV Push once  dextrose 50% Injectable 25 Gram(s) IV Push once  enoxaparin Injectable 40 milliGRAM(s) SubCutaneous daily  glucagon  Injectable 1 milliGRAM(s) IntraMuscular once  influenza   Vaccine 0.5 milliLiter(s) IntraMuscular once  insulin glargine Injectable (LANTUS) 36 Unit(s) SubCutaneous at bedtime  insulin lispro (ADMELOG) corrective regimen sliding scale   SubCutaneous three times a day before meals  insulin lispro (ADMELOG) corrective regimen sliding scale   SubCutaneous at bedtime  insulin lispro Injectable (ADMELOG) 12 Unit(s) SubCutaneous two times a day with meals  lisinopril 20 milliGRAM(s) Oral daily  mupirocin 2% Ointment 1 Application(s) Topical two times a day  polyethylene glycol 3350 17 Gram(s) Oral daily  senna 2 Tablet(s) Oral at bedtime  simvastatin 20 milliGRAM(s) Oral at bedtime  torsemide 10 milliGRAM(s) Oral daily  triamcinolone 0.1% Ointment 1 Application(s) Topical two times a day    MEDICATIONS  (PRN):  acetaminophen   Tablet .. 650 milliGRAM(s) Oral every 6 hours PRN Mild Pain (1 - 3), Moderate Pain (4 - 6)  diphenhydrAMINE 25 milliGRAM(s) Oral every 4 hours PRN Rash and/or Itching  ketorolac   Injectable 15 milliGRAM(s) IV Push every 6 hours PRN Severe Pain (7 - 10)      Vital Signs Last 24 Hrs  T(C): 37.3 (13 Oct 2021 06:56), Max: 37.5 (12 Oct 2021 21:05)  T(F): 99.2 (13 Oct 2021 06:56), Max: 99.5 (12 Oct 2021 21:05)  HR: 90 (13 Oct 2021 07:13) (90 - 101)  BP: 122/84 (13 Oct 2021 06:56) (116/70 - 129/85)  BP(mean): --  RR: 18 (13 Oct 2021 06:56) (18 - 18)  SpO2: 97% (13 Oct 2021 07:13) (95% - 97%)  CAPILLARY BLOOD GLUCOSE      POCT Blood Glucose.: 256 mg/dL (12 Oct 2021 22:15)  POCT Blood Glucose.: 278 mg/dL (12 Oct 2021 17:39)  POCT Blood Glucose.: 262 mg/dL (12 Oct 2021 12:27)  POCT Blood Glucose.: 255 mg/dL (12 Oct 2021 08:43)    I&O's Summary      PHYSICAL EXAM:  GENERAL: Pt upset due to diabetes diagnosis, does not feel like taking care of herself due to recent death of parents and feeling alone  HEAD:  Atraumatic, Normocephalic  EYES: EOMI, PERRLA, conjunctiva and sclera clear  NECK: Supple, no lymphadenopathy, no JVD  CHEST/LUNG: CTAB; No wheezes, rales, or rhonchi  HEART: Regular rate and rhythm; No murmurs, rubs, or gallops  ABDOMEN: Soft, non-tender, non-distended; normal bowel sounds, no organomegaly  EXTREMITIES:  2+ peripheral pulses b/  SKIN:  no changes since yesterday, red non raised rash present over L lower extremity, beginning medially and extending above the knees on the sides of the L leg, peeling skin present b/l, wet changes px btwn toes    LABS:                        12.6   8.02  )-----------( 346      ( 12 Oct 2021 07:04 )             37.1     10-12    138  |  93<L>  |  15  ----------------------------<  241<H>  3.8   |  32<H>  |  0.53    Ca    9.9      12 Oct 2021 07:04  Phos  3.6     10-12  Mg     2.00     10-12                RADIOLOGY & ADDITIONAL TESTS:    Imaging Personally Reviewed:    Consultant(s) Notes Reviewed:      Care Discussed with Consultants/Other Providers:    Bibi Padgett, PGY-1; [facility] Pager: [phone]; [facility] Pager: [phone]   Patient is a 51y old  Female who presents with a chief complaint of cellulitis (12 Oct 2021 14:56)      SUBJECTIVE / OVERNIGHT EVENTS: Patient seen and examined at bedside.    MEDICATIONS  (STANDING):  ammonium lactate 12% Lotion 1 Application(s) Topical two times a day  ampicillin  IVPB 1 Gram(s) IV Intermittent every 6 hours  ampicillin  IVPB      AQUAPHOR (petrolatum Ointment) 1 Application(s) Topical three times a day  budesonide 160 MICROgram(s)/formoterol 4.5 MICROgram(s) Inhaler 2 Puff(s) Inhalation two times a day  ciprofloxacin   IVPB 400 milliGRAM(s) IV Intermittent every 8 hours  dextrose 40% Gel 15 Gram(s) Oral once  dextrose 5%. 1000 milliLiter(s) (50 mL/Hr) IV Continuous <Continuous>  dextrose 5%. 1000 milliLiter(s) (100 mL/Hr) IV Continuous <Continuous>  dextrose 50% Injectable 25 Gram(s) IV Push once  dextrose 50% Injectable 12.5 Gram(s) IV Push once  dextrose 50% Injectable 25 Gram(s) IV Push once  enoxaparin Injectable 40 milliGRAM(s) SubCutaneous daily  glucagon  Injectable 1 milliGRAM(s) IntraMuscular once  influenza   Vaccine 0.5 milliLiter(s) IntraMuscular once  insulin glargine Injectable (LANTUS) 36 Unit(s) SubCutaneous at bedtime  insulin lispro (ADMELOG) corrective regimen sliding scale   SubCutaneous three times a day before meals  insulin lispro (ADMELOG) corrective regimen sliding scale   SubCutaneous at bedtime  insulin lispro Injectable (ADMELOG) 12 Unit(s) SubCutaneous two times a day with meals  lisinopril 20 milliGRAM(s) Oral daily  mupirocin 2% Ointment 1 Application(s) Topical two times a day  polyethylene glycol 3350 17 Gram(s) Oral daily  senna 2 Tablet(s) Oral at bedtime  simvastatin 20 milliGRAM(s) Oral at bedtime  torsemide 10 milliGRAM(s) Oral daily  triamcinolone 0.1% Ointment 1 Application(s) Topical two times a day    MEDICATIONS  (PRN):  acetaminophen   Tablet .. 650 milliGRAM(s) Oral every 6 hours PRN Mild Pain (1 - 3), Moderate Pain (4 - 6)  diphenhydrAMINE 25 milliGRAM(s) Oral every 4 hours PRN Rash and/or Itching  ketorolac   Injectable 15 milliGRAM(s) IV Push every 6 hours PRN Severe Pain (7 - 10)      Vital Signs Last 24 Hrs  T(C): 37.3 (13 Oct 2021 06:56), Max: 37.5 (12 Oct 2021 21:05)  T(F): 99.2 (13 Oct 2021 06:56), Max: 99.5 (12 Oct 2021 21:05)  HR: 90 (13 Oct 2021 07:13) (90 - 101)  BP: 122/84 (13 Oct 2021 06:56) (116/70 - 129/85)  BP(mean): --  RR: 18 (13 Oct 2021 06:56) (18 - 18)  SpO2: 97% (13 Oct 2021 07:13) (95% - 97%)  CAPILLARY BLOOD GLUCOSE      POCT Blood Glucose.: 256 mg/dL (12 Oct 2021 22:15)  POCT Blood Glucose.: 278 mg/dL (12 Oct 2021 17:39)  POCT Blood Glucose.: 262 mg/dL (12 Oct 2021 12:27)  POCT Blood Glucose.: 255 mg/dL (12 Oct 2021 08:43)    I&O's Summary      PHYSICAL EXAM:  GENERAL: Pt is resting comfortably in bed  HEAD:  Atraumatic, Normocephalic  EYES: EOMI, PERRLA, conjunctiva and sclera clear  NECK: Supple, no lymphadenopathy, no JVD  CHEST/LUNG: CTAB; No wheezes, rales, or rhonchi  HEART: Regular rate and rhythm; No murmurs, rubs, or gallops  ABDOMEN: Soft, non-tender, non-distended; normal bowel sounds, no organomegaly  EXTREMITIES:  2+ peripheral pulses b/  SKIN:  redness improved, not as warm as yesterday    LABS:                        12.6   8.02  )-----------( 346      ( 12 Oct 2021 07:04 )             37.1     10-12    138  |  93<L>  |  15  ----------------------------<  241<H>  3.8   |  32<H>  |  0.53    Ca    9.9      12 Oct 2021 07:04  Phos  3.6     10-12  Mg     2.00     10-12                RADIOLOGY & ADDITIONAL TESTS:    Imaging Personally Reviewed:    Consultant(s) Notes Reviewed:      Care Discussed with Consultants/Other Providers:    Bibi Padgett, PGY-1; [facility] Pager: [phone]; [facility] Pager: [phone]

## 2021-10-13 NOTE — PROGRESS NOTE ADULT - PROBLEM SELECTOR PLAN 1
- abscess cultures + pseudamonas, group B strep   - switched vanco--> Zosyn   - ID consult placed, will f/up ID recommendations --> switch pt to cipro 400mg q8, amp1g q6  - continue abx until 10/20 (10 day course)  -Benadryl prn for pruritus  -Aquaphor for dry skin

## 2021-10-13 NOTE — PROGRESS NOTE ADULT - ASSESSMENT
52 yo f obesity kylee on cpap, reactive airway, DM2,  disease presenting with distal left leg swelling/erythema. Admitted for cellulitis. Endocrinology consulted for uncontrolled DM2    50 yo f obesity kylee on cpap, reactive airway, DM2,  disease presenting with distal left leg swelling/erythema. Admitted for cellulitis. Endocrinology consulted for uncontrolled DM2     1. DM  Pt has 9 yr hx of DM2, uncontrolled, non-compliant, needle phobia    check FSBG TID qac and hs  carb consistent diet   10/13  - Lantus 42 u qhs  -Humalog 16 units premeals   -moderate  dose SS TIDAC/qhs    Discharge recs:  Glimepiride 2mg daily, Metformin 1000mg BID and Trulicity inj subq 0.75 weekly   Trulicity covered by her insurance   ideally Insulin is the best for the pt however pt will not inject insulin outpt   -Discussed with patient the importance of Carb consistent diet and exercise as tolerated.    -Recommend nutritional consultation  -Discussed glycemic goal of a1c <7% to prevent microvascular complications of diabetes mellitus and reduce the risk of macrovascular complications.   -d/w pt she must have opthalmology exam to make sure no diabetic retinopathy (DR). Trulicity has been associated with progression of DR if underlaying DR present    To prepare for dc:  - Make sure patient knows how to inject insulin and check fingersticks with glucometer (ask bedside RN for teaching)  - Discharge on premeal insulin and Lantus. do not dc on correction scale or bedtime scale.  - At home, Patient should check FSBG premeals and bedtime. Pt should call their doctor when FSBG <70 or above >400 and or consistently above 200s as changes in the regimen will have to be made.  -pt should call PMD for DM related questions or concerns until pt is seen by CDE or endocrine       DISCHARGE RX:  - Glucometer (ACCU_CHECK elizabet Conenct, Ascensia Contour Next EZ or One, Freestyle Freedome LITE or OneTouch Verio IQ)  - Glucometer test strips and lancets  (make sure compatible w glucometer), Dispense #100 (or #200) use as directed    Patient will also need a glucometer, test strips, lancets, alcohol pads,       2. HTN  BP is at target, goal is SBP<130 and DBP 80 or below   Monitor BP  DASH diet          3. HLD  pt is on statin   Further management outpatient  follow lipids outpt once pt is consistent with dosing         Mounika Blanco MD  Attending Physician   Department of Endocrinology, Diabetes and Metabolism     Pager  [phone] [please provide 10 digit call back number]  CARMELO 9-5  [email or web address]  Nights and weekends: [phone]  Please note that this patient may be followed by a different provider tomorrow.   If no answer or after hours, please contact [phone].  For final dc reccomendations, please call [phone] or page the endocrine fellow on call.      -I spent a total time of 35 mins with the patient at bedside/on unit of which more than 50% of time was spent on counseling/coordination of care.    High risk patient with high level decision making, at high risk of worsening microvascular and macrovascular complications.  Endocrine team consulted for uncontrolled diabetes. Patient is high risk with high level decision making due to uncontrolled diabetes which places patient at high risk for cardiovascular and cerebrovascular events. Patient with lability of glucose requiring close monitoring and insulin adjustments.   50 yo f obesity kylee on cpap, reactive airway, DM2,  disease presenting with distal left leg swelling/erythema. Admitted for cellulitis. Endocrinology consulted for uncontrolled DM2       Please note- today pt noted to have depression and stating "she doesnt know if she wants to live sometimes"- d.w primary team to have SW/BH evaluation prior to dc given concern for depression     1. DM  Pt has 9 yr hx of DM2, uncontrolled, non-compliant, needle phobia    check FSBG TID qac and hs  carb consistent diet   10/13  - Lantus 42 u qhs  -Humalog 16 units premeals   -moderate  dose SS TIDAC/qhs    Discharge recs:  Glimepiride 2mg daily, Metformin 1000mg BID and Trulicity inj subq 0.75 weekly   Trulicity covered by her insurance   ideally Insulin is the best for the pt however pt will not inject insulin outpt   -Discussed with patient the importance of Carb consistent diet and exercise as tolerated.    -Recommend nutritional consultation  -Discussed glycemic goal of a1c <7% to prevent microvascular complications of diabetes mellitus and reduce the risk of macrovascular complications.   -d/w pt she must have opthalmology exam to make sure no diabetic retinopathy (DR). Trulicity has been associated with progression of DR if underlaying DR present    To prepare for dc:  - Make sure patient knows how to inject insulin and check fingersticks with glucometer (ask bedside RN for teaching)  - Discharge on premeal insulin and Lantus. do not dc on correction scale or bedtime scale.  - At home, Patient should check FSBG premeals and bedtime. Pt should call their doctor when FSBG <70 or above >400 and or consistently above 200s as changes in the regimen will have to be made.  -pt should call PMD for DM related questions or concerns until pt is seen by CDE or endocrine. please make sure pt has a PCP appt prior to dc   -If patient wishes to follow up with [facility] Endocrinology   ENDOCRINE FOLLOW UP  CALL PATIENT ACCESS SERVICES  [phone]  For all [facility] Endocrine Practices phone numbers and locations throughout [place], [place], and [place].          DISCHARGE RX:  - Glucometer (ACCU_CHECK elizabet Conenct, Ascensia Contour Next EZ or One, Freestyle Freedome LITE or OneTouch Verio IQ)  - Glucometer test strips and lancets  (make sure compatible w glucometer), Dispense #100 (or #200) use as directed    Patient will also need a glucometer, test strips, lancets, alcohol pads,       2. HTN  BP is at target, goal is SBP<130 and DBP 80 or below   Monitor BP  DASH diet          3. HLD  pt is on statin   Further management outpatient  follow lipids outpt once pt is consistent with dosing         Mounika Blanco MD  Attending Physician   Department of Endocrinology, Diabetes and Metabolism     Pager  [phone] [please provide 10 digit call back number]  CARMELO 9-5  [email or web address]  Nights and weekends: [phone]  Please note that this patient may be followed by a different provider tomorrow.   If no answer or after hours, please contact [phone].  For final dc reccomendations, please call [phone] or page the endocrine fellow on call.      -I spent a total time of 35 mins with the patient at bedside/on unit of which more than 50% of time was spent on counseling/coordination of care.    High risk patient with high level decision making, at high risk of worsening microvascular and macrovascular complications.  Endocrine team consulted for uncontrolled diabetes. Patient is high risk with high level decision making due to uncontrolled diabetes which places patient at high risk for cardiovascular and cerebrovascular events. Patient with lability of glucose requiring close monitoring and insulin adjustments.

## 2021-10-13 NOTE — PROGRESS NOTE ADULT - ASSESSMENT
Nonpurulent left lower leg cellulitis in the setting of uncontrolled diabetes and morbid obesity.   Associated edema, weeping and bullae formation, now ruptured and dry, grew GBS and Pseudomonas.   May have been provoked by a recent pedicure.   Outpatient developed a rash and peeling after Cefpodoxime - also improving. Tolerating Ampicillin.   Feels much better. Not systemically ill.     Suggest  -Ciprofloxacin 400mg IV q8h and Ampicillin 1GM IV q6h   -can discharge on PO Cipro 750mg BID and Amoxicillin 500mg TID through Wed 10/20 for a 10 day course     Silas Seaman MD   Infectious Disease   Pager [phone]   After 5PM and on weekends please page fellow on call or call [phone]

## 2021-10-13 NOTE — PROGRESS NOTE ADULT - PROBLEM SELECTOR PLAN 1
Pt has 9 yr hx of DM2, uncontrolled, non-compliant, needle phobia  On Lantus 36 Units , Admelog 12 Units before meals with Mod sliding scale in-pt  D/C planning today as per medicine team    Discharge recs:  Glimepiride 2mg OD, Metformin 1000mg BID and Trulicity inj subq 0.75 weekly   Trulicity covers by her insurance   Outpatient follow up with her PCP, Injection teaching in-patient, Behavioral therapy before gets d/c for depression and suicidal ideation    DW Medicine Team above mentioned plan.

## 2021-10-13 NOTE — PHYSICAL THERAPY INITIAL EVALUATION ADULT - PATIENT PROFILE REVIEW, REHAB EVAL
Activity Order: Increase As Tolerated 10/12; Patient ok for PT Eval/Ambulation, as per GRYE Simon./yes

## 2021-10-13 NOTE — PROGRESS NOTE ADULT - SUBJECTIVE AND OBJECTIVE BOX
Follow Up:  Cellulitis    Interval History/ROS: Afebrile. No diarrhea. Leg is improving.     Allergies  cefpodoxime (Rash)  estrogens (Hives; Pruritus)  peanuts (Short breath)        ANTIMICROBIALS:  ampicillin  IVPB    ampicillin  IVPB 1 every 6 hours  ciprofloxacin   IVPB 400 every 8 hours      OTHER MEDS:  acetaminophen   Tablet .. 650 milliGRAM(s) Oral every 6 hours PRN  ammonium lactate 12% Lotion 1 Application(s) Topical two times a day  AQUAPHOR (petrolatum Ointment) 1 Application(s) Topical three times a day  budesonide 160 MICROgram(s)/formoterol 4.5 MICROgram(s) Inhaler 2 Puff(s) Inhalation two times a day  dextrose 40% Gel 15 Gram(s) Oral once  dextrose 5%. 1000 milliLiter(s) IV Continuous <Continuous>  dextrose 5%. 1000 milliLiter(s) IV Continuous <Continuous>  dextrose 50% Injectable 25 Gram(s) IV Push once  dextrose 50% Injectable 12.5 Gram(s) IV Push once  dextrose 50% Injectable 25 Gram(s) IV Push once  diphenhydrAMINE 25 milliGRAM(s) Oral every 4 hours PRN  enoxaparin Injectable 40 milliGRAM(s) SubCutaneous daily  glucagon  Injectable 1 milliGRAM(s) IntraMuscular once  influenza   Vaccine 0.5 milliLiter(s) IntraMuscular once  insulin glargine Injectable (LANTUS) 42 Unit(s) SubCutaneous at bedtime  insulin lispro (ADMELOG) corrective regimen sliding scale   SubCutaneous three times a day before meals  insulin lispro (ADMELOG) corrective regimen sliding scale   SubCutaneous at bedtime  insulin lispro Injectable (ADMELOG) 16 Unit(s) SubCutaneous three times a day before meals  ketorolac   Injectable 15 milliGRAM(s) IV Push every 6 hours PRN  lisinopril 20 milliGRAM(s) Oral daily  mupirocin 2% Ointment 1 Application(s) Topical two times a day  polyethylene glycol 3350 17 Gram(s) Oral daily  senna 2 Tablet(s) Oral at bedtime  simvastatin 20 milliGRAM(s) Oral at bedtime  torsemide 10 milliGRAM(s) Oral daily  triamcinolone 0.1% Ointment 1 Application(s) Topical two times a day      Vital Signs Last 24 Hrs  T(C): 37.3 (13 Oct 2021 15:38), Max: 37.5 (12 Oct 2021 21:05)  T(F): 99.2 (13 Oct 2021 15:38), Max: 99.5 (12 Oct 2021 21:05)  HR: 102 (13 Oct 2021 15:38) (90 - 102)  BP: 111/72 (13 Oct 2021 15:38) (111/72 - 129/85)  BP(mean): --  RR: 18 (13 Oct 2021 15:38) (18 - 18)  SpO2: 97% (13 Oct 2021 15:38) (95% - 97%)    Physical Exam:  General: awake, alert, non toxic, obese  Head: atraumatic, normocephalic  Eye: normal sclera and conjunctiva  Cardio: regular rate   Respiratory: nonlabored on room air  abd: nondistended   Skin: improved/stable LLE erythema/warmth. not tender. blisters scabbing over.   psych: normal affect                          12.1   6.67  )-----------( 295      ( 13 Oct 2021 08:21 )             37.0       10-13    136  |  93<L>  |  16  ----------------------------<  251<H>  3.6   |  32<H>  |  0.60    Ca    9.4      13 Oct 2021 08:21  Phos  3.1     10-13  Mg     1.80     10-13    TPro  7.6  /  Alb  4.2  /  TBili  0.5  /  DBili  x   /  AST  30  /  ALT  33  /  AlkPhos  78  10-13          MICROBIOLOGY:  Culture - Abscess with Gram Stain (collected 10-08-21 @ 19:13)  Source: .Abscess skin L leg  Final Report (10-13-21 @ 16:50):    Moderate Enterococcus faecalis    Numerous Pseudomonas aeruginosa    Numerous Streptococcus agalactiae (Group B) isolated    Group B streptococci are susceptible to ampicillin,    penicillin and cefazolin, but may be resistant to    erythromycin and clindamycin.    Recommendations for intrapartum prophylaxis for Group B    streptococci are penicillin or ampicillin.  Organism: Pseudomonas aeruginosa  Enterococcus faecalis (10-13-21 @ 16:50)  Organism: Enterococcus faecalis (10-13-21 @ 16:50)      -  Ampicillin: S <=2 Predicts results to ampicillin/sulbactam, amoxacillin-clavulanate and  piperacillin-tazobactam.      -  Tetra/Doxy: R >8      -  Vancomycin: S 2      Method Type: BERNICE  Organism: Pseudomonas aeruginosa (10-13-21 @ 16:50)      -  Amikacin: S <=16      -  Aztreonam: S <=4      -  Cefepime: S <=2      -  Ceftazidime: S <=1      -  Ciprofloxacin: S <=0.25      -  Gentamicin: S <=2      -  Imipenem: S <=1      -  Levofloxacin: S <=0.5      -  Meropenem: S <=1      -  Piperacillin/Tazobactam: S <=8      -  Tobramycin: S <=2      Method Type: BERNICE    Culture - Blood (collected 10-07-21 @ 19:30)  Source: .Blood Blood-Venous  Final Report (10-13-21 @ 01:00):    No Growth Final    Culture - Blood (collected 10-07-21 @ 19:15)  Source: .Blood Blood-Peripheral  Final Report (10-13-21 @ 01:00):    No Growth Final    RADIOLOGY:  Images below reviewed personally  US Duplex Venous Lower Ext Ltd, Left (10.07.21 @ 21:25)   No evidence of left lower extremity deep venous thrombosis.    Xray Tibia + Fibula 2 Views, Left (10.07.21 @ 20:07)   1. Soft tissue prominence/edema with predominance of the forefoot without appreciated gas.

## 2021-10-13 NOTE — PROGRESS NOTE ADULT - PROBLEM SELECTOR PROBLEM 4
DVT prophylaxis
Morbid obesity
DVT prophylaxis

## 2021-10-13 NOTE — PROGRESS NOTE ADULT - PROVIDER SPECIALTY LIST ADULT
Infectious Disease
Infectious Disease
Endocrinology
Internal Medicine

## 2021-10-13 NOTE — PROGRESS NOTE ADULT - ATTENDING COMMENTS
Patient seen and examined by myself , case discussed  with resident ,agree with the above finding and plan  continue with antibiotics for cellulitis - pt with peeling skin after blisters - unclear etiology, derm eval appreciated, recs noted, will f/u results of bacterial cx and HSV/VZV pcr obtained 10/8 by derm  -  will apply mupirocin BID to open areas of skin on LLE until healed as per Derm recs Superficial desquamation without inflammation on trunk and extremities, etiology not clear ,  secondary to cellulitis vs resolving morbilliform eruption secondary to cefpodoxim or other abx given at [facility]. Derm recommend applying triamcinolone 0.1% ointment BID to affected areas on trunk and extremities (aside from the LLE) for 1 week  [id number], pt will likely need insulin upon discharge, diabetic education,   Morbid obesity: nutrition eval,   plan of care d/w patient
Pt seen and examined; case reviewed with housestaff. continue with antibiotics for cellulitis - pt with peeling skin after blisters - unclear etiology, derm eval.
51F with HTN, T2DM, asthma, MENA on CPAP pw LLE cellulitis. wound cx grew GBS and PsA. ID consult for abx adjustment and duration. uncontrolled DM2, A1c >10, endo consult for DM management, will need SubQ insulin on discharge. diabetic teaching, insulin injection teaching, RD consult. c/w IV abx and monitor response.
51F with HTN, uncontrolled DM presented with LLE GBS and pseduomonas cellulitis, overall improving with abx. ID rec appreciated, will transition to PO abx on discharge to complete course. evaluate by PT. dc home today. appreciate endocrine input for d/c glycemic regimen. need close endo follow up to ensure glycemic control.    45 minutes spent coordinating discharge
51F uncontrolled DM, asthma, MENA presented with LE cellulitis due to GBS and PsA. ID rec appreciated, abx adjusted per recs. clinically improving. DM uncontrolled, endo following for adjustment. need outpatient endo follow up. dispo planning, dc home tomorrow if patient continues to improve.
Patient seen and examined by myself , case discussed  with resident ,agree with the above finding and plan  continue with antibiotics for cellulitis - pt with peeling skin after blisters - unclear etiology, derm eval appreciated, recs noted, results of bacterial cx  noted, growing Pseudomonas and group B strep and HSV/VZV pcr negative , will DC vanco and start Zosyn for pseudomonas and Group strep, Monitor for rash or aother allergic reaction as pt reportedly had rash with Cefpodoxime   -  will apply mupirocin BID to open areas of skin on LLE until healed as per Derm recs Superficial desquamation without inflammation on trunk and extremities, etiology not clear ,  secondary to cellulitis vs resolving morbilliform eruption secondary to cefpodoxim or other abx given at [facility]. Derm recommend applying triamcinolone 0.1% ointment BID to affected areas on trunk and extremities (aside from the LLE) for 1 week  [id number], pt will likely need insulin upon discharge, diabetic education,   Morbid obesity: nutrition eval,   plan of care d/w patient

## 2021-10-13 NOTE — DISCHARGE NOTE NURSING/CASE MANAGEMENT/SOCIAL WORK - NSDCPNINST_GEN_ALL_CORE
Patient alert and oriented x 4, denies pain or discomfort, no s/s of acute respiratory distress noted, IV access discontinued per hospital policy, patient ready for discharge home.

## 2021-10-13 NOTE — PROGRESS NOTE ADULT - PROBLEM SELECTOR PLAN 2
-hold home oral medication  - Endo consult placed   - Nutrional consult placed  -increasing lantus to 36 per endo, keep premeal at 12,  - Moderate sliding scale  - Will d/c on oral or injectable GLP-1 + glimeride 2mg, will have pt follow up endo outpt to switch glimeride to SGLT-2 once LLE infection resolves -hold home oral medication  - Endo consult placed   - lipid panel shows , HDL 31  - nutritional consult placed  - increasing lantus to 36 per endo, keep premeal at 12,  - Moderate sliding scale  - Will d/c on oral or injectable GLP-1 + glimeride 2mg, will have pt follow up endo outpt to switch glimeride to SGLT-2 once LLE infection resolves

## 2021-10-13 NOTE — DISCHARGE NOTE NURSING/CASE MANAGEMENT/SOCIAL WORK - PATIENT PORTAL LINK FT
You can access the FollowMyHealth Patient Portal offered by [facility] by registering at the following website: [email or web address]. By joining [facility]’s FollowMyHealth portal, you will also be able to view your health information using other applications (apps) compatible with our system.

## 2021-10-13 NOTE — PROGRESS NOTE ADULT - PROBLEM SELECTOR PLAN 4
Lovenox  SW consult placed for psychosocial support for improved medical management Lovenox  SW consult placed for psychosocial support for improved medical management  PT consult pending

## 2021-10-13 NOTE — PROGRESS NOTE ADULT - PROBLEM SELECTOR PLAN 3
-cpap qhs
Continue with home medication on discharge   Further management outpatient
-cpap qhs
-cpap qhs

## 2021-10-13 NOTE — PROGRESS NOTE ADULT - PROBLEM SELECTOR PROBLEM 2
DM2 (diabetes mellitus, type 2)
DM2 (diabetes mellitus, type 2)
HTN (hypertension)
DM2 (diabetes mellitus, type 2)

## 2021-10-13 NOTE — PROGRESS NOTE ADULT - SUBJECTIVE AND OBJECTIVE BOX
HPI:  50 yo f obesity kylee on cpap, reactive airway, DM2,  disease presenting with distal left leg swelling/erythema. Admitted for cellulitis. Endocrinology consulted for uncontrolled DM. Home medications Glimepiride 4mg daily, Januvia 100mg daily and Metformin 1000mg BID. Diagnosed with DM2 9 years ago. Pt denied to have neuropathy, retinopathy or any cardiac issues. She endorsed that she takes Carb consistent diet at home but denied doing exercises. Pt is non complaint, also her insurance changed recently, didn't have insurance last month and was unable to take Januvia due to no refills. She checks her blood sugars not very often. She doesn't follow any endocrinologist outpatient. She sees ophthalmologist twice annually. Next appointment in November. Last seen by podiatrist in 2019. Pt has needle phobia and alot of time was spent to educate her on diabetes risks, complications and control with medications. Discuss about the insulin given her elevated hgb A1C but pt was persistently refusing insulin or any sort of injections.     HPI: Pt examined at bedside this morning. No new complaints. Agreed on once a week subcutaneous injection.       PAST MEDICAL & SURGICAL HISTORY:  HTN (hypertension)    DM (diabetes mellitus)    Asthma    No significant past surgical history        FAMILY HISTORY:      SOCIAL HISTORY:    REVIEW OF SYSTEMS:    Constitutional: No fever, no chills, no change in weight.    Eyes: No eye swelling, no blurry vision, no redness, no loss of vision.    Neck: No neck pain, no change in voice.    Lungs: No shortness of breath, no wheezing, no cough    CV: No chest pain, no palpitations, no pain with walking.    GI: No nausea, no vomiting, no constipation, no diarrhea, no abdominal pain    : No urinary frequency, no blood in urine, no urinary burning, no difficulty voiding.    Musculoskeletal: No muscle pain, no joint pain, no swelling, pain in her LLE    Skin: +ve rash, no infections.    Neurologic: No headaches, no weakness, no burning or pain in feet, no tremor.    Endocrine: No heat intolerance, no cold intolerance, no increased sweating, no shakiness between meals.    Psych: No depression, no anxiety, no trouble concentrating    MEDICATIONS  (STANDING):  ammonium lactate 12% Lotion 1 Application(s) Topical two times a day  ampicillin  IVPB 1 Gram(s) IV Intermittent every 6 hours  ampicillin  IVPB      AQUAPHOR (petrolatum Ointment) 1 Application(s) Topical three times a day  budesonide 160 MICROgram(s)/formoterol 4.5 MICROgram(s) Inhaler 2 Puff(s) Inhalation two times a day  ciprofloxacin   IVPB 400 milliGRAM(s) IV Intermittent every 8 hours  dextrose 40% Gel 15 Gram(s) Oral once  dextrose 5%. 1000 milliLiter(s) (50 mL/Hr) IV Continuous <Continuous>  dextrose 5%. 1000 milliLiter(s) (100 mL/Hr) IV Continuous <Continuous>  dextrose 50% Injectable 25 Gram(s) IV Push once  dextrose 50% Injectable 12.5 Gram(s) IV Push once  dextrose 50% Injectable 25 Gram(s) IV Push once  enoxaparin Injectable 40 milliGRAM(s) SubCutaneous daily  glucagon  Injectable 1 milliGRAM(s) IntraMuscular once  influenza   Vaccine 0.5 milliLiter(s) IntraMuscular once  insulin glargine Injectable (LANTUS) 36 Unit(s) SubCutaneous at bedtime  insulin lispro (ADMELOG) corrective regimen sliding scale   SubCutaneous three times a day before meals  insulin lispro (ADMELOG) corrective regimen sliding scale   SubCutaneous at bedtime  insulin lispro Injectable (ADMELOG) 12 Unit(s) SubCutaneous two times a day with meals  lisinopril 20 milliGRAM(s) Oral daily  mupirocin 2% Ointment 1 Application(s) Topical two times a day  polyethylene glycol 3350 17 Gram(s) Oral daily  senna 2 Tablet(s) Oral at bedtime  simvastatin 20 milliGRAM(s) Oral at bedtime  torsemide 10 milliGRAM(s) Oral daily  triamcinolone 0.1% Ointment 1 Application(s) Topical two times a day    MEDICATIONS  (PRN):  acetaminophen   Tablet .. 650 milliGRAM(s) Oral every 6 hours PRN Mild Pain (1 - 3), Moderate Pain (4 - 6)  diphenhydrAMINE 25 milliGRAM(s) Oral every 4 hours PRN Rash and/or Itching  ketorolac   Injectable 15 milliGRAM(s) IV Push every 6 hours PRN Severe Pain (7 - 10)      Allergies    cefpodoxime (Rash)  estrogens (Hives; Pruritus)  peanuts (Short breath)    Intolerances          PHYSICAL EXAM:    Vital Signs Last 24 Hrs  T(C): 37.3 (13 Oct 2021 06:56), Max: 37.5 (12 Oct 2021 21:05)  T(F): 99.2 (13 Oct 2021 06:56), Max: 99.5 (12 Oct 2021 21:05)  HR: 90 (13 Oct 2021 07:13) (90 - 101)  BP: 122/84 (13 Oct 2021 06:56) (116/70 - 129/85)  BP(mean): --  RR: 18 (13 Oct 2021 06:56) (18 - 18)  SpO2: 97% (13 Oct 2021 07:13) (95% - 97%)    General appearance: Morbidly obese female, NAD, lying comfortably on bed     Eyes: Pupils equal and reactive to light. EOM full. No exophthalmos.    Neck: Trachea midline. No thyroid enlargement.    Lungs: Normal respiratory excursion. Lungs clear.    CV: Regular cardiac rhythm. No murmur. Carotid and pedal pulses intact.    Abdomen: Soft, distended, +ve umbilical hernia     Musculoskeletal: No cyanosis, clubbing, or edema. No pedal lesions.    Skin: Warm, dry, tender, erythematous LLE with Stage I wound   +1 pitting edema on b/l LE    Neuro: Cranial nerves intact. Normal motor and sensory function. DTR's normal.              LABS:                        12.1   6.67  )-----------( 295      ( 13 Oct 2021 08:21 )             37.0     10-13    136  |  93<L>  |  16  ----------------------------<  251<H>  3.6   |  32<H>  |  0.60    Ca    9.4      13 Oct 2021 08:21  Phos  3.1     10-13  Mg     1.80     10-13    TPro  7.6  /  Alb  4.2  /  TBili  0.5  /  DBili  x   /  AST  30  /  ALT  33  /  AlkPhos  78  10-13      LIVER FUNCTIONS - ( 13 Oct 2021 08:21 )  Alb: 4.2 g/dL / Pro: 7.6 g/dL / ALK PHOS: 78 U/L / ALT: 33 U/L / AST: 30 U/L / GGT: x                 POCT Blood Glucose.: 251 mg/dL (10-13-21 @ 12:18)  POCT Blood Glucose.: 238 mg/dL (10-13-21 @ 08:57)  POCT Blood Glucose.: 256 mg/dL (10-12-21 @ 22:15)  POCT Blood Glucose.: 278 mg/dL (10-12-21 @ 17:39)  POCT Blood Glucose.: 262 mg/dL (10-12-21 @ 12:27)  POCT Blood Glucose.: 255 mg/dL (10-12-21 @ 08:43)  POCT Blood Glucose.: 270 mg/dL (10-11-21 @ 22:11)  POCT Blood Glucose.: 382 mg/dL (10-11-21 @ 17:30)  POCT Blood Glucose.: 320 mg/dL (10-11-21 @ 12:48)  POCT Blood Glucose.: 295 mg/dL (10-11-21 @ 09:16)  POCT Blood Glucose.: 274 mg/dL (10-10-21 @ 22:05)  POCT Blood Glucose.: 322 mg/dL (10-10-21 @ 18:24)  POCT Blood Glucose.: 276 mg/dL (10-10-21 @ 13:41)      RADIOLOGY & ADDITIONAL STUDIES:     HPI:  52 yo f obesity kylee on cpap, reactive airway, DM2,  disease presenting with distal left leg swelling/erythema. Admitted for cellulitis. Endocrinology consulted for uncontrolled DM. Home medications Glimepiride 4mg daily, Januvia 100mg daily and Metformin 1000mg BID. Diagnosed with DM2 9 years ago. Pt denied to have neuropathy, retinopathy or any cardiac issues. She endorsed that she takes Carb consistent diet at home but denied doing exercises. Pt is non complaint, also her insurance changed recently, didn't have insurance last month and was unable to take Januvia due to no refills. She checks her blood sugars not very often. She doesn't follow any endocrinologist outpatient. She sees ophthalmologist twice annually. Next appointment in November. Last seen by podiatrist in 2019. Pt has needle phobia and alot of time was spent to educate her on diabetes risks, complications and control with medications. Discuss about the insulin given her elevated hgb A1C but pt was persistently refusing insulin or any sort of injections.     HPI: Pt examined at bedside this morning. No new complaints. Agreed on once a week subcutaneous injection      PAST MEDICAL & SURGICAL HISTORY:  HTN (hypertension)    DM (diabetes mellitus)    Asthma    No significant past surgical history        FAMILY HISTORY:      SOCIAL HISTORY:    REVIEW OF SYSTEMS:    Constitutional: No fever, no chills, no change in weight.    Eyes: No eye swelling, no blurry vision, no redness, no loss of vision.    Neck: No neck pain, no change in voice.    Lungs: No shortness of breath, no wheezing, no cough    CV: No chest pain, no palpitations, no pain with walking.    GI: No nausea, no vomiting, no constipation, no diarrhea, no abdominal pain    : No urinary frequency, no blood in urine, no urinary burning, no difficulty voiding.    Musculoskeletal: No muscle pain, no joint pain, no swelling, pain in her LLE    Skin: +ve rash, no infections.    Neurologic: No headaches, no weakness, no burning or pain in feet, no tremor.    Endocrine: No heat intolerance, no cold intolerance, no increased sweating, no shakiness between meals.    Psych: No depression, no anxiety, no trouble concentrating    MEDICATIONS  (STANDING):  ammonium lactate 12% Lotion 1 Application(s) Topical two times a day  ampicillin  IVPB 1 Gram(s) IV Intermittent every 6 hours  ampicillin  IVPB      AQUAPHOR (petrolatum Ointment) 1 Application(s) Topical three times a day  budesonide 160 MICROgram(s)/formoterol 4.5 MICROgram(s) Inhaler 2 Puff(s) Inhalation two times a day  ciprofloxacin   IVPB 400 milliGRAM(s) IV Intermittent every 8 hours  dextrose 40% Gel 15 Gram(s) Oral once  dextrose 5%. 1000 milliLiter(s) (50 mL/Hr) IV Continuous <Continuous>  dextrose 5%. 1000 milliLiter(s) (100 mL/Hr) IV Continuous <Continuous>  dextrose 50% Injectable 25 Gram(s) IV Push once  dextrose 50% Injectable 12.5 Gram(s) IV Push once  dextrose 50% Injectable 25 Gram(s) IV Push once  enoxaparin Injectable 40 milliGRAM(s) SubCutaneous daily  glucagon  Injectable 1 milliGRAM(s) IntraMuscular once  influenza   Vaccine 0.5 milliLiter(s) IntraMuscular once  insulin glargine Injectable (LANTUS) 36 Unit(s) SubCutaneous at bedtime  insulin lispro (ADMELOG) corrective regimen sliding scale   SubCutaneous three times a day before meals  insulin lispro (ADMELOG) corrective regimen sliding scale   SubCutaneous at bedtime  insulin lispro Injectable (ADMELOG) 12 Unit(s) SubCutaneous two times a day with meals  lisinopril 20 milliGRAM(s) Oral daily  mupirocin 2% Ointment 1 Application(s) Topical two times a day  polyethylene glycol 3350 17 Gram(s) Oral daily  senna 2 Tablet(s) Oral at bedtime  simvastatin 20 milliGRAM(s) Oral at bedtime  torsemide 10 milliGRAM(s) Oral daily  triamcinolone 0.1% Ointment 1 Application(s) Topical two times a day    MEDICATIONS  (PRN):  acetaminophen   Tablet .. 650 milliGRAM(s) Oral every 6 hours PRN Mild Pain (1 - 3), Moderate Pain (4 - 6)  diphenhydrAMINE 25 milliGRAM(s) Oral every 4 hours PRN Rash and/or Itching  ketorolac   Injectable 15 milliGRAM(s) IV Push every 6 hours PRN Severe Pain (7 - 10)      Allergies    cefpodoxime (Rash)  estrogens (Hives; Pruritus)  peanuts (Short breath)    Intolerances          PHYSICAL EXAM:    Vital Signs Last 24 Hrs  T(C): 37.3 (13 Oct 2021 06:56), Max: 37.5 (12 Oct 2021 21:05)  T(F): 99.2 (13 Oct 2021 06:56), Max: 99.5 (12 Oct 2021 21:05)  HR: 90 (13 Oct 2021 07:13) (90 - 101)  BP: 122/84 (13 Oct 2021 06:56) (116/70 - 129/85)  BP(mean): --  RR: 18 (13 Oct 2021 06:56) (18 - 18)  SpO2: 97% (13 Oct 2021 07:13) (95% - 97%)    General appearance: Morbidly obese female, NAD, lying comfortably on bed     Eyes: Pupils equal and reactive to light. EOM full. No exophthalmos.    Neck: Trachea midline. No thyroid enlargement.    Lungs: Normal respiratory excursion. Lungs clear.    CV: Regular cardiac rhythm. No murmur. Carotid and pedal pulses intact.    Abdomen: Soft, distended, +ve umbilical hernia     Musculoskeletal: No cyanosis, clubbing, or edema. No pedal lesions.    Skin: Warm, dry, tender, erythematous LLE with Stage I wound   +1 pitting edema on b/l LE    Neuro: Cranial nerves intact. Normal motor and sensory function. DTR's normal.              LABS:                        12.1   6.67  )-----------( 295      ( 13 Oct 2021 08:21 )             37.0     10-13    136  |  93<L>  |  16  ----------------------------<  251<H>  3.6   |  32<H>  |  0.60    Ca    9.4      13 Oct 2021 08:21  Phos  3.1     10-13  Mg     1.80     10-13    TPro  7.6  /  Alb  4.2  /  TBili  0.5  /  DBili  x   /  AST  30  /  ALT  33  /  AlkPhos  78  10-13      LIVER FUNCTIONS - ( 13 Oct 2021 08:21 )  Alb: 4.2 g/dL / Pro: 7.6 g/dL / ALK PHOS: 78 U/L / ALT: 33 U/L / AST: 30 U/L / GGT: x                 POCT Blood Glucose.: 251 mg/dL (10-13-21 @ 12:18)  POCT Blood Glucose.: 238 mg/dL (10-13-21 @ 08:57)  POCT Blood Glucose.: 256 mg/dL (10-12-21 @ 22:15)  POCT Blood Glucose.: 278 mg/dL (10-12-21 @ 17:39)  POCT Blood Glucose.: 262 mg/dL (10-12-21 @ 12:27)  POCT Blood Glucose.: 255 mg/dL (10-12-21 @ 08:43)  POCT Blood Glucose.: 270 mg/dL (10-11-21 @ 22:11)  POCT Blood Glucose.: 382 mg/dL (10-11-21 @ 17:30)  POCT Blood Glucose.: 320 mg/dL (10-11-21 @ 12:48)  POCT Blood Glucose.: 295 mg/dL (10-11-21 @ 09:16)  POCT Blood Glucose.: 274 mg/dL (10-10-21 @ 22:05)  POCT Blood Glucose.: 322 mg/dL (10-10-21 @ 18:24)  POCT Blood Glucose.: 276 mg/dL (10-10-21 @ 13:41)      RADIOLOGY & ADDITIONAL STUDIES:

## 2021-10-18 PROBLEM — Z00.00 ENCOUNTER FOR PREVENTIVE HEALTH EXAMINATION: Status: ACTIVE | Noted: 2021-10-18

## 2021-11-24 ENCOUNTER — APPOINTMENT (OUTPATIENT)
Dept: ENDOCRINOLOGY | Facility: CLINIC | Age: 51
End: 2021-11-24

## 2022-09-11 ENCOUNTER — INPATIENT (INPATIENT)
Facility: HOSPITAL | Age: 52
LOS: 3 days | Discharge: HOME | End: 2022-09-15
Attending: SURGERY | Admitting: SURGERY

## 2022-09-11 VITALS
HEART RATE: 87 BPM | HEIGHT: 62 IN | DIASTOLIC BLOOD PRESSURE: 76 MMHG | OXYGEN SATURATION: 97 % | SYSTOLIC BLOOD PRESSURE: 133 MMHG | RESPIRATION RATE: 18 BRPM | TEMPERATURE: 98 F

## 2022-09-11 DIAGNOSIS — K46.0 UNSPECIFIED ABDOMINAL HERNIA WITH OBSTRUCTION, WITHOUT GANGRENE: ICD-10-CM

## 2022-09-11 DIAGNOSIS — Z98.890 OTHER SPECIFIED POSTPROCEDURAL STATES: Chronic | ICD-10-CM

## 2022-09-11 LAB
ALBUMIN SERPL ELPH-MCNC: 4.5 G/DL — SIGNIFICANT CHANGE UP (ref 3.3–5)
ALP SERPL-CCNC: 81 U/L — SIGNIFICANT CHANGE UP (ref 40–120)
ALT FLD-CCNC: 23 U/L — SIGNIFICANT CHANGE UP (ref 4–33)
ANION GAP SERPL CALC-SCNC: 15 MMOL/L — HIGH (ref 7–14)
APTT BLD: 26.3 SEC — LOW (ref 27–36.3)
AST SERPL-CCNC: 32 U/L — SIGNIFICANT CHANGE UP (ref 4–32)
BASOPHILS # BLD AUTO: 0.03 K/UL — SIGNIFICANT CHANGE UP (ref 0–0.2)
BASOPHILS NFR BLD AUTO: 0.3 % — SIGNIFICANT CHANGE UP (ref 0–2)
BILIRUB SERPL-MCNC: 0.6 MG/DL — SIGNIFICANT CHANGE UP (ref 0.2–1.2)
BLD GP AB SCN SERPL QL: NEGATIVE — SIGNIFICANT CHANGE UP
BUN SERPL-MCNC: 32 MG/DL — HIGH (ref 7–23)
CALCIUM SERPL-MCNC: 10 MG/DL — SIGNIFICANT CHANGE UP (ref 8.4–10.5)
CHLORIDE SERPL-SCNC: 93 MMOL/L — LOW (ref 98–107)
CO2 SERPL-SCNC: 24 MMOL/L — SIGNIFICANT CHANGE UP (ref 22–31)
CREAT SERPL-MCNC: 0.6 MG/DL — SIGNIFICANT CHANGE UP (ref 0.5–1.3)
EGFR: 109 ML/MIN/1.73M2 — SIGNIFICANT CHANGE UP
EOSINOPHIL # BLD AUTO: 0.03 K/UL — SIGNIFICANT CHANGE UP (ref 0–0.5)
EOSINOPHIL NFR BLD AUTO: 0.3 % — SIGNIFICANT CHANGE UP (ref 0–6)
FLUAV AG NPH QL: SIGNIFICANT CHANGE UP
FLUBV AG NPH QL: SIGNIFICANT CHANGE UP
GLUCOSE BLDC GLUCOMTR-MCNC: 249 MG/DL — HIGH (ref 70–99)
GLUCOSE SERPL-MCNC: 275 MG/DL — HIGH (ref 70–99)
HCT VFR BLD CALC: 38 % — SIGNIFICANT CHANGE UP (ref 34.5–45)
HGB BLD-MCNC: 12.7 G/DL — SIGNIFICANT CHANGE UP (ref 11.5–15.5)
IANC: 7.5 K/UL — HIGH (ref 1.8–7.4)
IMM GRANULOCYTES NFR BLD AUTO: 0.3 % — SIGNIFICANT CHANGE UP (ref 0–1.5)
INR BLD: 1.08 RATIO — SIGNIFICANT CHANGE UP (ref 0.88–1.16)
LACTATE SERPL-SCNC: 2.3 MMOL/L — HIGH (ref 0.5–2)
LIDOCAIN IGE QN: 22 U/L — SIGNIFICANT CHANGE UP (ref 7–60)
LYMPHOCYTES # BLD AUTO: 2.08 K/UL — SIGNIFICANT CHANGE UP (ref 1–3.3)
LYMPHOCYTES # BLD AUTO: 20.4 % — SIGNIFICANT CHANGE UP (ref 13–44)
MCHC RBC-ENTMCNC: 25.7 PG — LOW (ref 27–34)
MCHC RBC-ENTMCNC: 33.4 GM/DL — SIGNIFICANT CHANGE UP (ref 32–36)
MCV RBC AUTO: 76.8 FL — LOW (ref 80–100)
MONOCYTES # BLD AUTO: 0.53 K/UL — SIGNIFICANT CHANGE UP (ref 0–0.9)
MONOCYTES NFR BLD AUTO: 5.2 % — SIGNIFICANT CHANGE UP (ref 2–14)
NEUTROPHILS # BLD AUTO: 7.5 K/UL — HIGH (ref 1.8–7.4)
NEUTROPHILS NFR BLD AUTO: 73.5 % — SIGNIFICANT CHANGE UP (ref 43–77)
NRBC # BLD: 0 /100 WBCS — SIGNIFICANT CHANGE UP (ref 0–0)
NRBC # FLD: 0 K/UL — SIGNIFICANT CHANGE UP (ref 0–0)
PLATELET # BLD AUTO: 204 K/UL — SIGNIFICANT CHANGE UP (ref 150–400)
POTASSIUM SERPL-MCNC: 4.7 MMOL/L — SIGNIFICANT CHANGE UP (ref 3.5–5.3)
POTASSIUM SERPL-SCNC: 4.7 MMOL/L — SIGNIFICANT CHANGE UP (ref 3.5–5.3)
PROT SERPL-MCNC: 7.8 G/DL — SIGNIFICANT CHANGE UP (ref 6–8.3)
PROTHROM AB SERPL-ACNC: 12.5 SEC — SIGNIFICANT CHANGE UP (ref 10.5–13.4)
RBC # BLD: 4.95 M/UL — SIGNIFICANT CHANGE UP (ref 3.8–5.2)
RBC # FLD: 14.8 % — HIGH (ref 10.3–14.5)
RH IG SCN BLD-IMP: POSITIVE — SIGNIFICANT CHANGE UP
RSV RNA NPH QL NAA+NON-PROBE: SIGNIFICANT CHANGE UP
SARS-COV-2 RNA SPEC QL NAA+PROBE: SIGNIFICANT CHANGE UP
SODIUM SERPL-SCNC: 132 MMOL/L — LOW (ref 135–145)
WBC # BLD: 10.2 K/UL — SIGNIFICANT CHANGE UP (ref 3.8–10.5)
WBC # FLD AUTO: 10.2 K/UL — SIGNIFICANT CHANGE UP (ref 3.8–10.5)

## 2022-09-11 PROCEDURE — 74177 CT ABD & PELVIS W/CONTRAST: CPT | Mod: 26,MA

## 2022-09-11 PROCEDURE — 99285 EMERGENCY DEPT VISIT HI MDM: CPT

## 2022-09-11 PROCEDURE — 99222 1ST HOSP IP/OBS MODERATE 55: CPT | Mod: GC

## 2022-09-11 RX ORDER — INSULIN LISPRO 100/ML
VIAL (ML) SUBCUTANEOUS EVERY 6 HOURS
Refills: 0 | Status: DISCONTINUED | OUTPATIENT
Start: 2022-09-11 | End: 2022-09-14

## 2022-09-11 RX ORDER — BUDESONIDE AND FORMOTEROL FUMARATE DIHYDRATE 160; 4.5 UG/1; UG/1
2 AEROSOL RESPIRATORY (INHALATION)
Refills: 0 | Status: DISCONTINUED | OUTPATIENT
Start: 2022-09-11 | End: 2022-09-15

## 2022-09-11 RX ORDER — GLUCAGON INJECTION, SOLUTION 0.5 MG/.1ML
1 INJECTION, SOLUTION SUBCUTANEOUS ONCE
Refills: 0 | Status: DISCONTINUED | OUTPATIENT
Start: 2022-09-11 | End: 2022-09-15

## 2022-09-11 RX ORDER — ACETAMINOPHEN 500 MG
1000 TABLET ORAL ONCE
Refills: 0 | Status: COMPLETED | OUTPATIENT
Start: 2022-09-11 | End: 2022-09-11

## 2022-09-11 RX ORDER — DEXTROSE 50 % IN WATER 50 %
25 SYRINGE (ML) INTRAVENOUS ONCE
Refills: 0 | Status: DISCONTINUED | OUTPATIENT
Start: 2022-09-11 | End: 2022-09-14

## 2022-09-11 RX ORDER — MORPHINE SULFATE 50 MG/1
4 CAPSULE, EXTENDED RELEASE ORAL ONCE
Refills: 0 | Status: DISCONTINUED | OUTPATIENT
Start: 2022-09-11 | End: 2022-09-11

## 2022-09-11 RX ORDER — SODIUM CHLORIDE 9 MG/ML
1000 INJECTION, SOLUTION INTRAVENOUS ONCE
Refills: 0 | Status: COMPLETED | OUTPATIENT
Start: 2022-09-11 | End: 2022-09-11

## 2022-09-11 RX ORDER — ALBUTEROL 90 UG/1
2 AEROSOL, METERED ORAL EVERY 6 HOURS
Refills: 0 | Status: DISCONTINUED | OUTPATIENT
Start: 2022-09-11 | End: 2022-09-15

## 2022-09-11 RX ORDER — ONDANSETRON 8 MG/1
4 TABLET, FILM COATED ORAL ONCE
Refills: 0 | Status: COMPLETED | OUTPATIENT
Start: 2022-09-11 | End: 2022-09-11

## 2022-09-11 RX ORDER — KETOROLAC TROMETHAMINE 30 MG/ML
15 SYRINGE (ML) INJECTION ONCE
Refills: 0 | Status: DISCONTINUED | OUTPATIENT
Start: 2022-09-11 | End: 2022-09-11

## 2022-09-11 RX ORDER — HYDROMORPHONE HYDROCHLORIDE 2 MG/ML
0.5 INJECTION INTRAMUSCULAR; INTRAVENOUS; SUBCUTANEOUS ONCE
Refills: 0 | Status: DISCONTINUED | OUTPATIENT
Start: 2022-09-11 | End: 2022-09-11

## 2022-09-11 RX ORDER — DEXTROSE 50 % IN WATER 50 %
15 SYRINGE (ML) INTRAVENOUS ONCE
Refills: 0 | Status: DISCONTINUED | OUTPATIENT
Start: 2022-09-11 | End: 2022-09-14

## 2022-09-11 RX ORDER — DEXTROSE 50 % IN WATER 50 %
12.5 SYRINGE (ML) INTRAVENOUS ONCE
Refills: 0 | Status: DISCONTINUED | OUTPATIENT
Start: 2022-09-11 | End: 2022-09-14

## 2022-09-11 RX ORDER — BUDESONIDE AND FORMOTEROL FUMARATE DIHYDRATE 160; 4.5 UG/1; UG/1
2 AEROSOL RESPIRATORY (INHALATION)
Qty: 0 | Refills: 0 | DISCHARGE

## 2022-09-11 RX ORDER — SODIUM CHLORIDE 9 MG/ML
1000 INJECTION, SOLUTION INTRAVENOUS
Refills: 0 | Status: DISCONTINUED | OUTPATIENT
Start: 2022-09-11 | End: 2022-09-13

## 2022-09-11 RX ORDER — SODIUM CHLORIDE 9 MG/ML
1000 INJECTION INTRAMUSCULAR; INTRAVENOUS; SUBCUTANEOUS ONCE
Refills: 0 | Status: COMPLETED | OUTPATIENT
Start: 2022-09-11 | End: 2022-09-11

## 2022-09-11 RX ORDER — FENTANYL CITRATE 50 UG/ML
50 INJECTION INTRAVENOUS ONCE
Refills: 0 | Status: DISCONTINUED | OUTPATIENT
Start: 2022-09-11 | End: 2022-09-11

## 2022-09-11 RX ORDER — SODIUM CHLORIDE 9 MG/ML
1000 INJECTION, SOLUTION INTRAVENOUS
Refills: 0 | Status: DISCONTINUED | OUTPATIENT
Start: 2022-09-11 | End: 2022-09-14

## 2022-09-11 RX ORDER — ENOXAPARIN SODIUM 100 MG/ML
40 INJECTION SUBCUTANEOUS EVERY 24 HOURS
Refills: 0 | Status: DISCONTINUED | OUTPATIENT
Start: 2022-09-11 | End: 2022-09-15

## 2022-09-11 RX ADMIN — ONDANSETRON 4 MILLIGRAM(S): 8 TABLET, FILM COATED ORAL at 13:26

## 2022-09-11 RX ADMIN — MORPHINE SULFATE 4 MILLIGRAM(S): 50 CAPSULE, EXTENDED RELEASE ORAL at 13:26

## 2022-09-11 RX ADMIN — FENTANYL CITRATE 50 MICROGRAM(S): 50 INJECTION INTRAVENOUS at 14:47

## 2022-09-11 RX ADMIN — HYDROMORPHONE HYDROCHLORIDE 0.5 MILLIGRAM(S): 2 INJECTION INTRAMUSCULAR; INTRAVENOUS; SUBCUTANEOUS at 20:03

## 2022-09-11 RX ADMIN — Medication 15 MILLIGRAM(S): at 16:54

## 2022-09-11 RX ADMIN — SODIUM CHLORIDE 1000 MILLILITER(S): 9 INJECTION INTRAMUSCULAR; INTRAVENOUS; SUBCUTANEOUS at 13:26

## 2022-09-11 RX ADMIN — Medication 400 MILLIGRAM(S): at 14:29

## 2022-09-11 RX ADMIN — SODIUM CHLORIDE 1000 MILLILITER(S): 9 INJECTION, SOLUTION INTRAVENOUS at 17:38

## 2022-09-11 NOTE — ED PROVIDER NOTE - ATTENDING CONTRIBUTION TO CARE
Seen and examined, pt. c/o last night acute onset of nausea and abd pain, hx of umbilical hernia, no vomiting, no b.m., to ED c/o persistent and worsening pain. No fever/chills, +abd distention, no diarrhea, no sick contacts. Mod. distress to ED, anicteric, MM sl. dry, clear lungs, heart reg, abd soft, distented, diffusely tender, with point tender/firm/indurated mass below umbilicus, no CVAT, no edema, NT calves.

## 2022-09-11 NOTE — H&P ADULT - NSHPLABSRESULTS_GEN_ALL_CORE
Labs:  CAPILLARY BLOOD GLUCOSE      POCT Blood Glucose.: 249 mg/dL (11 Sep 2022 12:35)                          12.7   10.20 )-----------( 204      ( 11 Sep 2022 13:31 )             38.0       Auto Immature Granulocyte %: 0.3 % (09-11-22 @ 13:31)  Auto Neutrophil %: 73.5 % (09-11-22 @ 13:31)    09-11    132<L>  |  93<L>  |  32<H>  ----------------------------<  275<H>  4.7   |  24  |  0.60      Calcium, Total Serum: 10.0 mg/dL (09-11-22 @ 13:31)      LFTs:             7.8  | 0.6  | 32       ------------------[81      ( 11 Sep 2022 13:31 )  4.5  | x    | 23          Lipase:22     Amylase:x         Lactate, Blood: 2.3 mmol/L (09-11-22 @ 16:15)      Coags:     12.5   ----< 1.08    ( 11 Sep 2022 13:31 )     26.3                        Radiology:  < from: CT Abdomen and Pelvis w/ IV Cont (09.11.22 @ 14:12) >    Large, [place] umbilical hernia, which contains nondilated loops of   transverse colon and mid small bowel. There is small amount of fluid as   well as fat stranding within the hernia sac. Correlate clinically for   incarceration.    Mild distention of the ascending and proximal transverse colon to the   level of the umbilical hernia.    Prominence of the right ovary compared to the left. Correlate with pelvic   ultrasound.    Indeterminate 2.3 cm left upper pole renal lesion. Recommend nonemergent   renal for further evaluation.    < end of copied text >

## 2022-09-11 NOTE — ED PROVIDER NOTE - NS ED ROS FT
CONSTITUTIONAL: No fevers, no chills, no lightheadedness, no dizziness  EYES: no visual changes, no eye pain  EARS: no ear drainage, no ear pain, no change in hearing  NOSE: no nasal congestion  MOUTH/THROAT: no sore throat  CV: No chest pain, no palpitations  RESP: No SOB, no cough  GI: No n/v/d, +abd pain, -flatus  : no dysuria, no hematuria, no flank pain  MSK: no back pain, no extremity pain  SKIN: no rashes  NEURO: no headache, no focal weakness, no decreased sensation/parasthesias   PSYCHIATRIC: no known mental health issues

## 2022-09-11 NOTE — ED PROVIDER NOTE - OBJECTIVE STATEMENT
53 yo F, hx of DM, MENA, presents with abdominal pain since last night. Patient was at rest when she suddenly felt severe, constant, diffuse abdominal pain, worse at the site of her umbilical hernia. Has never had this before. Also had numerous bouts of diarrhea last night but none today. Has not passed flatus today. No nausea, vomiting, chest pain, shortness of breath.

## 2022-09-11 NOTE — ED PROVIDER NOTE - PROGRESS NOTE DETAILS
Diamond PIRES: Treating patient's pain with morphine. Consulted surgery for evaluation of the hernia. Gave patient ice to put on the hernia. Will continue to reassess. Diamond PIRES: Patient is still experiencing severe abdominal pain. Bedside reduction attempted with surgical resident successful. Lower abdomen is softer, minimal protrusion of the hernia, and patient feels mild improvement in symptoms. Will continue to monitor and treat pain. Pending next surgery recs. Melonie Capps PGY-3 patient to be admitted to surg team

## 2022-09-11 NOTE — H&P ADULT - HISTORY OF PRESENT ILLNESS
52F with multiple medical issues, morbid obesity, here with 1 day hx of acute onset abdominal pain, associated with umbilical hernia. Patient has had hernia for more than 10 year, has seen multiple surgeons outpatient but currently with no plans for repair. Pain started acutely overnight associated with multiple episodes of diarrhea, last gas and BM last night. No fevers, chills, nightsweats.

## 2022-09-11 NOTE — ED ADULT NURSE NOTE - OBJECTIVE STATEMENT
52 year old female received to  6 AAOx4 ambulatory at baseline. Pt c/o 10/10 abd pain worse to umbilical hernia area starting last night. Pt endorses nausea and diarrhea. Last PO intake on Friday, decreased appetite. Pt had juice yesterday. Pt denies headache, dizziness, chest pain, shortness of breath, fever, chills. Abd round, distended, tender to palpation. Respirations even and unlabored sating 100% on room air. Skin intact warm dry. PIV #22 right AC, labs drawn and sent. Stat covid sent. VS as noted. Bed in lowest position, call bell within reach. Brother at bedside. Surgery at bedside for eval

## 2022-09-11 NOTE — ED ADULT TRIAGE NOTE - CHIEF COMPLAINT QUOTE
states" I am having severe pain in my belly since last night' patient moaning and crying in pain. denies n /v/d. [id number]

## 2022-09-11 NOTE — ED PROVIDER NOTE - PHYSICAL EXAMINATION
Physical Exam:  Gen: AOx3, extremely uncomfortable  Head: NCAT  HEENT: EOMI, PEERLA, normal conjunctiva, tongue midline, oral mucosa moist  Lung: CTAB, no respiratory distress, no wheezes/rhonchi/rales B/L, speaking in full sentences  CV: RRR, no murmurs, rubs or gallops  Abd: soft, moderately distended, diffusely tender, protruding >3cm umbilical hernia visualized, hard at the umbilicus and directly under, no overlying skin changes  MSK: no visible deformities, ROM normal in UE/LE  Neuro: No focal sensory or motor deficits  Skin: Warm, well perfused, no rash, no leg swelling  Psych: normal affect, calm

## 2022-09-11 NOTE — H&P ADULT - ASSESSMENT
Assessment:  52y Female with umbilical hernia containing large and small bowel. Successfully reduced at bedside. Some mild proximal bowel dilation.    Plan:  - admit to surgery  - serial abdominal exam  - trend lactate  - will plan for hernia repair  - plan dw Dr Rodrigues    Surgery  [id number] Assessment:  52y Female with umbilical hernia containing large and small bowel. Some mild proximal bowel dilation. Hernia was successfully reduced at bedside, patient reported mild improvement in symptoms.    Plan:  - admit to surgery  - serial abdominal exam  - trend lactate  - will plan for hernia repair  - plan dw Dr Rodrigues    Surgery  [id number] Assessment:  52y Female with umbilical hernia containing large and small bowel. Some mild proximal bowel dilation. Hernia was successfully reduced at bedside, patient reported mild improvement in symptoms.    Plan:  - admit to surgery  - serial abdominal exam  - trend lactate  - plan dw Dr Rodrigues    Surgery  [id number]

## 2022-09-11 NOTE — H&P ADULT - NSHPPHYSICALEXAM_GEN_ALL_CORE
T(C): 36.8 (09-11-22 @ 16:56), Max: 36.8 (09-11-22 @ 12:36)  HR: 81 (09-11-22 @ 16:56) (81 - 87)  BP: 124/81 (09-11-22 @ 16:56) (124/81 - 133/76)  RR: 20 (09-11-22 @ 16:56) (18 - 20)  SpO2: 100% (09-11-22 @ 16:56) (97% - 100%)    Physical Exam:  General: NAD, alert  HEENT: normocephalic, PERRLA  CVS: RRR, no murmur  Chest: CTABL  Abdomen: soft, tender lower abdomen, large umbilical hernia  Extremities: peripheral pulses palpable

## 2022-09-11 NOTE — ED ADULT NURSE REASSESSMENT NOTE - NS ED NURSE REASSESS COMMENT FT1
Report received from [facility] GREY Porter. Patient A&Ox4, respirations even and unlabored. Patient states slight improvement in pain after last medication administration. Admitting team at bedside. Awaiting bed assignment. Stretcher in lowest position, wheels locked, appropriate side rails in place.

## 2022-09-11 NOTE — ED PROVIDER NOTE - NS_EDPROVIDERDISPOUSERTYPE_ED_A_ED
Chief Complaint   Patient presents with     Allied Health Visit     Aranesp     Blood pressure 92/56, pulse 78, SpO2 99 %.    Frequency: Every 14 days   Most recent or today's HGB: 6.9   Date: 22  Date of lat dose: 21  HGB associated with last dose given: 8.0    Blood Pressure:92/56    Diagnosis: Anemia    Ordered by: Eloy Rao MD  VIS Offered: Yes    Double Checked by: Yvonne NIETO CMA    See MAR for administration details    Pt's first name, last name and  verified prior to medication administration, injection given without complications or questions.     Nereida Steiner CMA   Attending Attestation (For Attendings USE Only)...

## 2022-09-11 NOTE — ED PROVIDER NOTE - CLINICAL SUMMARY MEDICAL DECISION MAKING FREE TEXT BOX
52F c/o pain at umbilical hernia, constant since last night, severe since a.m. Likely incarcerated hernia. With onset > 12 hrs will CT first, Surgery eval, poss. OR vs bedside reduction attempt.

## 2022-09-11 NOTE — ED ADULT NURSE REASSESSMENT NOTE - NS ED NURSE REASSESS COMMENT FT1
Resident at bedside , reduction to be attempted , pt pre medicate prior to procedure - will continue to monitor elao rn

## 2022-09-11 NOTE — CHART NOTE - NSCHARTNOTEFT_GEN_A_CORE
Patient examined at bedside for a serial abd exam.     Pt is HDS, reports pain is overall improved. On abd exam, umbilical hernia herniated. There are no overlying skin changes. Pt was given 0.5 Dilaudid before the exam with the attempt to reduce hernia, however, pt refused to proceed with hernia reduction stating that it was a lot of pain last time and she can't tolerate it even with pain meds.     Plan:   - Follow up lactate at 11pm  - Seral abd exam Patient examined at bedside for a serial abd exam.     Pt is HDS, reports pain is overall improved. On abd exam, umbilical hernia herniated. There are no overlying skin changes. Pt was given 0.5 Dilaudid before the exam with the attempt to reduce hernia, however, pt refused to proceed with hernia reduction stating that it was a lot of pain last time and she can't tolerate it even with pain meds.     Pt 9/12 - reports mild improvement in pain control, abd exam c/w prior interval exam - hernia remains reducible.    Plan:   - Follow up lactate at 11pm  - Seral abd exam

## 2022-09-11 NOTE — H&P ADULT - ATTENDING COMMENTS
ventral hernia in morbidly obese patient, risk of anesthesia and recurrence is high; at present patient is no longer obstructed  -will discuss with colleagues best approach to management  -in meantime, admit for monitoring of resolution of bowel obstruction, medical evaluation

## 2022-09-12 DIAGNOSIS — E11.9 TYPE 2 DIABETES MELLITUS WITHOUT COMPLICATIONS: ICD-10-CM

## 2022-09-12 DIAGNOSIS — N28.9 DISORDER OF KIDNEY AND URETER, UNSPECIFIED: ICD-10-CM

## 2022-09-12 DIAGNOSIS — Z29.9 ENCOUNTER FOR PROPHYLACTIC MEASURES, UNSPECIFIED: ICD-10-CM

## 2022-09-12 DIAGNOSIS — I10 ESSENTIAL (PRIMARY) HYPERTENSION: ICD-10-CM

## 2022-09-12 DIAGNOSIS — J45.909 UNSPECIFIED ASTHMA, UNCOMPLICATED: ICD-10-CM

## 2022-09-12 DIAGNOSIS — G47.33 OBSTRUCTIVE SLEEP APNEA (ADULT) (PEDIATRIC): ICD-10-CM

## 2022-09-12 DIAGNOSIS — K42.9 UMBILICAL HERNIA WITHOUT OBSTRUCTION OR GANGRENE: ICD-10-CM

## 2022-09-12 LAB
A1C WITH ESTIMATED AVERAGE GLUCOSE RESULT: 8.5 % — HIGH (ref 4–5.6)
ANION GAP SERPL CALC-SCNC: 12 MMOL/L — SIGNIFICANT CHANGE UP (ref 7–14)
BUN SERPL-MCNC: 29 MG/DL — HIGH (ref 7–23)
CALCIUM SERPL-MCNC: 9.8 MG/DL — SIGNIFICANT CHANGE UP (ref 8.4–10.5)
CHLORIDE SERPL-SCNC: 95 MMOL/L — LOW (ref 98–107)
CO2 SERPL-SCNC: 25 MMOL/L — SIGNIFICANT CHANGE UP (ref 22–31)
CREAT SERPL-MCNC: 0.48 MG/DL — LOW (ref 0.5–1.3)
EGFR: 115 ML/MIN/1.73M2 — SIGNIFICANT CHANGE UP
ESTIMATED AVERAGE GLUCOSE: 197 — SIGNIFICANT CHANGE UP
GLUCOSE BLDC GLUCOMTR-MCNC: 136 MG/DL — HIGH (ref 70–99)
GLUCOSE BLDC GLUCOMTR-MCNC: 147 MG/DL — HIGH (ref 70–99)
GLUCOSE BLDC GLUCOMTR-MCNC: 188 MG/DL — HIGH (ref 70–99)
GLUCOSE BLDC GLUCOMTR-MCNC: 189 MG/DL — HIGH (ref 70–99)
GLUCOSE SERPL-MCNC: 219 MG/DL — HIGH (ref 70–99)
HCT VFR BLD CALC: 37.2 % — SIGNIFICANT CHANGE UP (ref 34.5–45)
HGB BLD-MCNC: 12.4 G/DL — SIGNIFICANT CHANGE UP (ref 11.5–15.5)
LACTATE SERPL-SCNC: 2 MMOL/L — SIGNIFICANT CHANGE UP (ref 0.5–2)
MAGNESIUM SERPL-MCNC: 1.8 MG/DL — SIGNIFICANT CHANGE UP (ref 1.6–2.6)
MCHC RBC-ENTMCNC: 25.7 PG — LOW (ref 27–34)
MCHC RBC-ENTMCNC: 33.3 GM/DL — SIGNIFICANT CHANGE UP (ref 32–36)
MCV RBC AUTO: 77.2 FL — LOW (ref 80–100)
NRBC # BLD: 0 /100 WBCS — SIGNIFICANT CHANGE UP (ref 0–0)
NRBC # FLD: 0 K/UL — SIGNIFICANT CHANGE UP (ref 0–0)
PHOSPHATE SERPL-MCNC: 4.5 MG/DL — SIGNIFICANT CHANGE UP (ref 2.5–4.5)
PLATELET # BLD AUTO: 213 K/UL — SIGNIFICANT CHANGE UP (ref 150–400)
POTASSIUM SERPL-MCNC: 5.2 MMOL/L — SIGNIFICANT CHANGE UP (ref 3.5–5.3)
POTASSIUM SERPL-SCNC: 5.2 MMOL/L — SIGNIFICANT CHANGE UP (ref 3.5–5.3)
RBC # BLD: 4.82 M/UL — SIGNIFICANT CHANGE UP (ref 3.8–5.2)
RBC # FLD: 14.8 % — HIGH (ref 10.3–14.5)
SODIUM SERPL-SCNC: 132 MMOL/L — LOW (ref 135–145)
WBC # BLD: 8.59 K/UL — SIGNIFICANT CHANGE UP (ref 3.8–10.5)
WBC # FLD AUTO: 8.59 K/UL — SIGNIFICANT CHANGE UP (ref 3.8–10.5)

## 2022-09-12 PROCEDURE — 99223 1ST HOSP IP/OBS HIGH 75: CPT

## 2022-09-12 PROCEDURE — 90792 PSYCH DIAG EVAL W/MED SRVCS: CPT

## 2022-09-12 PROCEDURE — 74176 CT ABD & PELVIS W/O CONTRAST: CPT | Mod: 26

## 2022-09-12 PROCEDURE — 71045 X-RAY EXAM CHEST 1 VIEW: CPT | Mod: 26

## 2022-09-12 RX ORDER — HYDROMORPHONE HYDROCHLORIDE 2 MG/ML
1 INJECTION INTRAMUSCULAR; INTRAVENOUS; SUBCUTANEOUS ONCE
Refills: 0 | Status: COMPLETED | OUTPATIENT
Start: 2022-09-12 | End: 2022-09-12

## 2022-09-12 RX ORDER — ACETAMINOPHEN 500 MG
1000 TABLET ORAL ONCE
Refills: 0 | Status: COMPLETED | OUTPATIENT
Start: 2022-09-12 | End: 2022-09-12

## 2022-09-12 RX ORDER — MAGNESIUM SULFATE 500 MG/ML
2 VIAL (ML) INJECTION ONCE
Refills: 0 | Status: COMPLETED | OUTPATIENT
Start: 2022-09-12 | End: 2022-09-13

## 2022-09-12 RX ORDER — LISINOPRIL 2.5 MG/1
20 TABLET ORAL DAILY
Refills: 0 | Status: DISCONTINUED | OUTPATIENT
Start: 2022-09-12 | End: 2022-09-15

## 2022-09-12 RX ORDER — INFLUENZA VIRUS VACCINE 15; 15; 15; 15 UG/.5ML; UG/.5ML; UG/.5ML; UG/.5ML
0.5 SUSPENSION INTRAMUSCULAR ONCE
Refills: 0 | Status: COMPLETED | OUTPATIENT
Start: 2022-09-12 | End: 2022-09-12

## 2022-09-12 RX ADMIN — Medication 400 MILLIGRAM(S): at 01:08

## 2022-09-12 RX ADMIN — Medication 2: at 00:17

## 2022-09-12 RX ADMIN — Medication 1000 MILLIGRAM(S): at 10:29

## 2022-09-12 RX ADMIN — ENOXAPARIN SODIUM 40 MILLIGRAM(S): 100 INJECTION SUBCUTANEOUS at 11:02

## 2022-09-12 RX ADMIN — Medication 400 MILLIGRAM(S): at 09:59

## 2022-09-12 RX ADMIN — Medication 1000 MILLIGRAM(S): at 03:11

## 2022-09-12 RX ADMIN — Medication 1: at 08:45

## 2022-09-12 RX ADMIN — SODIUM CHLORIDE 125 MILLILITER(S): 9 INJECTION, SOLUTION INTRAVENOUS at 00:17

## 2022-09-12 RX ADMIN — SODIUM CHLORIDE 125 MILLILITER(S): 9 INJECTION, SOLUTION INTRAVENOUS at 08:50

## 2022-09-12 RX ADMIN — Medication 1: at 12:42

## 2022-09-12 NOTE — BH CONSULTATION LIAISON ASSESSMENT NOTE - NSBHCHARTREVIEWLAB_PSY_A_CORE FT
12.4   8.59  )-----------( 213      ( 12 Sep 2022 00:19 )             37.2   09-12    132<L>  |  95<L>  |  29<H>  ----------------------------<  219<H>  5.2   |  25  |  0.48<L>    Ca    9.8      12 Sep 2022 01:37  Phos  4.5     09-12  Mg     1.80     09-12    TPro  7.8  /  Alb  4.5  /  TBili  0.6  /  DBili  x   /  AST  32  /  ALT  23  /  AlkPhos  81  09-11

## 2022-09-12 NOTE — PATIENT PROFILE ADULT - FALL HARM RISK - HARM RISK INTERVENTIONS

## 2022-09-12 NOTE — PROGRESS NOTE ADULT - ASSESSMENT
52y Female with umbilical hernia containing large and small bowel. Some mild proximal bowel dilation. Hernia was successfully reduced at bedside, patient reported mild improvement in symptoms.    Plan:  - admit to surgery  - serial abdominal exam  - trend lactate  - plan dw Dr Rodrigues    Surgery  [id number] 52y Female with umbilical hernia containing large and small bowel. Some mild proximal bowel dilation. Hernia was successfully reduced at bedside, patient reported mild improvement in symptoms.    Plan:  - serial abdominal exam  - trend lactate, now wnl  - Possible surgery vs IV abx    Surgery  [id number] 52y Female with umbilical hernia containing large and small bowel. Some mild proximal bowel dilation. Hernia was successfully reduced at bedside, patient reported mild improvement in symptoms.    Plan:  - serial abdominal exam  - trend lactate, now wnl  - No acute surgical intervention at this time, bowel potentially incarcerated but not strangulated     Surgery  [id number] 52y Female with umbilical hernia containing large and small bowel. Some mild proximal bowel dilation. Hernia was successfully reduced at bedside, patient reported mild improvement in symptoms.    Plan:  - serial abdominal exam  - trend lactate, now wnl  - No acute surgical intervention at this time, bowel potentially incarcerated but not strangulated   - f/u psych consult  - incidental finding of 2.3cm L. Renal Lesion     Surgery  [id number] 52y Female with umbilical hernia containing large and small bowel. Some mild proximal bowel dilation. Hernia was successfully reduced at bedside, patient reported mild improvement in symptoms.    Plan:  - serial abdominal exam  - trend lactate, now wnl  - No acute surgical intervention at this time, bowel potentially incarcerated but not strangulated   - f/u psych consult  - f/u medicine recs, CXR, EKG, and A1c   - incidental finding of 2.3cm L. Renal Lesion     Surgery  [id number]

## 2022-09-12 NOTE — PROGRESS NOTE ADULT - SUBJECTIVE AND OBJECTIVE BOX
Surgery [facility] Team Daily Progress Note   FRANCESCA MCCORD | [id number]  --------------------------------------------------------------------------------------------------------------------  SUBJECTIVE / 24H EVENTS  Patient seen and examined on morning rounds. No acute events overnight.  --------------------------------------------------------------------------------------------------------------------  OBJECTIVE:    VITAL SIGNS:  T(C): 37.4 (09-12-22 @ 00:06), Max: 37.4 (09-12-22 @ 00:06)  HR: 89 (09-12-22 @ 00:06) (81 - 92)  BP: 143/70 (09-12-22 @ 00:06) (124/81 - 143/70)  RR: 20 (09-12-22 @ 00:06) (18 - 20)  SpO2: 97% (09-12-22 @ 00:06) (94% - 100%)  Daily Height in cm: 157.48 (11 Sep 2022 12:36)    Daily   POCT Blood Glucose.: 249 mg/dL (09-11-22 @ 23:40)  POCT Blood Glucose.: 249 mg/dL (09-11-22 @ 12:35)      PHYSICAL EXAM:  Gen: NAD  LS: Respirations unlabored.   Card: RRR.   GI: **********************************************  Ext: Warm, well perfused        LAB VALUES:  09-11    132<L>  |  93<L>  |  32<H>  ----------------------------<  275<H>  4.7   |  24  |  0.60    Ca    10.0      11 Sep 2022 13:31    TPro  7.8  /  Alb  4.5  /  TBili  0.6  /  DBili  x   /  AST  32  /  ALT  23  /  AlkPhos  81  09-11                               12.4   8.59  )-----------( 213      ( 12 Sep 2022 00:19 )             37.2     LIVER FUNCTIONS - ( 11 Sep 2022 13:31 )  Alb: 4.5 g/dL / Pro: 7.8 g/dL / ALK PHOS: 81 U/L / ALT: 23 U/L / AST: 32 U/L / GGT: x           PT/INR - ( 11 Sep 2022 13:31 )   PT: 12.5 sec;   INR: 1.08 ratio         PTT - ( 11 Sep 2022 13:31 )  PTT:26.3 sec            MICROBIOLOGY:    No new microbiology data for review.     RADIOLOGY:  PACS Image: Image(s) Available (09-11-22 @ 14:12)    No new radiographic images for review.    MEDICATIONS  (STANDING):  budesonide 160 MICROgram(s)/formoterol 4.5 MICROgram(s) Inhaler 2 Puff(s) Inhalation two times a day  dextrose 5%. 1000 milliLiter(s) (100 mL/Hr) IV Continuous <Continuous>  dextrose 5%. 1000 milliLiter(s) (50 mL/Hr) IV Continuous <Continuous>  dextrose 50% Injectable 25 Gram(s) IV Push once  dextrose 50% Injectable 12.5 Gram(s) IV Push once  dextrose 50% Injectable 25 Gram(s) IV Push once  enoxaparin Injectable 40 milliGRAM(s) SubCutaneous every 24 hours  glucagon  Injectable 1 milliGRAM(s) IntraMuscular once  insulin lispro (ADMELOG) corrective regimen sliding scale   SubCutaneous every 6 hours  lactated ringers. 1000 milliLiter(s) (125 mL/Hr) IV Continuous <Continuous>    MEDICATIONS  (PRN):  ALBUTerol    90 MICROgram(s) HFA Inhaler 2 Puff(s) Inhalation every 6 hours PRN Shortness of Breath and/or Wheezing  dextrose Oral Gel 15 Gram(s) Oral once PRN Blood Glucose LESS THAN 70 milliGRAM(s)/deciliter      Surgery [facility] Team Daily Progress Note   FRANCESCA MCCORD | [id number]  --------------------------------------------------------------------------------------------------------------------  SUBJECTIVE / 24H EVENTS  Patient seen and examined on morning rounds. No acute events overnight.  --------------------------------------------------------------------------------------------------------------------  OBJECTIVE:    VITAL SIGNS:  T(C): 37.4 (09-12-22 @ 00:06), Max: 37.4 (09-12-22 @ 00:06)  HR: 89 (09-12-22 @ 00:06) (81 - 92)  BP: 143/70 (09-12-22 @ 00:06) (124/81 - 143/70)  RR: 20 (09-12-22 @ 00:06) (18 - 20)  SpO2: 97% (09-12-22 @ 00:06) (94% - 100%)  Daily Height in cm: 157.48 (11 Sep 2022 12:36)    Daily   POCT Blood Glucose.: 249 mg/dL (09-11-22 @ 23:40)  POCT Blood Glucose.: 249 mg/dL (09-11-22 @ 12:35)      PHYSICAL EXAM:  Gen: AAOx3, non-toxic  Head: NCAT  HEENT: EOMI, oral mucosa moist, normal conjunctiva  Lung: Breathing on RA, unlabored   Abd: soft, non-distended, obese habitus. TTP in epigastrium   MSK: no visible deformities  Neuro: No focal sensory or motor deficits      LAB VALUES:  09-11    132<L>  |  93<L>  |  32<H>  ----------------------------<  275<H>  4.7   |  24  |  0.60    Ca    10.0      11 Sep 2022 13:31    TPro  7.8  /  Alb  4.5  /  TBili  0.6  /  DBili  x   /  AST  32  /  ALT  23  /  AlkPhos  81  09-11                               12.4   8.59  )-----------( 213      ( 12 Sep 2022 00:19 )             37.2     LIVER FUNCTIONS - ( 11 Sep 2022 13:31 )  Alb: 4.5 g/dL / Pro: 7.8 g/dL / ALK PHOS: 81 U/L / ALT: 23 U/L / AST: 32 U/L / GGT: x           PT/INR - ( 11 Sep 2022 13:31 )   PT: 12.5 sec;   INR: 1.08 ratio         PTT - ( 11 Sep 2022 13:31 )  PTT:26.3 sec            MICROBIOLOGY:    No new microbiology data for review.     RADIOLOGY:  PACS Image: Image(s) Available (09-11-22 @ 14:12)    No new radiographic images for review.    MEDICATIONS  (STANDING):  budesonide 160 MICROgram(s)/formoterol 4.5 MICROgram(s) Inhaler 2 Puff(s) Inhalation two times a day  dextrose 5%. 1000 milliLiter(s) (100 mL/Hr) IV Continuous <Continuous>  dextrose 5%. 1000 milliLiter(s) (50 mL/Hr) IV Continuous <Continuous>  dextrose 50% Injectable 25 Gram(s) IV Push once  dextrose 50% Injectable 12.5 Gram(s) IV Push once  dextrose 50% Injectable 25 Gram(s) IV Push once  enoxaparin Injectable 40 milliGRAM(s) SubCutaneous every 24 hours  glucagon  Injectable 1 milliGRAM(s) IntraMuscular once  insulin lispro (ADMELOG) corrective regimen sliding scale   SubCutaneous every 6 hours  lactated ringers. 1000 milliLiter(s) (125 mL/Hr) IV Continuous <Continuous>    MEDICATIONS  (PRN):  ALBUTerol    90 MICROgram(s) HFA Inhaler 2 Puff(s) Inhalation every 6 hours PRN Shortness of Breath and/or Wheezing  dextrose Oral Gel 15 Gram(s) Oral once PRN Blood Glucose LESS THAN 70 milliGRAM(s)/deciliter      Surgery [facility] Team Daily Progress Note   FRANCESCA MCCORD | [id number]  --------------------------------------------------------------------------------------------------------------------  SUBJECTIVE / 24H EVENTS  Patient seen and examined on morning rounds. No acute events overnight.  --------------------------------------------------------------------------------------------------------------------  OBJECTIVE:    VITAL SIGNS:  T(C): 37.4 (09-12-22 @ 00:06), Max: 37.4 (09-12-22 @ 00:06)  HR: 89 (09-12-22 @ 00:06) (81 - 92)  BP: 143/70 (09-12-22 @ 00:06) (124/81 - 143/70)  RR: 20 (09-12-22 @ 00:06) (18 - 20)  SpO2: 97% (09-12-22 @ 00:06) (94% - 100%)  Daily Height in cm: 157.48 (11 Sep 2022 12:36)    Daily   POCT Blood Glucose.: 249 mg/dL (09-11-22 @ 23:40)  POCT Blood Glucose.: 249 mg/dL (09-11-22 @ 12:35)      PHYSICAL EXAM:  Gen: AAOx3, non-toxic  Head: NCAT  HEENT: EOMI, oral mucosa moist, normal conjunctiva  Lung: Breathing on RA, unlabored   Abd: soft, non-distended, obese habitus. TTP in epigastrium   MSK: no visible deformities  Neuro: No focal sensory or motor deficits      LAB VALUES:  09-11    132<L>  |  93<L>  |  32<H>  ----------------------------<  275<H>  4.7   |  24  |  0.60    Ca    10.0      11 Sep 2022 13:31    TPro  7.8  /  Alb  4.5  /  TBili  0.6  /  DBili  x   /  AST  32  /  ALT  23  /  AlkPhos  81  09-11                               12.4   8.59  )-----------( 213      ( 12 Sep 2022 00:19 )             37.2     LIVER FUNCTIONS - ( 11 Sep 2022 13:31 )  Alb: 4.5 g/dL / Pro: 7.8 g/dL / ALK PHOS: 81 U/L / ALT: 23 U/L / AST: 32 U/L / GGT: x           PT/INR - ( 11 Sep 2022 13:31 )   PT: 12.5 sec;   INR: 1.08 ratio         PTT - ( 11 Sep 2022 13:31 )  PTT:26.3 sec      MICROBIOLOGY:    No new microbiology data for review.     RADIOLOGY:  PACS Image: Image(s) Available (09-11-22 @ 14:12)    No new radiographic images for review.    MEDICATIONS  (STANDING):  budesonide 160 MICROgram(s)/formoterol 4.5 MICROgram(s) Inhaler 2 Puff(s) Inhalation two times a day  dextrose 5%. 1000 milliLiter(s) (100 mL/Hr) IV Continuous <Continuous>  dextrose 5%. 1000 milliLiter(s) (50 mL/Hr) IV Continuous <Continuous>  dextrose 50% Injectable 25 Gram(s) IV Push once  dextrose 50% Injectable 12.5 Gram(s) IV Push once  dextrose 50% Injectable 25 Gram(s) IV Push once  enoxaparin Injectable 40 milliGRAM(s) SubCutaneous every 24 hours  glucagon  Injectable 1 milliGRAM(s) IntraMuscular once  insulin lispro (ADMELOG) corrective regimen sliding scale   SubCutaneous every 6 hours  lactated ringers. 1000 milliLiter(s) (125 mL/Hr) IV Continuous <Continuous>    MEDICATIONS  (PRN):  ALBUTerol    90 MICROgram(s) HFA Inhaler 2 Puff(s) Inhalation every 6 hours PRN Shortness of Breath and/or Wheezing  dextrose Oral Gel 15 Gram(s) Oral once PRN Blood Glucose LESS THAN 70 milliGRAM(s)/deciliter

## 2022-09-12 NOTE — CONSULT NOTE ADULT - PROBLEM SELECTOR RECOMMENDATION 4
Hx of MENA on CPAP. Pt. refusing CPAP machine here but is compliant at home. Says the machine here is too cumbersome.   - Monitor

## 2022-09-12 NOTE — BH CONSULTATION LIAISON ASSESSMENT NOTE - DETAILS
too drowsy? While she denies active SI now, she states that a potential method would be allowing herself to have an asthma attack and not seek rescue inhaler
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
## 2022-09-12 NOTE — BH CONSULTATION LIAISON ASSESSMENT NOTE - MSE UNSTRUCTURED FT
Mental Status Exam:  [place]: well groomed, fair hygiene     Behavior: calm, cooperative, no psychomotor retardation/agitation  Motor: no tremors, EPS, or rigidity  Gait: did not assess, pt in bed  Speech: normal rate, rhythm, prosody and volume   Mood: "depressed"  Affect: dysthymic, restricted range  Thought process: clear, goal directed   Thought Content: denies paranoia, delusions   Perception: denies AH/VH  SI: denies active SI but admits to at times wanting to die because no one can help her with the pain  HI: denies  Insight: limited  Judgment: limited    Cognitive Exam:  Orientation: AOx3  Recall: intact  Attention: intact  Abstraction: intact

## 2022-09-12 NOTE — ED ADULT NURSE REASSESSMENT NOTE - NS ED NURSE REASSESS COMMENT FT1
Pt received on stretcher, A&Ox3, ambulatory with steady gait c/o abdominal pain, pt is NPO as per MD orders, admitted to surgery, will give report to inpatient floor. Pt seen moaning on stretcher, recently received 1G Iv Tylenol, MD saw pt and pt expressed that the pain is improving however pt continues to moan.

## 2022-09-12 NOTE — BH CONSULTATION LIAISON ASSESSMENT NOTE - NSBHCHARTREVIEWVS_PSY_A_CORE FT
Vital Signs Last 24 Hrs  T(C): 37.2 (12 Sep 2022 09:40), Max: 37.4 (12 Sep 2022 00:06)  T(F): 99 (12 Sep 2022 09:40), Max: 99.3 (12 Sep 2022 00:06)  HR: 76 (12 Sep 2022 09:40) (72 - 92)  BP: 110/72 (12 Sep 2022 09:40) (95/60 - 143/70)  BP(mean): --  RR: 18 (12 Sep 2022 09:40) (18 - 20)  SpO2: 97% (12 Sep 2022 09:40) (94% - 100%)    Parameters below as of 12 Sep 2022 09:40  Patient On (Oxygen Delivery Method): room air

## 2022-09-12 NOTE — CONSULT NOTE ADULT - PROBLEM SELECTOR RECOMMENDATION 9
Pt. with 10 year history of hernia and presenting with abdominal pain. CT scan showing the hernia contains large and small bowel. Hernia was reduced at bedside by surgery with improvement in abdominal pain.   - No acute surgical intervention at this time   - Serial abdominal exam  - If planning for OR and requires medical clearance: please obtain EKG and CXR. Pt with exercise intolerance and mets <4. RCRI 2 - class III risk. 10.1% risk of death MI or cardiac arrest. Pt. tolerated anesthesia in the past but this was in 2006 prior to putting on weight and diagnosis of MENA. Currently, not in asthma exacerbation. No wheezing on exam. Optimization pending EKG and CXR.
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
## 2022-09-12 NOTE — CONSULT NOTE ADULT - PROBLEM SELECTOR RECOMMENDATION 5
Hx of asthma with increased use of nebulizer over the summer. She has not used her nebulizer in 1 month. No wheezing on exam.   - C/w home inhalers

## 2022-09-12 NOTE — CONSULT NOTE ADULT - PROBLEM SELECTOR RECOMMENDATION 2
Hx of uncontrolled diabetes A1C 10 in October 2021. She was supposed to be discharged on insulin but she does not like the needle. She has not followed up with endocrinology but visits a PCP. She has been taking oral medications (metformin + januvia). A1C 8. 4 in April 2022.   - Hold oral medications   - C/w ISS for now   - F/u A1C - if >10 obtain endocrine consult.

## 2022-09-12 NOTE — BH CONSULTATION LIAISON ASSESSMENT NOTE - RISK ASSESSMENT
Patient has history of PTSD and many medical issues and is expressing frustration and SI. With that said she also wants to get better and is asking for medical treatment with the hopes of returning to the work force. She is sober, not actively psychotic. Her chronic risk is moderate. Imminent risk is unclear at this time as she is in pain.

## 2022-09-12 NOTE — CONSULT NOTE ADULT - ASSESSMENT
53 y/o F with PMH HTN, HLD, DM, MENA on CPAP, morbid obesity and an umbilical hernia who presents with abdominal pain. Medicine consulted for medical optimization prior to surgical intervention.

## 2022-09-13 ENCOUNTER — TRANSCRIPTION ENCOUNTER (OUTPATIENT)
Age: 52
End: 2022-09-13

## 2022-09-13 LAB
ANION GAP SERPL CALC-SCNC: 10 MMOL/L — SIGNIFICANT CHANGE UP (ref 7–14)
BUN SERPL-MCNC: 13 MG/DL — SIGNIFICANT CHANGE UP (ref 7–23)
CALCIUM SERPL-MCNC: 9.5 MG/DL — SIGNIFICANT CHANGE UP (ref 8.4–10.5)
CHLORIDE SERPL-SCNC: 100 MMOL/L — SIGNIFICANT CHANGE UP (ref 98–107)
CO2 SERPL-SCNC: 30 MMOL/L — SIGNIFICANT CHANGE UP (ref 22–31)
CREAT SERPL-MCNC: 0.5 MG/DL — SIGNIFICANT CHANGE UP (ref 0.5–1.3)
EGFR: 113 ML/MIN/1.73M2 — SIGNIFICANT CHANGE UP
GLUCOSE BLDC GLUCOMTR-MCNC: 121 MG/DL — HIGH (ref 70–99)
GLUCOSE BLDC GLUCOMTR-MCNC: 128 MG/DL — HIGH (ref 70–99)
GLUCOSE BLDC GLUCOMTR-MCNC: 131 MG/DL — HIGH (ref 70–99)
GLUCOSE BLDC GLUCOMTR-MCNC: 135 MG/DL — HIGH (ref 70–99)
GLUCOSE BLDC GLUCOMTR-MCNC: 149 MG/DL — HIGH (ref 70–99)
GLUCOSE SERPL-MCNC: 134 MG/DL — HIGH (ref 70–99)
HCT VFR BLD CALC: 35.9 % — SIGNIFICANT CHANGE UP (ref 34.5–45)
HGB BLD-MCNC: 12 G/DL — SIGNIFICANT CHANGE UP (ref 11.5–15.5)
MAGNESIUM SERPL-MCNC: 1.8 MG/DL — SIGNIFICANT CHANGE UP (ref 1.6–2.6)
MCHC RBC-ENTMCNC: 25.9 PG — LOW (ref 27–34)
MCHC RBC-ENTMCNC: 33.4 GM/DL — SIGNIFICANT CHANGE UP (ref 32–36)
MCV RBC AUTO: 77.4 FL — LOW (ref 80–100)
NRBC # BLD: 0 /100 WBCS — SIGNIFICANT CHANGE UP (ref 0–0)
NRBC # FLD: 0 K/UL — SIGNIFICANT CHANGE UP (ref 0–0)
PHOSPHATE SERPL-MCNC: 3 MG/DL — SIGNIFICANT CHANGE UP (ref 2.5–4.5)
PLATELET # BLD AUTO: 186 K/UL — SIGNIFICANT CHANGE UP (ref 150–400)
POTASSIUM SERPL-MCNC: 4.2 MMOL/L — SIGNIFICANT CHANGE UP (ref 3.5–5.3)
POTASSIUM SERPL-SCNC: 4.2 MMOL/L — SIGNIFICANT CHANGE UP (ref 3.5–5.3)
RBC # BLD: 4.64 M/UL — SIGNIFICANT CHANGE UP (ref 3.8–5.2)
RBC # FLD: 14.9 % — HIGH (ref 10.3–14.5)
SARS-COV-2 RNA SPEC QL NAA+PROBE: SIGNIFICANT CHANGE UP
SODIUM SERPL-SCNC: 140 MMOL/L — SIGNIFICANT CHANGE UP (ref 135–145)
WBC # BLD: 7.01 K/UL — SIGNIFICANT CHANGE UP (ref 3.8–10.5)
WBC # FLD AUTO: 7.01 K/UL — SIGNIFICANT CHANGE UP (ref 3.8–10.5)

## 2022-09-13 PROCEDURE — 74018 RADEX ABDOMEN 1 VIEW: CPT | Mod: 26

## 2022-09-13 PROCEDURE — 99231 SBSQ HOSP IP/OBS SF/LOW 25: CPT

## 2022-09-13 PROCEDURE — 93010 ELECTROCARDIOGRAM REPORT: CPT

## 2022-09-13 PROCEDURE — 99255 IP/OBS CONSLTJ NEW/EST HI 80: CPT | Mod: 25,GC

## 2022-09-13 RX ORDER — MAGNESIUM SULFATE 500 MG/ML
2 VIAL (ML) INJECTION ONCE
Refills: 0 | Status: COMPLETED | OUTPATIENT
Start: 2022-09-13 | End: 2022-09-13

## 2022-09-13 RX ORDER — SODIUM CHLORIDE 9 MG/ML
1000 INJECTION, SOLUTION INTRAVENOUS
Refills: 0 | Status: DISCONTINUED | OUTPATIENT
Start: 2022-09-13 | End: 2022-09-15

## 2022-09-13 RX ORDER — LIDOCAINE 4 G/100G
1 CREAM TOPICAL DAILY
Refills: 0 | Status: DISCONTINUED | OUTPATIENT
Start: 2022-09-13 | End: 2022-09-15

## 2022-09-13 RX ORDER — ACETAMINOPHEN 500 MG
1000 TABLET ORAL EVERY 6 HOURS
Refills: 0 | Status: DISCONTINUED | OUTPATIENT
Start: 2022-09-13 | End: 2022-09-15

## 2022-09-13 RX ORDER — CYCLOBENZAPRINE HYDROCHLORIDE 10 MG/1
5 TABLET, FILM COATED ORAL THREE TIMES A DAY
Refills: 0 | Status: DISCONTINUED | OUTPATIENT
Start: 2022-09-13 | End: 2022-09-15

## 2022-09-13 RX ADMIN — BUDESONIDE AND FORMOTEROL FUMARATE DIHYDRATE 2 PUFF(S): 160; 4.5 AEROSOL RESPIRATORY (INHALATION) at 21:57

## 2022-09-13 RX ADMIN — BUDESONIDE AND FORMOTEROL FUMARATE DIHYDRATE 2 PUFF(S): 160; 4.5 AEROSOL RESPIRATORY (INHALATION) at 10:23

## 2022-09-13 RX ADMIN — SODIUM CHLORIDE 125 MILLILITER(S): 9 INJECTION, SOLUTION INTRAVENOUS at 01:51

## 2022-09-13 RX ADMIN — Medication 25 GRAM(S): at 10:22

## 2022-09-13 RX ADMIN — LIDOCAINE 1 PATCH: 4 CREAM TOPICAL at 21:57

## 2022-09-13 RX ADMIN — LISINOPRIL 20 MILLIGRAM(S): 2.5 TABLET ORAL at 05:55

## 2022-09-13 RX ADMIN — Medication 10 MILLIGRAM(S): at 05:55

## 2022-09-13 RX ADMIN — Medication 25 GRAM(S): at 01:51

## 2022-09-13 RX ADMIN — BUDESONIDE AND FORMOTEROL FUMARATE DIHYDRATE 2 PUFF(S): 160; 4.5 AEROSOL RESPIRATORY (INHALATION) at 01:51

## 2022-09-13 RX ADMIN — ENOXAPARIN SODIUM 40 MILLIGRAM(S): 100 INJECTION SUBCUTANEOUS at 12:43

## 2022-09-13 RX ADMIN — Medication 1 ENEMA: at 15:17

## 2022-09-13 NOTE — DIETITIAN INITIAL EVALUATION ADULT - PERTINENT MEDS FT
MEDICATIONS  (STANDING):  budesonide 160 MICROgram(s)/formoterol 4.5 MICROgram(s) Inhaler 2 Puff(s) Inhalation two times a day  dextrose 5% + sodium chloride 0.45%. 1000 milliLiter(s) (125 mL/Hr) IV Continuous <Continuous>  dextrose 5%. 1000 milliLiter(s) (100 mL/Hr) IV Continuous <Continuous>  dextrose 5%. 1000 milliLiter(s) (50 mL/Hr) IV Continuous <Continuous>  dextrose 50% Injectable 25 Gram(s) IV Push once  dextrose 50% Injectable 12.5 Gram(s) IV Push once  dextrose 50% Injectable 25 Gram(s) IV Push once  enoxaparin Injectable 40 milliGRAM(s) SubCutaneous every 24 hours  glucagon  Injectable 1 milliGRAM(s) IntraMuscular once  influenza   Vaccine 0.5 milliLiter(s) IntraMuscular once  insulin lispro (ADMELOG) corrective regimen sliding scale   SubCutaneous every 6 hours  lidocaine   4% Patch 1 Patch Transdermal daily  lisinopril 20 milliGRAM(s) Oral daily  torsemide 10 milliGRAM(s) Oral daily    MEDICATIONS  (PRN):  acetaminophen     Tablet .. 1000 milliGRAM(s) Oral every 6 hours PRN Mild Pain (1 - 3)  ALBUTerol    90 MICROgram(s) HFA Inhaler 2 Puff(s) Inhalation every 6 hours PRN Shortness of Breath and/or Wheezing  cyclobenzaprine 5 milliGRAM(s) Oral three times a day PRN Muscle Spasm  dextrose Oral Gel 15 Gram(s) Oral once PRN Blood Glucose LESS THAN 70 milliGRAM(s)/deciliter

## 2022-09-13 NOTE — DIETITIAN INITIAL EVALUATION ADULT - ORAL INTAKE PTA/DIET HISTORY
Patient reports height of 5'1", usual body weight 330lbs, reports 16lbs weight loss over the past couple of months. Patient reports she has no choice in what she eats, she does not have money to buy food. She can only eat whatever she can get from the food pantry. Patient reports she has been getting fresh fruits and vegetables from the food pantry, sometimes will get canned green peas. Admits not using salt in cooking, however puts chicken bouillon in cooking. Patient confirms peanuts allergy.

## 2022-09-13 NOTE — CHART NOTE - NSCHARTNOTEFT_GEN_A_CORE
Patient admitted for ventral hernia containing bowel. CT A/P with PO contrast prelim shows contrast in transverse colon loop within large ventral hernia sac with trace contrast within colon distal to hernia. Increased fluid surrounding loops of bowel within ventral hernia concerning for obstruction.     Patient seen and examined at bedside. Reports pain at hernia site when touched. Denies nausea, vomiting. Passed flatus since CT with PO contrast. Patient hemodynamically stable. On exam, patient with large ventral hernia, mild TTP. Rest of abd obese, soft, ND, NT; no rebound or guarding. Patient not clinically obstructed. Will follow up CT final read. Continue to monitor with serial abdominal exam. Will obtain AXR to assess progression of PO contrast.    Pretty Vaca, PGY4

## 2022-09-13 NOTE — CONSULT NOTE ADULT - ATTENDING COMMENTS
Ms. Boone is a 53 yo F with Pmhx MENA, obesity, DM, Asthma presenting with acute onset abdominal pain, associated with umbilical hernia occurring after completing recent PFTs a few days ago. Plan for Hernia Repair on 9/14. Pulmonary consulted for presurgical optimization prior to procedure. Patient reports longstanding history of asthma which worsened after working at [employer] during 9/11 - for this reason she follows with Pulm at [facility].  She reports worsening of asthma requiring more frequent KATIE use last month due to severe heat/humidity but this has improved and she is back to her baseline of being well-controlled on her Symbicort.  She does report chronic clear sputum production, not increased from baseline - Albuterol nebs help with clearance.  Patient appears to have her asthma well-controlled on current regimen and is optimized from pulmonary standpoint.  However, given comorbidities she remains moderate risk for postoperative complications. Given rise in HCO3 while inpatient/not using CPAP appears to have some degree of OHS supported by positive pressure vs hypoventilation in setting of abd pain which will only worsen postoperatively and contribute to atelectasis.    Recommend:  - Continue Symbicort and Albuterol nebs q4-6h prn   - Patient bringing in home cpap to use this evening, uses nasal pillows with home machine and feels claustrophobic with full mask  - can administer preoperative Albuterol neb  - Would extubate to Bilevel after extubation and for the next few hours after until fully alert - can set at 15/5  - encourage OOB to chair as soon as possible as well as use of incentive spirometry    Pulmonary will sign off.  Thank you for involving us in the care of this patient.  Please do not hesitate to contact us with further questions.    Danae Correia MD

## 2022-09-13 NOTE — PHYSICAL THERAPY INITIAL EVALUATION ADULT - PRECAUTIONS/LIMITATIONS, REHAB EVAL
Patient's recent testing in my office came back positive for Chlamydia.  This is a sexually transmitted disease.  They need treatment for this; this is typically 1 gram of azithromycin taken all at once.  I have sent this medication to their pharmacy.  Any sexual partners that she has or has had recently should be notified to be tested and treated.  They should also follow up to be tested for cure in 1 month.  Please follow up with me in the office for this.    Other STD (sexually transmitted disease) testing -  are negative (normal).      I called patient to discuss above, she voiced understanding and gratitude for call.    Ghada Vega MD     fall precautions

## 2022-09-13 NOTE — DIETITIAN INITIAL EVALUATION ADULT - COLLABORATION WITH OTHER PROVIDERS
1. Recommend follow-up with outpatient dietitian upon discharge.  2. Recommend social work consult for food insecurity.

## 2022-09-13 NOTE — CONSULT NOTE ADULT - SUBJECTIVE AND OBJECTIVE BOX
HPI:  53 yo F pmhx MENA, obesity, DM, Asthma presenting with  day hx of acute onset abdominal pain, associated with umbilical hernia. Patient has had hernia for more than 10 year, has seen multiple surgeons outpatient but currently with no plans for repair. Pain started acutely overnight associated with multiple episodes of diarrhea, last gas and BM last night. No fevers, chills, night sweats.    Pulmonary consulted for presurgical optimization, plan for Vental Hernia repair on 9/14. Patient seen and examined at bedside. Has long hx of asthma, currently on symbicort and was using nebulizers prn in the summer for humidity, though has not used for the last month. Never intubated for asthma. Currently no shortness of breath, wheezing, though with mild cough. Also with long hx of MENA. Uses a cpap machine at night with settings of 15. Have not used machines here because it is uncomfortable but her family member will be bringing in her home machine for her to use this evening.     Follows with pulmonary team in [place], per patient last PFTs were in January.       PAST MEDICAL & SURGICAL HISTORY:  HTN (hypertension)      DM (diabetes mellitus)      Asthma      S/P ovarian cystectomy          FAMILY HISTORY:      SOCIAL HISTORY:  Smoking: never  EtOH Use: none  Marital Status:  Occupation: was day care care worker  Exposures:  Recent Travel: originally form [employer].   Allergies    cefpodoxime (Rash)  estrogens (Hives; Pruritus)  peanuts (Short breath)    Intolerances        HOME MEDICATIONS:    REVIEW OF SYSTEMS:  Constitutional: No fevers or chills. No weight loss. No fatigue or generalised malaise.  Eyes: No itching or discharge from the eyes  ENT: No ear pain. No ear discharge. No nasal congestion. No post nasal drip. No epistaxis. No throat pain. No sore throat. No difficulty swallowing.   CV: No chest pain. No palpitations. No lightheadedness or dizziness.   Resp: No dyspnea at rest. No dyspnea on exertion. No orthopnea. No wheezing. No cough. No stridor. No sputum production. No chest pain with respiration.  GI: No nausea. No vomiting. No diarrhea.  MSK: No joint pain or pain in any extremities +back pain   Integumentary: No skin lesions. No pedal edema.  Neurological: No gross motor weakness. No sensory changes.    [ ] All other systems negative  [ ] Unable to assess ROS because ________    OBJECTIVE:  ICU Vital Signs Last 24 Hrs  T(C): 37.2 (13 Sep 2022 09:47), Max: 37.2 (13 Sep 2022 09:47)  T(F): 99 (13 Sep 2022 09:47), Max: 99 (13 Sep 2022 09:47)  HR: 77 (13 Sep 2022 09:47) (58 - 84)  BP: 133/74 (13 Sep 2022 09:47) (113/68 - 133/74)  BP(mean): --  ABP: --  ABP(mean): --  RR: 18 (13 Sep 2022 09:47) (16 - 18)  SpO2: 98% (13 Sep 2022 09:47) (95% - 100%)    O2 Parameters below as of 13 Sep 2022 09:47  Patient On (Oxygen Delivery Method): room air              09-12 @ 07:01  -  09-13 @ 07:00  --------------------------------------------------------  IN: 2700 mL / OUT: 0 mL / NET: 2700 mL      CAPILLARY BLOOD GLUCOSE      POCT Blood Glucose.: 149 mg/dL (13 Sep 2022 06:12)      PHYSICAL EXAM:  General: Awake, alert, oriented X 3.   HEENT: Atraumatic, normocephalic.                  No tonsillar or pharyngeal exudates.  Lymph Nodes: No palpable lymphadenopathy  Neck: No JVD. No carotid bruit.   Respiratory: Normal chest expansion                         Normal percussion                         Normal and equal air entry                         No wheeze, rhonchi or rales.  Cardiovascular: S1 S2 normal. No murmurs, rubs or gallops.   Abdomen: Soft, non-tender, non-distended. Ventral Hernia   Extremities: Warm to touch. Peripheral pulse palpable. No pedal edema.   Skin: No rashes or skin lesions  Neurological: Non-focal  Psychiatry: Appropriate mood and affect.    HOSPITAL MEDICATIONS:  MEDICATIONS  (STANDING):  budesonide 160 MICROgram(s)/formoterol 4.5 MICROgram(s) Inhaler 2 Puff(s) Inhalation two times a day  dextrose 5% + sodium chloride 0.45%. 1000 milliLiter(s) (125 mL/Hr) IV Continuous <Continuous>  dextrose 5%. 1000 milliLiter(s) (100 mL/Hr) IV Continuous <Continuous>  dextrose 5%. 1000 milliLiter(s) (50 mL/Hr) IV Continuous <Continuous>  dextrose 50% Injectable 25 Gram(s) IV Push once  dextrose 50% Injectable 12.5 Gram(s) IV Push once  dextrose 50% Injectable 25 Gram(s) IV Push once  enoxaparin Injectable 40 milliGRAM(s) SubCutaneous every 24 hours  glucagon  Injectable 1 milliGRAM(s) IntraMuscular once  influenza   Vaccine 0.5 milliLiter(s) IntraMuscular once  insulin lispro (ADMELOG) corrective regimen sliding scale   SubCutaneous every 6 hours  lidocaine   4% Patch 1 Patch Transdermal daily  lisinopril 20 milliGRAM(s) Oral daily  torsemide 10 milliGRAM(s) Oral daily    MEDICATIONS  (PRN):  acetaminophen     Tablet .. 1000 milliGRAM(s) Oral every 6 hours PRN Mild Pain (1 - 3)  ALBUTerol    90 MICROgram(s) HFA Inhaler 2 Puff(s) Inhalation every 6 hours PRN Shortness of Breath and/or Wheezing  cyclobenzaprine 5 milliGRAM(s) Oral three times a day PRN Muscle Spasm  dextrose Oral Gel 15 Gram(s) Oral once PRN Blood Glucose LESS THAN 70 milliGRAM(s)/deciliter      LABS:                        12.0   7.01  )-----------( 186      ( 13 Sep 2022 06:33 )             35.9     09-13    140  |  100  |  13  ----------------------------<  134<H>  4.2   |  30  |  0.50    Ca    9.5      13 Sep 2022 06:33  Phos  3.0     09-13  Mg     1.80     09-13    TPro  7.8  /  Alb  4.5  /  TBili  0.6  /  DBili  x   /  AST  32  /  ALT  23  /  AlkPhos  81  09-11    PT/INR - ( 11 Sep 2022 13:31 )   PT: 12.5 sec;   INR: 1.08 ratio         PTT - ( 11 Sep 2022 13:31 )  PTT:26.3 sec          MICROBIOLOGY:     RADIOLOGY:  [ ] Reviewed and interpreted by me    Point of Care Ultrasound Findings;    PFT:    EKG:
INTERNAL MEDICINE SERVICE INITIAL CONSULT NOTE    HPI:  52F with multiple medical issues, morbid obesity, here with 1 day hx of acute onset abdominal pain, associated with umbilical hernia. Patient has had hernia for more than 10 year, has seen multiple surgeons outpatient but currently with no plans for repair. Pain started acutely overnight associated with multiple episodes of diarrhea, last gas and BM last night. No fevers, chills, nightsweats. (11 Sep 2022 16:58)    ADDITIONAL MEDICINE HPI: Pt. reports the abdominal pain has improved since coming to the hospital. She has a history of diabetes. She has not been able to follow up with endocrinology since her last admission in Oct 2021. She was supposed to be discharged on insulin but she does not take it because she does not like to do the injections. She says her sugars range from 100-200s. Last month they were as low as the 50s. She has not seen her sugar level in the 50s since that time. She takes her sugar level twice a day at home. She reports that she has difficulty moving and getting around. She says she gets sharp pains in her back and as a result feels short of breath. She has to stop about every 20 steps to take a deep breath in order to keep going. She denies chest pain with exertion. This summer, she had issues with her asthma. She was persistently using her nebulizer (2x/day) due to SOB and wheezing. She thinks this is from the humidity. She has not had to use the nebulizer in 1 month. She uses CPAP at night since 2007. She does not want to use the CPAP machine that we have here.     REVIEW OF SYSTEMS:   Otherwise negative except as specified in HPI    PAST MEDICAL HISTORY: DM, HTN, HLD, hernia, asthma.     PAST SURGICAL HISTORY: s/p ovarian cystectomy.     FAMILY HISTORY: Denies significant family history     SOCIAL HISTORY:  Tobacco use: Denies  EtOH use: Denies  Illicit drug use: Denies    MEDICATIONS:  MEDICATIONS  (STANDING):  budesonide 160 MICROgram(s)/formoterol 4.5 MICROgram(s) Inhaler 2 Puff(s) Inhalation two times a day  dextrose 5%. 1000 milliLiter(s) (100 mL/Hr) IV Continuous <Continuous>  dextrose 5%. 1000 milliLiter(s) (50 mL/Hr) IV Continuous <Continuous>  dextrose 50% Injectable 25 Gram(s) IV Push once  dextrose 50% Injectable 12.5 Gram(s) IV Push once  dextrose 50% Injectable 25 Gram(s) IV Push once  enoxaparin Injectable 40 milliGRAM(s) SubCutaneous every 24 hours  glucagon  Injectable 1 milliGRAM(s) IntraMuscular once  influenza   Vaccine 0.5 milliLiter(s) IntraMuscular once  insulin lispro (ADMELOG) corrective regimen sliding scale   SubCutaneous every 6 hours  lactated ringers. 1000 milliLiter(s) (125 mL/Hr) IV Continuous <Continuous>  magnesium sulfate  IVPB 2 Gram(s) IV Intermittent once    MEDICATIONS  (PRN):  ALBUTerol    90 MICROgram(s) HFA Inhaler 2 Puff(s) Inhalation every 6 hours PRN Shortness of Breath and/or Wheezing  dextrose Oral Gel 15 Gram(s) Oral once PRN Blood Glucose LESS THAN 70 milliGRAM(s)/deciliter      ALLERGIES:  Allergies    cefpodoxime (Rash)  estrogens (Hives; Pruritus)  peanuts (Short breath)    Intolerances        VITAL SIGNS:  Vital Signs Last 24 Hrs  T(C): 37.2 (12 Sep 2022 09:40), Max: 37.4 (12 Sep 2022 00:06)  T(F): 99 (12 Sep 2022 09:40), Max: 99.3 (12 Sep 2022 00:06)  HR: 76 (12 Sep 2022 09:40) (72 - 92)  BP: 110/72 (12 Sep 2022 09:40) (95/60 - 143/70)  BP(mean): --  RR: 18 (12 Sep 2022 09:40) (18 - 20)  SpO2: 97% (12 Sep 2022 09:40) (94% - 100%)    Parameters below as of 12 Sep 2022 09:40  Patient On (Oxygen Delivery Method): room air          PHYSICAL EXAM:  Constitutional: Obese female resting resting comfortably in bed; NAD  Head: NC/AT  Eyes: PERRL, EOMI, anicteric sclera  ENT: no nasal discharge; uvula midline, no oropharyngeal erythema or exudates; MMM  Neck: supple; no JVD or thyromegaly  Respiratory: CTA B/L; no W/R/R, no retractions  Cardiac: +S1/S2; RRR; no M/R/G; PMI non-displaced  Gastrointestinal: abdomen soft, NT/ND; no rebound or guarding; +BSx4  Genitourinary: normal external genitalia  Back: spine midline, no bony tenderness or step-offs; no CVAT B/L  Extremities: WWP, no clubbing or cyanosis; no peripheral edema  Musculoskeletal: NROM x4; no joint swelling, tenderness or erythema  Vascular: 2+ radial, femoral, DP/PT pulses B/L  Dermatologic: skin warm, dry and intact; no rashes, wounds, or scars  Lymphatic: no submandibular or cervical LAD  Neurologic: AAOx3; CNII-XII grossly intact; no focal deficits  Psychiatric: affect and characteristics of appearance, verbalizations, behaviors are appropriate    LABS:                        12.4   8.59  )-----------( 213      ( 12 Sep 2022 00:19 )             37.2     09-12    132<L>  |  95<L>  |  29<H>  ----------------------------<  219<H>  5.2   |  25  |  0.48<L>    Ca    9.8      12 Sep 2022 01:37  Phos  4.5     09-12  Mg     1.80     09-12    TPro  7.8  /  Alb  4.5  /  TBili  0.6  /  DBili  x   /  AST  32  /  ALT  23  /  AlkPhos  81  09-11    PT/INR - ( 11 Sep 2022 13:31 )   PT: 12.5 sec;   INR: 1.08 ratio         PTT - ( 11 Sep 2022 13:31 )  PTT:26.3 sec        CAPILLARY BLOOD GLUCOSE      POCT Blood Glucose.: 189 mg/dL (12 Sep 2022 12:08)  POCT Blood Glucose.: 188 mg/dL (12 Sep 2022 08:43)  POCT Blood Glucose.: 249 mg/dL (11 Sep 2022 23:40)          RADIOLOGY & ADDITIONAL TESTS: Reviewed.

## 2022-09-13 NOTE — BH CONSULTATION LIAISON PROGRESS NOTE - NSBHCHARTREVIEWLAB_PSY_A_CORE FT
12.0   7.01  )-----------( 186      ( 13 Sep 2022 06:33 )             35.9   09-13    140  |  100  |  13  ----------------------------<  134<H>  4.2   |  30  |  0.50    Ca    9.5      13 Sep 2022 06:33  Phos  3.0     09-13  Mg     1.80     09-13

## 2022-09-13 NOTE — PROGRESS NOTE ADULT - SUBJECTIVE AND OBJECTIVE BOX
B TEAM SURGERY PROGRESS NOTE:    SUBJECTIVE:  No acute events overnight. Serial abdominal exam performed. Patient seen and examined at bedside. Reports passing flatus and improvement in abdominal pain. Complaints this morning of back pain due to positioning. Denies nausea/vomiting, chest pain or SOB.    OBJECTIVE:    ICU Vital Signs Last 24 Hrs  T(C): 36.8 (13 Sep 2022 06:20), Max: 37 (13 Sep 2022 02:23)  T(F): 98.3 (13 Sep 2022 06:20), Max: 98.6 (13 Sep 2022 02:23)  HR: 77 (13 Sep 2022 06:20) (58 - 84)  BP: 122/68 (13 Sep 2022 06:20) (113/68 - 127/78)  BP(mean): --  ABP: --  ABP(mean): --  RR: 16 (13 Sep 2022 06:20) (16 - 18)  SpO2: 100% (13 Sep 2022 06:20) (95% - 100%)    O2 Parameters below as of 13 Sep 2022 06:20  Patient On (Oxygen Delivery Method): room air        CBC (09-13 @ 06:33)                          12.0                     7.01    )--------------(  186        --    % Neuts, --    % Lymphs, ANC: --                              35.9    CBC (09-12 @ 00:19)                          12.4                     8.59    )--------------(  213        --    % Neuts, --    % Lymphs, ANC: --                              37.2      BMP (09-13 @ 06:33)       140     |  100     |  13    			Ca++ --      Ca 9.5          ---------------------------------( 134<H>		Mg 1.80         4.2     |  30      |  0.50  			Ph 3.0     BMP (09-12 @ 01:37)       132<L>  |  95<L>   |  29<H> 			Ca++ --      Ca 9.8          ---------------------------------( 219<H>		Mg 1.80         5.2     |  25      |  0.48<L>			Ph 4.5             ABG (09-12 @ 00:19)      /  /  /  /  / %     Lactate:  2.0        PHYSICAL EXAM:  Gen: NAD, resting comfortably in bed  Neuro: AOX3  Cardiac: RRR  Pulmonary: CTA B/L  Abdomen: Obese, soft, non distended, minimally tender to umbilicus at site of hernia without rebound or guarding  Ext: No edema  Skin: Warm and dry

## 2022-09-13 NOTE — DIETITIAN INITIAL EVALUATION ADULT - PERTINENT LABORATORY DATA
09-13    140  |  100  |  13  ----------------------------<  134<H>  4.2   |  30  |  0.50    Ca    9.5      13 Sep 2022 06:33  Phos  3.0     09-13  Mg     1.80     09-13    POCT Blood Glucose.: 131 mg/dL (09-13-22 @ 12:20)  A1C with Estimated Average Glucose Result: 8.5 % (09-12-22 @ 00:19)  A1C with Estimated Average Glucose Result: 10.4 % (10-09-21 @ 06:59)  A1C with Estimated Average Glucose Result: 10.3 % (10-07-21 @ 19:42)

## 2022-09-13 NOTE — CONSULT NOTE ADULT - ASSESSMENT
51 yo F pmhx MENA, obesity, DM, Asthma presenting with  day hx of acute onset abdominal pain, associated with umbilical hernia. Plan for Hernia Repair on 9/14. Pulmonary consulted for presurgical optimization prior to procedure.     #MENA  #Asthma  #Presurgical Optimization    Recommendations:    NOTE INCOMPLETE  51 yo F pmhx MENA, obesity, DM, Asthma presenting with  day hx of acute onset abdominal pain, associated with umbilical hernia. Plan for Hernia Repair on 9/14. Pulmonary consulted for presurgical optimization prior to procedure.     #MENA  #Asthma  #Presurgical Optimization    Recommendations:  - Can continue with home symbicort and bronchodilators.   - Patient bringing in home cpap, would use this evening  - Would use periprocedural bronchodilators  - Would extubate to bilevel after extubation and for the next few hours after; 15/5  - Other than above recommendations, optimized from pulmonary standpoint for procedure. Patient is moderate risk due to obesity.

## 2022-09-13 NOTE — DIETITIAN INITIAL EVALUATION ADULT - ADD RECOMMEND
1. Advance PO diet to clear liquid, then to consistent carbohydrate DASH diet, as tolerated, and when clinically permits.  2. Once PO diet initiates, monitor po diet tolerance.  3. Monitor weight, labs, bowl movement.

## 2022-09-13 NOTE — PHYSICAL THERAPY INITIAL EVALUATION ADULT - GENERAL OBSERVATIONS, REHAB EVAL
Patient received in semifowler position in bed in [facility]. Patient denies chest pain, SOB, headache, and dizziness.

## 2022-09-13 NOTE — DIETITIAN INITIAL EVALUATION ADULT - OTHER INFO
52 year old female with past medical history of MENA, obesity, DM, Asthma, has acute onset abdominal pain, associated with umbilical hernia. Plan for Hernia Repair on 9/14. Pulmonary consulted for presurgical optimization prior to procedure, per chart.   Patient reports good appetite, denies any GI distress at this time, experiencing abdominal pain, denies any chewing or swallowing difficulties. Current weight: 141.4kg/311.7lbs as per flow sheet.   RD provided extensive nutrition education on heart healthy and diabetes diet, including avoid sodium seasonings in food, avoid concentrated sweets and beverages, increase intake of fresh foods, avoid processed and canned foods, portion control. Patient verbalized understanding of materials, however, she admits she will not follow dietary guidelines. Encouragement and emotional support provided.

## 2022-09-13 NOTE — DIETITIAN INITIAL EVALUATION ADULT - PERSON TAUGHT/METHOD
verbal instruction/individual instruction/teach back - (Patient repeats in own words)/patient instructed

## 2022-09-13 NOTE — PROGRESS NOTE ADULT - ASSESSMENT
52y female with pmhx HTN, DM, Asthma, MENA on CPAP with umbilical hernia containing large and small bowel. Some mild proximal bowel dilation. Hernia was successfully reduced at bedside, patient reported mild improvement in symptoms.   -Follow up repeat KUB to assess progression of contrast  -Bowel prep with tap water enema x2 today  -Pre-op: Plan for OR tomorrow 9/14  -Add Lidocaine patch for back pain   -Pain control PRN  -Continue home meds  -Medicine following, appreciate recommendations. Follow up EKG for pre-op planning/ optimization  -Pulmonary consult for pre-op clearance   -Continue ISS, A1c 8.5%  -Sips with meds, mIVF, DVT ppx  -PT sandro Allen PA-C  Please page with any questions or concerns   #[phone]

## 2022-09-14 ENCOUNTER — RESULT REVIEW (OUTPATIENT)
Age: 52
End: 2022-09-14

## 2022-09-14 ENCOUNTER — TRANSCRIPTION ENCOUNTER (OUTPATIENT)
Age: 52
End: 2022-09-14

## 2022-09-14 LAB
ANION GAP SERPL CALC-SCNC: 15 MMOL/L — HIGH (ref 7–14)
APTT BLD: 30.2 SEC — SIGNIFICANT CHANGE UP (ref 27–36.3)
BLD GP AB SCN SERPL QL: NEGATIVE — SIGNIFICANT CHANGE UP
BUN SERPL-MCNC: 12 MG/DL — SIGNIFICANT CHANGE UP (ref 7–23)
CALCIUM SERPL-MCNC: 9 MG/DL — SIGNIFICANT CHANGE UP (ref 8.4–10.5)
CHLORIDE SERPL-SCNC: 95 MMOL/L — LOW (ref 98–107)
CO2 SERPL-SCNC: 26 MMOL/L — SIGNIFICANT CHANGE UP (ref 22–31)
CREAT SERPL-MCNC: 0.46 MG/DL — LOW (ref 0.5–1.3)
EGFR: 116 ML/MIN/1.73M2 — SIGNIFICANT CHANGE UP
GLUCOSE BLDC GLUCOMTR-MCNC: 160 MG/DL — HIGH (ref 70–99)
GLUCOSE BLDC GLUCOMTR-MCNC: 196 MG/DL — HIGH (ref 70–99)
GLUCOSE BLDC GLUCOMTR-MCNC: 198 MG/DL — HIGH (ref 70–99)
GLUCOSE BLDC GLUCOMTR-MCNC: 226 MG/DL — HIGH (ref 70–99)
GLUCOSE SERPL-MCNC: 203 MG/DL — HIGH (ref 70–99)
HCG SERPL-ACNC: <5 MIU/ML — SIGNIFICANT CHANGE UP
HCT VFR BLD CALC: 35.7 % — SIGNIFICANT CHANGE UP (ref 34.5–45)
HGB BLD-MCNC: 11.9 G/DL — SIGNIFICANT CHANGE UP (ref 11.5–15.5)
INR BLD: 1.09 RATIO — SIGNIFICANT CHANGE UP (ref 0.88–1.16)
MAGNESIUM SERPL-MCNC: 2 MG/DL — SIGNIFICANT CHANGE UP (ref 1.6–2.6)
MCHC RBC-ENTMCNC: 25.6 PG — LOW (ref 27–34)
MCHC RBC-ENTMCNC: 33.3 GM/DL — SIGNIFICANT CHANGE UP (ref 32–36)
MCV RBC AUTO: 76.8 FL — LOW (ref 80–100)
NRBC # BLD: 0 /100 WBCS — SIGNIFICANT CHANGE UP (ref 0–0)
NRBC # FLD: 0 K/UL — SIGNIFICANT CHANGE UP (ref 0–0)
PHOSPHATE SERPL-MCNC: 3.1 MG/DL — SIGNIFICANT CHANGE UP (ref 2.5–4.5)
PLATELET # BLD AUTO: 180 K/UL — SIGNIFICANT CHANGE UP (ref 150–400)
POTASSIUM SERPL-MCNC: 3.7 MMOL/L — SIGNIFICANT CHANGE UP (ref 3.5–5.3)
POTASSIUM SERPL-SCNC: 3.7 MMOL/L — SIGNIFICANT CHANGE UP (ref 3.5–5.3)
PROTHROM AB SERPL-ACNC: 12.7 SEC — SIGNIFICANT CHANGE UP (ref 10.5–13.4)
RBC # BLD: 4.65 M/UL — SIGNIFICANT CHANGE UP (ref 3.8–5.2)
RBC # FLD: 14.9 % — HIGH (ref 10.3–14.5)
RH IG SCN BLD-IMP: POSITIVE — SIGNIFICANT CHANGE UP
SODIUM SERPL-SCNC: 136 MMOL/L — SIGNIFICANT CHANGE UP (ref 135–145)
WBC # BLD: 6.5 K/UL — SIGNIFICANT CHANGE UP (ref 3.8–10.5)
WBC # FLD AUTO: 6.5 K/UL — SIGNIFICANT CHANGE UP (ref 3.8–10.5)

## 2022-09-14 PROCEDURE — 49568: CPT

## 2022-09-14 PROCEDURE — 49561: CPT

## 2022-09-14 PROCEDURE — 15734 MUSCLE-SKIN GRAFT TRUNK: CPT

## 2022-09-14 PROCEDURE — 88302 TISSUE EXAM BY PATHOLOGIST: CPT | Mod: 26

## 2022-09-14 DEVICE — MESH PHASIX ST 6X8IN: Type: IMPLANTABLE DEVICE | Status: FUNCTIONAL

## 2022-09-14 DEVICE — ETHICON HERNIA SECURESTRAP 5MM SIZE 25: Type: IMPLANTABLE DEVICE | Status: FUNCTIONAL

## 2022-09-14 RX ORDER — INSULIN LISPRO 100/ML
VIAL (ML) SUBCUTANEOUS
Refills: 0 | Status: DISCONTINUED | OUTPATIENT
Start: 2022-09-14 | End: 2022-09-15

## 2022-09-14 RX ORDER — ACETAMINOPHEN 500 MG
1000 TABLET ORAL EVERY 6 HOURS
Refills: 0 | Status: DISCONTINUED | OUTPATIENT
Start: 2022-09-14 | End: 2022-09-15

## 2022-09-14 RX ORDER — HYDROMORPHONE HYDROCHLORIDE 2 MG/ML
0.5 INJECTION INTRAMUSCULAR; INTRAVENOUS; SUBCUTANEOUS
Refills: 0 | Status: DISCONTINUED | OUTPATIENT
Start: 2022-09-14 | End: 2022-09-14

## 2022-09-14 RX ORDER — FENTANYL CITRATE 50 UG/ML
50 INJECTION INTRAVENOUS ONCE
Refills: 0 | Status: DISCONTINUED | OUTPATIENT
Start: 2022-09-14 | End: 2022-09-14

## 2022-09-14 RX ORDER — IBUPROFEN 200 MG
600 TABLET ORAL EVERY 6 HOURS
Refills: 0 | Status: DISCONTINUED | OUTPATIENT
Start: 2022-09-14 | End: 2022-09-15

## 2022-09-14 RX ORDER — POTASSIUM CHLORIDE 20 MEQ
10 PACKET (EA) ORAL
Refills: 0 | Status: DISCONTINUED | OUTPATIENT
Start: 2022-09-14 | End: 2022-09-15

## 2022-09-14 RX ORDER — OXYCODONE HYDROCHLORIDE 5 MG/1
5 TABLET ORAL ONCE
Refills: 0 | Status: DISCONTINUED | OUTPATIENT
Start: 2022-09-14 | End: 2022-09-14

## 2022-09-14 RX ORDER — OXYCODONE HYDROCHLORIDE 5 MG/1
10 TABLET ORAL EVERY 6 HOURS
Refills: 0 | Status: DISCONTINUED | OUTPATIENT
Start: 2022-09-14 | End: 2022-09-15

## 2022-09-14 RX ORDER — ONDANSETRON 8 MG/1
4 TABLET, FILM COATED ORAL ONCE
Refills: 0 | Status: DISCONTINUED | OUTPATIENT
Start: 2022-09-14 | End: 2022-09-14

## 2022-09-14 RX ORDER — HYDROMORPHONE HYDROCHLORIDE 2 MG/ML
0.25 INJECTION INTRAMUSCULAR; INTRAVENOUS; SUBCUTANEOUS
Refills: 0 | Status: DISCONTINUED | OUTPATIENT
Start: 2022-09-14 | End: 2022-09-14

## 2022-09-14 RX ORDER — OXYCODONE HYDROCHLORIDE 5 MG/1
5 TABLET ORAL EVERY 4 HOURS
Refills: 0 | Status: DISCONTINUED | OUTPATIENT
Start: 2022-09-14 | End: 2022-09-15

## 2022-09-14 RX ORDER — SODIUM CHLORIDE 9 MG/ML
1000 INJECTION, SOLUTION INTRAVENOUS
Refills: 0 | Status: DISCONTINUED | OUTPATIENT
Start: 2022-09-14 | End: 2022-09-14

## 2022-09-14 RX ADMIN — Medication 1: at 07:23

## 2022-09-14 RX ADMIN — Medication 1: at 17:30

## 2022-09-14 RX ADMIN — LIDOCAINE 1 PATCH: 4 CREAM TOPICAL at 06:39

## 2022-09-14 RX ADMIN — Medication 1000 MILLIGRAM(S): at 21:21

## 2022-09-14 RX ADMIN — Medication 2: at 23:00

## 2022-09-14 RX ADMIN — FENTANYL CITRATE 50 MICROGRAM(S): 50 INJECTION INTRAVENOUS at 18:15

## 2022-09-14 RX ADMIN — FENTANYL CITRATE 50 MICROGRAM(S): 50 INJECTION INTRAVENOUS at 18:06

## 2022-09-14 NOTE — PROGRESS NOTE ADULT - SUBJECTIVE AND OBJECTIVE BOX
Surgery [facility] Team Daily Progress Note   FRANCESCA MCCORD | [id number]  --------------------------------------------------------------------------------------------------------------------  SUBJECTIVE / 24H EVENTS  Patient seen and examined on morning rounds. No acute events overnight.  --------------------------------------------------------------------------------------------------------------------  OBJECTIVE:    VITAL SIGNS:  T(C): 37.4 (09-13-22 @ 21:00), Max: 37.4 (09-13-22 @ 21:00)  HR: 75 (09-13-22 @ 21:00) (68 - 77)  BP: 118/72 (09-13-22 @ 21:00) (113/53 - 133/74)  RR: 17 (09-13-22 @ 21:00) (16 - 18)  SpO2: 100% (09-13-22 @ 21:00) (96% - 100%)  Daily     Daily   POCT Blood Glucose.: 135 mg/dL (09-13-22 @ 21:34)  POCT Blood Glucose.: 128 mg/dL (09-13-22 @ 17:35)  POCT Blood Glucose.: 131 mg/dL (09-13-22 @ 12:20)      PHYSICAL EXAM:  Gen: NAD  LS: Respirations unlabored.   Card: RRR.   GI: *************************************  Ext: Warm, well perfused      09-12-22 @ 07:01  -  09-13-22 @ 07:00  --------------------------------------------------------  IN:    IV PiggyBack: 150 mL    Lactated Ringers: 2550 mL  Total IN: 2700 mL    OUT:    Oral Fluid: 0 mL  Total OUT: 0 mL    Total NET: 2700 mL      09-13-22 @ 07:01  -  09-14-22 @ 00:40  --------------------------------------------------------  IN:    dextrose 5% + sodium chloride 0.45%: 1550 mL    IV PiggyBack: 50 mL  Total IN: 1600 mL    OUT:    Oral Fluid: 0 mL  Total OUT: 0 mL    Total NET: 1600 mL          LAB VALUES:  09-13    140  |  100  |  13  ----------------------------<  134<H>  4.2   |  30  |  0.50    Ca    9.5      13 Sep 2022 06:33  Phos  3.0     09-13  Mg     1.80     09-13                                 12.0   7.01  )-----------( 186      ( 13 Sep 2022 06:33 )             35.9                   MICROBIOLOGY:    No new microbiology data for review.     RADIOLOGY:  PACS Image: Image(s) Available (09-13-22 @ 09:09)  PACS Image: Image(s) Available (09-13-22 @ 05:04)    No new radiographic images for review.    MEDICATIONS  (STANDING):  budesonide 160 MICROgram(s)/formoterol 4.5 MICROgram(s) Inhaler 2 Puff(s) Inhalation two times a day  dextrose 5% + sodium chloride 0.45%. 1000 milliLiter(s) (125 mL/Hr) IV Continuous <Continuous>  dextrose 5%. 1000 milliLiter(s) (100 mL/Hr) IV Continuous <Continuous>  dextrose 5%. 1000 milliLiter(s) (50 mL/Hr) IV Continuous <Continuous>  dextrose 50% Injectable 25 Gram(s) IV Push once  dextrose 50% Injectable 12.5 Gram(s) IV Push once  dextrose 50% Injectable 25 Gram(s) IV Push once  enoxaparin Injectable 40 milliGRAM(s) SubCutaneous every 24 hours  glucagon  Injectable 1 milliGRAM(s) IntraMuscular once  influenza   Vaccine 0.5 milliLiter(s) IntraMuscular once  insulin lispro (ADMELOG) corrective regimen sliding scale   SubCutaneous every 6 hours  lidocaine   4% Patch 1 Patch Transdermal daily  lisinopril 20 milliGRAM(s) Oral daily  torsemide 10 milliGRAM(s) Oral daily    MEDICATIONS  (PRN):  acetaminophen     Tablet .. 1000 milliGRAM(s) Oral every 6 hours PRN Mild Pain (1 - 3)  ALBUTerol    90 MICROgram(s) HFA Inhaler 2 Puff(s) Inhalation every 6 hours PRN Shortness of Breath and/or Wheezing  cyclobenzaprine 5 milliGRAM(s) Oral three times a day PRN Muscle Spasm  dextrose Oral Gel 15 Gram(s) Oral once PRN Blood Glucose LESS THAN 70 milliGRAM(s)/deciliter      Surgery [facility] Team Daily Progress Note   FRANCESCA MCCORD | [id number]  --------------------------------------------------------------------------------------------------------------------  SUBJECTIVE / 24H EVENTS  Patient seen and examined on morning rounds. No acute events overnight.  --------------------------------------------------------------------------------------------------------------------  OBJECTIVE:    VITAL SIGNS:  T(C): 37.4 (09-13-22 @ 21:00), Max: 37.4 (09-13-22 @ 21:00)  HR: 75 (09-13-22 @ 21:00) (68 - 77)  BP: 118/72 (09-13-22 @ 21:00) (113/53 - 133/74)  RR: 17 (09-13-22 @ 21:00) (16 - 18)  SpO2: 100% (09-13-22 @ 21:00) (96% - 100%)  Daily     Daily   POCT Blood Glucose.: 135 mg/dL (09-13-22 @ 21:34)  POCT Blood Glucose.: 128 mg/dL (09-13-22 @ 17:35)  POCT Blood Glucose.: 131 mg/dL (09-13-22 @ 12:20)      PHYSICAL EXAM:  Gen: NAD  LS: Respirations unlabored.   Card: RRR.   GI: obese body habitus, tender to palpation, umbilical hernia   Ext: Warm, well perfused    09-12-22 @ 07:01  -  09-13-22 @ 07:00  --------------------------------------------------------  IN:    IV PiggyBack: 150 mL    Lactated Ringers: 2550 mL  Total IN: 2700 mL    OUT:    Oral Fluid: 0 mL  Total OUT: 0 mL    Total NET: 2700 mL      09-13-22 @ 07:01  -  09-14-22 @ 00:40  --------------------------------------------------------  IN:    dextrose 5% + sodium chloride 0.45%: 1550 mL    IV PiggyBack: 50 mL  Total IN: 1600 mL    OUT:    Oral Fluid: 0 mL  Total OUT: 0 mL    Total NET: 1600 mL    LAB VALUES:  09-13    140  |  100  |  13  ----------------------------<  134<H>  4.2   |  30  |  0.50    Ca    9.5      13 Sep 2022 06:33  Phos  3.0     09-13  Mg     1.80     09-13                                 12.0   7.01  )-----------( 186      ( 13 Sep 2022 06:33 )             35.9     MICROBIOLOGY:    No new microbiology data for review.     RADIOLOGY:  PACS Image: Image(s) Available (09-13-22 @ 09:09)  PACS Image: Image(s) Available (09-13-22 @ 05:04)    No new radiographic images for review.    MEDICATIONS  (STANDING):  budesonide 160 MICROgram(s)/formoterol 4.5 MICROgram(s) Inhaler 2 Puff(s) Inhalation two times a day  dextrose 5% + sodium chloride 0.45%. 1000 milliLiter(s) (125 mL/Hr) IV Continuous <Continuous>  dextrose 5%. 1000 milliLiter(s) (100 mL/Hr) IV Continuous <Continuous>  dextrose 5%. 1000 milliLiter(s) (50 mL/Hr) IV Continuous <Continuous>  dextrose 50% Injectable 25 Gram(s) IV Push once  dextrose 50% Injectable 12.5 Gram(s) IV Push once  dextrose 50% Injectable 25 Gram(s) IV Push once  enoxaparin Injectable 40 milliGRAM(s) SubCutaneous every 24 hours  glucagon  Injectable 1 milliGRAM(s) IntraMuscular once  influenza   Vaccine 0.5 milliLiter(s) IntraMuscular once  insulin lispro (ADMELOG) corrective regimen sliding scale   SubCutaneous every 6 hours  lidocaine   4% Patch 1 Patch Transdermal daily  lisinopril 20 milliGRAM(s) Oral daily  torsemide 10 milliGRAM(s) Oral daily    MEDICATIONS  (PRN):  acetaminophen     Tablet .. 1000 milliGRAM(s) Oral every 6 hours PRN Mild Pain (1 - 3)  ALBUTerol    90 MICROgram(s) HFA Inhaler 2 Puff(s) Inhalation every 6 hours PRN Shortness of Breath and/or Wheezing  cyclobenzaprine 5 milliGRAM(s) Oral three times a day PRN Muscle Spasm  dextrose Oral Gel 15 Gram(s) Oral once PRN Blood Glucose LESS THAN 70 milliGRAM(s)/deciliter

## 2022-09-14 NOTE — CHART NOTE - NSCHARTNOTEFT_GEN_A_CORE
POST-OPERATIVE NOTE    Subjective:  Patient is s/p open ventral hernia repair w/ mesh. Recovering appropriately. Pt denies CP/SOB/N/V/fevers/chills. Pt is currently comfortable at bedside.    Vital Signs Last 24 Hrs  T(C): 37.1 (14 Sep 2022 19:00), Max: 37.2 (14 Sep 2022 17:00)  T(F): 98.8 (14 Sep 2022 19:00), Max: 99 (14 Sep 2022 17:00)  HR: 75 (14 Sep 2022 19:45) (68 - 87)  BP: 119/73 (14 Sep 2022 19:45) (101/59 - 138/77)  BP(mean): 84 (14 Sep 2022 19:45) (70 - 97)  RR: 12 (14 Sep 2022 19:45) (12 - 22)  SpO2: 99% (14 Sep 2022 19:45) (91% - 100%)    Parameters below as of 14 Sep 2022 19:00  Patient On (Oxygen Delivery Method): room air      I&O's Detail    13 Sep 2022 07:01  -  14 Sep 2022 07:00  --------------------------------------------------------  IN:    dextrose 5% + sodium chloride 0.45%: 2550 mL    IV PiggyBack: 50 mL  Total IN: 2600 mL    OUT:    Oral Fluid: 0 mL  Total OUT: 0 mL    Total NET: 2600 mL      14 Sep 2022 07:01  -  14 Sep 2022 21:41  --------------------------------------------------------  IN:    Lactated Ringers: 75 mL    Oral Fluid: 60 mL  Total IN: 135 mL    OUT:    Bulb (mL): 25 mL    Bulb (mL): 5 mL  Total OUT: 30 mL    Total NET: 105 mL        enoxaparin Injectable 40  lisinopril 20  torsemide 10    PAST MEDICAL & SURGICAL HISTORY:  HTN (hypertension)      DM (diabetes mellitus)      Asthma      S/P ovarian cystectomy            Physical Exam:  General: NAD, resting comfortably in bed  Pulmonary: Nonlabored breathing, no respiratory distress  Cardiovascular: NSR  Abdominal: soft, mild distended, minimal epigastric tenderness; midline incision c/d/i w/ Dermabond; JOCELYN x2 holding suction s/s.   Extremities: WWP      LABS:                        11.9   6.50  )-----------( 180      ( 14 Sep 2022 06:40 )             35.7     09-14    136  |  95<L>  |  12  ----------------------------<  203<H>  3.7   |  26  |  0.46<L>    Ca    9.0      14 Sep 2022 06:40  Phos  3.1     09-14  Mg     2.00     09-14      PT/INR - ( 14 Sep 2022 06:40 )   PT: 12.7 sec;   INR: 1.09 ratio         PTT - ( 14 Sep 2022 06:40 )  PTT:30.2 sec  CAPILLARY BLOOD GLUCOSE      POCT Blood Glucose.: 198 mg/dL (14 Sep 2022 17:00)  POCT Blood Glucose.: 160 mg/dL (14 Sep 2022 10:55)  POCT Blood Glucose.: 196 mg/dL (14 Sep 2022 07:12)      Radiology and Additional Studies:    Assessment:  The patient is a 52y Female who is now several hours post-op from a open ventral hernia repair w/ mesh.    Plan:  - Pain control as needed  - IVF  - Diet Carb w/ Evening Snack  - SQH  - F/u AM labs

## 2022-09-14 NOTE — BRIEF OPERATIVE NOTE - OPERATION/FINDINGS
incarcerated ventral hernia, hernia sac dissected from surrounding attachments and fascial defect identified. Hernia sac entered due to difficulty reducing hernia and contents including omentum, portion of transverse colon and small bowel examined, all healthy in appearance. Portion of omentum excised to facilitate reduction and contents reduced back to abdominal cavity. Fascial flaps created circumferentially defect closed with interrupted Maxon sutures in figure of eight fashion. [id number] Phasix mesh trimmed to 15 x 10cm length with at least 5cm fascial overlap in all directions and mesh tacked in place overlying fascia. 2 10mm JOCELYN drains placed in subcutaneous space. Subcutaneous tissues closed in layers to eliminate dead space. Dermal sutures placed in interrupted fashion using 3-0 Vicryl. Skin closed with 4-0 Monocryl.

## 2022-09-14 NOTE — PROGRESS NOTE ADULT - ASSESSMENT
52y female with pmhx HTN, DM, Asthma, MENA on CPAP with umbilical hernia containing large and small bowel. Some mild proximal bowel dilation. Hernia was successfully reduced at bedside, patient reported mild improvement in symptoms.   -Follow up repeat KUB to assess progression of contrast  -Bowel prep with tap water enema x2 today  -Pre-op: Plan for OR tomorrow 9/14  -Add Lidocaine patch for back pain   -Pain control PRN  -Continue home meds  -Medicine following, appreciate recommendations. Follow up EKG for pre-op planning/ optimization  -Pulmonary consult for pre-op clearance   -Continue ISS, A1c 8.5%  -Sips with meds, mIVF, DVT ppx  -PT eval     Please page with any questions or concerns   #[id number] 52y female with pmhx HTN, DM, Asthma, MENA on CPAP with umbilical hernia containing large and small bowel. Some mild proximal bowel dilation. Hernia was successfully reduced at bedside, patient reported mild improvement in symptoms.     Plan:  -OR today for ventral hernia repair, possible mesh   -Pain control PRN  -Continue home meds  -Medicine following, appreciate recommendations.   -Pulmonary cleared patient for OR   -Continue ISS, A1c 8.5%  -PT eval     Please page with any questions or concerns   #[id number]

## 2022-09-14 NOTE — CHART NOTE - NSCHARTNOTEFT_GEN_A_CORE
Brief Psych CL Note:    Attempted to see patient for follow up however she was off the floor for surgical procedure.  Spoke with RN Manager and recommended that for when she returns that she remains on 1:1 CO especially as she may be at risk for delirium post operatively.  Psych CL to follow up tomorrow.

## 2022-09-15 ENCOUNTER — TRANSCRIPTION ENCOUNTER (OUTPATIENT)
Age: 52
End: 2022-09-15

## 2022-09-15 VITALS
RESPIRATION RATE: 18 BRPM | SYSTOLIC BLOOD PRESSURE: 125 MMHG | OXYGEN SATURATION: 100 % | TEMPERATURE: 100 F | HEART RATE: 93 BPM | DIASTOLIC BLOOD PRESSURE: 76 MMHG

## 2022-09-15 LAB
ANION GAP SERPL CALC-SCNC: 11 MMOL/L — SIGNIFICANT CHANGE UP (ref 7–14)
BUN SERPL-MCNC: 15 MG/DL — SIGNIFICANT CHANGE UP (ref 7–23)
CALCIUM SERPL-MCNC: 9.2 MG/DL — SIGNIFICANT CHANGE UP (ref 8.4–10.5)
CHLORIDE SERPL-SCNC: 97 MMOL/L — LOW (ref 98–107)
CO2 SERPL-SCNC: 27 MMOL/L — SIGNIFICANT CHANGE UP (ref 22–31)
CREAT SERPL-MCNC: 0.51 MG/DL — SIGNIFICANT CHANGE UP (ref 0.5–1.3)
EGFR: 113 ML/MIN/1.73M2 — SIGNIFICANT CHANGE UP
GLUCOSE BLDC GLUCOMTR-MCNC: 240 MG/DL — HIGH (ref 70–99)
GLUCOSE BLDC GLUCOMTR-MCNC: 268 MG/DL — HIGH (ref 70–99)
GLUCOSE SERPL-MCNC: 251 MG/DL — HIGH (ref 70–99)
HCT VFR BLD CALC: 37 % — SIGNIFICANT CHANGE UP (ref 34.5–45)
HGB BLD-MCNC: 12.1 G/DL — SIGNIFICANT CHANGE UP (ref 11.5–15.5)
MAGNESIUM SERPL-MCNC: 1.9 MG/DL — SIGNIFICANT CHANGE UP (ref 1.6–2.6)
MCHC RBC-ENTMCNC: 25.2 PG — LOW (ref 27–34)
MCHC RBC-ENTMCNC: 32.7 GM/DL — SIGNIFICANT CHANGE UP (ref 32–36)
MCV RBC AUTO: 76.9 FL — LOW (ref 80–100)
NRBC # BLD: 0 /100 WBCS — SIGNIFICANT CHANGE UP (ref 0–0)
NRBC # FLD: 0 K/UL — SIGNIFICANT CHANGE UP (ref 0–0)
PHOSPHATE SERPL-MCNC: 2.8 MG/DL — SIGNIFICANT CHANGE UP (ref 2.5–4.5)
PLATELET # BLD AUTO: 202 K/UL — SIGNIFICANT CHANGE UP (ref 150–400)
POTASSIUM SERPL-MCNC: 4.3 MMOL/L — SIGNIFICANT CHANGE UP (ref 3.5–5.3)
POTASSIUM SERPL-SCNC: 4.3 MMOL/L — SIGNIFICANT CHANGE UP (ref 3.5–5.3)
RBC # BLD: 4.81 M/UL — SIGNIFICANT CHANGE UP (ref 3.8–5.2)
RBC # FLD: 14.6 % — HIGH (ref 10.3–14.5)
SODIUM SERPL-SCNC: 135 MMOL/L — SIGNIFICANT CHANGE UP (ref 135–145)
WBC # BLD: 11.02 K/UL — HIGH (ref 3.8–10.5)
WBC # FLD AUTO: 11.02 K/UL — HIGH (ref 3.8–10.5)

## 2022-09-15 PROCEDURE — 99232 SBSQ HOSP IP/OBS MODERATE 35: CPT

## 2022-09-15 RX ORDER — SODIUM CHLORIDE 9 MG/ML
1000 INJECTION, SOLUTION INTRAVENOUS
Refills: 0 | Status: DISCONTINUED | OUTPATIENT
Start: 2022-09-15 | End: 2022-09-15

## 2022-09-15 RX ORDER — IBUPROFEN 200 MG
1 TABLET ORAL
Qty: 0 | Refills: 0 | DISCHARGE
Start: 2022-09-15

## 2022-09-15 RX ORDER — OXYCODONE HYDROCHLORIDE 5 MG/1
1 TABLET ORAL
Qty: 12 | Refills: 0
Start: 2022-09-15 | End: 2022-09-17

## 2022-09-15 RX ORDER — ACETAMINOPHEN 500 MG
2 TABLET ORAL
Qty: 0 | Refills: 0 | DISCHARGE
Start: 2022-09-15

## 2022-09-15 RX ADMIN — Medication 3: at 08:53

## 2022-09-15 RX ADMIN — Medication 1000 MILLIGRAM(S): at 13:40

## 2022-09-15 RX ADMIN — ENOXAPARIN SODIUM 40 MILLIGRAM(S): 100 INJECTION SUBCUTANEOUS at 12:33

## 2022-09-15 RX ADMIN — Medication 2: at 12:34

## 2022-09-15 RX ADMIN — BUDESONIDE AND FORMOTEROL FUMARATE DIHYDRATE 2 PUFF(S): 160; 4.5 AEROSOL RESPIRATORY (INHALATION) at 08:54

## 2022-09-15 RX ADMIN — Medication 600 MILLIGRAM(S): at 07:18

## 2022-09-15 RX ADMIN — Medication 1000 MILLIGRAM(S): at 01:02

## 2022-09-15 RX ADMIN — Medication 1000 MILLIGRAM(S): at 12:40

## 2022-09-15 RX ADMIN — Medication 600 MILLIGRAM(S): at 15:43

## 2022-09-15 NOTE — DISCHARGE NOTE PROVIDER - NSDCFUADDAPPT_GEN_ALL_CORE_FT
Follow up with your surgeon, Dr. Gonzalez. Call for an appointment.  Please follow up with your primary care physician regarding your hospitalization   Do not drive while taking pain medications    Follow up with your surgeon, Dr. Gonzalez. Call for an appointment.  Please follow up with your primary care physician regarding your hospitalization, your blood sugar levels were elevated while you were admitted. Continue home regiment and repeat your Hemoglobin A1C every 3 months.  Do not drive while taking pain medications

## 2022-09-15 NOTE — BH CONSULTATION LIAISON PROGRESS NOTE - CURRENT MEDICATION
MEDICATIONS  (STANDING):  acetaminophen     Tablet .. 1000 milliGRAM(s) Oral every 6 hours  budesonide 160 MICROgram(s)/formoterol 4.5 MICROgram(s) Inhaler 2 Puff(s) Inhalation two times a day  enoxaparin Injectable 40 milliGRAM(s) SubCutaneous every 24 hours  ibuprofen  Tablet. 600 milliGRAM(s) Oral every 6 hours  influenza   Vaccine 0.5 milliLiter(s) IntraMuscular once  insulin lispro (ADMELOG) corrective regimen sliding scale   SubCutaneous three times a day before meals  lidocaine   4% Patch 1 Patch Transdermal daily  lisinopril 20 milliGRAM(s) Oral daily  potassium chloride  10 mEq/100 mL IVPB 10 milliEquivalent(s) IV Intermittent every 1 hour  torsemide 10 milliGRAM(s) Oral daily    MEDICATIONS  (PRN):  acetaminophen     Tablet .. 1000 milliGRAM(s) Oral every 6 hours PRN Mild Pain (1 - 3)  ALBUTerol    90 MICROgram(s) HFA Inhaler 2 Puff(s) Inhalation every 6 hours PRN Shortness of Breath and/or Wheezing  cyclobenzaprine 5 milliGRAM(s) Oral three times a day PRN Muscle Spasm  oxyCODONE    IR 5 milliGRAM(s) Oral every 4 hours PRN Moderate Pain (4 - 6)  oxyCODONE    IR 10 milliGRAM(s) Oral every 6 hours PRN Severe Pain (7 - 10)  
MEDICATIONS  (STANDING):  budesonide 160 MICROgram(s)/formoterol 4.5 MICROgram(s) Inhaler 2 Puff(s) Inhalation two times a day  dextrose 5% + sodium chloride 0.45%. 1000 milliLiter(s) (125 mL/Hr) IV Continuous <Continuous>  dextrose 5%. 1000 milliLiter(s) (100 mL/Hr) IV Continuous <Continuous>  dextrose 5%. 1000 milliLiter(s) (50 mL/Hr) IV Continuous <Continuous>  dextrose 50% Injectable 25 Gram(s) IV Push once  dextrose 50% Injectable 12.5 Gram(s) IV Push once  dextrose 50% Injectable 25 Gram(s) IV Push once  enoxaparin Injectable 40 milliGRAM(s) SubCutaneous every 24 hours  glucagon  Injectable 1 milliGRAM(s) IntraMuscular once  influenza   Vaccine 0.5 milliLiter(s) IntraMuscular once  insulin lispro (ADMELOG) corrective regimen sliding scale   SubCutaneous every 6 hours  lidocaine   4% Patch 1 Patch Transdermal daily  lisinopril 20 milliGRAM(s) Oral daily  torsemide 10 milliGRAM(s) Oral daily    MEDICATIONS  (PRN):  acetaminophen     Tablet .. 1000 milliGRAM(s) Oral every 6 hours PRN Mild Pain (1 - 3)  ALBUTerol    90 MICROgram(s) HFA Inhaler 2 Puff(s) Inhalation every 6 hours PRN Shortness of Breath and/or Wheezing  cyclobenzaprine 5 milliGRAM(s) Oral three times a day PRN Muscle Spasm  dextrose Oral Gel 15 Gram(s) Oral once PRN Blood Glucose LESS THAN 70 milliGRAM(s)/deciliter

## 2022-09-15 NOTE — BH CONSULTATION LIAISON PROGRESS NOTE - NSBHCHARTREVIEWVS_PSY_A_CORE FT
Vital Signs Last 24 Hrs  T(C): 36.7 (13 Sep 2022 17:58), Max: 37.2 (13 Sep 2022 09:47)  T(F): 98 (13 Sep 2022 17:58), Max: 99 (13 Sep 2022 09:47)  HR: 70 (13 Sep 2022 17:58) (68 - 77)  BP: 113/53 (13 Sep 2022 17:58) (113/53 - 133/74)  BP(mean): --  RR: 16 (13 Sep 2022 17:58) (16 - 18)  SpO2: 99% (13 Sep 2022 17:58) (95% - 100%)    Parameters below as of 13 Sep 2022 17:58  Patient On (Oxygen Delivery Method): room air    
Vital Signs Last 24 Hrs  T(C): 37.5 (15 Sep 2022 10:10), Max: 37.5 (15 Sep 2022 10:10)  T(F): 99.5 (15 Sep 2022 10:10), Max: 99.5 (15 Sep 2022 10:10)  HR: 93 (15 Sep 2022 10:10) (73 - 93)  BP: 125/76 (15 Sep 2022 10:10) (101/59 - 138/77)  BP(mean): 84 (14 Sep 2022 19:45) (70 - 97)  RR: 18 (15 Sep 2022 10:10) (12 - 22)  SpO2: 100% (15 Sep 2022 10:10) (91% - 100%)    Parameters below as of 15 Sep 2022 10:10  Patient On (Oxygen Delivery Method): room air

## 2022-09-15 NOTE — DISCHARGE NOTE NURSING/CASE MANAGEMENT/SOCIAL WORK - PATIENT PORTAL LINK FT
You can access the FollowMyHealth Patient Portal offered by [facility] by registering at the following website: [email or web address]. By joining [facility]’s FollowMyHealth portal, you will also be able to view your health information using other applications (apps) compatible with our system.

## 2022-09-15 NOTE — DISCHARGE NOTE PROVIDER - NSDCCPCAREPLAN_GEN_ALL_CORE_FT
PRINCIPAL DISCHARGE DIAGNOSIS  Diagnosis: Incarcerated hernia  Assessment and Plan of Treatment:        PRINCIPAL DISCHARGE DIAGNOSIS  Diagnosis: Incarcerated hernia  Assessment and Plan of Treatment: WOUND CARE:  Keep incision clean and dry. Do not rub or scrub wound. Pat incisions dry after showering. JOCELYN drain care as taught by nurse prior to discharge, record output daily and bring to follow up.  BATHING: Please do not submerge wound underwater. You may shower and/or sponge bathe.  ACTIVITY: No heavy lifting or straining. Otherwise, you may return to your usual level of physical activity. If you are taking narcotic pain medication (such as Percocet) DO NOT drive a car, operate machinery or make important decisions.  DIET: Return to your usual diet.  NOTIFY YOUR SURGEON IF: You have any bleeding that does not stop, any pus draining from your wound(s), any fever (over 100.4 F) or chills, persistent nausea/vomiting, persistent diarrhea, or if your pain is not controlled on your discharge pain medications.  FOLLOW-UP: Please follow up with your primary care physician in one week regarding your hospitalization

## 2022-09-15 NOTE — PROGRESS NOTE ADULT - SUBJECTIVE AND OBJECTIVE BOX
Surgery [facility] Team Daily Progress Note   FRANCESCA MCCORD | [id number]  --------------------------------------------------------------------------------------------------------------------  SUBJECTIVE / 24H EVENTS  Patient seen and examined on morning rounds. No acute events overnight. Pt tolerating CLD, IVF @75 from 125cc.  --------------------------------------------------------------------------------------------------------------------  OBJECTIVE:    VITAL SIGNS:  T(C): 36.9 (09-14-22 @ 22:00), Max: 37.2 (09-14-22 @ 17:00)  HR: 76 (09-14-22 @ 22:00) (68 - 87)  BP: 121/66 (09-14-22 @ 22:00) (101/59 - 138/77)  RR: 18 (09-14-22 @ 22:00) (12 - 22)  SpO2: 99% (09-14-22 @ 22:00) (91% - 100%)  Daily Height in cm: 157.5 (14 Sep 2022 10:33)    Daily   POCT Blood Glucose.: 226 mg/dL (09-14-22 @ 22:11)  POCT Blood Glucose.: 198 mg/dL (09-14-22 @ 17:00)  POCT Blood Glucose.: 160 mg/dL (09-14-22 @ 10:55)      Physical Exam:  General: NAD, resting comfortably in bed  Pulmonary: Nonlabored breathing, no respiratory distress  Cardiovascular: NSR  Abdominal: soft, mild distended, minimal epigastric tenderness; midline incision c/d/i w/ Dermabond; JOCELYN x2 holding suction s/s.   Extremities: P          09-13-22 @ 07:01  -  09-14-22 @ 07:00  --------------------------------------------------------  IN:    dextrose 5% + sodium chloride 0.45%: 2550 mL    IV PiggyBack: 50 mL  Total IN: 2600 mL    OUT:    Oral Fluid: 0 mL  Total OUT: 0 mL    Total NET: 2600 mL      09-14-22 @ 07:01  -  09-15-22 @ 00:49  --------------------------------------------------------  IN:    dextrose 5% + sodium chloride 0.45%: 125 mL    Lactated Ringers: 75 mL    Oral Fluid: 180 mL  Total IN: 380 mL    OUT:    Bulb (mL): 55 mL    Bulb (mL): 10 mL    Voided (mL): 0 mL  Total OUT: 65 mL    Total NET: 315 mL          LAB VALUES:  09-14    136  |  95<L>  |  12  ----------------------------<  203<H>  3.7   |  26  |  0.46<L>    Ca    9.0      14 Sep 2022 06:40  Phos  3.1     09-14  Mg     2.00     09-14                                 11.9   6.50  )-----------( 180      ( 14 Sep 2022 06:40 )             35.7       PT/INR - ( 14 Sep 2022 06:40 )   PT: 12.7 sec;   INR: 1.09 ratio         PTT - ( 14 Sep 2022 06:40 )  PTT:30.2 sec            MICROBIOLOGY:    No new microbiology data for review.     RADIOLOGY:    No new radiographic images for review.    MEDICATIONS  (STANDING):  acetaminophen     Tablet .. 1000 milliGRAM(s) Oral every 6 hours  budesonide 160 MICROgram(s)/formoterol 4.5 MICROgram(s) Inhaler 2 Puff(s) Inhalation two times a day  dextrose 5% + sodium chloride 0.45%. 1000 milliLiter(s) (75 mL/Hr) IV Continuous <Continuous>  enoxaparin Injectable 40 milliGRAM(s) SubCutaneous every 24 hours  glucagon  Injectable 1 milliGRAM(s) IntraMuscular once  ibuprofen  Tablet. 600 milliGRAM(s) Oral every 6 hours  influenza   Vaccine 0.5 milliLiter(s) IntraMuscular once  insulin lispro (ADMELOG) corrective regimen sliding scale   SubCutaneous three times a day before meals  lidocaine   4% Patch 1 Patch Transdermal daily  lisinopril 20 milliGRAM(s) Oral daily  potassium chloride  10 mEq/100 mL IVPB 10 milliEquivalent(s) IV Intermittent every 1 hour  torsemide 10 milliGRAM(s) Oral daily    MEDICATIONS  (PRN):  acetaminophen     Tablet .. 1000 milliGRAM(s) Oral every 6 hours PRN Mild Pain (1 - 3)  ALBUTerol    90 MICROgram(s) HFA Inhaler 2 Puff(s) Inhalation every 6 hours PRN Shortness of Breath and/or Wheezing  cyclobenzaprine 5 milliGRAM(s) Oral three times a day PRN Muscle Spasm  oxyCODONE    IR 5 milliGRAM(s) Oral every 4 hours PRN Moderate Pain (4 - 6)  oxyCODONE    IR 10 milliGRAM(s) Oral every 6 hours PRN Severe Pain (7 - 10)      Surgery [facility] Team Daily Progress Note   FRANCESCA MCCORD | [id number]  --------------------------------------------------------------------------------------------------------------------  SUBJECTIVE / 24H EVENTS  Patient seen and examined on morning rounds. No acute events overnight. Pt tolerating CLD, passing flatus overnight. Denies chest pain, palpitations, SOB, dizziness, fever, chills, N/V/D.  --------------------------------------------------------------------------------------------------------------------  OBJECTIVE:    VITAL SIGNS:  T(C): 36.9 (09-14-22 @ 22:00), Max: 37.2 (09-14-22 @ 17:00)  HR: 76 (09-14-22 @ 22:00) (68 - 87)  BP: 121/66 (09-14-22 @ 22:00) (101/59 - 138/77)  RR: 18 (09-14-22 @ 22:00) (12 - 22)  SpO2: 99% (09-14-22 @ 22:00) (91% - 100%)  Daily Height in cm: 157.5 (14 Sep 2022 10:33)    Daily   POCT Blood Glucose.: 226 mg/dL (09-14-22 @ 22:11)  POCT Blood Glucose.: 198 mg/dL (09-14-22 @ 17:00)  POCT Blood Glucose.: 160 mg/dL (09-14-22 @ 10:55)      Physical Exam:  General: NAD, resting comfortably in bed  Pulmonary: Nonlabored breathing, no respiratory distress  Cardiovascular: NSR  Abdominal: soft, mild distended, minimal epigastric tenderness; midline incision c/d/i w/ Dermabond; JOCELYN x2 holding suction s/s.   Extremities: [facility]          09-13-22 @ 07:01  -  09-14-22 @ 07:00  --------------------------------------------------------  IN:    dextrose 5% + sodium chloride 0.45%: 2550 mL    IV PiggyBack: 50 mL  Total IN: 2600 mL    OUT:    Oral Fluid: 0 mL  Total OUT: 0 mL    Total NET: 2600 mL      09-14-22 @ 07:01  -  09-15-22 @ 00:49  --------------------------------------------------------  IN:    dextrose 5% + sodium chloride 0.45%: 125 mL    Lactated Ringers: 75 mL    Oral Fluid: 180 mL  Total IN: 380 mL    OUT:    Bulb (mL): 55 mL    Bulb (mL): 10 mL    Voided (mL): 0 mL  Total OUT: 65 mL    Total NET: 315 mL          LAB VALUES:  09-14    136  |  95<L>  |  12  ----------------------------<  203<H>  3.7   |  26  |  0.46<L>    Ca    9.0      14 Sep 2022 06:40  Phos  3.1     09-14  Mg     2.00     09-14                                 11.9   6.50  )-----------( 180      ( 14 Sep 2022 06:40 )             35.7       PT/INR - ( 14 Sep 2022 06:40 )   PT: 12.7 sec;   INR: 1.09 ratio         PTT - ( 14 Sep 2022 06:40 )  PTT:30.2 sec            MICROBIOLOGY:    No new microbiology data for review.     RADIOLOGY:    No new radiographic images for review.    MEDICATIONS  (STANDING):  acetaminophen     Tablet .. 1000 milliGRAM(s) Oral every 6 hours  budesonide 160 MICROgram(s)/formoterol 4.5 MICROgram(s) Inhaler 2 Puff(s) Inhalation two times a day  dextrose 5% + sodium chloride 0.45%. 1000 milliLiter(s) (75 mL/Hr) IV Continuous <Continuous>  enoxaparin Injectable 40 milliGRAM(s) SubCutaneous every 24 hours  glucagon  Injectable 1 milliGRAM(s) IntraMuscular once  ibuprofen  Tablet. 600 milliGRAM(s) Oral every 6 hours  influenza   Vaccine 0.5 milliLiter(s) IntraMuscular once  insulin lispro (ADMELOG) corrective regimen sliding scale   SubCutaneous three times a day before meals  lidocaine   4% Patch 1 Patch Transdermal daily  lisinopril 20 milliGRAM(s) Oral daily  potassium chloride  10 mEq/100 mL IVPB 10 milliEquivalent(s) IV Intermittent every 1 hour  torsemide 10 milliGRAM(s) Oral daily    MEDICATIONS  (PRN):  acetaminophen     Tablet .. 1000 milliGRAM(s) Oral every 6 hours PRN Mild Pain (1 - 3)  ALBUTerol    90 MICROgram(s) HFA Inhaler 2 Puff(s) Inhalation every 6 hours PRN Shortness of Breath and/or Wheezing  cyclobenzaprine 5 milliGRAM(s) Oral three times a day PRN Muscle Spasm  oxyCODONE    IR 5 milliGRAM(s) Oral every 4 hours PRN Moderate Pain (4 - 6)  oxyCODONE    IR 10 milliGRAM(s) Oral every 6 hours PRN Severe Pain (7 - 10)

## 2022-09-15 NOTE — DISCHARGE NOTE PROVIDER - HOSPITAL COURSE
52F with multiple medical issues, morbid obesity, here with 1 day hx of acute onset abdominal pain, associated with umbilical hernia. Patient has had hernia for more than 10 year, has seen multiple surgeons outpatient but currently with no plans for repair. Pain started acutely overnight associated with multiple episodes of diarrhea, last gas and BM last night. No fevers, chills, night sweats.  9/12: CT abd/pelvis: Bowel containing ventral hernia, containing a portion of the transverse colon and increasing small amount of free fluid. Small bowel within the hernia sac is no longer present and distention of the colon proximal to the hernia has improved.  While oral contrast is seen only to the level of the hernia sac, there is no definitive evidence of bowel obstruction. Follow-up abdominal x-ray may be obtained to assess for progression of oral contrast.  Hernia was successfully reduced at bedside.  Redemonstrated prominent right ovary, which may be further evaluated with pelvic ultrasound.  Patient admitted to surgical service. Medicine and pulmonology consulted for pre-op optimization.  Behavioral Health consulted for suicidal ideations and patient placed on constant observation with behavioral health following patient.  9/14 Patient taken to the OR s/p incarcerated ventral hernia repair. Post operatively patient hemodynamically stable and transferred to the floor. Pain well controlled and patient tolerating a regular diet.  Patient stable for follow up as an outpatient

## 2022-09-15 NOTE — BH CONSULTATION LIAISON PROGRESS NOTE - MSE UNSTRUCTURED FT
Mental Status Exam:  Anamaria: well groomed, fair hygiene     Behavior: calm, cooperative, no psychomotor retardation/agitation  Motor: no tremors, EPS, or rigidity  Gait: did not assess, pt in bed  Speech: normal rate, rhythm, prosody and volume   Mood: "depressed"  Affect: dysthymic, restricted range  Thought process: clear, goal directed   Thought Content: denies paranoia, delusions   Perception: denies AH/VH  SI: no active SI, has passive SI for many years   HI: denies  Insight: limited  Judgment: limited    Cognitive Exam:  Orientation: AOx3  Recall: intact  Attention: intact  Abstraction: intact  
Mental Status Exam:  Anamaria: well groomed, fair hygiene     Behavior: calm, cooperative, no psychomotor retardation/agitation  Motor: no tremors, EPS, or rigidity  Gait: did not assess, pt in bed  Speech: normal rate, rhythm, prosody and volume   Mood: "good"  Affect: euthymic, congruent, full range  Thought process: clear, goal directed   Thought Content: denies paranoia, delusions, talks about enjoying life, feeling less pain  Perception: denies AH/VH  SI: denies  HI: denies  Insight: fair  Judgment: fair    Cognitive Exam:  Orientation: AOx3  Recall: intact  Attention: intact  Abstraction: intact

## 2022-09-15 NOTE — BH CONSULTATION LIAISON PROGRESS NOTE - NSBHCONSULTFOLLOWAFTERCARE_PSY_A_CORE FT
[facility]  [phone] [place] [id number] [phone]    Her own outpatient psychiatry team

## 2022-09-15 NOTE — DISCHARGE NOTE PROVIDER - NSDCMRMEDTOKEN_GEN_ALL_CORE_FT
budesonide-formoterol 160 mcg-4.5 mcg/inh inhalation aerosol: 2 puff(s) inhaled 2 times a day  glimepiride 2 mg oral tablet: 1 tab(s) orally once a day  lisinopril 20 mg oral tablet: 1 tab(s) orally once a day  metFORMIN 1000 mg oral tablet: 1 tab(s) orally 2 times a day  simvastatin 20 mg oral tablet: 1 tab(s) orally once a day (at bedtime)  torsemide 10 mg oral tablet: 1 tab(s) orally once a day  Trulicity Pen 0.75 mg/0.5 mL subcutaneous solution: 0.75 milligram(s) subcutaneously once a week   Ventolin HFA 90 mcg/inh inhalation aerosol: 2 puff(s) inhaled every 6 hours, As Needed -for shortness of breath and/or wheezing    acetaminophen 500 mg oral tablet: 2 tab(s) orally every 6 hours  budesonide-formoterol 160 mcg-4.5 mcg/inh inhalation aerosol: 2 puff(s) inhaled 2 times a day  glimepiride 2 mg oral tablet: 1 tab(s) orally once a day  ibuprofen 600 mg oral tablet: 1 tab(s) orally every 6 hours  lisinopril 20 mg oral tablet: 1 tab(s) orally once a day  metFORMIN 1000 mg oral tablet: 1 tab(s) orally 2 times a day  oxyCODONE 5 mg oral tablet: 1 tab(s) orally every 6 hours, As Needed MDD:4 tabs  simvastatin 20 mg oral tablet: 1 tab(s) orally once a day (at bedtime)  torsemide 10 mg oral tablet: 1 tab(s) orally once a day  Trulicity Pen 0.75 mg/0.5 mL subcutaneous solution: 0.75 milligram(s) subcutaneously once a week   Ventolin HFA 90 mcg/inh inhalation aerosol: 2 puff(s) inhaled every 6 hours, As Needed -for shortness of breath and/or wheezing
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
## 2022-09-15 NOTE — PROGRESS NOTE ADULT - ASSESSMENT
52y female with pmhx HTN, DM, Asthma, MENA on CPAP with umbilical hernia containing large and small bowel. Some mild proximal bowel dilation. Hernia was successfully reduced at bedside, patient reported mild improvement in symptoms s/p 9/14 open ventral hernia repair w/ mesh.    Plan:  -Pain control PRN  -Regular diet  -Continue home meds  -Medicine following, appreciate recommendations.   -Continue ISS, A1c 8.5%  -PT eval     Please page with any questions or concerns   #[id number] 52y female with pmhx HTN, DM, Asthma, MENA on CPAP with umbilical hernia containing large and small bowel. Some mild proximal bowel dilation. Hernia was successfully reduced at bedside, patient reported mild improvement in symptoms s/p 9/14 open ventral hernia repair w/ mesh.    Plan:  -Pain control PRN  -Regular diet  -Continue home meds  -Medicine following, appreciate recommendations.  -f/u d/c recs from endocrine  - f/u [facility] for OP f/u on discharge   -Continue ISS, A1c 8.5%  -PT eval   - d/c home this afternoon    Please page with any questions or concerns   #[id number]

## 2022-09-15 NOTE — DISCHARGE NOTE PROVIDER - CARE PROVIDER_API CALL
Jin Gonzalez)  Surgery; Surgical Critical Care  [address] [place] [address]  Phone: [phone]  Fax: [phone]  Follow Up Time: 1 week

## 2022-09-15 NOTE — DISCHARGE NOTE NURSING/CASE MANAGEMENT/SOCIAL WORK - NSDCFUADDAPPT_GEN_ALL_CORE_FT
Follow up with your surgeon, Dr. Gonzalez. Call for an appointment.  Please follow up with your primary care physician regarding your hospitalization, your blood sugar levels were elevated while you were admitted. Continue home regiment and repeat your Hemoglobin A1C every 3 months.  Do not drive while taking pain medications
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
## 2022-09-15 NOTE — DISCHARGE NOTE NURSING/CASE MANAGEMENT/SOCIAL WORK - NSTRANSFERBELONGINGSRESP_GEN_A_NUR
History  Chief Complaint   Patient presents with    Cough     Pt presents to ED from home w/ cough and fatigue for two days  A this is a 25years old came for having body ache and cough  Patient stated that she has symptoms for 2 days and she wants to be tested for COVID-19  Patient did not take the COVID vaccine  Patient has history of anemia  Patient in no distress  Patient denies chest pain, abdominal pain, nausea vomiting diarrhea  Patient stated that she has fever yesterday but today no fever  Prior to Admission Medications   Prescriptions Last Dose Informant Patient Reported? Taking?   bisacodyl (DULCOLAX) 5 mg EC tablet   No No   Sig: Take 1 tablet (5 mg total) by mouth 1 (one) time for 1 dose   ondansetron (ZOFRAN) 4 mg tablet   No No   Sig: Take 1 tablet (4 mg total) by mouth every 6 (six) hours   Patient not taking: Reported on 3/1/2022    senna-docusate sodium (SENOKOT S) 8 6-50 mg per tablet   No No   Sig: Take 1 tablet by mouth daily   Patient not taking: Reported on 3/1/2022       Facility-Administered Medications: None       Past Medical History:   Diagnosis Date    Anemia        History reviewed  No pertinent surgical history  Family History   Problem Relation Age of Onset    No Known Problems Mother     No Known Problems Father     Seizures Sister     No Known Problems Brother     Diabetes Maternal Grandmother     No Known Problems Paternal Grandmother     No Known Problems Paternal Grandfather     Breast cancer Neg Hx     Colon cancer Neg Hx     Ovarian cancer Neg Hx      I have reviewed and agree with the history as documented      E-Cigarette/Vaping    E-Cigarette Use Current Every Day User     Cartridges/Day 1      E-Cigarette/Vaping Substances    Nicotine Yes     Flavoring Yes      Social History     Tobacco Use    Smoking status: Former Smoker     Types: Cigars    Smokeless tobacco: Never Used   Vaping Use    Vaping Use: Every day    Substances: Nicotine, Flavoring   Substance Use Topics    Alcohol use: Not Currently     Comment: socailly     Drug use: Not Currently       Review of Systems   Constitutional: Positive for fatigue and fever  HENT: Negative for rhinorrhea, sinus pressure, sinus pain, sneezing, sore throat and trouble swallowing  Respiratory: Positive for cough  Negative for shortness of breath  Cardiovascular: Negative for chest pain and palpitations  Gastrointestinal: Negative for abdominal pain, diarrhea, nausea and vomiting  Genitourinary: Negative for decreased urine volume, difficulty urinating, dysuria, flank pain, frequency and pelvic pain  Musculoskeletal: Negative for back pain, myalgias, neck pain and neck stiffness  Skin: Negative for color change, pallor and rash  Neurological: Positive for weakness  Negative for dizziness, light-headedness and headaches  Psychiatric/Behavioral: Negative for agitation, behavioral problems and confusion  Physical Exam  Physical Exam  Constitutional:       Appearance: She is well-developed  HENT:      Head: Normocephalic and atraumatic  Right Ear: Ear canal normal       Left Ear: Ear canal normal       Nose: Nose normal  No congestion or rhinorrhea  Mouth/Throat:      Mouth: Mucous membranes are moist       Pharynx: No oropharyngeal exudate or posterior oropharyngeal erythema  Eyes:      Extraocular Movements: Extraocular movements intact  Pupils: Pupils are equal, round, and reactive to light  Neck:      Vascular: No carotid bruit  Cardiovascular:      Rate and Rhythm: Normal rate and regular rhythm  Heart sounds: Normal heart sounds  No murmur heard  No friction rub  No gallop  Pulmonary:      Effort: Pulmonary effort is normal  No respiratory distress  Breath sounds: Normal breath sounds  No wheezing, rhonchi or rales  Abdominal:      General: Bowel sounds are normal  There is no distension  Palpations: Abdomen is soft   There is no mass  Tenderness: There is no abdominal tenderness  There is no right CVA tenderness, left CVA tenderness, guarding or rebound  Hernia: No hernia is present  Musculoskeletal:         General: No swelling, tenderness, deformity or signs of injury  Normal range of motion  Cervical back: Normal range of motion and neck supple  No rigidity or tenderness  Right lower leg: No edema  Left lower leg: No edema  Lymphadenopathy:      Cervical: No cervical adenopathy  Skin:     General: Skin is warm and dry  Coloration: Skin is not jaundiced or pale  Findings: No bruising, erythema, lesion or rash  Neurological:      General: No focal deficit present  Mental Status: She is alert and oriented to person, place, and time  Psychiatric:         Behavior: Behavior normal          Vital Signs  ED Triage Vitals [05/27/22 1538]   Temperature Pulse Respirations Blood Pressure SpO2   98 1 °F (36 7 °C) 85 16 125/82 100 %      Temp Source Heart Rate Source Patient Position - Orthostatic VS BP Location FiO2 (%)   Oral -- -- -- --      Pain Score       --           Vitals:    05/27/22 1538   BP: 125/82   Pulse: 85         Visual Acuity      ED Medications  Medications - No data to display    Diagnostic Studies  Results Reviewed     Procedure Component Value Units Date/Time    COVID/FLU/RSV [[id number]]  (Abnormal) Collected: 05/27/22 1555    Lab Status: Final result Specimen: Nares from Nose Updated: 05/27/22 1638     SARS-CoV-2 Negative     INFLUENZA A PCR Positive     INFLUENZA B PCR Negative     RSV PCR Negative    Narrative:      FOR PEDIATRIC PATIENTS - copy/paste COVID Guidelines URL to browser: [email or web address]  [employer]    SARS-CoV-2 assay is a Nucleic Acid Amplification assay intended for the  qualitative detection of nucleic acid from SARS-CoV-2 in nasopharyngeal  swabs   Results are for the presumptive identification of SARS-CoV-2 RNA     Positive results are indicative of infection with SARS-CoV-2, the virus  causing COVID-19, but do not rule out bacterial infection or co-infection  with other viruses  Laboratories within the [place] and its  territories are required to report all positive results to the appropriate  public health authorities  Negative results do not preclude SARS-CoV-2  infection and should not be used as the sole basis for treatment or other  patient management decisions  Negative results must be combined with  clinical observations, patient history, and epidemiological information  This test has not been FDA cleared or approved  This test has been authorized by FDA under an Emergency Use Authorization  (EUA)  This test is only authorized for the duration of time the  declaration that circumstances exist justifying the authorization of the  emergency use of an in vitro diagnostic tests for detection of SARS-CoV-2  virus and/or diagnosis of COVID-19 infection under section 564(b)(1) of  the Act, 21 U  S C  292UDS-2(Z)(5), unless the authorization is terminated  or revoked sooner  The test has been validated but independent review by FDA  and CLIA is pending  Test performed using [employer]: This RT-PCR assay targets N2,  a region unique to SARS-CoV-2  A conserved region in the E-gene was chosen  for pan-Sarbecovirus detection which includes SARS-CoV-2  No orders to display              Procedures  Procedures         ED Course                                             MDM  Number of Diagnoses or Management Options  Diagnosis management comments: This is a 25years old having a multiple symptoms including low-grade fever at home and cough body ache  Patient came to be tested for COVID-19  Patient did not take the [place] vaccination  Physical exam came back negative  Patient advised to go home after we took the COVID swab and follow up the results and my chart [address] [employer]          Amount yes and/or Complexity of Data Reviewed  Clinical lab tests: ordered and reviewed    Risk of Complications, Morbidity, and/or Mortality  Presenting problems: moderate  Diagnostic procedures: moderate  Management options: low        Disposition  Final diagnoses:   Encounter for laboratory testing for COVID-19 virus     Time reflects when diagnosis was documented in both MDM as applicable and the Disposition within this note     Time User Action Codes Description Comment    5/27/2022  4:11 PM Pennelcarl Degree Add [Y14 [id number]] Encounter for laboratory testing for COVID-19 virus       ED Disposition     ED Disposition   Discharge    Condition   Stable    Date/Time   Fri May 27, 2022  4:10 PM    Comment   Kwesi Vitale discharge to home/self care  Follow-up Information     Follow up With Specialties Details Why Contact Info Additional Information    [facility] [address] In 1 week  [address]  [address] [place] [id number] [phone] DY KKWV'C [address] [facility], [address] [facility], [address], [address], [phone]          Discharge Medication List as of 5/27/2022  4:17 PM      CONTINUE these medications which have NOT CHANGED    Details   bisacodyl (DULCOLAX) 5 mg EC tablet Take 1 tablet (5 mg total) by mouth 1 (one) time for 1 dose, Starting Sat 2/12/2022, Normal      ondansetron (ZOFRAN) 4 mg tablet Take 1 tablet (4 mg total) by mouth every 6 (six) hours, Starting Tue 7/14/2020, Normal      senna-docusate sodium (SENOKOT S) 8 6-50 mg per tablet Take 1 tablet by mouth daily, Starting Sat 2/12/2022, Normal             No discharge procedures on file      PDMP Review     None          ED Provider  Electronically Signed by           Chava Freeman MD  05/27/22 5740 no

## 2022-09-15 NOTE — PROGRESS NOTE ADULT - ATTENDING COMMENTS
Incarcerated VH  a,  For VHR today  b.  Keep NPO, IVF  c.  May get pre-operative DVT and SSI prophylaxis    Ovarian mass, renal lesion  a.  Obtain non-urgent gyn, urology consult.  b,  Discuss with patient
Ventral hernia  a. Repeat CT reviewed  b.  Follow abdominal xray  c.  Keep NPO  d.  May give tap H20 enemas  e.  Plan for VHR in am  f.  Risk stratification per medicine
The patient was seen and examined, chart and notes reviewed.  The current diagnosis, plan of care and alternatives have been discussed with the patient.  All questions have been answered and updates have been discussed.  The case was discussed with B team residents/PA's and medical students at morning B surgical rounds and throughout the course of the day.    S/P VHR with mesh  a.  Feels better  b.  Diet as tolerated  c. DM MGT PER pmd/outpt  d.  Appreciate psychiatry  input  e.  Discharge planning
Ventral hernia  a.  With abdominal pain, mild tenderness in right lower quadrant  b.  Denies flatus/bowel movements  c.  Keep NPO, continue IVF  d.  CT reviewed,  will obtain CT with oral contrast  e.  Consider ventral hernia repair this admission.  We will discuss risk and benefits and alternatives with patient once repeat CT completed  f.  May receive pain medications prn

## 2022-09-21 LAB — SURGICAL PATHOLOGY STUDY: SIGNIFICANT CHANGE UP

## 2022-09-23 NOTE — ED ADULT NURSE NOTE - HEART RATE (BEATS/MIN)
09/23/22 0730   Vitals   BP (!) 103/36   Temp 97.8 °F (36.6 °C)   Heart Rate 76   Resp 16   Weight 143 lb 4.8 oz (65 kg)   Peritoneal Dialysis Catheter Right lower abdomen   No Placement Date or Time found. Catheter Location: Right lower abdomen   Status Deaccessed   Site Condition Clean, dry, intact   Dressing Status Clean, dry & intact   Dressing Gauze   Date of Last Dressing Change 09/22/22   Dialysis Type Continuous cycling   Peritoneal Dialysis (CAPD manual)   Effluent Appearance Clear;Light;Red   Cycler   Ultrafiltration (UF) (mL) 687 mL   De accessed from [facility] using strict aseptic technique, 687 clear lt red effluent removed, tolerated well, drscari CDI, unable to allow for final fill, STAT drained and left empty, Dr Dinh Torres aware w Jane Clifton.  Stable at PR, call ligt margaux jaquez [phone]

## 2022-10-04 ENCOUNTER — INPATIENT (INPATIENT)
Facility: HOSPITAL | Age: 52
LOS: 3 days | Discharge: HOME | End: 2022-10-08
Attending: SURGERY | Admitting: SURGERY

## 2022-10-04 VITALS
OXYGEN SATURATION: 100 % | DIASTOLIC BLOOD PRESSURE: 87 MMHG | HEART RATE: 103 BPM | SYSTOLIC BLOOD PRESSURE: 119 MMHG | HEIGHT: 62 IN | TEMPERATURE: 100 F | RESPIRATION RATE: 17 BRPM

## 2022-10-04 DIAGNOSIS — Z98.890 OTHER SPECIFIED POSTPROCEDURAL STATES: Chronic | ICD-10-CM

## 2022-10-04 LAB
APPEARANCE UR: CLEAR — SIGNIFICANT CHANGE UP
APTT BLD: 30.9 SEC — SIGNIFICANT CHANGE UP (ref 27–36.3)
BASE EXCESS BLDV CALC-SCNC: 7.2 MMOL/L — HIGH (ref -2–3)
BASOPHILS # BLD AUTO: 0.05 K/UL — SIGNIFICANT CHANGE UP (ref 0–0.2)
BASOPHILS NFR BLD AUTO: 0.5 % — SIGNIFICANT CHANGE UP (ref 0–2)
BILIRUB UR-MCNC: NEGATIVE — SIGNIFICANT CHANGE UP
BLOOD GAS VENOUS COMPREHENSIVE RESULT: SIGNIFICANT CHANGE UP
CHLORIDE BLDV-SCNC: 99 MMOL/L — SIGNIFICANT CHANGE UP (ref 96–108)
CO2 BLDV-SCNC: 34.6 MMOL/L — HIGH (ref 22–26)
COLOR SPEC: YELLOW — SIGNIFICANT CHANGE UP
DIFF PNL FLD: NEGATIVE — SIGNIFICANT CHANGE UP
EOSINOPHIL # BLD AUTO: 0.1 K/UL — SIGNIFICANT CHANGE UP (ref 0–0.5)
EOSINOPHIL NFR BLD AUTO: 1 % — SIGNIFICANT CHANGE UP (ref 0–6)
GAS PNL BLDV: 135 MMOL/L — LOW (ref 136–145)
GAS PNL BLDV: SIGNIFICANT CHANGE UP
GLUCOSE BLDV-MCNC: 254 MG/DL — HIGH (ref 70–99)
GLUCOSE UR QL: NEGATIVE — SIGNIFICANT CHANGE UP
HCO3 BLDV-SCNC: 33 MMOL/L — HIGH (ref 22–29)
HCT VFR BLD CALC: 31.4 % — LOW (ref 34.5–45)
HCT VFR BLDA CALC: 31 % — LOW (ref 34.5–46.5)
HGB BLD CALC-MCNC: 10.3 G/DL — LOW (ref 11.5–15.5)
HGB BLD-MCNC: 10.1 G/DL — LOW (ref 11.5–15.5)
IANC: 5.77 K/UL — SIGNIFICANT CHANGE UP (ref 1.8–7.4)
IMM GRANULOCYTES NFR BLD AUTO: 0.2 % — SIGNIFICANT CHANGE UP (ref 0–0.9)
INR BLD: 1.22 RATIO — HIGH (ref 0.88–1.16)
KETONES UR-MCNC: NEGATIVE — SIGNIFICANT CHANGE UP
LACTATE BLDV-MCNC: 1.6 MMOL/L — SIGNIFICANT CHANGE UP (ref 0.5–2)
LEUKOCYTE ESTERASE UR-ACNC: NEGATIVE — SIGNIFICANT CHANGE UP
LYMPHOCYTES # BLD AUTO: 3.1 K/UL — SIGNIFICANT CHANGE UP (ref 1–3.3)
LYMPHOCYTES # BLD AUTO: 31.3 % — SIGNIFICANT CHANGE UP (ref 13–44)
MCHC RBC-ENTMCNC: 25.1 PG — LOW (ref 27–34)
MCHC RBC-ENTMCNC: 32.2 GM/DL — SIGNIFICANT CHANGE UP (ref 32–36)
MCV RBC AUTO: 77.9 FL — LOW (ref 80–100)
MONOCYTES # BLD AUTO: 0.85 K/UL — SIGNIFICANT CHANGE UP (ref 0–0.9)
MONOCYTES NFR BLD AUTO: 8.6 % — SIGNIFICANT CHANGE UP (ref 2–14)
NEUTROPHILS # BLD AUTO: 5.77 K/UL — SIGNIFICANT CHANGE UP (ref 1.8–7.4)
NEUTROPHILS NFR BLD AUTO: 58.4 % — SIGNIFICANT CHANGE UP (ref 43–77)
NITRITE UR-MCNC: NEGATIVE — SIGNIFICANT CHANGE UP
NRBC # BLD: 0 /100 WBCS — SIGNIFICANT CHANGE UP (ref 0–0)
NRBC # FLD: 0 K/UL — SIGNIFICANT CHANGE UP (ref 0–0)
PCO2 BLDV: 52 MMHG — HIGH (ref 39–42)
PH BLDV: 7.41 — SIGNIFICANT CHANGE UP (ref 7.32–7.43)
PH UR: 6 — SIGNIFICANT CHANGE UP (ref 5–8)
PLATELET # BLD AUTO: 279 K/UL — SIGNIFICANT CHANGE UP (ref 150–400)
PO2 BLDV: 29 MMHG — SIGNIFICANT CHANGE UP
POTASSIUM BLDV-SCNC: 4.1 MMOL/L — SIGNIFICANT CHANGE UP (ref 3.5–5.1)
PROT UR-MCNC: ABNORMAL
PROTHROM AB SERPL-ACNC: 14.2 SEC — HIGH (ref 10.5–13.4)
RBC # BLD: 4.03 M/UL — SIGNIFICANT CHANGE UP (ref 3.8–5.2)
RBC # FLD: 14.6 % — HIGH (ref 10.3–14.5)
SAO2 % BLDV: 41.7 % — SIGNIFICANT CHANGE UP
SP GR SPEC: 1.02 — SIGNIFICANT CHANGE UP (ref 1.01–1.05)
UROBILINOGEN FLD QL: SIGNIFICANT CHANGE UP
WBC # BLD: 9.89 K/UL — SIGNIFICANT CHANGE UP (ref 3.8–10.5)
WBC # FLD AUTO: 9.89 K/UL — SIGNIFICANT CHANGE UP (ref 3.8–10.5)

## 2022-10-04 PROCEDURE — 74177 CT ABD & PELVIS W/CONTRAST: CPT | Mod: 26,MA

## 2022-10-04 PROCEDURE — 99285 EMERGENCY DEPT VISIT HI MDM: CPT

## 2022-10-04 PROCEDURE — 71045 X-RAY EXAM CHEST 1 VIEW: CPT | Mod: 26

## 2022-10-04 RX ORDER — SODIUM CHLORIDE 9 MG/ML
1000 INJECTION INTRAMUSCULAR; INTRAVENOUS; SUBCUTANEOUS ONCE
Refills: 0 | Status: COMPLETED | OUTPATIENT
Start: 2022-10-04 | End: 2022-10-04

## 2022-10-04 RX ORDER — VANCOMYCIN HCL 1 G
1000 VIAL (EA) INTRAVENOUS ONCE
Refills: 0 | Status: COMPLETED | OUTPATIENT
Start: 2022-10-04 | End: 2022-10-04

## 2022-10-04 RX ORDER — PIPERACILLIN AND TAZOBACTAM 4; .5 G/20ML; G/20ML
3.38 INJECTION, POWDER, LYOPHILIZED, FOR SOLUTION INTRAVENOUS ONCE
Refills: 0 | Status: COMPLETED | OUTPATIENT
Start: 2022-10-04 | End: 2022-10-04

## 2022-10-04 NOTE — ED PROVIDER NOTE - ATTENDING CONTRIBUTION TO CARE
Dr. Jack:  I have personally performed a face to face bedside history and physical examination of this patient. I have discussed the history, examination, review of systems, assessment and plan of management with the resident. I have reviewed the electronic medical record and amended it to reflect my history, review of systems, physical exam, assessment and plan.    52F h/o DM, MENA, morbid obesity, s/p recent ventral hernia repair with JOCELYN drains, presents with fever x 5 days at home and foul smelling drainage from incision/drain sites.  Tm 105 at home as per patient, states she's been taking Tylenol around the clock.  C/o "burning" sensation to JOCELYN drain sites, ROS otherwise negative.    Exam:  - nad  - mildly tachy  - ctab  - abd soft, +dehiscence on incision, +mild induration by JOCELYN drain sites, non-tender abdomen    A/P  - fever post op, eval infection  - basic labs, UA, CXR, CT abd/pelvis, blood cultures  = appreciate surgery recs

## 2022-10-04 NOTE — ED ADULT NURSE NOTE - OBJECTIVE STATEMENT
Pt received to  1 AxOx4 C/O infected surgical site. Pt underwent hernial repair on 9/14/22. Since Thursday patient has noticed foul smelling drainage and fevers, and pain at drain sites. Denies N/V/D, chest pain, SOB. Surgical incision noted below umbilical, superficially open with slough drainage. Bilateral JOCELYN drains with no drainage in collection chamber, sites covered by gauze. Green puss visualized at drain sites. Sites are foul smelling. Abdominal sounds noted in all four quadrants. BM are regular, denies blood in stool. Pt states she has not taken shower since surgery, has been using wash cloths. Pt taking tylenol at home every 2 hours because of the pain and fevers (100.1 at home). Pt NSR on cardiac monitor, blood pressure stable. febrile rectally 101.9. Labs drawn and sent. Awaiting CT.

## 2022-10-04 NOTE — ED ADULT NURSE NOTE - TEMPERATURE IN CELSIUS (DEGREES C)
[FreeTextEntry1] : 64 M with DM, HTN, HLD, CKD, small cell lung cancer, metastatic to bone, and brain, recent hospitalization s/p L TMA revision for osteo, with clean bone showing MRSA and OM\par Pt s/p Vanco, 6 weeks\par Present for further follow up\par Still with small wound at site--s/p adequate duration antibiotic\par Lack of healing implies OM may be present, but would wait to see if wound heals further\par Anticipate any further treatment would need debridement/amputation (less likely further abx would  "cure" infection)\par ESR, CRP, CBC generally reassuring (slightly elevated ESR)\par Overall, OM, wound infection, MRSA\par - Continue off abx\par - Follow up with podiatry\par - Patient to call me PRN with further questions or concerns\par - RTC PRN 38.8

## 2022-10-04 NOTE — ED PROVIDER NOTE - OBJECTIVE STATEMENT
51YO F hx HTN, DM, p/w fevers, drainage from JOCELYN drains. Pt s/p 51YO F hx HTN, DM, p/w fevers, purulent drainage from skin around JOCELYN drains. Pt s/p hernia repair on 9/14 with dr. becker. pt denies abdominal pain, constipation, diarrhea. endorses fevers x 7d, multiple days of purulent drainage from skin. denies any drainage in JOCELYN drains. endorses chronic cough s/t asthma, denies sob, cp, dysuria.

## 2022-10-04 NOTE — ED ADULT TRIAGE NOTE - CHIEF COMPLAINT QUOTE
Pt. states she had a hernia repair on 9/14. c/o fevers, chills, abdominal pain x1 week . arrives with bilateral JOCELYN drains. endorses foul smelling drainage. Denies SOB, nausea, vomiting, chest pain. PHx HTN, DM II.

## 2022-10-04 NOTE — ED PROVIDER NOTE - PHYSICAL EXAMINATION
Gen: WDWN, NAD  HEENT: EOMI, no nasal discharge, mucous membranes moist  CV: RRR,  2+ radial pulses R radial  Resp:  no accessory muscle use, no increased work of breathing  GI: Abdomen soft non-distended, NTTP, + b/l lower abdomen JOCELYN drains in place without purulent fluid.   Derm: + skin induration surrounding JOCELYN drains b/l lower quadrants, no skin erythema, + purulence at base of JOCELYN drain  MSK: no bruising, no LE edema  Neuro: A&Ox4, following commands, moving all four extremities spontaneously  Psych: appropriate mood

## 2022-10-04 NOTE — ED PROVIDER NOTE - CLINICAL SUMMARY MEDICAL DECISION MAKING FREE TEXT BOX
Lizbet Juan MD, PGY-3: 51YO F hx HTN, DM, p/w fevers, purulent drainage from skin around JOCELYN drains. vs: borderline febrile, stable vs. consider cellulitis vs. intraabdominal abscess. plan for basic labs, blood cultures, fluids, broad spectrum antibiotics, ct a/p, surgery cs.

## 2022-10-05 DIAGNOSIS — E11.9 TYPE 2 DIABETES MELLITUS WITHOUT COMPLICATIONS: ICD-10-CM

## 2022-10-05 DIAGNOSIS — Z98.890 OTHER SPECIFIED POSTPROCEDURAL STATES: Chronic | ICD-10-CM

## 2022-10-05 DIAGNOSIS — T81.9XXA UNSPECIFIED COMPLICATION OF PROCEDURE, INITIAL ENCOUNTER: ICD-10-CM

## 2022-10-05 DIAGNOSIS — E66.01 MORBID (SEVERE) OBESITY DUE TO EXCESS CALORIES: ICD-10-CM

## 2022-10-05 LAB
ALBUMIN SERPL ELPH-MCNC: 4.5 G/DL — SIGNIFICANT CHANGE UP (ref 3.3–5)
ALP SERPL-CCNC: 68 U/L — SIGNIFICANT CHANGE UP (ref 40–120)
ALT FLD-CCNC: 14 U/L — SIGNIFICANT CHANGE UP (ref 4–33)
ANION GAP SERPL CALC-SCNC: 13 MMOL/L — SIGNIFICANT CHANGE UP (ref 7–14)
APAP SERPL-MCNC: <10 UG/ML — LOW (ref 15–25)
AST SERPL-CCNC: 16 U/L — SIGNIFICANT CHANGE UP (ref 4–32)
BILIRUB SERPL-MCNC: 0.4 MG/DL — SIGNIFICANT CHANGE UP (ref 0.2–1.2)
BUN SERPL-MCNC: 12 MG/DL — SIGNIFICANT CHANGE UP (ref 7–23)
CALCIUM SERPL-MCNC: 9.5 MG/DL — SIGNIFICANT CHANGE UP (ref 8.4–10.5)
CHLORIDE SERPL-SCNC: 96 MMOL/L — LOW (ref 98–107)
CO2 SERPL-SCNC: 28 MMOL/L — SIGNIFICANT CHANGE UP (ref 22–31)
CREAT SERPL-MCNC: 0.55 MG/DL — SIGNIFICANT CHANGE UP (ref 0.5–1.3)
EGFR: 111 ML/MIN/1.73M2 — SIGNIFICANT CHANGE UP
GLUCOSE BLDC GLUCOMTR-MCNC: 211 MG/DL — HIGH (ref 70–99)
GLUCOSE BLDC GLUCOMTR-MCNC: 266 MG/DL — HIGH (ref 70–99)
GLUCOSE BLDC GLUCOMTR-MCNC: 272 MG/DL — HIGH (ref 70–99)
GLUCOSE BLDC GLUCOMTR-MCNC: 306 MG/DL — HIGH (ref 70–99)
GLUCOSE SERPL-MCNC: 246 MG/DL — HIGH (ref 70–99)
HCG SERPL-ACNC: <5 MIU/ML — SIGNIFICANT CHANGE UP
POTASSIUM SERPL-MCNC: 4 MMOL/L — SIGNIFICANT CHANGE UP (ref 3.5–5.3)
POTASSIUM SERPL-SCNC: 4 MMOL/L — SIGNIFICANT CHANGE UP (ref 3.5–5.3)
PROT SERPL-MCNC: 7.2 G/DL — SIGNIFICANT CHANGE UP (ref 6–8.3)
SARS-COV-2 RNA SPEC QL NAA+PROBE: SIGNIFICANT CHANGE UP
SODIUM SERPL-SCNC: 137 MMOL/L — SIGNIFICANT CHANGE UP (ref 135–145)

## 2022-10-05 PROCEDURE — 99223 1ST HOSP IP/OBS HIGH 75: CPT

## 2022-10-05 RX ORDER — MORPHINE SULFATE 50 MG/1
4 CAPSULE, EXTENDED RELEASE ORAL ONCE
Refills: 0 | Status: DISCONTINUED | OUTPATIENT
Start: 2022-10-05 | End: 2022-10-05

## 2022-10-05 RX ORDER — INFLUENZA VIRUS VACCINE 15; 15; 15; 15 UG/.5ML; UG/.5ML; UG/.5ML; UG/.5ML
0.5 SUSPENSION INTRAMUSCULAR ONCE
Refills: 0 | Status: COMPLETED | OUTPATIENT
Start: 2022-10-05 | End: 2022-10-05

## 2022-10-05 RX ORDER — SIMVASTATIN 20 MG/1
20 TABLET, FILM COATED ORAL AT BEDTIME
Refills: 0 | Status: DISCONTINUED | OUTPATIENT
Start: 2022-10-05 | End: 2022-10-08

## 2022-10-05 RX ORDER — SODIUM CHLORIDE 9 MG/ML
1000 INJECTION, SOLUTION INTRAVENOUS
Refills: 0 | Status: DISCONTINUED | OUTPATIENT
Start: 2022-10-05 | End: 2022-10-07

## 2022-10-05 RX ORDER — INSULIN LISPRO 100/ML
VIAL (ML) SUBCUTANEOUS AT BEDTIME
Refills: 0 | Status: DISCONTINUED | OUTPATIENT
Start: 2022-10-05 | End: 2022-10-08

## 2022-10-05 RX ORDER — DEXTROSE 50 % IN WATER 50 %
25 SYRINGE (ML) INTRAVENOUS ONCE
Refills: 0 | Status: DISCONTINUED | OUTPATIENT
Start: 2022-10-05 | End: 2022-10-08

## 2022-10-05 RX ORDER — ACETAMINOPHEN 500 MG
650 TABLET ORAL EVERY 6 HOURS
Refills: 0 | Status: DISCONTINUED | OUTPATIENT
Start: 2022-10-05 | End: 2022-10-05

## 2022-10-05 RX ORDER — ENOXAPARIN SODIUM 100 MG/ML
40 INJECTION SUBCUTANEOUS EVERY 24 HOURS
Refills: 0 | Status: DISCONTINUED | OUTPATIENT
Start: 2022-10-05 | End: 2022-10-08

## 2022-10-05 RX ORDER — DEXTROSE 50 % IN WATER 50 %
12.5 SYRINGE (ML) INTRAVENOUS ONCE
Refills: 0 | Status: DISCONTINUED | OUTPATIENT
Start: 2022-10-05 | End: 2022-10-08

## 2022-10-05 RX ORDER — PIPERACILLIN AND TAZOBACTAM 4; .5 G/20ML; G/20ML
3.38 INJECTION, POWDER, LYOPHILIZED, FOR SOLUTION INTRAVENOUS EVERY 8 HOURS
Refills: 0 | Status: DISCONTINUED | OUTPATIENT
Start: 2022-10-05 | End: 2022-10-05

## 2022-10-05 RX ORDER — INSULIN LISPRO 100/ML
VIAL (ML) SUBCUTANEOUS
Refills: 0 | Status: DISCONTINUED | OUTPATIENT
Start: 2022-10-05 | End: 2022-10-08

## 2022-10-05 RX ORDER — INSULIN GLARGINE 100 [IU]/ML
20 INJECTION, SOLUTION SUBCUTANEOUS AT BEDTIME
Refills: 0 | Status: DISCONTINUED | OUTPATIENT
Start: 2022-10-05 | End: 2022-10-07

## 2022-10-05 RX ORDER — LISINOPRIL 2.5 MG/1
20 TABLET ORAL DAILY
Refills: 0 | Status: DISCONTINUED | OUTPATIENT
Start: 2022-10-05 | End: 2022-10-08

## 2022-10-05 RX ORDER — DEXTROSE 50 % IN WATER 50 %
15 SYRINGE (ML) INTRAVENOUS ONCE
Refills: 0 | Status: DISCONTINUED | OUTPATIENT
Start: 2022-10-05 | End: 2022-10-08

## 2022-10-05 RX ORDER — GLUCAGON INJECTION, SOLUTION 0.5 MG/.1ML
1 INJECTION, SOLUTION SUBCUTANEOUS ONCE
Refills: 0 | Status: DISCONTINUED | OUTPATIENT
Start: 2022-10-05 | End: 2022-10-08

## 2022-10-05 RX ORDER — PIPERACILLIN AND TAZOBACTAM 4; .5 G/20ML; G/20ML
3.38 INJECTION, POWDER, LYOPHILIZED, FOR SOLUTION INTRAVENOUS EVERY 8 HOURS
Refills: 0 | Status: DISCONTINUED | OUTPATIENT
Start: 2022-10-05 | End: 2022-10-08

## 2022-10-05 RX ORDER — ACETAMINOPHEN 500 MG
1000 TABLET ORAL EVERY 6 HOURS
Refills: 0 | Status: COMPLETED | OUTPATIENT
Start: 2022-10-05 | End: 2022-10-06

## 2022-10-05 RX ADMIN — Medication 2: at 09:00

## 2022-10-05 RX ADMIN — Medication 400 MILLIGRAM(S): at 22:59

## 2022-10-05 RX ADMIN — MORPHINE SULFATE 4 MILLIGRAM(S): 50 CAPSULE, EXTENDED RELEASE ORAL at 00:12

## 2022-10-05 RX ADMIN — PIPERACILLIN AND TAZOBACTAM 200 GRAM(S): 4; .5 INJECTION, POWDER, LYOPHILIZED, FOR SOLUTION INTRAVENOUS at 00:12

## 2022-10-05 RX ADMIN — Medication 250 MILLIGRAM(S): at 01:21

## 2022-10-05 RX ADMIN — Medication 3: at 17:11

## 2022-10-05 RX ADMIN — LISINOPRIL 20 MILLIGRAM(S): 2.5 TABLET ORAL at 06:15

## 2022-10-05 RX ADMIN — Medication 1: at 21:56

## 2022-10-05 RX ADMIN — PIPERACILLIN AND TAZOBACTAM 25 GRAM(S): 4; .5 INJECTION, POWDER, LYOPHILIZED, FOR SOLUTION INTRAVENOUS at 07:41

## 2022-10-05 RX ADMIN — Medication 10 MILLIGRAM(S): at 06:15

## 2022-10-05 RX ADMIN — INSULIN GLARGINE 20 UNIT(S): 100 INJECTION, SOLUTION SUBCUTANEOUS at 21:20

## 2022-10-05 RX ADMIN — Medication 400 MILLIGRAM(S): at 10:53

## 2022-10-05 RX ADMIN — SIMVASTATIN 20 MILLIGRAM(S): 20 TABLET, FILM COATED ORAL at 21:19

## 2022-10-05 RX ADMIN — PIPERACILLIN AND TAZOBACTAM 25 GRAM(S): 4; .5 INJECTION, POWDER, LYOPHILIZED, FOR SOLUTION INTRAVENOUS at 16:12

## 2022-10-05 RX ADMIN — ENOXAPARIN SODIUM 40 MILLIGRAM(S): 100 INJECTION SUBCUTANEOUS at 06:14

## 2022-10-05 RX ADMIN — Medication 650 MILLIGRAM(S): at 04:20

## 2022-10-05 RX ADMIN — Medication 4: at 13:24

## 2022-10-05 RX ADMIN — SODIUM CHLORIDE 1000 MILLILITER(S): 9 INJECTION INTRAMUSCULAR; INTRAVENOUS; SUBCUTANEOUS at 00:11

## 2022-10-05 RX ADMIN — Medication 400 MILLIGRAM(S): at 17:11

## 2022-10-05 NOTE — H&P ADULT - NSHPPHYSICALEXAM_GEN_ALL_CORE
No focal neurologic deficits  Sclera nonicteric  NAD, awake and alert  Respirations nonlabored  Abdomen soft, some tenderness around incision sites, incision w/o drainage w/ granulation tissue, some induration infraumbilically around incision site, drains with serous drainage, drain site w/ some thicker drainage  No guarding or rebound tenderness

## 2022-10-05 NOTE — H&P ADULT - ASSESSMENT
53YO F hx HTN, DM, and s/p ventral hernia repair w/ mesh (9/14), presents w/ fevers and drainage from her incision.    -Admit to Dr. Gonzalez  -IV abx - zosyn  -Regular diet  -home medications  -vte ppx    Discussed w/ Dr. Gonzalez  B surgery, [id number]

## 2022-10-05 NOTE — H&P ADULT - ATTENDING COMMENTS
Postoperative fever  a.  Unclear source of fever  b.  Await COVID results _ viral panel  c.  Will empirically start abx for ? cellulitis at this time  d. Continue DVT prophylaxis    DM, poorly controlled  a.  On ISS  b.  Follow up endo recommendations

## 2022-10-05 NOTE — CONSULT NOTE ADULT - SUBJECTIVE AND OBJECTIVE BOX
Reason For Consult:     HPI:  53YO F hx HTN, DM, and s/p ventral hernia repair w/ mesh (9/14), presents w/ fevers and drainage from her incision. She reports she has had fevers over the past 5 days. She also reports foul smelling drainage, however reports that it has been clear from her midline incision. She reports her drains have been putting out clear fluid. She is tolerating a diet and denies n/v. She reports she has had a cough and has noticed drainage when she coughs.     In the ER the pt was hemodynamically stable however was febrile to 101.9. A CT scan was performed which demonstrated no fluid collections.  (05 Oct 2022 02:00)     Endocrinology consulted for uncontrolled DM. Home medications Glimepiride 4mg daily, Januvia 100mg daily and Metformin 1000mg BID. Diagnosed with DM2 9 years ago. Pt denied to have neuropathy, retinopathy or any cardiac issues. She endorsed that she takes Carb consistent diet at home but denied doing exercises. Pt is non complaint, also her insurance changed recently, didn't have insurance last month and was unable to take Januvia due to no refills. She checks her blood sugars not very often. She doesn't follow any endocrinologist outpatient. She sees ophthalmologist twice annually. Next appointment in November. Last seen by podiatrist in 2019. Pt has needle phobia and alot of time was spent to educate her on diabetes risks, complications and control with medications. Discuss about the insulin given her elevated hgb A1C but pt was persistently refusing insulin or any sort of injections.     PAST MEDICAL & SURGICAL HISTORY:  HTN (hypertension)      DM (diabetes mellitus)      Asthma      S/P ovarian cystectomy      S/P repair of ventral hernia          FAMILY HISTORY:      Social History:    Outpatient Medications:    MEDICATIONS  (STANDING):  acetaminophen   IVPB .. 1000 milliGRAM(s) IV Intermittent every 6 hours  dextrose 5%. 1000 milliLiter(s) (50 mL/Hr) IV Continuous <Continuous>  dextrose 5%. 1000 milliLiter(s) (100 mL/Hr) IV Continuous <Continuous>  dextrose 50% Injectable 25 Gram(s) IV Push once  dextrose 50% Injectable 12.5 Gram(s) IV Push once  dextrose 50% Injectable 25 Gram(s) IV Push once  enoxaparin Injectable 40 milliGRAM(s) SubCutaneous every 24 hours  glucagon  Injectable 1 milliGRAM(s) IntraMuscular once  influenza   Vaccine 0.5 milliLiter(s) IntraMuscular once  insulin lispro (ADMELOG) corrective regimen sliding scale   SubCutaneous three times a day before meals  lisinopril 20 milliGRAM(s) Oral daily  piperacillin/tazobactam IVPB.. 3.375 Gram(s) IV Intermittent every 8 hours  simvastatin 20 milliGRAM(s) Oral at bedtime  torsemide 10 milliGRAM(s) Oral daily    MEDICATIONS  (PRN):  dextrose Oral Gel 15 Gram(s) Oral once PRN Blood Glucose LESS THAN 70 milliGRAM(s)/deciliter      Allergies    cefpodoxime (Rash)  estrogens (Hives; Pruritus)  peanuts (Short breath)    Intolerances      Review of Systems:  Constitutional: No fever  Eyes: No blurry vision  Neuro: No tremors  HEENT: No pain  Cardiovascular: No chest pain, palpitations  Respiratory: No SOB, no cough  GI: No nausea, vomiting, abdominal pain  : No dysuria  Skin: no rash  Psych: no depression  Endocrine: no polyuria, polydipsia  Hem/lymph: no swelling  Osteoporosis: no fractures    ALL OTHER SYSTEMS REVIEWED AND NEGATIVE    UNABLE TO OBTAIN    PHYSICAL EXAM:  VITALS: T(C): 36.6 (10-05-22 @ 13:45)  T(F): 97.9 (10-05-22 @ 13:45), Max: 101.9 (10-04-22 @ 23:11)  HR: 85 (10-05-22 @ 13:45) (81 - 103)  BP: 109/61 (10-05-22 @ 13:45) (108/75 - 126/76)  RR:  (16 - 20)  SpO2:  (94% - 100%)  Wt(kg): --  GENERAL: NAD, well-groomed, well-developed  EYES: No proptosis, no lid lag, anicteric  HEENT:  Atraumatic, Normocephalic, moist mucous membranes  THYROID: Normal size, no palpable nodules  RESPIRATORY: Clear to auscultation bilaterally; No rales, rhonchi, wheezing, or rubs  CARDIOVASCULAR: Regular rate and rhythm; No murmurs; no peripheral edema  GI: Soft, nontender, non distended, normal bowel sounds  SKIN: Dry, intact, No rashes or lesions  MUSCULOSKELETAL: Full range of motion, normal strength  NEURO: sensation intact, extraocular movements intact, no tremor, normal reflexes  PSYCH: Alert and oriented x 3, normal affect, normal mood  CUSHING'S SIGNS: no striae    POCT Blood Glucose.: 306 mg/dL (10-05-22 @ 12:26)  POCT Blood Glucose.: 211 mg/dL (10-05-22 @ 08:56)  POCT Blood Glucose.: 134 mg/dL (10-04-22 @ 20:37)                            10.1   9.89  )-----------( 279      ( 04 Oct 2022 22:50 )             31.4       10-04    137  |  96<L>  |  12  ----------------------------<  246<H>  4.0   |  28  |  0.55    eGFR: 111    Ca    9.5      10-04    TPro  7.2  /  Alb  4.5  /  TBili  0.4  /  DBili  x   /  AST  16  /  ALT  14  /  AlkPhos  68  10-04      Thyroid Function Tests:              Radiology:              Reason For Consult: DM     HPI:  53YO F hx HTN, DM, and s/p ventral hernia repair w/ mesh (9/14), presents w/ fevers and drainage from her incision. She reports she has had fevers over the past 5 days. She also reports foul smelling drainage, however reports that it has been clear from her midline incision. She reports her drains have been putting out clear fluid. She is tolerating a diet and denies n/v. She reports she has had a cough and has noticed drainage when she coughs.     In the ER the pt was hemodynamically stable however was febrile to 101.9. A CT scan was performed which demonstrated no fluid collections.  (05 Oct 2022 02:00)    Endocrine history:   DM dx around 4909-7075   family history: mother with DM, HTN, lupus; father with hernia, HTN, CAD   Home meds; Januvia 100 mg daily, met 1g bid, SGLT-2 (cannot recall name or dose)  SMBG: twice a day, BG ranges .  reports low bG 3x/month usually in the morning and during the day time, especially when she does not eat.  Has awareness   PCP: Dr. Tillman, he had referred her to Endocrinologist but she never saw them.   Diet: has regular soda, ginger ale and cranberry juice   complications: none     PAST MEDICAL & SURGICAL HISTORY:  HTN (hypertension)      DM (diabetes mellitus)      Asthma      S/P ovarian cystectomy      S/P repair of ventral hernia          FAMILY HISTORY:      Social History:    Outpatient Medications:    MEDICATIONS  (STANDING):  acetaminophen   IVPB .. 1000 milliGRAM(s) IV Intermittent every 6 hours  dextrose 5%. 1000 milliLiter(s) (50 mL/Hr) IV Continuous <Continuous>  dextrose 5%. 1000 milliLiter(s) (100 mL/Hr) IV Continuous <Continuous>  dextrose 50% Injectable 25 Gram(s) IV Push once  dextrose 50% Injectable 12.5 Gram(s) IV Push once  dextrose 50% Injectable 25 Gram(s) IV Push once  enoxaparin Injectable 40 milliGRAM(s) SubCutaneous every 24 hours  glucagon  Injectable 1 milliGRAM(s) IntraMuscular once  influenza   Vaccine 0.5 milliLiter(s) IntraMuscular once  insulin lispro (ADMELOG) corrective regimen sliding scale   SubCutaneous three times a day before meals  lisinopril 20 milliGRAM(s) Oral daily  piperacillin/tazobactam IVPB.. 3.375 Gram(s) IV Intermittent every 8 hours  simvastatin 20 milliGRAM(s) Oral at bedtime  torsemide 10 milliGRAM(s) Oral daily    MEDICATIONS  (PRN):  dextrose Oral Gel 15 Gram(s) Oral once PRN Blood Glucose LESS THAN 70 milliGRAM(s)/deciliter      Allergies    cefpodoxime (Rash)  estrogens (Hives; Pruritus)  peanuts (Short breath)    Intolerances      Review of Systems:  Constitutional: No fever  Eyes: No blurry vision  Neuro: No tremors  HEENT: No pain  Cardiovascular: No chest pain, palpitations  Respiratory: No SOB, no cough  GI: No nausea, vomiting,+ abdominal pain  : No dysuria  Skin: no rash  Psych: no depression  Endocrine: no polyuria, polydipsia  Hem/lymph: no swelling  Osteoporosis: no fractures    ALL OTHER SYSTEMS REVIEWED AND NEGATIVE    PHYSICAL EXAM:  VITALS: T(C): 36.6 (10-05-22 @ 13:45)  T(F): 97.9 (10-05-22 @ 13:45), Max: 101.9 (10-04-22 @ 23:11)  HR: 85 (10-05-22 @ 13:45) (81 - 103)  BP: 109/61 (10-05-22 @ 13:45) (108/75 - 126/76)  RR:  (16 - 20)  SpO2:  (94% - 100%)  Wt(kg): --  GENERAL: NAD, well-groomed, well-developed  EYES: No proptosis, no lid lag, anicteric  HEENT:  Atraumatic, Normocephalic, moist mucous membranes  THYROID: Normal size, no palpable nodules  RESPIRATORY: Clear to auscultation bilaterally; No rales, rhonchi, wheezing, or rubs  CARDIOVASCULAR: Regular rate and rhythm; No murmurs; no peripheral edema  PSYCH: Alert and oriented x 3, normal affect, normal mood  CUSHING'S SIGNS: no striae    POCT Blood Glucose.: 306 mg/dL (10-05-22 @ 12:26)  POCT Blood Glucose.: 211 mg/dL (10-05-22 @ 08:56)  POCT Blood Glucose.: 134 mg/dL (10-04-22 @ 20:37)                            10.1   9.89  )-----------( 279      ( 04 Oct 2022 22:50 )             31.4       10-04    137  |  96<L>  |  12  ----------------------------<  246<H>  4.0   |  28  |  0.55    eGFR: 111    Ca    9.5      10-04    TPro  7.2  /  Alb  4.5  /  TBili  0.4  /  DBili  x   /  AST  16  /  ALT  14  /  AlkPhos  68  10-04

## 2022-10-05 NOTE — CONSULT NOTE ADULT - ASSESSMENT
51YO F hx HTN, DM, and s/p ventral hernia repair w/ mesh (9/14), presents w/ fevers and drainage from her incision.    # Stress Hyperglycemia  # Non-compliance meds and diet   # Diabetes Mellitus type II  # Morbid obesity   A1c: 8.5%  Home meds:  Januvia 100 mg daily, met 1g bid, SGLT-2 (cannot recall name or dose)  GFR/Cr: 111/0.55     - Blood glucose target while hospitalized 140-180 mg/dL  - She is currently managed on SS only. During last admission about 1 year ago, she was managed on Lantus 42 u qhs and Humalog 16 units pre meals  - please document weight    -   -   - do not give scheduled prandial insulin if patient is NPO  - please monitor fingerstick blood glucose at least 4 times a day to avoid insulin toxicity, hypoglycemia  - Carbohydrate controlled diet, no concentrated sweets  - Counseled on need for medication adherence to avoid new complications.   DISCHARGE: she was d/c on Trulicity after last admission. Admits to losing weight on it but is concerned about side effects.  She then admits to having concern with all the medications and their side effects.  d/w risk and benefits of all the medications. Would recommend d/c on Trulicity and Metformin. She was having low BG on CALDWELL, so would off for now.  d/c Januvia if Trulicity is started.      # Dyslipidemia: c/w simvastatin   # Hypertension: BP goal < 130/80, will defer to primary team     Laure Rodrigues MD  Pager: 9AM - 5PM (Mon-Fri): [phone]  After 5PM and on Weekends: [phone]     For nonurgent matters, please email [email or web address] for assistance.    53YO F hx HTN, DM, and s/p ventral hernia repair w/ mesh (9/14), presents w/ fevers and drainage from her incision.    # Stress Hyperglycemia  # Non-compliance meds and diet   # Diabetes Mellitus type II  # Morbid obesity   A1c: 8.5%  Home meds:  Januvia 100 mg daily, met 1g bid, SGLT-2 (cannot recall name or dose)  GFR/Cr: 111/0.55     - Blood glucose target while hospitalized 140-180 mg/dL.  Her BG have been variable from 134 to 206.    - She is currently managed on SS only. She received total of 6 u of insulin so far.  During last admission about 1 year ago, she was managed on Lantus 42 u qhs and Humalog 16 units pre meals  - please document weight.  Paged the team, awaiting a response    -   -   - do not give scheduled prandial insulin if patient is NPO  - please monitor fingerstick blood glucose at least 4 times a day to avoid insulin toxicity, hypoglycemia  - Carbohydrate controlled diet, no concentrated sweets  - Counseled on need for medication adherence to avoid new complications.   DISCHARGE: she was d/c on Trulicity after last admission. Admits to losing weight on it but is concerned about side effects.  She then admits to having concern with all the medications and their side effects.  d/w risk and benefits of all the medications. Would recommend d/c on Trulicity (if she is agreeable) and Metformin. She was having low BG on CALDWELL, so would d/c or now.  d/c Januvia if Trulicity is started.      # Dyslipidemia: c/w simvastatin   # Hypertension: BP goal < 130/80, will defer to primary team     Laure Rodrigues MD  Pager: 9AM - 5PM (Mon-Fri): [phone]  After 5PM and on Weekends: [phone]     For nonurgent matters, please email [email or web address] for assistance.    53YO F hx HTN, DM, and s/p ventral hernia repair w/ mesh (9/14), presents w/ fevers and drainage from her incision.    # Stress Hyperglycemia  # Non-compliance meds and diet   # Diabetes Mellitus type II  # Morbid obesity   A1c: 8.5%  Home meds:  Januvia 100 mg daily, met 1g bid, SGLT-2 (cannot recall name or dose)  GFR/Cr: 111/0.55     - Blood glucose target while hospitalized 140-180 mg/dL.  Her BG have been variable from 134 to 206.    - She is currently managed on SS only. She received total of 6 u of insulin so far.  During last admission about 1 year ago, she was managed on Lantus 42 u qhs and Humalog 16 units pre meals  - would recommend starting Lantus 20 U HS (about 0.3 weight based dose)  - pt repots to not have much of an appetite, would c/w SS with meals for now   - please monitor fingerstick blood glucose at least 4 times a day to avoid insulin toxicity, hypoglycemia  - Carbohydrate controlled diet, no concentrated sweets  - Counseled on need for medication adherence to avoid new complications.   DISCHARGE: she was d/c on Trulicity after last admission. Admits to losing weight on it but is concerned about side effects.  She then admits to having concern with all the medications and their side effects.  d/w risk and benefits of all the medications. Would recommend d/c on Trulicity (if she is agreeable) and Metformin. She was having low BG on CALDWELL, so would d/c or now.  d/c Januvia if Trulicity is started.      # Dyslipidemia: c/w simvastatin   # Hypertension: BP goal < 130/80, will defer to primary team     Laure Rodrigues MD  Pager: 9AM - 5PM (Mon-Fri): [phone]  After 5PM and on Weekends: [phone]     For nonurgent matters, please email [email or web address] for assistance.

## 2022-10-05 NOTE — H&P ADULT - HISTORY OF PRESENT ILLNESS
53YO F hx HTN, DM, and s/p ventral hernia repair w/ mesh (9/14), presents w/ fevers and drainage from her incision. She reports she has had fevers over the past 5 days. She also reports foul smelling drainage, however reports that it has been clear from her midline incision. She reports her drains have been putting out clear fluid. She is tolerating a diet and denies n/v. She reports she has had a cough and has noticed drainage when she coughs.     In the ER the pt was hemodynamically stable however was febrile to 101.9. A CT scan was performed which demonstrated no fluid collections.

## 2022-10-05 NOTE — H&P ADULT - NSHPLABSRESULTS_GEN_ALL_CORE
c< from: CT Abdomen and Pelvis w/ IV Cont (10.04.22 @ 23:25) >    FINDINGS:  LOWER CHEST: Within normal limits.  Evaluation of the subcutaneous tissues is limited by body habitus and   incomplete image acquisition.  LIVER: Fatty.  BILE DUCTS: Normal caliber.  GALLBLADDER: Within normal limits.  SPLEEN: Within normal limits.  PANCREAS: Within normal limits.  ADRENALS: Within normal limits.  KIDNEYS/URETERS: Redemonstration of an indeterminate 2.3 cm left renal   lesion.    BLADDER: Decompressed.  REPRODUCTIVE ORGANS: No pelvic masses.    BOWEL: No bowel obstruction. Appendix is unremarkable. The colon is   underdistended without significant fecal load. No convincing focal bowel   wall thickening or diverticulitis.  PERITONEUM: No ascites.  VESSELS: Within normal limits.  RETROPERITONEUM/LYMPHNODES: No lymphadenopathy.  ABDOMINAL WALL: The patient is status post ventral hernia repair with   foci of soft tissue gas in the subcutaneous tissues and abdominal wall.   There are bilateral lower abdominal surgical drains positioned in the mid   lower abdomen, without discrete focal drainable collection. Subcutaneous   edema.  BONES: Within normal limits.    IMPRESSION:  Incomplete visualization of subcutaneous tissues in mid abdomen due to   body habitus.    Postoperative changes status postventral hernia repair. No bowel   herniation, bowel obstruction or discrete focal drainable collection.  Bilateral drainage catheters in place.    Redemonstration of an indeterminate 2.3 cm left renal lesion which may be   further characterized with renal ultrasound.    < end of copied text >

## 2022-10-06 DIAGNOSIS — E78.5 HYPERLIPIDEMIA, UNSPECIFIED: ICD-10-CM

## 2022-10-06 DIAGNOSIS — I10 ESSENTIAL (PRIMARY) HYPERTENSION: ICD-10-CM

## 2022-10-06 DIAGNOSIS — E11.65 TYPE 2 DIABETES MELLITUS WITH HYPERGLYCEMIA: ICD-10-CM

## 2022-10-06 LAB
ANION GAP SERPL CALC-SCNC: 11 MMOL/L — SIGNIFICANT CHANGE UP (ref 7–14)
BUN SERPL-MCNC: 12 MG/DL — SIGNIFICANT CHANGE UP (ref 7–23)
CALCIUM SERPL-MCNC: 9.4 MG/DL — SIGNIFICANT CHANGE UP (ref 8.4–10.5)
CHLORIDE SERPL-SCNC: 98 MMOL/L — SIGNIFICANT CHANGE UP (ref 98–107)
CO2 SERPL-SCNC: 28 MMOL/L — SIGNIFICANT CHANGE UP (ref 22–31)
CREAT SERPL-MCNC: 0.52 MG/DL — SIGNIFICANT CHANGE UP (ref 0.5–1.3)
CULTURE RESULTS: SIGNIFICANT CHANGE UP
EGFR: 112 ML/MIN/1.73M2 — SIGNIFICANT CHANGE UP
GLUCOSE BLDC GLUCOMTR-MCNC: 196 MG/DL — HIGH (ref 70–99)
GLUCOSE BLDC GLUCOMTR-MCNC: 204 MG/DL — HIGH (ref 70–99)
GLUCOSE BLDC GLUCOMTR-MCNC: 246 MG/DL — HIGH (ref 70–99)
GLUCOSE BLDC GLUCOMTR-MCNC: 248 MG/DL — HIGH (ref 70–99)
GLUCOSE SERPL-MCNC: 216 MG/DL — HIGH (ref 70–99)
HCT VFR BLD CALC: 31.4 % — LOW (ref 34.5–45)
HGB BLD-MCNC: 10.3 G/DL — LOW (ref 11.5–15.5)
MAGNESIUM SERPL-MCNC: 1.7 MG/DL — SIGNIFICANT CHANGE UP (ref 1.6–2.6)
MCHC RBC-ENTMCNC: 25.2 PG — LOW (ref 27–34)
MCHC RBC-ENTMCNC: 32.8 GM/DL — SIGNIFICANT CHANGE UP (ref 32–36)
MCV RBC AUTO: 76.8 FL — LOW (ref 80–100)
NRBC # BLD: 0 /100 WBCS — SIGNIFICANT CHANGE UP (ref 0–0)
NRBC # FLD: 0 K/UL — SIGNIFICANT CHANGE UP (ref 0–0)
PHOSPHATE SERPL-MCNC: 2.8 MG/DL — SIGNIFICANT CHANGE UP (ref 2.5–4.5)
PLATELET # BLD AUTO: 248 K/UL — SIGNIFICANT CHANGE UP (ref 150–400)
POTASSIUM SERPL-MCNC: 3.6 MMOL/L — SIGNIFICANT CHANGE UP (ref 3.5–5.3)
POTASSIUM SERPL-SCNC: 3.6 MMOL/L — SIGNIFICANT CHANGE UP (ref 3.5–5.3)
RBC # BLD: 4.09 M/UL — SIGNIFICANT CHANGE UP (ref 3.8–5.2)
RBC # FLD: 14.8 % — HIGH (ref 10.3–14.5)
SODIUM SERPL-SCNC: 137 MMOL/L — SIGNIFICANT CHANGE UP (ref 135–145)
SPECIMEN SOURCE: SIGNIFICANT CHANGE UP
WBC # BLD: 9.44 K/UL — SIGNIFICANT CHANGE UP (ref 3.8–10.5)
WBC # FLD AUTO: 9.44 K/UL — SIGNIFICANT CHANGE UP (ref 3.8–10.5)

## 2022-10-06 PROCEDURE — 99232 SBSQ HOSP IP/OBS MODERATE 35: CPT

## 2022-10-06 RX ORDER — INSULIN LISPRO 100/ML
5 VIAL (ML) SUBCUTANEOUS
Refills: 0 | Status: DISCONTINUED | OUTPATIENT
Start: 2022-10-06 | End: 2022-10-07

## 2022-10-06 RX ORDER — ACETAMINOPHEN 500 MG
650 TABLET ORAL EVERY 6 HOURS
Refills: 0 | Status: DISCONTINUED | OUTPATIENT
Start: 2022-10-06 | End: 2022-10-08

## 2022-10-06 RX ADMIN — Medication 1: at 17:29

## 2022-10-06 RX ADMIN — Medication 650 MILLIGRAM(S): at 18:01

## 2022-10-06 RX ADMIN — PIPERACILLIN AND TAZOBACTAM 25 GRAM(S): 4; .5 INJECTION, POWDER, LYOPHILIZED, FOR SOLUTION INTRAVENOUS at 15:19

## 2022-10-06 RX ADMIN — LISINOPRIL 20 MILLIGRAM(S): 2.5 TABLET ORAL at 05:44

## 2022-10-06 RX ADMIN — Medication 5 UNIT(S): at 12:20

## 2022-10-06 RX ADMIN — Medication 400 MILLIGRAM(S): at 05:44

## 2022-10-06 RX ADMIN — Medication 5 UNIT(S): at 17:29

## 2022-10-06 RX ADMIN — PIPERACILLIN AND TAZOBACTAM 25 GRAM(S): 4; .5 INJECTION, POWDER, LYOPHILIZED, FOR SOLUTION INTRAVENOUS at 07:57

## 2022-10-06 RX ADMIN — PIPERACILLIN AND TAZOBACTAM 25 GRAM(S): 4; .5 INJECTION, POWDER, LYOPHILIZED, FOR SOLUTION INTRAVENOUS at 00:09

## 2022-10-06 RX ADMIN — SIMVASTATIN 20 MILLIGRAM(S): 20 TABLET, FILM COATED ORAL at 22:01

## 2022-10-06 RX ADMIN — INSULIN GLARGINE 20 UNIT(S): 100 INJECTION, SOLUTION SUBCUTANEOUS at 22:01

## 2022-10-06 RX ADMIN — ENOXAPARIN SODIUM 40 MILLIGRAM(S): 100 INJECTION SUBCUTANEOUS at 05:44

## 2022-10-06 RX ADMIN — Medication 2: at 12:20

## 2022-10-06 RX ADMIN — Medication 2: at 08:21

## 2022-10-06 RX ADMIN — Medication 650 MILLIGRAM(S): at 18:46

## 2022-10-06 RX ADMIN — Medication 10 MILLIGRAM(S): at 05:44

## 2022-10-06 NOTE — PROGRESS NOTE ADULT - SUBJECTIVE AND OBJECTIVE BOX
Chief Complaint: DM 2    History: Patient seen at bedside. Reports she is eating meals, denies n/v, denies s/s of hypoglycemia  Reviewed outpatient medications for diabetes, patient reports she takes:  Metformin 1000 mg PO BID  Januvia 100 mg PO daily  Glimepiride 2 mg daily   (No SGLT2i, listed them for her, she denies taking Jardiance, Farxiga, Steglatro, Invokana)     Patient states she was previously prescribed Trulicity once weekly, took this for about a month, is willing to consider going back on this  States she was prescribed insulin in the past (is not sure the type, amount, how many times to take it etc), says she picked it up from pharmacy, put it in her fridge and never touched it again. Does not want to consider taking insulin at discharge    Diet wise, patient reports she drinks pre-mixed protein shakes frequently to replace meals (sugar free). Has had +hypoglycemia when doing this  When she eats meals, they tend to be heavy in rice    MEDICATIONS  (STANDING):  dextrose 5%. 1000 milliLiter(s) (50 mL/Hr) IV Continuous <Continuous>  dextrose 5%. 1000 milliLiter(s) (100 mL/Hr) IV Continuous <Continuous>  dextrose 50% Injectable 25 Gram(s) IV Push once  dextrose 50% Injectable 12.5 Gram(s) IV Push once  dextrose 50% Injectable 25 Gram(s) IV Push once  enoxaparin Injectable 40 milliGRAM(s) SubCutaneous every 24 hours  glucagon  Injectable 1 milliGRAM(s) IntraMuscular once  influenza   Vaccine 0.5 milliLiter(s) IntraMuscular once  insulin glargine Injectable (LANTUS) 20 Unit(s) SubCutaneous at bedtime  insulin lispro (ADMELOG) corrective regimen sliding scale   SubCutaneous at bedtime  insulin lispro (ADMELOG) corrective regimen sliding scale   SubCutaneous three times a day before meals  insulin lispro Injectable (ADMELOG) 5 Unit(s) SubCutaneous three times a day before meals  lisinopril 20 milliGRAM(s) Oral daily  piperacillin/tazobactam IVPB.. 3.375 Gram(s) IV Intermittent every 8 hours  simvastatin 20 milliGRAM(s) Oral at bedtime  torsemide 10 milliGRAM(s) Oral daily    MEDICATIONS  (PRN):  dextrose Oral Gel 15 Gram(s) Oral once PRN Blood Glucose LESS THAN 70 milliGRAM(s)/deciliter    Allergies  cefpodoxime (Rash)  estrogens (Hives; Pruritus)  peanuts (Short breath)    Review of Systems:  Cardiovascular: No chest pain  Respiratory: No SOB  GI: No nausea, vomiting  Endocrine: no hypoglycemia     PHYSICAL EXAM:  VITALS: T(C): 36.9 (10-06-22 @ 13:44)  T(F): 98.4 (10-06-22 @ 13:44), Max: 100.1 (10-06-22 @ 05:42)  HR: 98 (10-06-22 @ 13:44) (80 - 103)  BP: 112/70 (10-06-22 @ 13:44) (104/71 - 114/63)  RR:  (15 - 20)  SpO2:  (94% - 100%)  Wt(kg): --  GENERAL: NAD  EYES: No proptosis, no lid lag, anicteric  HEENT:  Atraumatic, Normocephalic, moist mucous membranes  RESPIRATORY: unlabored respirations     CAPILLARY BLOOD GLUCOSE    POCT Blood Glucose.: 196 mg/dL (06 Oct 2022 17:04)  POCT Blood Glucose.: 246 mg/dL (06 Oct 2022 12:16)  POCT Blood Glucose.: 204 mg/dL (06 Oct 2022 08:15)  POCT Blood Glucose.: 272 mg/dL (05 Oct 2022 21:14)      10-06    137  |  98  |  12  ----------------------------<  216<H>  3.6   |  28  |  0.52    eGFR: 112    Ca    9.4      10-06  Mg     1.70     10-06  Phos  2.8     10-06    TPro  7.2  /  Alb  4.5  /  TBili  0.4  /  DBili  x   /  AST  16  /  ALT  14  /  AlkPhos  68  10-04      A1C with Estimated Average Glucose Result: 8.5 % (09-12-22 @ 00:19)  A1C with Estimated Average Glucose Result: 10.4 % (10-09-21 @ 06:59)  A1C with Estimated Average Glucose Result: 10.3 % (10-07-21 @ 19:42)    Diet, Regular:   Consistent Carbohydrate Evening Snack (CSTCHOSN) (10-05-22 @ 02:14) [Active]       Chief Complaint: DM 2    History: Patient seen at bedside. Reports she is eating meals, denies n/v, denies s/s of hypoglycemia  Reviewed outpatient medications for diabetes, patient reports she takes:  Metformin 1000 mg PO BID  Januvia 100 mg PO daily  Glimepiride 2 mg daily   (No SGLT2i, listed them for her, she denies taking Jardiance, Farxiga, Steglatro, Invokana)     Patient states she was previously prescribed Trulicity once weekly, took this for about a month, is willing to consider going back on this  Is concerned about the side effects of all diabetes medications  States she was prescribed insulin in the past (is not sure the type, amount, how many times to take it etc), says she picked it up from pharmacy, put it in her fridge and never touched it again. Does not want to consider taking insulin at discharge    Diet wise, patient reports she drinks pre-mixed protein shakes frequently to replace meals (sugar free). Has had +hypoglycemia when doing this  When she eats meals, they tend to be heavy in rice    MEDICATIONS  (STANDING):  dextrose 5%. 1000 milliLiter(s) (50 mL/Hr) IV Continuous <Continuous>  dextrose 5%. 1000 milliLiter(s) (100 mL/Hr) IV Continuous <Continuous>  dextrose 50% Injectable 25 Gram(s) IV Push once  dextrose 50% Injectable 12.5 Gram(s) IV Push once  dextrose 50% Injectable 25 Gram(s) IV Push once  enoxaparin Injectable 40 milliGRAM(s) SubCutaneous every 24 hours  glucagon  Injectable 1 milliGRAM(s) IntraMuscular once  influenza   Vaccine 0.5 milliLiter(s) IntraMuscular once  insulin glargine Injectable (LANTUS) 20 Unit(s) SubCutaneous at bedtime  insulin lispro (ADMELOG) corrective regimen sliding scale   SubCutaneous at bedtime  insulin lispro (ADMELOG) corrective regimen sliding scale   SubCutaneous three times a day before meals  insulin lispro Injectable (ADMELOG) 5 Unit(s) SubCutaneous three times a day before meals  lisinopril 20 milliGRAM(s) Oral daily  piperacillin/tazobactam IVPB.. 3.375 Gram(s) IV Intermittent every 8 hours  simvastatin 20 milliGRAM(s) Oral at bedtime  torsemide 10 milliGRAM(s) Oral daily    MEDICATIONS  (PRN):  dextrose Oral Gel 15 Gram(s) Oral once PRN Blood Glucose LESS THAN 70 milliGRAM(s)/deciliter    Allergies  cefpodoxime (Rash)  estrogens (Hives; Pruritus)  peanuts (Short breath)    Review of Systems:  Cardiovascular: No chest pain  Respiratory: No SOB  GI: No nausea, vomiting  Endocrine: no hypoglycemia     PHYSICAL EXAM:  VITALS: T(C): 36.9 (10-06-22 @ 13:44)  T(F): 98.4 (10-06-22 @ 13:44), Max: 100.1 (10-06-22 @ 05:42)  HR: 98 (10-06-22 @ 13:44) (80 - 103)  BP: 112/70 (10-06-22 @ 13:44) (104/71 - 114/63)  RR:  (15 - 20)  SpO2:  (94% - 100%)  Wt(kg): --  GENERAL: NAD  EYES: No proptosis, no lid lag, anicteric  HEENT:  Atraumatic, Normocephalic, moist mucous membranes  RESPIRATORY: unlabored respirations     CAPILLARY BLOOD GLUCOSE    POCT Blood Glucose.: 196 mg/dL (06 Oct 2022 17:04)  POCT Blood Glucose.: 246 mg/dL (06 Oct 2022 12:16)  POCT Blood Glucose.: 204 mg/dL (06 Oct 2022 08:15)  POCT Blood Glucose.: 272 mg/dL (05 Oct 2022 21:14)      10-06    137  |  98  |  12  ----------------------------<  216<H>  3.6   |  28  |  0.52    eGFR: 112    Ca    9.4      10-06  Mg     1.70     10-06  Phos  2.8     10-06    TPro  7.2  /  Alb  4.5  /  TBili  0.4  /  DBili  x   /  AST  16  /  ALT  14  /  AlkPhos  68  10-04      A1C with Estimated Average Glucose Result: 8.5 % (09-12-22 @ 00:19)  A1C with Estimated Average Glucose Result: 10.4 % (10-09-21 @ 06:59)  A1C with Estimated Average Glucose Result: 10.3 % (10-07-21 @ 19:42)    Diet, Regular:   Consistent Carbohydrate Evening Snack (CSTCHOSN) (10-05-22 @ 02:14) [Active]

## 2022-10-06 NOTE — PROGRESS NOTE ADULT - ASSESSMENT
51YO F hx HTN, DM, and s/p ventral hernia repair w/ mesh (9/14), presents w/ fevers and drainage from her incision.    1. Type 2 diabetes mellitus with hyperglycemia   A1c: 8.5%  Home Regimen: Januvia 100 mg daily, Metformin 1000 mg BID, Glimepiride 2 mg daily  Used to take Trulicity (0.75 mg for about a month), was prescribed insulin but never took it    While inpatient:  BG target 100-180 mg/dl  Continue Lantus 20 units SQ qHS  Added Admelog 5 units SQ TID before meals (Hold if NPO/skips meal)   Continue Admelog low dose correctional scale before meals, low dose at bedtime  Consistent carbohydrate diet, recommend RD consult if possible - counseled today regarding dietary balance, moderation of carbohydrates, not complete elimination of them  Check BG before meals and bedtime  Hypoglycemia protocol      Discharge Plan:  Likely resume Metformin 1000 mg PO BID + add Trulicity 0.75 SQ weekly  STOP Januvia (if Trulicity is started)  STOP Glimepiride   Ensure she has glucometer, glucose test strips, lancets, alcohol swabs   Followup with PCP, endocrinology, opthalmology and podiatry as outpatient  If desiring to followup with [address], [address], [facility] [address], [phone]   Or followup with prior endocrinologist    2. Hypertension  BP goal < 130/80  On Torsemide, Lisinopril  Titration per primary team    3. HLD  LDL target less than 70  Continue Simvastatin 20 mg daily    Inocencia Padgett  Nurse Practitioner  Division of Endocrinology & Diabetes  In house pager #[phone]/long range pager #[phone]    If before 9AM or after 6PM, or on weekends/holidays, please call endocrine answering service for assistance ([phone]).  For nonurgent matters email [email or web address] for assistance.  51YO F hx HTN, DM, and s/p ventral hernia repair w/ mesh (9/14), presents w/ fevers and drainage from her incision.    1. Type 2 diabetes mellitus with hyperglycemia   A1c: 8.5%  Home Regimen: Januvia 100 mg daily, Metformin 1000 mg BID, Glimepiride 2 mg daily  Used to take Trulicity (0.75 mg for about a month), was prescribed insulin but never took it    While inpatient:  BG target 100-180 mg/dl  Continue Lantus 20 units SQ qHS  Added Admelog 5 units SQ TID before meals (Hold if NPO/skips meal)   Continue Admelog low dose correctional scale before meals, low dose at bedtime  Consistent carbohydrate diet, recommend RD consult if possible - counseled today regarding dietary balance, moderation of carbohydrates, not complete elimination of them  Check BG before meals and bedtime  Hypoglycemia protocol      Discharge Plan:  Likely resume Metformin 1000 mg PO BID + add Trulicity 0.75 SQ weekly  STOP Januvia (if Trulicity is started)  STOP Glimepiride   Ensure she has glucometer, glucose test strips, lancets, alcohol swabs   Followup with PCP, endocrinology, opthalmology and podiatry as outpatient  Recommend dilated eye exam as outpatient  If desiring to followup with [address], [address], [facility] [address], [phone]   Or followup with prior endocrinologist    2. Hypertension  BP goal < 130/80  On Torsemide, Lisinopril  Titration per primary team    3. HLD  LDL target less than 70  Check fasting lipid panel - note that severely elevated triglycerides increase risk for pancreatitis when using GLP1RA, would not recommend re-starting Trulicity unless Trig less than 500  Continue Simvastatin 20 mg daily    Inocencia Padgett  Nurse Practitioner  Division of Endocrinology & Diabetes  In house pager #[phone]/long range pager #[phone]    If before 9AM or after 6PM, or on weekends/holidays, please call endocrine answering service for assistance ([phone]).  For nonurgent matters email [email or web address] for assistance.

## 2022-10-06 NOTE — PROGRESS NOTE ADULT - ASSESSMENT
53YO F hx HTN, DM, and s/p ventral hernia repair w/ mesh (9/14), presents w/ fevers and drainage from her incision.    -Admit to Dr. Gonzalez  -IV abx - zosyn  -Regular diet  -home medications  -vte ppx    Discussed w/ Dr. Gonzalez  B surgery, [id number] 51YO F hx HTN, DM, and s/p ventral hernia repair w/ mesh (9/14), presents w/ fevers and drainage from her incision.      -Cont zosyn  -Regular diet  -home medications  -vte ppx    B Team Surgery  [phone]

## 2022-10-06 NOTE — PROGRESS NOTE ADULT - SUBJECTIVE AND OBJECTIVE BOX
TEAM *** Surgery Progress Note  Patient is a 52y old  Female who presents with a chief complaint of fevers (05 Oct 2022 13:56)      Overnight Events: ***No acute events overnight.  SUBJECTIVE: Patient seen and examined at bedside with surgical team, patient without complaints. Denies fever, chills, CP, SOB nausea, vomiting, abdominal pain.    START [id number]    137  |  96<L>  |  12  ----------------------------<  246<H>  4.0   |  28  |  0.55    Ca    9.5      04 Oct 2022 22:50    TPro  7.2  /  Alb  4.5  /  TBili  0.4  /  DBili  x   /  AST  16  /  ALT  14  /  AlkPhos  68  10-04  POCT Blood Glucose.: 272 mg/dL (10-05-22 @ 21:14)  A1C with Estimated Average Glucose Result: 8.5 % (22 @ 00:19)  A1C with Estimated Average Glucose Result: 10.4 % (10-09-21 @ 06:59)  A1C with Estimated Average Glucose Result: 10.3 % (10-07-21 @ 19:42)  END CHEMFISH  START MEDSACTIVEMEDICATIONS  (STANDING):  acetaminophen   IVPB .. 1000 milliGRAM(s) IV Intermittent every 6 hours  dextrose 5%. 1000 milliLiter(s) (50 mL/Hr) IV Continuous <Continuous>  dextrose 5%. 1000 milliLiter(s) (100 mL/Hr) IV Continuous <Continuous>  dextrose 50% Injectable 25 Gram(s) IV Push once  dextrose 50% Injectable 12.5 Gram(s) IV Push once  dextrose 50% Injectable 25 Gram(s) IV Push once  enoxaparin Injectable 40 milliGRAM(s) SubCutaneous every 24 hours  glucagon  Injectable 1 milliGRAM(s) IntraMuscular once  influenza   Vaccine 0.5 milliLiter(s) IntraMuscular once  insulin glargine Injectable (LANTUS) 20 Unit(s) SubCutaneous at bedtime  insulin lispro (ADMELOG) corrective regimen sliding scale   SubCutaneous at bedtime  insulin lispro (ADMELOG) corrective regimen sliding scale   SubCutaneous three times a day before meals  lisinopril 20 milliGRAM(s) Oral daily  piperacillin/tazobactam IVPB.. 3.375 Gram(s) IV Intermittent every 8 hours  simvastatin 20 milliGRAM(s) Oral at bedtime  torsemide 10 milliGRAM(s) Oral daily    MEDICATIONS  (PRN):  dextrose Oral Gel 15 Gram(s) Oral once PRN Blood Glucose LESS THAN 70 milliGRAM(s)/deciliter  END MEDSACTIVE  START DIETORDERDiet, Regular:   Consistent Carbohydrate Evening Snack (CSTCHOSN) (10-05-22 @ 02:14)  END DIETORDER  START ADMITDXUnspecified complication of procedure, initial encounter    END ADMITDX  START IOFS  END IOFS  START SKINPUEND SKINPU    OBJECTIVE:    Vital Signs Last 24 Hrs  T(C): 36.6 (05 Oct 2022 21:23), Max: 38.4 (05 Oct 2022 03:06)  T(F): 97.8 (05 Oct 2022 21:), Max: 101.1 (05 Oct 2022 03:06)  HR: 95 (05 Oct 2022 21:) (81 - 95)  BP: 114/63 (05 Oct 2022 21:) (108/75 - 126/76)  BP(mean): --  RR: 18 (05 Oct 2022 21:23) (16 - 20)  SpO2: 100% (05 Oct 2022 21:23) (94% - 100%)    Parameters below as of 05 Oct 2022 21:23  Patient On (Oxygen Delivery Method): room air    I&O's Detail    04 Oct 2022 07:01  -  05 Oct 2022 07:00  --------------------------------------------------------  IN:  Total IN: 0 mL    OUT:    Bulb (mL): 25 mL    Bulb (mL): 90 mL  Total OUT: 115 mL    Total NET: -115 mL      05 Oct 2022 07:01  -  06 Oct 2022 00:50  --------------------------------------------------------  IN:    IV PiggyBack: 100 mL    Oral Fluid: 240 mL  Total IN: 340 mL    OUT:    Bulb (mL): 40.5 mL    Bulb (mL): 63 mL    Voided (mL): 750 mL  Total OUT: 853.5 mL    Total NET: -513.5 mL      MEDICATIONS  (STANDING):  acetaminophen   IVPB .. 1000 milliGRAM(s) IV Intermittent every 6 hours  dextrose 5%. 1000 milliLiter(s) (50 mL/Hr) IV Continuous <Continuous>  dextrose 5%. 1000 milliLiter(s) (100 mL/Hr) IV Continuous <Continuous>  dextrose 50% Injectable 25 Gram(s) IV Push once  dextrose 50% Injectable 12.5 Gram(s) IV Push once  dextrose 50% Injectable 25 Gram(s) IV Push once  enoxaparin Injectable 40 milliGRAM(s) SubCutaneous every 24 hours  glucagon  Injectable 1 milliGRAM(s) IntraMuscular once  influenza   Vaccine 0.5 milliLiter(s) IntraMuscular once  insulin glargine Injectable (LANTUS) 20 Unit(s) SubCutaneous at bedtime  insulin lispro (ADMELOG) corrective regimen sliding scale   SubCutaneous at bedtime  insulin lispro (ADMELOG) corrective regimen sliding scale   SubCutaneous three times a day before meals  lisinopril 20 milliGRAM(s) Oral daily  piperacillin/tazobactam IVPB.. 3.375 Gram(s) IV Intermittent every 8 hours  simvastatin 20 milliGRAM(s) Oral at bedtime  torsemide 10 milliGRAM(s) Oral daily    MEDICATIONS  (PRN):  dextrose Oral Gel 15 Gram(s) Oral once PRN Blood Glucose LESS THAN 70 milliGRAM(s)/deciliter      PHYSICAL EXAM:  Constitutional: A&Ox3, NAD  Respiratory: Unlabored breathing  Abdomen: Soft, nondistended, NTTP. No rebound or guarding.  Extremities: WWP, BOWLING spontaneously    LABS:                        10.1   9.89  )-----------( 279      ( 04 Oct 2022 22:50 )             31.4     10-04    137  |  96<L>  |  12  ----------------------------<  246<H>  4.0   |  28  |  0.55    Ca    9.5      04 Oct 2022 22:50    TPro  7.2  /  Alb  4.5  /  TBili  0.4  /  DBili  x   /  AST  16  /  ALT  14  /  AlkPhos  68  10-04    PT/INR - ( 04 Oct 2022 22:50 )   PT: 14.2 sec;   INR: 1.22 ratio         PTT - ( 04 Oct 2022 22:50 )  PTT:30.9 sec  LIVER FUNCTIONS - ( 04 Oct 2022 22:50 )  Alb: 4.5 g/dL / Pro: 7.2 g/dL / ALK PHOS: 68 U/L / ALT: 14 U/L / AST: 16 U/L / GGT: x           Urinalysis Basic - ( 04 Oct 2022 22:50 )    Color: Yellow / Appearance: Clear / S.021 / pH: x  Gluc: x / Ketone: Negative  / Bili: Negative / Urobili: <2 mg/dL   Blood: x / Protein: Trace / Nitrite: Negative   Leuk Esterase: Negative / RBC: x / WBC x   Sq Epi: x / Non Sq Epi: x / Bacteria: x          IMAGING:     TEAM B Surgery Progress Note      SUBJECTIVE: Patient seen and examined at bedside with surgical team, patient without complaints. No acute events overnight. Denies fever, chills, CP, SOB nausea, vomiting, abdominal pain.    Vital Signs Last 24 Hrs  T(C): 36.6 (05 Oct 2022 21:23), Max: 38.4 (05 Oct 2022 03:06)  T(F): 97.8 (05 Oct 2022 21:23), Max: 101.1 (05 Oct 2022 03:06)  HR: 95 (05 Oct 2022 21:) (81 - 95)  BP: 114/63 (05 Oct 2022 21:) (108/75 - 126/76)  RR: 18 (05 Oct 2022 21:) (16 - 20)  SpO2: 100% (05 Oct 2022 21:23) (94% - 100%)    Parameters below as of 05 Oct 2022 21:  Patient On (Oxygen Delivery Method): room air    I&O's Detail    04 Oct 2022 07:01  -  05 Oct 2022 07:00  --------------------------------------------------------  IN:  Total IN: 0 mL    OUT:    Bulb (mL): 25 mL    Bulb (mL): 90 mL  Total OUT: 115 mL    Total NET: -115 mL      05 Oct 2022 07:01  -  06 Oct 2022 00:50  --------------------------------------------------------  IN:    IV PiggyBack: 100 mL    Oral Fluid: 240 mL  Total IN: 340 mL    OUT:    Bulb (mL): 40.5 mL    Bulb (mL): 63 mL    Voided (mL): 750 mL  Total OUT: 853.5 mL    Total NET: -513.5 mL    MEDICATIONS  (STANDING):  acetaminophen   IVPB .. 1000 milliGRAM(s) IV Intermittent every 6 hours  dextrose 5%. 1000 milliLiter(s) (50 mL/Hr) IV Continuous <Continuous>  dextrose 5%. 1000 milliLiter(s) (100 mL/Hr) IV Continuous <Continuous>  dextrose 50% Injectable 25 Gram(s) IV Push once  dextrose 50% Injectable 12.5 Gram(s) IV Push once  dextrose 50% Injectable 25 Gram(s) IV Push once  enoxaparin Injectable 40 milliGRAM(s) SubCutaneous every 24 hours  glucagon  Injectable 1 milliGRAM(s) IntraMuscular once  influenza   Vaccine 0.5 milliLiter(s) IntraMuscular once  insulin glargine Injectable (LANTUS) 20 Unit(s) SubCutaneous at bedtime  insulin lispro (ADMELOG) corrective regimen sliding scale   SubCutaneous at bedtime  insulin lispro (ADMELOG) corrective regimen sliding scale   SubCutaneous three times a day before meals  lisinopril 20 milliGRAM(s) Oral daily  piperacillin/tazobactam IVPB.. 3.375 Gram(s) IV Intermittent every 8 hours  simvastatin 20 milliGRAM(s) Oral at bedtime  torsemide 10 milliGRAM(s) Oral daily    PHYSICAL EXAM:  Constitutional: A&Ox3, NAD  Respiratory: Unlabored breathing  Abdomen: Soft, nondistended, NTTP. No rebound or guarding.  Extremities: WWP, BOWLING spontaneously    LABS:                        10.1   9.89  )-----------( 279      ( 04 Oct 2022 22:50 )             31.4     10-04    137  |  96<L>  |  12  ----------------------------<  246<H>  4.0   |  28  |  0.55    Ca    9.5      04 Oct 2022 22:50    TPro  7.2  /  Alb  4.5  /  TBili  0.4  /  DBili  x   /  AST  16  /  ALT  14  /  AlkPhos  68  10-04    PT/INR - ( 04 Oct 2022 22:50 )   PT: 14.2 sec;   INR: 1.22 ratio         PTT - ( 04 Oct 2022 22:50 )  PTT:30.9 sec  LIVER FUNCTIONS - ( 04 Oct 2022 22:50 )  Alb: 4.5 g/dL / Pro: 7.2 g/dL / ALK PHOS: 68 U/L / ALT: 14 U/L / AST: 16 U/L / GGT: x           Urinalysis Basic - ( 04 Oct 2022 22:50 )    Color: Yellow / Appearance: Clear / S.021 / pH: x  Gluc: x / Ketone: Negative  / Bili: Negative / Urobili: <2 mg/dL   Blood: x / Protein: Trace / Nitrite: Negative   Leuk Esterase: Negative / RBC: x / WBC x   Sq Epi: x / Non Sq Epi: x / Bacteria: x          IMAGING:     TEAM B Surgery Progress Note      SUBJECTIVE: Patient seen and examined at bedside with surgical team, patient without complaints. No acute events overnight. Denies fever, chills, CP, SOB nausea, vomiting, abdominal pain.    Vital Signs Last 24 Hrs  T(C): 36.6 (05 Oct 2022 21:23), Max: 38.4 (05 Oct 2022 03:06)  T(F): 97.8 (05 Oct 2022 21:23), Max: 101.1 (05 Oct 2022 03:06)  HR: 95 (05 Oct 2022 21:) (81 - 95)  BP: 114/63 (05 Oct 2022 21:) (108/75 - 126/76)  RR: 18 (05 Oct 2022 21:) (16 - 20)  SpO2: 100% (05 Oct 2022 21:23) (94% - 100%)    Parameters below as of 05 Oct 2022 21:  Patient On (Oxygen Delivery Method): room air    I&O's Detail    04 Oct 2022 07:01  -  05 Oct 2022 07:00  --------------------------------------------------------  IN:  Total IN: 0 mL    OUT:    Bulb (mL): 25 mL    Bulb (mL): 90 mL  Total OUT: 115 mL    Total NET: -115 mL      05 Oct 2022 07:01  -  06 Oct 2022 00:50  --------------------------------------------------------  IN:    IV PiggyBack: 100 mL    Oral Fluid: 240 mL  Total IN: 340 mL    OUT:    Bulb (mL): 40.5 mL    Bulb (mL): 63 mL    Voided (mL): 750 mL  Total OUT: 853.5 mL    Total NET: -513.5 mL    MEDICATIONS  (STANDING):  acetaminophen   IVPB .. 1000 milliGRAM(s) IV Intermittent every 6 hours  dextrose 5%. 1000 milliLiter(s) (50 mL/Hr) IV Continuous <Continuous>  dextrose 5%. 1000 milliLiter(s) (100 mL/Hr) IV Continuous <Continuous>  dextrose 50% Injectable 25 Gram(s) IV Push once  dextrose 50% Injectable 12.5 Gram(s) IV Push once  dextrose 50% Injectable 25 Gram(s) IV Push once  enoxaparin Injectable 40 milliGRAM(s) SubCutaneous every 24 hours  glucagon  Injectable 1 milliGRAM(s) IntraMuscular once  influenza   Vaccine 0.5 milliLiter(s) IntraMuscular once  insulin glargine Injectable (LANTUS) 20 Unit(s) SubCutaneous at bedtime  insulin lispro (ADMELOG) corrective regimen sliding scale   SubCutaneous at bedtime  insulin lispro (ADMELOG) corrective regimen sliding scale   SubCutaneous three times a day before meals  lisinopril 20 milliGRAM(s) Oral daily  piperacillin/tazobactam IVPB.. 3.375 Gram(s) IV Intermittent every 8 hours  simvastatin 20 milliGRAM(s) Oral at bedtime  torsemide 10 milliGRAM(s) Oral daily    PHYSICAL EXAM:  Constitutional: A&Ox3, NAD  Respiratory: Unlabored breathing  Abdomen: Soft, nondistended, NTTP. No rebound or guarding. B/L JOCELYN drains with SS output. Tender at JOCELYN drain sites.  Extremities: WWP, BOWLING spontaneously    LABS:                        10.1   9.89  )-----------( 279      ( 04 Oct 2022 22:50 )             31.4     10-04    137  |  96<L>  |  12  ----------------------------<  246<H>  4.0   |  28  |  0.55    Ca    9.5      04 Oct 2022 22:50    TPro  7.2  /  Alb  4.5  /  TBili  0.4  /  DBili  x   /  AST  16  /  ALT  14  /  AlkPhos  68  10-04    PT/INR - ( 04 Oct 2022 22:50 )   PT: 14.2 sec;   INR: 1.22 ratio         PTT - ( 04 Oct 2022 22:50 )  PTT:30.9 sec  LIVER FUNCTIONS - ( 04 Oct 2022 22:50 )  Alb: 4.5 g/dL / Pro: 7.2 g/dL / ALK PHOS: 68 U/L / ALT: 14 U/L / AST: 16 U/L / GGT: x           Urinalysis Basic - ( 04 Oct 2022 22:50 )    Color: Yellow / Appearance: Clear / S.021 / pH: x  Gluc: x / Ketone: Negative  / Bili: Negative / Urobili: <2 mg/dL   Blood: x / Protein: Trace / Nitrite: Negative   Leuk Esterase: Negative / RBC: x / WBC x   Sq Epi: x / Non Sq Epi: x / Bacteria: x          IMAGING:

## 2022-10-07 ENCOUNTER — TRANSCRIPTION ENCOUNTER (OUTPATIENT)
Age: 52
End: 2022-10-07

## 2022-10-07 LAB
ANION GAP SERPL CALC-SCNC: 10 MMOL/L — SIGNIFICANT CHANGE UP (ref 7–14)
BUN SERPL-MCNC: 12 MG/DL — SIGNIFICANT CHANGE UP (ref 7–23)
CALCIUM SERPL-MCNC: 9.5 MG/DL — SIGNIFICANT CHANGE UP (ref 8.4–10.5)
CHLORIDE SERPL-SCNC: 100 MMOL/L — SIGNIFICANT CHANGE UP (ref 98–107)
CO2 SERPL-SCNC: 29 MMOL/L — SIGNIFICANT CHANGE UP (ref 22–31)
CREAT SERPL-MCNC: 0.53 MG/DL — SIGNIFICANT CHANGE UP (ref 0.5–1.3)
EGFR: 112 ML/MIN/1.73M2 — SIGNIFICANT CHANGE UP
GLUCOSE BLDC GLUCOMTR-MCNC: 242 MG/DL — HIGH (ref 70–99)
GLUCOSE BLDC GLUCOMTR-MCNC: 252 MG/DL — HIGH (ref 70–99)
GLUCOSE BLDC GLUCOMTR-MCNC: 264 MG/DL — HIGH (ref 70–99)
GLUCOSE BLDC GLUCOMTR-MCNC: 271 MG/DL — HIGH (ref 70–99)
GLUCOSE SERPL-MCNC: 269 MG/DL — HIGH (ref 70–99)
HCT VFR BLD CALC: 31.8 % — LOW (ref 34.5–45)
HGB BLD-MCNC: 10.1 G/DL — LOW (ref 11.5–15.5)
MAGNESIUM SERPL-MCNC: 1.8 MG/DL — SIGNIFICANT CHANGE UP (ref 1.6–2.6)
MCHC RBC-ENTMCNC: 24.8 PG — LOW (ref 27–34)
MCHC RBC-ENTMCNC: 31.8 GM/DL — LOW (ref 32–36)
MCV RBC AUTO: 78.1 FL — LOW (ref 80–100)
NRBC # BLD: 0 /100 WBCS — SIGNIFICANT CHANGE UP (ref 0–0)
NRBC # FLD: 0 K/UL — SIGNIFICANT CHANGE UP (ref 0–0)
PHOSPHATE SERPL-MCNC: 2.9 MG/DL — SIGNIFICANT CHANGE UP (ref 2.5–4.5)
PLATELET # BLD AUTO: 278 K/UL — SIGNIFICANT CHANGE UP (ref 150–400)
POTASSIUM SERPL-MCNC: 4 MMOL/L — SIGNIFICANT CHANGE UP (ref 3.5–5.3)
POTASSIUM SERPL-SCNC: 4 MMOL/L — SIGNIFICANT CHANGE UP (ref 3.5–5.3)
RBC # BLD: 4.07 M/UL — SIGNIFICANT CHANGE UP (ref 3.8–5.2)
RBC # FLD: 14.6 % — HIGH (ref 10.3–14.5)
SODIUM SERPL-SCNC: 139 MMOL/L — SIGNIFICANT CHANGE UP (ref 135–145)
WBC # BLD: 7.82 K/UL — SIGNIFICANT CHANGE UP (ref 3.8–10.5)
WBC # FLD AUTO: 7.82 K/UL — SIGNIFICANT CHANGE UP (ref 3.8–10.5)

## 2022-10-07 PROCEDURE — 99232 SBSQ HOSP IP/OBS MODERATE 35: CPT | Mod: 25

## 2022-10-07 PROCEDURE — 99232 SBSQ HOSP IP/OBS MODERATE 35: CPT

## 2022-10-07 RX ORDER — SITAGLIPTIN 50 MG/1
1 TABLET, FILM COATED ORAL
Qty: 0 | Refills: 0 | DISCHARGE

## 2022-10-07 RX ORDER — INSULIN LISPRO 100/ML
7 VIAL (ML) SUBCUTANEOUS
Refills: 0 | Status: DISCONTINUED | OUTPATIENT
Start: 2022-10-07 | End: 2022-10-08

## 2022-10-07 RX ORDER — INSULIN GLARGINE 100 [IU]/ML
22 INJECTION, SOLUTION SUBCUTANEOUS AT BEDTIME
Refills: 0 | Status: DISCONTINUED | OUTPATIENT
Start: 2022-10-07 | End: 2022-10-08

## 2022-10-07 RX ADMIN — Medication 3: at 08:56

## 2022-10-07 RX ADMIN — SIMVASTATIN 20 MILLIGRAM(S): 20 TABLET, FILM COATED ORAL at 21:30

## 2022-10-07 RX ADMIN — ENOXAPARIN SODIUM 40 MILLIGRAM(S): 100 INJECTION SUBCUTANEOUS at 06:47

## 2022-10-07 RX ADMIN — Medication 3: at 12:25

## 2022-10-07 RX ADMIN — Medication 1: at 21:31

## 2022-10-07 RX ADMIN — Medication 650 MILLIGRAM(S): at 00:03

## 2022-10-07 RX ADMIN — Medication 2: at 17:36

## 2022-10-07 RX ADMIN — Medication 650 MILLIGRAM(S): at 12:31

## 2022-10-07 RX ADMIN — Medication 7 UNIT(S): at 17:37

## 2022-10-07 RX ADMIN — Medication 7 UNIT(S): at 12:24

## 2022-10-07 RX ADMIN — Medication 650 MILLIGRAM(S): at 06:48

## 2022-10-07 RX ADMIN — PIPERACILLIN AND TAZOBACTAM 25 GRAM(S): 4; .5 INJECTION, POWDER, LYOPHILIZED, FOR SOLUTION INTRAVENOUS at 00:04

## 2022-10-07 RX ADMIN — PIPERACILLIN AND TAZOBACTAM 25 GRAM(S): 4; .5 INJECTION, POWDER, LYOPHILIZED, FOR SOLUTION INTRAVENOUS at 16:26

## 2022-10-07 RX ADMIN — Medication 5 UNIT(S): at 08:55

## 2022-10-07 RX ADMIN — LISINOPRIL 20 MILLIGRAM(S): 2.5 TABLET ORAL at 06:48

## 2022-10-07 RX ADMIN — Medication 650 MILLIGRAM(S): at 18:27

## 2022-10-07 RX ADMIN — INSULIN GLARGINE 22 UNIT(S): 100 INJECTION, SOLUTION SUBCUTANEOUS at 21:30

## 2022-10-07 RX ADMIN — Medication 10 MILLIGRAM(S): at 06:48

## 2022-10-07 RX ADMIN — PIPERACILLIN AND TAZOBACTAM 25 GRAM(S): 4; .5 INJECTION, POWDER, LYOPHILIZED, FOR SOLUTION INTRAVENOUS at 08:57

## 2022-10-07 NOTE — PROVIDER CONTACT NOTE (OTHER) - ACTION/TREATMENT ORDERED:
MD made aware, MD order to apply dressing,  clean and dry dressing applied , pt. tolerated well, continue to monitor.

## 2022-10-07 NOTE — DISCHARGE NOTE PROVIDER - CARE PROVIDER_API CALL
Graeme Yeh)  Surgery; Surgical Critical Care  [address] [place] [address]  Phone: [phone]  Fax: [phone]  Follow Up Time:     Inocencia Padgett (NP; RN)  NP in Primary Care AdultGerontology  [address] [place] [address]  Phone: [phone]  Fax: [phone]  Follow Up Time:

## 2022-10-07 NOTE — PROGRESS NOTE ADULT - ATTENDING COMMENTS
Patient with superificial wound infection. JOCELYN drain with 60 and 32 cc/24hr serosanguinous, midline wound clean.  plan  continue with abx  appreciate endocrine recs  reg diet  dispo planning    I have personally interviewed and examined this patient, reviewed pertinent labs and imaging, and discussed the case with colleagues, residents, and physician assistants on B Team rounds.    The active care issues are:  1. ssi    The Acute Care Surgery (B Team) Attending Group Practice:  Dr. Sarah Rodrigues, Dr. Jin Gonzalez, Dr. Isael Chung, Dr. Graeme Yeh,     urgent issues - spectra [phone]  nonurgent issues - [phone]  patient appointments or afterhours - [phone]
Patient with superificial wound infection. JOCELYN drains in place high output.  plan  continue with abx  appreciate endocrine recs  reg diet  dispo planning    I have personally interviewed and examined this patient, reviewed pertinent labs and imaging, and discussed the case with colleagues, residents, and physician assistants on B Team rounds.    The active care issues are:  1. ssi    The Acute Care Surgery (B Team) Attending Group Practice:  Dr. Sarah Rodrigues, Dr. Jin Gonzalez, Dr. Isael Chung, Dr. Graeme Yeh,     urgent issues - spectra [phone]  nonurgent issues - [phone]  patient appointments or afterhours - [phone]

## 2022-10-07 NOTE — PROVIDER CONTACT NOTE (OTHER) - BACKGROUND
pt. admitted for fever and drainage from incision and JOCELYN's site 2nf to s/p ventral hernia repair w/ mesh on 9/14/22

## 2022-10-07 NOTE — DISCHARGE NOTE PROVIDER - HOSPITAL COURSE
51YO F hx HTN, DM, and s/p ventral hernia repair w/ mesh (9/14), presents to [facility] 10/4/22 with fevers and drainage from her incision. She reports she has had fevers over the past 5 days. She also reports foul smelling drainage, however reports that it has been clear from her midline incision. She reports her drains have been putting out clear fluid.     In the ER the pt was hemodynamically stable however was febrile to 101.9.    10/4 CT scan: Incomplete visualization of subcutaneous tissues in mid abdomen due to body habitus. Postoperative changes status post ventral hernia repair. No bowel herniation, bowel obstruction or discrete focal drainable collection. Bilateral drainage catheters in place. Redemonstration of an indeterminate 2.3 cm left renal lesion.    Patient was admitted to the surgical service and started on IV antibiotics.     10/5: Endocrine was consulted for hyperglycemia and recommendations were made.     **COMPLETE UNTIL 10/7 53YO F hx HTN, DM, and s/p ventral hernia repair w/ mesh (9/14), presents to [facility] 10/4/22 with fevers and drainage from her incision. She reports she has had fevers over the past 5 days. She also reports foul smelling drainage, however reports that it has been clear from her midline incision. She reports her drains have been putting out clear fluid.     In the ER the pt was hemodynamically stable however was febrile to 101.9.    10/4 CT scan: Incomplete visualization of subcutaneous tissues in mid abdomen due to body habitus. Postoperative changes status post ventral hernia repair. No bowel herniation, bowel obstruction or discrete focal drainable collection. Bilateral drainage catheters in place. Redemonstration of an indeterminate 2.3 cm left renal lesion.    Patient was admitted to the surgical service and started on IV antibiotics.     10/5: Endocrine was consulted for hyperglycemia and recommendations were made.     Patient currently tolerating regular diet, ambulating at baseline, voiding, pain is well controlled and has been afebrile.    Per team and attending patient hemodynamically stable for discharge home on 2 weeks of augmentin and follow up with Dr. Yeh outpatient.

## 2022-10-07 NOTE — DISCHARGE NOTE PROVIDER - NSDCFUSCHEDAPPT_GEN_ALL_CORE_FT
Jin Gonzalez  [facility]  TRAUMASURG  [address]  Scheduled Appointment: 10/11/2022

## 2022-10-07 NOTE — DIETITIAN INITIAL EVALUATION ADULT - OTHER INFO
Pt has a history of HTN, DM, and s/p ventral hernia repair w/ mesh (9/14/22). She presents w/ fevers and drainage from her incision.  Pt states she is eating "ok". She had no complaints of nausea, vomiting, diarrhea or constipation. Pt has no difficulty chewing or swallowing. Pt has an allergy to peanuts.   Pt states her weight in 4/2022 was 300 lb. She was planning to have Bariatric surgery and was told to drink Premier Protein Shakes (Diabetic formulation) 6 times /day. She lost 30 lbs but her surgery was cancelled 3 times. Pt reports she is continuing to drink at least 1 shake /day. She will drink a second shake during the day if she does not want to eat.   Pt states she did follow her diet and take her medications but she stopped because "it's annoying" and "I don't care". Pt has had episodes of hypoglycemia at home. Pt endorses drinking sugared sodas and juices and eating rice (only 1 time /week). Pt appeared to know the diet but selectively follows it. Pt did not want a diet instruction but did accept a written copy of it.

## 2022-10-07 NOTE — PROGRESS NOTE ADULT - ASSESSMENT
Patient is a 52 F with history of HTN, DM, and s/p ventral hernia repair w/ mesh (9/14), presents w/ fevers and drainage from her incision.    1. Type 2 diabetes mellitus with hyperglycemia   A1c: 8.5%  Home Regimen: Januvia 100 mg daily, Metformin 1000 mg BID, Glimepiride 2 mg daily  Used to take Trulicity (0.75 mg for about a month), was prescribed insulin but never took it    While inpatient:  BG target 100-180 mg/dl  Increase Lantus to 22 units SQ qHS  Increase Admelog to 7 units SQ TID before meals (Hold if NPO/skips meal)   Continue Admelog low dose correctional scale before meals, low dose at bedtime  Consistent carbohydrate diet, recommend RD consult if possible - counseled today regarding dietary balance, moderation of carbohydrates, not complete elimination of them  Check BG before meals and bedtime  Hypoglycemia protocol      Discharge Plan:  ·	Resume Metformin 1000 mg PO BID + add Trulicity 0.75 SQ weekly  ·	STOP Januvia (if Trulicity is started)  ·	STOP Glimepiride due to hypoglycemia at home   ·	Ensure she has glucometer, glucose test strips, lancets, alcohol swabs   ·	Followup with PCP, endocrinology, opthalmology and podiatry as outpatient  ·	Recommend dilated eye exam as outpatient  ·	If desiring to followup with [address], [address], [facility] [address], [phone]   ·	Or followup with prior endocrinologist    2. Hypertension  BP goal < 130/80  On Torsemide, Lisinopril  Titration per primary team    3. HLD  LDL target less than 70  Check fasting lipid panel - note that severely elevated triglycerides increase risk for pancreatitis when using GLP1RA, would not recommend re-starting Trulicity unless Trig less than 500  Continue Simvastatin 20 mg daily    4. Class III obesity   - BMI 57.6  - Would benefit from evaluation for bariatric surgery outpatient and medical therapy in the mean time   - Add GLP-1RA as above     If before 9AM or after 6PM, or on weekends/holidays, please call endocrine answering service for assistance ([phone]). For nonurgent matters email [email or web address] for assistance.     Ivis Coreas MD  Department of Endocrinology, diabetes and metabolism   Pager [phone]

## 2022-10-07 NOTE — PROVIDER CONTACT NOTE (OTHER) - ASSESSMENT
pt. in no acute distress, awake, alert and oriented x4, denies pain , afebrile, antibiotic tx maintained, RLQ JOCELYN drain, draining  scant serosanguinous, creamy drainage, LLQ post JOCELYN site moist w/  yellow tissue

## 2022-10-07 NOTE — PROGRESS NOTE ADULT - SUBJECTIVE AND OBJECTIVE BOX
HPI:       Patient is a 52 M with history of HTN, DM, and s/p ventral hernia repair w/ mesh (9/14), presents w/ fevers and drainage from her incision. Endocrinology consulted for diabetes management.     Home diabetes medications:   - Januvia 100 mg daily, Metformin 1000 mg BID, Glimepiride 2 mg daily  Used to take Trulicity (0.75 mg for about a month), was prescribed insulin but never took it    Inpatient diabetes medications:   - Glargine 20 units QHS  - Admelog 5 units TIDAC     Most recent HbA1C: 8.5      INTERVAL HPI/OVERNIGHT EVENTS:  Eating lunch that consists of chicken and vegetables.   Currently denies polydipsia, polyuria , visual changes, numbness in feet.  Very good appetite.   Told me that she was supposed to see an endocrinologist outpatient, but did not end up going due to insurance issue.     Review of systems:   CONSTITUTIONAL:  Feels well, good appetite  CARDIOVASCULAR:  Negative for chest pain or palpitations  RESPIRATORY:  Negative for cough, or SOB   GASTROINTESTINAL:  Negative for nausea, vomiting, or abdominal pain  GENITOURINARY:  Negative frequency, urgency or dysuria     CAPILLARY BLOOD GLUCOSE      POCT Blood Glucose.: 264 mg/dL (07 Oct 2022 12:11)  POCT Blood Glucose.: 252 mg/dL (07 Oct 2022 08:14)  POCT Blood Glucose.: 248 mg/dL (06 Oct 2022 21:34)  POCT Blood Glucose.: 196 mg/dL (06 Oct 2022 17:04)       MEDICATIONS  (STANDING):  acetaminophen     Tablet .. 650 milliGRAM(s) Oral every 6 hours  dextrose 50% Injectable 25 Gram(s) IV Push once  dextrose 50% Injectable 12.5 Gram(s) IV Push once  dextrose 50% Injectable 25 Gram(s) IV Push once  enoxaparin Injectable 40 milliGRAM(s) SubCutaneous every 24 hours  glucagon  Injectable 1 milliGRAM(s) IntraMuscular once  influenza   Vaccine 0.5 milliLiter(s) IntraMuscular once  insulin glargine Injectable (LANTUS) 22 Unit(s) SubCutaneous at bedtime  insulin lispro (ADMELOG) corrective regimen sliding scale   SubCutaneous at bedtime  insulin lispro (ADMELOG) corrective regimen sliding scale   SubCutaneous three times a day before meals  insulin lispro Injectable (ADMELOG) 7 Unit(s) SubCutaneous three times a day before meals  lisinopril 20 milliGRAM(s) Oral daily  piperacillin/tazobactam IVPB.. 3.375 Gram(s) IV Intermittent every 8 hours  simvastatin 20 milliGRAM(s) Oral at bedtime  torsemide 10 milliGRAM(s) Oral daily    MEDICATIONS  (PRN):  dextrose Oral Gel 15 Gram(s) Oral once PRN Blood Glucose LESS THAN 70 milliGRAM(s)/deciliter      PHYSICAL EXAM  Vital Signs Last 24 Hrs  T(C): 37.2 (07 Oct 2022 10:37), Max: 38 (06 Oct 2022 17:32)  T(F): 98.9 (07 Oct 2022 10:37), Max: 100.4 (06 Oct 2022 17:32)  HR: 94 (07 Oct 2022 10:37) (86 - 100)  BP: 118/81 (07 Oct 2022 10:37) (110/83 - 145/75)  BP(mean): --  RR: 20 (07 Oct 2022 10:37) (15 - 20)  SpO2: 93% (07 Oct 2022 10:37) (93% - 100%)    Parameters below as of 07 Oct 2022 10:37  Patient On (Oxygen Delivery Method): room air        GENERAL: female laying in bed in NAD  RESPIRATORY: nonlabored breathing, no accessory muscle use  Extremities: Warm, no edema in all 4 exts   NEURO: A&O X3    LABS:                        10.1   7.82  )-----------( 278      ( 07 Oct 2022 12:40 )             31.8     10-07    139  |  100  |  12  ----------------------------<  269<H>  4.0   |  29  |  0.53    Ca    9.5      07 Oct 2022 12:40  Phos  2.9     10-07  Mg     1.80     10-07

## 2022-10-07 NOTE — DISCHARGE NOTE PROVIDER - NSDCMRMEDTOKEN_GEN_ALL_CORE_FT
acetaminophen 500 mg oral tablet: 2 tab(s) orally every 6 hours  budesonide-formoterol 160 mcg-4.5 mcg/inh inhalation aerosol: 2 puff(s) inhaled 2 times a day  glimepiride 2 mg oral tablet: 1 tab(s) orally once a day  ibuprofen 600 mg oral tablet: 1 tab(s) orally every 6 hours  lisinopril 20 mg oral tablet: 1 tab(s) orally once a day  metFORMIN 1000 mg oral tablet: 1 tab(s) orally 2 times a day  oxyCODONE 5 mg oral tablet: 1 tab(s) orally every 6 hours, As Needed MDD:4 tabs  simvastatin 20 mg oral tablet: 1 tab(s) orally once a day (at bedtime)  torsemide 10 mg oral tablet: 1 tab(s) orally once a day  Trulicity Pen 0.75 mg/0.5 mL subcutaneous solution: 0.75 milligram(s) subcutaneously once a week   Ventolin HFA 90 mcg/inh inhalation aerosol: 2 puff(s) inhaled every 6 hours, As Needed -for shortness of breath and/or wheezing    acetaminophen 500 mg oral tablet: 2 tab(s) orally every 6 hours  budesonide-formoterol 160 mcg-4.5 mcg/inh inhalation aerosol: 2 puff(s) inhaled 2 times a day  ibuprofen 600 mg oral tablet: 1 tab(s) orally every 6 hours  lisinopril 20 mg oral tablet: 1 tab(s) orally once a day  metFORMIN 1000 mg oral tablet: 1 tab(s) orally 2 times a day  oxyCODONE 5 mg oral tablet: 1 tab(s) orally every 6 hours, As Needed MDD:4 tabs  simvastatin 20 mg oral tablet: 1 tab(s) orally once a day (at bedtime)  torsemide 10 mg oral tablet: 1 tab(s) orally once a day  Trulicity Pen 0.75 mg/0.5 mL subcutaneous solution: 0.75 milligram(s) subcutaneously once a week   Ventolin HFA 90 mcg/inh inhalation aerosol: 2 puff(s) inhaled every 6 hours, As Needed -for shortness of breath and/or wheezing    acetaminophen 500 mg oral tablet: 2 tab(s) orally every 6 hours  amoxicillin-clavulanate 500 mg-125 mg oral tablet: 1 tab(s) orally 2 times a day   budesonide-formoterol 160 mcg-4.5 mcg/inh inhalation aerosol: 2 puff(s) inhaled 2 times a day  ibuprofen 600 mg oral tablet: 1 tab(s) orally every 6 hours  lisinopril 20 mg oral tablet: 1 tab(s) orally once a day  metFORMIN 1000 mg oral tablet: 1 tab(s) orally 2 times a day  oxyCODONE 5 mg oral tablet: 1 tab(s) orally every 6 hours, As Needed MDD:4 tabs  simvastatin 20 mg oral tablet: 1 tab(s) orally once a day (at bedtime)  torsemide 10 mg oral tablet: 1 tab(s) orally once a day  Trulicity Pen 0.75 mg/0.5 mL subcutaneous solution: 0.75 milligram(s) subcutaneously once a week   Ventolin HFA 90 mcg/inh inhalation aerosol: 2 puff(s) inhaled every 6 hours, As Needed -for shortness of breath and/or wheezing

## 2022-10-07 NOTE — DISCHARGE NOTE PROVIDER - NSDCCPCAREPLAN_GEN_ALL_CORE_FT
PRINCIPAL DISCHARGE DIAGNOSIS  Diagnosis: Post-operative complication  Assessment and Plan of Treatment: WOUND CARE:  Please keep incisions clean and dry. Please do not Scrub or rub incisions. Do not use lotion or powder on incisions.   BATHING: Please do not submerge wound underwater. You may shower and/or sponge bathe.  ACTIVITY: No heavy lifting or straining. Otherwise, you may return to your usual level of physical activity. If you are taking narcotic pain medication (such as Percocet) DO NOT drive a car, operate machinery or make important decisions.  DIET: Return to your usual diet.  NOTIFY YOUR SURGEON IF: You have any bleeding that does not stop, any pus draining from your wound(s), any fever (over 100.4 F) or chills, persistent nausea/vomiting, persistent diarrhea, or if your pain is not controlled on your discharge pain medications.  FOLLOW-UP: Please follow up with your primary care physician in one week regarding your hospitalization. Please follow-up with your surgeon, within 7 days following discharge- please call to schedule an appointment.      SECONDARY DISCHARGE DIAGNOSES  Diagnosis: Diabetes mellitus  Assessment and Plan of Treatment: Continue consistent carbohydrate diet.  Monitor blood glucose levels throughout the day before meals and at bedtime.  Record blood sugars and bring to outpatient providers appointment in order to be reviewed by your doctor for management modifications.  Be aware of diabetes management symptoms including feeling cool and clammy may be related to low glucose levels.  Feeling hot and dry may indicate high glucose levels.  If  you feel these symptoms, check your blood sugar.  Make regular podiatry appointments in order to have feet checked for wounds and toe nails cut by a doctor to prevent infections. Follow-up with an opthalmologist for an eye exam, recommend dilated eye exam as outpatient.   - Resume Metformin 1000 mg PO BID + add Trulicity 0.75 SQ weekly  - STOP Januvia (if Trulicity is started)  - STOP Glimepiride        PRINCIPAL DISCHARGE DIAGNOSIS  Diagnosis: Post-operative complication  Assessment and Plan of Treatment: WOUND CARE:  Please keep incisions clean and dry. Please do not Scrub or rub incisions. Do not use lotion or powder on incisions.   Drain: You will be discharged with JOCELYN drains. You will need to empty them and record outputs accurately. This will be taught to you by the nursing staff. Please do not remove the JOCELYN drains. They will be removed in the office. Please bring to the office accurate records of output.  BATHING: Please do not submerge wound underwater. You may shower and/or sponge bathe.  ACTIVITY: No heavy lifting or straining. Otherwise, you may return to your usual level of physical activity. If you are taking narcotic pain medication (such as Percocet) DO NOT drive a car, operate machinery or make important decisions.  DIET: Return to your usual diet.  NOTIFY YOUR SURGEON IF: You have any bleeding that does not stop, any pus draining from your wound(s), any fever (over 100.4 F) or chills, persistent nausea/vomiting, persistent diarrhea, or if your pain is not controlled on your discharge pain medications.  FOLLOW-UP: Please follow up with your primary care physician in one week regarding your hospitalization. Please follow-up with your surgeon, within 7 days following discharge- please call to schedule an appointment.      SECONDARY DISCHARGE DIAGNOSES  Diagnosis: Diabetes mellitus  Assessment and Plan of Treatment: Continue consistent carbohydrate diet.  Monitor blood glucose levels throughout the day before meals and at bedtime.  Record blood sugars and bring to outpatient providers appointment in order to be reviewed by your doctor for management modifications.  Be aware of diabetes management symptoms including feeling cool and clammy may be related to low glucose levels.  Feeling hot and dry may indicate high glucose levels.  If  you feel these symptoms, check your blood sugar.  Make regular podiatry appointments in order to have feet checked for wounds and toe nails cut by a doctor to prevent infections. Follow-up with an opthalmologist for an eye exam, recommend dilated eye exam as outpatient.   - Resume Metformin 1000 mg PO BID + add Trulicity 0.75 SQ weekly  - STOP Januvia (if Trulicity is started)  - STOP Glimepiride

## 2022-10-07 NOTE — DIETITIAN INITIAL EVALUATION ADULT - PERTINENT LABORATORY DATA
10-06    137  |  98  |  12  ----------------------------<  216<H>  3.6   |  28  |  0.52    Ca    9.4      06 Oct 2022 06:00  Phos  2.8     10-06  Mg     1.70     10-06    POCT Blood Glucose.: 264 mg/dL (10-07-22 @ 12:11)  A1C with Estimated Average Glucose Result: 8.5 % (09-12-22 @ 00:19)  A1C with Estimated Average Glucose Result: 10.4 % (10-09-21 @ 06:59)  A1C with Estimated Average Glucose Result: 10.3 % (10-07-21 @ 19:42)

## 2022-10-07 NOTE — PROGRESS NOTE ADULT - ASSESSMENT
51YO F hx HTN, DM, and s/p ventral hernia repair w/ mesh (9/14), presents w/ fevers and drainage from her incision.      -Cont zosyn  -Regular diet  -home medications  -vte ppx  - d/c today with 2wk augmentin    B Team Surgery  [id number]

## 2022-10-07 NOTE — DISCHARGE NOTE PROVIDER - NSDCFUADDAPPT_GEN_ALL_CORE_FT
Please follow-up with your surgeon, Dr. Yeh within 7 days following discharge- please call to schedule an appointment.    Please follow-up with endocrinologist or PCP within 2 weeks following discharge for diabetes management.     [facility] - [address] [place] [address], [address] [facility], [facility] [address], [phone]

## 2022-10-07 NOTE — DIETITIAN INITIAL EVALUATION ADULT - ORAL INTAKE PTA/DIET HISTORY
Pt states he was eating fairly well PTA. She states she does not follow a consistent diet nor take her medications.

## 2022-10-07 NOTE — PROGRESS NOTE ADULT - SUBJECTIVE AND OBJECTIVE BOX
B Team Surgery Daily Progress Note    Vital Signs Last 24 Hrs  T(C): 37.2 (07 Oct 2022 10:37), Max: 38 (06 Oct 2022 17:32)  T(F): 98.9 (07 Oct 2022 10:37), Max: 100.4 (06 Oct 2022 17:32)  HR: 94 (07 Oct 2022 10:37) (86 - 100)  BP: 118/81 (07 Oct 2022 10:37) (110/83 - 145/75)  RR: 20 (07 Oct 2022 10:37) (15 - 20)  SpO2: 93% (07 Oct 2022 10:37) (93% - 100%)    Parameters below as of 07 Oct 2022 10:37  Patient On (Oxygen Delivery Method): room air          SUBJECTIVE: Pt seen and examined on morning rounds. No acute events overnight. Denies SOB, palpitations, dizziness, chest pain, fever, chills, N/V/D.       General Appearance: Appears well, NAD  Neck: Supple  Chest: Equal expansion bilaterally, equal breath sounds  CV: Pulse regular presently  Abdomen: Soft, nontense, appropriate incisional tenderness, midline incision clean and dry and intact. B/L JOCELYN drains with serous output  Extremities: Grossly symmetric, SCD's in place     I&O's Summary    06 Oct 2022 07:01  -  07 Oct 2022 07:00  --------------------------------------------------------  IN: 1020 mL / OUT: 1587 mL / NET: -567 mL    07 Oct 2022 07:01  -  07 Oct 2022 11:51  --------------------------------------------------------  IN: 0 mL / OUT: 817.5 mL / NET: -817.5 mL      I&O's Detail    06 Oct 2022 07:01  -  07 Oct 2022 07:00  --------------------------------------------------------  IN:    IV PiggyBack: 300 mL    Oral Fluid: 720 mL  Total IN: 1020 mL    OUT:    Bulb (mL): 90 mL    Bulb (mL): 47 mL    Voided (mL): 1450 mL  Total OUT: 1587 mL    Total NET: -567 mL      07 Oct 2022 07:01  -  07 Oct 2022 11:51  --------------------------------------------------------  IN:  Total IN: 0 mL    OUT:    Bulb (mL): 12.5 mL    Bulb (mL): 5 mL    Voided (mL): 800 mL  Total OUT: 817.5 mL    Total NET: -817.5 mL          MEDICATIONS  (STANDING):  acetaminophen     Tablet .. 650 milliGRAM(s) Oral every 6 hours  dextrose 5%. 1000 milliLiter(s) (50 mL/Hr) IV Continuous <Continuous>  dextrose 5%. 1000 milliLiter(s) (100 mL/Hr) IV Continuous <Continuous>  dextrose 50% Injectable 25 Gram(s) IV Push once  dextrose 50% Injectable 12.5 Gram(s) IV Push once  dextrose 50% Injectable 25 Gram(s) IV Push once  enoxaparin Injectable 40 milliGRAM(s) SubCutaneous every 24 hours  glucagon  Injectable 1 milliGRAM(s) IntraMuscular once  influenza   Vaccine 0.5 milliLiter(s) IntraMuscular once  insulin glargine Injectable (LANTUS) 22 Unit(s) SubCutaneous at bedtime  insulin lispro (ADMELOG) corrective regimen sliding scale   SubCutaneous at bedtime  insulin lispro (ADMELOG) corrective regimen sliding scale   SubCutaneous three times a day before meals  insulin lispro Injectable (ADMELOG) 7 Unit(s) SubCutaneous three times a day before meals  lisinopril 20 milliGRAM(s) Oral daily  piperacillin/tazobactam IVPB.. 3.375 Gram(s) IV Intermittent every 8 hours  simvastatin 20 milliGRAM(s) Oral at bedtime  torsemide 10 milliGRAM(s) Oral daily    MEDICATIONS  (PRN):  dextrose Oral Gel 15 Gram(s) Oral once PRN Blood Glucose LESS THAN 70 milliGRAM(s)/deciliter      LABS:                        10.3   9.44  )-----------( 248      ( 06 Oct 2022 06:00 )             31.4     10-06    137  |  98  |  12  ----------------------------<  216<H>  3.6   |  28  |  0.52    Ca    9.4      06 Oct 2022 06:00  Phos  2.8     10-06  Mg     1.70     10-06            RADIOLOGY & ADDITIONAL STUDIES:

## 2022-10-07 NOTE — DIETITIAN INITIAL EVALUATION ADULT - PERTINENT MEDS FT
MEDICATIONS  (STANDING):  acetaminophen     Tablet .. 650 milliGRAM(s) Oral every 6 hours  dextrose 50% Injectable 25 Gram(s) IV Push once  dextrose 50% Injectable 12.5 Gram(s) IV Push once  dextrose 50% Injectable 25 Gram(s) IV Push once  enoxaparin Injectable 40 milliGRAM(s) SubCutaneous every 24 hours  glucagon  Injectable 1 milliGRAM(s) IntraMuscular once  influenza   Vaccine 0.5 milliLiter(s) IntraMuscular once  insulin glargine Injectable (LANTUS) 22 Unit(s) SubCutaneous at bedtime  insulin lispro (ADMELOG) corrective regimen sliding scale   SubCutaneous at bedtime  insulin lispro (ADMELOG) corrective regimen sliding scale   SubCutaneous three times a day before meals  insulin lispro Injectable (ADMELOG) 7 Unit(s) SubCutaneous three times a day before meals  lisinopril 20 milliGRAM(s) Oral daily  piperacillin/tazobactam IVPB.. 3.375 Gram(s) IV Intermittent every 8 hours  simvastatin 20 milliGRAM(s) Oral at bedtime  torsemide 10 milliGRAM(s) Oral daily    MEDICATIONS  (PRN):  dextrose Oral Gel 15 Gram(s) Oral once PRN Blood Glucose LESS THAN 70 milliGRAM(s)/deciliter

## 2022-10-08 ENCOUNTER — TRANSCRIPTION ENCOUNTER (OUTPATIENT)
Age: 52
End: 2022-10-08

## 2022-10-08 VITALS
DIASTOLIC BLOOD PRESSURE: 78 MMHG | HEART RATE: 86 BPM | TEMPERATURE: 98 F | RESPIRATION RATE: 18 BRPM | OXYGEN SATURATION: 96 % | SYSTOLIC BLOOD PRESSURE: 118 MMHG

## 2022-10-08 LAB — GLUCOSE BLDC GLUCOMTR-MCNC: 190 MG/DL — HIGH (ref 70–99)

## 2022-10-08 RX ORDER — DULAGLUTIDE 4.5 MG/.5ML
0.75 INJECTION, SOLUTION SUBCUTANEOUS
Qty: 4 | Refills: 0
Start: 2022-10-08 | End: 2022-11-06

## 2022-10-08 RX ADMIN — LISINOPRIL 20 MILLIGRAM(S): 2.5 TABLET ORAL at 05:57

## 2022-10-08 RX ADMIN — ENOXAPARIN SODIUM 40 MILLIGRAM(S): 100 INJECTION SUBCUTANEOUS at 05:57

## 2022-10-08 RX ADMIN — Medication 7 UNIT(S): at 08:21

## 2022-10-08 RX ADMIN — Medication 650 MILLIGRAM(S): at 05:57

## 2022-10-08 RX ADMIN — Medication 10 MILLIGRAM(S): at 05:57

## 2022-10-08 RX ADMIN — PIPERACILLIN AND TAZOBACTAM 25 GRAM(S): 4; .5 INJECTION, POWDER, LYOPHILIZED, FOR SOLUTION INTRAVENOUS at 00:05

## 2022-10-08 RX ADMIN — Medication 650 MILLIGRAM(S): at 00:05

## 2022-10-08 RX ADMIN — Medication 1: at 08:21

## 2022-10-08 NOTE — DISCHARGE NOTE NURSING/CASE MANAGEMENT/SOCIAL WORK - NSPROMEDSRETURNEDYESNO_GEN_A_NUR
Patient has no medications with her. Patient verbalizes that she did not bring any medications with her.

## 2022-10-08 NOTE — DISCHARGE NOTE NURSING/CASE MANAGEMENT/SOCIAL WORK - PATIENT PORTAL LINK FT
You can access the FollowMyHealth Patient Portal offered by [facility] by registering at the following website: [email or web address]. By joining [employer]’s FollowMyHealth portal, you will also be able to view your health information using other applications (apps) compatible with our system.

## 2022-10-08 NOTE — DISCHARGE NOTE NURSING/CASE MANAGEMENT/SOCIAL WORK - NSDCPEFALRISK_GEN_ALL_CORE
For information on Fall & Injury Prevention, visit: [email or web address] OR  [email or web address] OR  https://www.cdc.gov/steadi/patient.html

## 2022-10-08 NOTE — PROGRESS NOTE ADULT - SUBJECTIVE AND OBJECTIVE BOX
PROGRESS NOTE:   Authored by Sulaiman Fay MD  Pager: [facility] [phone]; [phone]    Patient is a 51y old  Female who presents with a chief complaint of cellulitis (08 Oct 2021 15:54)      SUBJECTIVE / OVERNIGHT EVENTS:  No acute events overnight. Pt still having swelling of her LLE with blister that is unchanged from last night    ADDITIONAL REVIEW OF SYSTEMS:    MEDICATIONS  (STANDING):  ammonium lactate 12% Lotion 1 Application(s) Topical two times a day  AQUAPHOR (petrolatum Ointment) 1 Application(s) Topical three times a day  budesonide 160 MICROgram(s)/formoterol 4.5 MICROgram(s) Inhaler 2 Puff(s) Inhalation two times a day  dextrose 40% Gel 15 Gram(s) Oral once  dextrose 5%. 1000 milliLiter(s) (50 mL/Hr) IV Continuous <Continuous>  dextrose 5%. 1000 milliLiter(s) (100 mL/Hr) IV Continuous <Continuous>  dextrose 50% Injectable 25 Gram(s) IV Push once  dextrose 50% Injectable 12.5 Gram(s) IV Push once  dextrose 50% Injectable 25 Gram(s) IV Push once  enoxaparin Injectable 40 milliGRAM(s) SubCutaneous daily  glucagon  Injectable 1 milliGRAM(s) IntraMuscular once  influenza   Vaccine 0.5 milliLiter(s) IntraMuscular once  insulin glargine Injectable (LANTUS) 4 Unit(s) SubCutaneous at bedtime  insulin lispro (ADMELOG) corrective regimen sliding scale   SubCutaneous three times a day before meals  insulin lispro (ADMELOG) corrective regimen sliding scale   SubCutaneous at bedtime  lisinopril 20 milliGRAM(s) Oral daily  mupirocin 2% Ointment 1 Application(s) Topical two times a day  simvastatin 20 milliGRAM(s) Oral at bedtime  torsemide 10 milliGRAM(s) Oral daily  triamcinolone 0.1% Ointment 1 Application(s) Topical two times a day  vancomycin  IVPB 1500 milliGRAM(s) IV Intermittent every 12 hours    MEDICATIONS  (PRN):  acetaminophen   Tablet .. 650 milliGRAM(s) Oral every 6 hours PRN Mild Pain (1 - 3), Moderate Pain (4 - 6)  diphenhydrAMINE 25 milliGRAM(s) Oral every 4 hours PRN Rash and/or Itching  ketorolac   Injectable 15 milliGRAM(s) IV Push every 6 hours PRN Severe Pain (7 - 10)      CAPILLARY BLOOD GLUCOSE      POCT Blood Glucose.: 319 mg/dL (09 Oct 2021 08:54)  POCT Blood Glucose.: 354 mg/dL (08 Oct 2021 22:23)  POCT Blood Glucose.: 229 mg/dL (08 Oct 2021 17:45)  POCT Blood Glucose.: 277 mg/dL (08 Oct 2021 12:08)    I&O's Summary      PHYSICAL EXAM:  Vital Signs Last 24 Hrs  T(C): 36.8 (09 Oct 2021 05:40), Max: 36.9 (08 Oct 2021 21:23)  T(F): 98.3 (09 Oct 2021 05:40), Max: 98.4 (08 Oct 2021 21:23)  HR: 79 (09 Oct 2021 07:30) (79 - 87)  BP: 131/83 (09 Oct 2021 05:40) (131/83 - 132/65)  BP(mean): --  RR: 18 (09 Oct 2021 05:40) (18 - 18)  SpO2: 96% (09 Oct 2021 07:30) (92% - 98%)    GENERAL: No acute distress, well-developed  HEAD:  Atraumatic, Normocephalic  EYES: EOMI, PERRLA, conjunctiva and sclera clear  NECK: Supple, no lymphadenopathy, no JVD  CHEST/LUNG: CTAB; No wheezes, rales, or rhonchi  HEART: Regular rate and rhythm; No murmurs, rubs, or gallops  ABDOMEN: Soft, non-tender, non-distended; normal bowel sounds, no organomegaly  EXTREMITIES:  2+ peripheral pulses b/  NEUROLOGY: A&O x 3, no focal deficits  SKIN: Skin: diffuse scaling pruritic rash as well as focal blistering near cellulitic lesion    LABS:                        12.1   6.47  )-----------( 339      ( 09 Oct 2021 06:59 )             36.0     10-09    138  |  93<L>  |  11  ----------------------------<  280<H>  3.9   |  32<H>  |  0.52    Ca    9.3      09 Oct 2021 06:59  Phos  3.4     10-09  Mg     1.70     10-09    TPro  7.5  /  Alb  4.3  /  TBili  0.4  /  DBili  x   /  AST  25  /  ALT  31  /  AlkPhos  88  10-09              Culture - Blood (collected 08 Oct 2021 00:35)  Source: .Blood Blood-Venous  Preliminary Report (09 Oct 2021 01:02):    No growth to date.    Culture - Blood (collected 08 Oct 2021 00:35)  Source: .Blood Blood-Peripheral  Preliminary Report (09 Oct 2021 01:02):    No growth to date.        RADIOLOGY & ADDITIONAL TESTS:  < from: US Duplex Venous Lower Ext Ltd, Left (10.07.21 @ 21:25) >  IMPRESSION:  No evidence of left lower extremity deep venous thrombosis.    < end of copied text >   [No Acute Distress] : no acute distress [No Respiratory Distress] : no respiratory distress  [Normal Rate] : normal rate  [Declined Breast Exam] : declined breast exam  [No Edema] : there was no peripheral edema [Soft] : abdomen soft [Non Tender] : non-tender [No CVA Tenderness] : no CVA  tenderness [No Joint Swelling] : no joint swelling [Normal Gait] : normal gait [Alert and Oriented x3] : oriented to person, place, and time

## 2022-10-08 NOTE — DISCHARGE NOTE NURSING/CASE MANAGEMENT/SOCIAL WORK - NSDCPNINST_GEN_ALL_CORE
Call MD or come to ER for fever/chills, pain not relieved with pain medicines, difficulty in breathing, persistent nausea/vomiting, drainage from the incision or difficulty in urinating.

## 2022-10-08 NOTE — DISCHARGE NOTE NURSING/CASE MANAGEMENT/SOCIAL WORK - NSDCFUADDAPPT_GEN_ALL_CORE_FT
Please follow-up with your surgeon, Dr. Yeh within 7 days following discharge- please call to schedule an appointment.    Please follow-up with endocrinologist or PCP within 2 weeks following discharge for diabetes management.     [facility] - [address] [place] [address], [address] [facility], [facility] [address], [phone]

## 2022-10-08 NOTE — PROGRESS NOTE ADULT - ASSESSMENT
53YO F hx HTN, DM, and s/p ventral hernia repair w/ mesh (9/14), presents w/ fevers and drainage from her incision.    -Regular diet  -home medications  -DVT ppx  - d/c today with 2wk augmentin    B Team Surgery  [id number]

## 2022-10-08 NOTE — PROGRESS NOTE ADULT - SUBJECTIVE AND OBJECTIVE BOX
Surgery [facility] Team Daily Progress Note   FRANCESCA MCCORD | [id number]  --------------------------------------------------------------------------------------------------------------------  SUBJECTIVE / 24H EVENTS  Patient seen and examined on morning rounds. JOCELYN L drain fell o/n.  --------------------------------------------------------------------------------------------------------------------  OBJECTIVE:    VITAL SIGNS:  T(C): 37.1 (10-08-22 @ 05:55), Max: 37.2 (10-07-22 @ 10:37)  HR: 86 (10-08-22 @ 05:55) (85 - 96)  BP: 126/73 (10-08-22 @ 05:55) (107/61 - 128/85)  RR: 18 (10-08-22 @ 05:55) (17 - 20)  SpO2: 96% (10-08-22 @ 05:55) (93% - 100%)  Daily     Daily   POCT Blood Glucose.: 190 mg/dL (10-08-22 @ 08:03)  POCT Blood Glucose.: 271 mg/dL (10-07-22 @ 21:09)  POCT Blood Glucose.: 242 mg/dL (10-07-22 @ 16:56)      PHYSICAL EXAM:  Gen: NAD  LS: Respirations unlabored.   Card: RRR.   GI: Soft. Nontender. Nondistended. R JOCELYN seen attached to gown but not in pt.  Ext: Warm, well perfused      10-07-22 @ 07:01  -  10-08-22 @ 07:00  --------------------------------------------------------  IN:    IV PiggyBack: 100 mL    Oral Fluid: 1360 mL  Total IN: 1460 mL    OUT:    Bulb (mL): 72.5 mL    Bulb (mL): 22 mL    Voided (mL): 2150 mL  Total OUT: 2244.5 mL    Total NET: -784.5 mL      10-08-22 @ 07:01  -  10-08-22 @ 09:27  --------------------------------------------------------  IN:    IV PiggyBack: 400 mL  Total IN: 400 mL    OUT:  Total OUT: 0 mL    Total NET: 400 mL          LAB VALUES:  10-07    139  |  100  |  12  ----------------------------<  269<H>  4.0   |  29  |  0.53    Ca    9.5      07 Oct 2022 12:40  Phos  2.9     10-07  Mg     1.80     10-07                                 10.1   7.82  )-----------( 278      ( 07 Oct 2022 12:40 )             31.8                   MICROBIOLOGY:    No new microbiology data for review.     RADIOLOGY:    No new radiographic images for review.    MEDICATIONS  (STANDING):  acetaminophen     Tablet .. 650 milliGRAM(s) Oral every 6 hours  dextrose 50% Injectable 25 Gram(s) IV Push once  dextrose 50% Injectable 12.5 Gram(s) IV Push once  dextrose 50% Injectable 25 Gram(s) IV Push once  enoxaparin Injectable 40 milliGRAM(s) SubCutaneous every 24 hours  glucagon  Injectable 1 milliGRAM(s) IntraMuscular once  insulin glargine Injectable (LANTUS) 22 Unit(s) SubCutaneous at bedtime  insulin lispro (ADMELOG) corrective regimen sliding scale   SubCutaneous at bedtime  insulin lispro (ADMELOG) corrective regimen sliding scale   SubCutaneous three times a day before meals  insulin lispro Injectable (ADMELOG) 7 Unit(s) SubCutaneous three times a day before meals  lisinopril 20 milliGRAM(s) Oral daily  piperacillin/tazobactam IVPB.. 3.375 Gram(s) IV Intermittent every 8 hours  simvastatin 20 milliGRAM(s) Oral at bedtime  torsemide 10 milliGRAM(s) Oral daily    MEDICATIONS  (PRN):  dextrose Oral Gel 15 Gram(s) Oral once PRN Blood Glucose LESS THAN 70 milliGRAM(s)/deciliter

## 2022-10-10 LAB
CULTURE RESULTS: SIGNIFICANT CHANGE UP
CULTURE RESULTS: SIGNIFICANT CHANGE UP
SPECIMEN SOURCE: SIGNIFICANT CHANGE UP
SPECIMEN SOURCE: SIGNIFICANT CHANGE UP

## 2022-10-11 ENCOUNTER — APPOINTMENT (OUTPATIENT)
Dept: TRAUMA SURGERY | Facility: HOSPITAL | Age: 52
End: 2022-10-11

## 2022-10-14 ENCOUNTER — APPOINTMENT (OUTPATIENT)
Dept: ENDOCRINOLOGY | Facility: CLINIC | Age: 52
End: 2022-10-14

## 2022-10-25 ENCOUNTER — APPOINTMENT (OUTPATIENT)
Dept: TRAUMA SURGERY | Facility: HOSPITAL | Age: 52
End: 2022-10-25
Payer: MEDICAID

## 2022-10-25 VITALS
HEIGHT: 60 IN | TEMPERATURE: 97.9 F | BODY MASS INDEX: 57.52 KG/M2 | DIASTOLIC BLOOD PRESSURE: 60 MMHG | SYSTOLIC BLOOD PRESSURE: 127 MMHG | WEIGHT: 293 LBS | HEART RATE: 97 BPM

## 2022-10-25 PROCEDURE — 99024 POSTOP FOLLOW-UP VISIT: CPT

## 2022-10-25 NOTE — HISTORY OF PRESENT ILLNESS
[de-identified] : Mrs. Boone is s/p open ventral hernia repair with mesh. She was readmitted after surgery for a wound infection and placed on antibiotics.  She presents today for follow up noting increased wound drainage from the midline incision as well as subjective chills. Otherwise she is eating well, maintaining euglycemia (DMII) Her pain has resolved and is passing gas an having normal BMs.

## 2022-10-25 NOTE — PLAN
[FreeTextEntry1] : Mrs. Boone is recovering slowly but well. Her wound has delayed healing and some cutaneous separation but no lester infection. She continued her postop abx. She will follow up with us in 2-4 weeks to follow her wound progress or earlier should she notice redness or puss in the incision. \par \par Isael Chung MD\par Acute and Critical Care Surgery\par

## 2022-10-25 NOTE — PHYSICAL EXAM
[de-identified] : Well ,comfortable. [de-identified] : Midline incision with opening at umbiliucs and at cuadal aspect. Some fibrinous tissue noted but skin without cellulitic change or puss expressed on palpation.

## 2022-12-08 NOTE — DIETITIAN INITIAL EVALUATION ADULT. - PATIENT PROFILE REVIEWED
Health Call Center    Phone Message    May a detailed message be left on voicemail: yes     Reason for Call: Other: Bud from [facility] is calling for Dr. Nunn. They are wondering if the patient had an ERG and would like the notes faxed to [phone].  Please call.  Thank you.      Action Taken: Message routed to:  [facility] ([facility] Ophthalmology    Travel Screening: Not Applicable                                                                         yes

## 2022-12-16 ENCOUNTER — INPATIENT (INPATIENT)
Facility: HOSPITAL | Age: 52
LOS: 4 days | Discharge: HOME | End: 2022-12-21
Attending: INTERNAL MEDICINE | Admitting: INTERNAL MEDICINE

## 2022-12-16 VITALS
SYSTOLIC BLOOD PRESSURE: 130 MMHG | TEMPERATURE: 98 F | RESPIRATION RATE: 16 BRPM | HEART RATE: 80 BPM | OXYGEN SATURATION: 98 % | DIASTOLIC BLOOD PRESSURE: 108 MMHG

## 2022-12-16 DIAGNOSIS — Z98.890 OTHER SPECIFIED POSTPROCEDURAL STATES: Chronic | ICD-10-CM

## 2022-12-16 LAB
APTT BLD: 25.7 SEC — LOW (ref 27–36.3)
BASE EXCESS BLDV CALC-SCNC: 7.7 MMOL/L — HIGH (ref -2–3)
BASOPHILS # BLD AUTO: 0.04 K/UL — SIGNIFICANT CHANGE UP (ref 0–0.2)
BASOPHILS NFR BLD AUTO: 0.5 % — SIGNIFICANT CHANGE UP (ref 0–2)
BLOOD GAS VENOUS COMPREHENSIVE RESULT: SIGNIFICANT CHANGE UP
CHLORIDE BLDV-SCNC: 100 MMOL/L — SIGNIFICANT CHANGE UP (ref 96–108)
CO2 BLDV-SCNC: 35.8 MMOL/L — HIGH (ref 22–26)
EOSINOPHIL # BLD AUTO: 0.02 K/UL — SIGNIFICANT CHANGE UP (ref 0–0.5)
EOSINOPHIL NFR BLD AUTO: 0.2 % — SIGNIFICANT CHANGE UP (ref 0–6)
GAS PNL BLDV: 136 MMOL/L — SIGNIFICANT CHANGE UP (ref 136–145)
GAS PNL BLDV: SIGNIFICANT CHANGE UP
GLUCOSE BLDV-MCNC: 223 MG/DL — HIGH (ref 70–99)
HCO3 BLDV-SCNC: 34 MMOL/L — HIGH (ref 22–29)
HCT VFR BLD CALC: 35.9 % — SIGNIFICANT CHANGE UP (ref 34.5–45)
HCT VFR BLDA CALC: 38 % — SIGNIFICANT CHANGE UP (ref 34.5–46.5)
HGB BLD CALC-MCNC: 12.5 G/DL — SIGNIFICANT CHANGE UP (ref 11.5–15.5)
HGB BLD-MCNC: 11.2 G/DL — LOW (ref 11.5–15.5)
IANC: 5.73 K/UL — SIGNIFICANT CHANGE UP (ref 1.8–7.4)
IMM GRANULOCYTES NFR BLD AUTO: 0.4 % — SIGNIFICANT CHANGE UP (ref 0–0.9)
INR BLD: 1.12 RATIO — SIGNIFICANT CHANGE UP (ref 0.88–1.16)
LACTATE BLDV-MCNC: 1.7 MMOL/L — SIGNIFICANT CHANGE UP (ref 0.5–2)
LYMPHOCYTES # BLD AUTO: 1.99 K/UL — SIGNIFICANT CHANGE UP (ref 1–3.3)
LYMPHOCYTES # BLD AUTO: 24.6 % — SIGNIFICANT CHANGE UP (ref 13–44)
MCHC RBC-ENTMCNC: 23.5 PG — LOW (ref 27–34)
MCHC RBC-ENTMCNC: 31.2 GM/DL — LOW (ref 32–36)
MCV RBC AUTO: 75.4 FL — LOW (ref 80–100)
MONOCYTES # BLD AUTO: 0.29 K/UL — SIGNIFICANT CHANGE UP (ref 0–0.9)
MONOCYTES NFR BLD AUTO: 3.6 % — SIGNIFICANT CHANGE UP (ref 2–14)
NEUTROPHILS # BLD AUTO: 5.73 K/UL — SIGNIFICANT CHANGE UP (ref 1.8–7.4)
NEUTROPHILS NFR BLD AUTO: 70.7 % — SIGNIFICANT CHANGE UP (ref 43–77)
NRBC # BLD: 0 /100 WBCS — SIGNIFICANT CHANGE UP (ref 0–0)
NRBC # FLD: 0.02 K/UL — HIGH (ref 0–0)
PCO2 BLDV: 55 MMHG — HIGH (ref 39–42)
PH BLDV: 7.4 — SIGNIFICANT CHANGE UP (ref 7.32–7.43)
PLATELET # BLD AUTO: 253 K/UL — SIGNIFICANT CHANGE UP (ref 150–400)
PO2 BLDV: 28 MMHG — SIGNIFICANT CHANGE UP
POTASSIUM BLDV-SCNC: 4 MMOL/L — SIGNIFICANT CHANGE UP (ref 3.5–5.1)
PROTHROM AB SERPL-ACNC: 13 SEC — SIGNIFICANT CHANGE UP (ref 10.5–13.4)
RBC # BLD: 4.76 M/UL — SIGNIFICANT CHANGE UP (ref 3.8–5.2)
RBC # FLD: 16.9 % — HIGH (ref 10.3–14.5)
SAO2 % BLDV: 41.6 % — SIGNIFICANT CHANGE UP
WBC # BLD: 8.1 K/UL — SIGNIFICANT CHANGE UP (ref 3.8–10.5)
WBC # FLD AUTO: 8.1 K/UL — SIGNIFICANT CHANGE UP (ref 3.8–10.5)

## 2022-12-16 PROCEDURE — 99285 EMERGENCY DEPT VISIT HI MDM: CPT | Mod: 25

## 2022-12-16 PROCEDURE — 36000 PLACE NEEDLE IN VEIN: CPT

## 2022-12-16 RX ORDER — SODIUM CHLORIDE 9 MG/ML
1000 INJECTION INTRAMUSCULAR; INTRAVENOUS; SUBCUTANEOUS ONCE
Refills: 0 | Status: COMPLETED | OUTPATIENT
Start: 2022-12-16 | End: 2022-12-16

## 2022-12-16 RX ORDER — MORPHINE SULFATE 50 MG/1
4 CAPSULE, EXTENDED RELEASE ORAL ONCE
Refills: 0 | Status: DISCONTINUED | OUTPATIENT
Start: 2022-12-16 | End: 2022-12-16

## 2022-12-16 RX ORDER — ONDANSETRON 8 MG/1
4 TABLET, FILM COATED ORAL ONCE
Refills: 0 | Status: COMPLETED | OUTPATIENT
Start: 2022-12-16 | End: 2022-12-16

## 2022-12-16 RX ADMIN — ONDANSETRON 4 MILLIGRAM(S): 8 TABLET, FILM COATED ORAL at 23:38

## 2022-12-16 RX ADMIN — MORPHINE SULFATE 4 MILLIGRAM(S): 50 CAPSULE, EXTENDED RELEASE ORAL at 23:38

## 2022-12-16 RX ADMIN — SODIUM CHLORIDE 1000 MILLILITER(S): 9 INJECTION INTRAMUSCULAR; INTRAVENOUS; SUBCUTANEOUS at 23:47

## 2022-12-16 NOTE — ED ADULT NURSE NOTE - OBJECTIVE STATEMENT
Lindsay RN: 52 yof present A&Ox4, c/o 10/10 abdominal pain x1 day and feeling constipated x1. Last bowel movement was on Wednesday. PMHx asthma, DM. Respirations even and unlabored, abdomen tender upon palpation and distended, pt states abdomen has become more distended over the past week. Respirations even and unlabored, sating 100% on RA. Pt denies any chest pain, dyspnea, dizziness, vomiting, diarrhea, or fevers. Unable to gain IV access, US guided 18g placed in L forearm by MD. Labs sent per order. Medication administered per eMAR. No acute distress noted upon leaving room. bed in lowest position, side rails up, call bell in hand, safety maintained. Report given to primary RN.

## 2022-12-16 NOTE — ED ADULT TRIAGE NOTE - CHIEF COMPLAINT QUOTE
Pt c/o abdominal pain that started today. Pt says over the last couple of days was constipated. Says had a croissant and tea when started having the right upper abdominal pain. Endorsing nausea. Says "I feel more distended". Phx: HTN, DM, asthma. .

## 2022-12-17 DIAGNOSIS — K56.609 UNSPECIFIED INTESTINAL OBSTRUCTION, UNSPECIFIED AS TO PARTIAL VERSUS COMPLETE OBSTRUCTION: ICD-10-CM

## 2022-12-17 LAB
ALBUMIN SERPL ELPH-MCNC: 4.8 G/DL — SIGNIFICANT CHANGE UP (ref 3.3–5)
ALP SERPL-CCNC: 76 U/L — SIGNIFICANT CHANGE UP (ref 40–120)
ALT FLD-CCNC: 18 U/L — SIGNIFICANT CHANGE UP (ref 4–33)
ANION GAP SERPL CALC-SCNC: 12 MMOL/L — SIGNIFICANT CHANGE UP (ref 7–14)
AST SERPL-CCNC: 23 U/L — SIGNIFICANT CHANGE UP (ref 4–32)
BILIRUB SERPL-MCNC: 0.4 MG/DL — SIGNIFICANT CHANGE UP (ref 0.2–1.2)
BLD GP AB SCN SERPL QL: NEGATIVE — SIGNIFICANT CHANGE UP
BUN SERPL-MCNC: 14 MG/DL — SIGNIFICANT CHANGE UP (ref 7–23)
CALCIUM SERPL-MCNC: 10.3 MG/DL — SIGNIFICANT CHANGE UP (ref 8.4–10.5)
CHLORIDE SERPL-SCNC: 97 MMOL/L — LOW (ref 98–107)
CO2 SERPL-SCNC: 30 MMOL/L — SIGNIFICANT CHANGE UP (ref 22–31)
CREAT SERPL-MCNC: 0.46 MG/DL — LOW (ref 0.5–1.3)
EGFR: 116 ML/MIN/1.73M2 — SIGNIFICANT CHANGE UP
FLUAV AG NPH QL: SIGNIFICANT CHANGE UP
FLUBV AG NPH QL: SIGNIFICANT CHANGE UP
GLUCOSE BLDC GLUCOMTR-MCNC: 139 MG/DL — HIGH (ref 70–99)
GLUCOSE BLDC GLUCOMTR-MCNC: 153 MG/DL — HIGH (ref 70–99)
GLUCOSE BLDC GLUCOMTR-MCNC: 157 MG/DL — HIGH (ref 70–99)
GLUCOSE BLDC GLUCOMTR-MCNC: 165 MG/DL — HIGH (ref 70–99)
GLUCOSE BLDC GLUCOMTR-MCNC: 188 MG/DL — HIGH (ref 70–99)
GLUCOSE BLDC GLUCOMTR-MCNC: 245 MG/DL — HIGH (ref 70–99)
GLUCOSE SERPL-MCNC: 217 MG/DL — HIGH (ref 70–99)
GRAM STN FLD: SIGNIFICANT CHANGE UP
HCG SERPL-ACNC: <5 MIU/ML — SIGNIFICANT CHANGE UP
LIDOCAIN IGE QN: 23 U/L — SIGNIFICANT CHANGE UP (ref 7–60)
POTASSIUM SERPL-MCNC: 4.3 MMOL/L — SIGNIFICANT CHANGE UP (ref 3.5–5.3)
POTASSIUM SERPL-SCNC: 4.3 MMOL/L — SIGNIFICANT CHANGE UP (ref 3.5–5.3)
PROT SERPL-MCNC: 8.4 G/DL — HIGH (ref 6–8.3)
RH IG SCN BLD-IMP: POSITIVE — SIGNIFICANT CHANGE UP
RSV RNA NPH QL NAA+NON-PROBE: SIGNIFICANT CHANGE UP
SARS-COV-2 RNA SPEC QL NAA+PROBE: SIGNIFICANT CHANGE UP
SODIUM SERPL-SCNC: 139 MMOL/L — SIGNIFICANT CHANGE UP (ref 135–145)
SPECIMEN SOURCE: SIGNIFICANT CHANGE UP

## 2022-12-17 PROCEDURE — 99222 1ST HOSP IP/OBS MODERATE 55: CPT | Mod: GC

## 2022-12-17 PROCEDURE — 74177 CT ABD & PELVIS W/CONTRAST: CPT | Mod: 26,MG

## 2022-12-17 PROCEDURE — 71045 X-RAY EXAM CHEST 1 VIEW: CPT | Mod: 26

## 2022-12-17 PROCEDURE — 10030 IMG GID FLU COLL DRG SFT TIS: CPT

## 2022-12-17 PROCEDURE — G1004: CPT

## 2022-12-17 RX ORDER — PIPERACILLIN AND TAZOBACTAM 4; .5 G/20ML; G/20ML
3.38 INJECTION, POWDER, LYOPHILIZED, FOR SOLUTION INTRAVENOUS ONCE
Refills: 0 | Status: COMPLETED | OUTPATIENT
Start: 2022-12-17 | End: 2022-12-17

## 2022-12-17 RX ORDER — ALBUTEROL 90 UG/1
2 AEROSOL, METERED ORAL EVERY 6 HOURS
Refills: 0 | Status: DISCONTINUED | OUTPATIENT
Start: 2022-12-17 | End: 2022-12-21

## 2022-12-17 RX ORDER — ACETAMINOPHEN 500 MG
1000 TABLET ORAL EVERY 6 HOURS
Refills: 0 | Status: COMPLETED | OUTPATIENT
Start: 2022-12-17 | End: 2022-12-18

## 2022-12-17 RX ORDER — DEXTROSE 50 % IN WATER 50 %
12.5 SYRINGE (ML) INTRAVENOUS ONCE
Refills: 0 | Status: DISCONTINUED | OUTPATIENT
Start: 2022-12-17 | End: 2022-12-21

## 2022-12-17 RX ORDER — INFLUENZA VIRUS VACCINE 15; 15; 15; 15 UG/.5ML; UG/.5ML; UG/.5ML; UG/.5ML
0.5 SUSPENSION INTRAMUSCULAR ONCE
Refills: 0 | Status: DISCONTINUED | OUTPATIENT
Start: 2022-12-17 | End: 2022-12-21

## 2022-12-17 RX ORDER — INSULIN GLARGINE 100 [IU]/ML
10 INJECTION, SOLUTION SUBCUTANEOUS AT BEDTIME
Refills: 0 | Status: DISCONTINUED | OUTPATIENT
Start: 2022-12-17 | End: 2022-12-21

## 2022-12-17 RX ORDER — DEXTROSE 50 % IN WATER 50 %
25 SYRINGE (ML) INTRAVENOUS ONCE
Refills: 0 | Status: DISCONTINUED | OUTPATIENT
Start: 2022-12-17 | End: 2022-12-21

## 2022-12-17 RX ORDER — VANCOMYCIN HCL 1 G
1000 VIAL (EA) INTRAVENOUS ONCE
Refills: 0 | Status: DISCONTINUED | OUTPATIENT
Start: 2022-12-17 | End: 2022-12-17

## 2022-12-17 RX ORDER — ENOXAPARIN SODIUM 100 MG/ML
40 INJECTION SUBCUTANEOUS EVERY 24 HOURS
Refills: 0 | Status: COMPLETED | OUTPATIENT
Start: 2022-12-17 | End: 2022-12-17

## 2022-12-17 RX ORDER — ACETAMINOPHEN 500 MG
1000 TABLET ORAL ONCE
Refills: 0 | Status: COMPLETED | OUTPATIENT
Start: 2022-12-17 | End: 2022-12-17

## 2022-12-17 RX ORDER — SODIUM CHLORIDE 9 MG/ML
1000 INJECTION, SOLUTION INTRAVENOUS
Refills: 0 | Status: DISCONTINUED | OUTPATIENT
Start: 2022-12-17 | End: 2022-12-19

## 2022-12-17 RX ORDER — INSULIN LISPRO 100/ML
VIAL (ML) SUBCUTANEOUS EVERY 6 HOURS
Refills: 0 | Status: DISCONTINUED | OUTPATIENT
Start: 2022-12-17 | End: 2022-12-19

## 2022-12-17 RX ORDER — PIPERACILLIN AND TAZOBACTAM 4; .5 G/20ML; G/20ML
3.38 INJECTION, POWDER, LYOPHILIZED, FOR SOLUTION INTRAVENOUS EVERY 8 HOURS
Refills: 0 | Status: DISCONTINUED | OUTPATIENT
Start: 2022-12-17 | End: 2022-12-21

## 2022-12-17 RX ORDER — ONDANSETRON 8 MG/1
4 TABLET, FILM COATED ORAL ONCE
Refills: 0 | Status: COMPLETED | OUTPATIENT
Start: 2022-12-17 | End: 2022-12-17

## 2022-12-17 RX ORDER — MORPHINE SULFATE 50 MG/1
4 CAPSULE, EXTENDED RELEASE ORAL ONCE
Refills: 0 | Status: DISCONTINUED | OUTPATIENT
Start: 2022-12-17 | End: 2022-12-17

## 2022-12-17 RX ORDER — BUDESONIDE AND FORMOTEROL FUMARATE DIHYDRATE 160; 4.5 UG/1; UG/1
2 AEROSOL RESPIRATORY (INHALATION)
Refills: 0 | Status: DISCONTINUED | OUTPATIENT
Start: 2022-12-17 | End: 2022-12-21

## 2022-12-17 RX ORDER — VANCOMYCIN HCL 1 G
1250 VIAL (EA) INTRAVENOUS EVERY 8 HOURS
Refills: 0 | Status: DISCONTINUED | OUTPATIENT
Start: 2022-12-17 | End: 2022-12-19

## 2022-12-17 RX ADMIN — INSULIN GLARGINE 10 UNIT(S): 100 INJECTION, SOLUTION SUBCUTANEOUS at 21:39

## 2022-12-17 RX ADMIN — Medication 166.67 MILLIGRAM(S): at 19:15

## 2022-12-17 RX ADMIN — ONDANSETRON 4 MILLIGRAM(S): 8 TABLET, FILM COATED ORAL at 03:31

## 2022-12-17 RX ADMIN — PIPERACILLIN AND TAZOBACTAM 25 GRAM(S): 4; .5 INJECTION, POWDER, LYOPHILIZED, FOR SOLUTION INTRAVENOUS at 22:32

## 2022-12-17 RX ADMIN — Medication 2: at 23:55

## 2022-12-17 RX ADMIN — PIPERACILLIN AND TAZOBACTAM 200 GRAM(S): 4; .5 INJECTION, POWDER, LYOPHILIZED, FOR SOLUTION INTRAVENOUS at 03:36

## 2022-12-17 RX ADMIN — Medication 400 MILLIGRAM(S): at 19:16

## 2022-12-17 RX ADMIN — PIPERACILLIN AND TAZOBACTAM 25 GRAM(S): 4; .5 INJECTION, POWDER, LYOPHILIZED, FOR SOLUTION INTRAVENOUS at 14:07

## 2022-12-17 RX ADMIN — Medication 1000 MILLIGRAM(S): at 20:00

## 2022-12-17 RX ADMIN — Medication 1000 MILLIGRAM(S): at 15:15

## 2022-12-17 RX ADMIN — SODIUM CHLORIDE 125 MILLILITER(S): 9 INJECTION, SOLUTION INTRAVENOUS at 19:16

## 2022-12-17 RX ADMIN — Medication 166.67 MILLIGRAM(S): at 08:52

## 2022-12-17 RX ADMIN — Medication 2: at 06:31

## 2022-12-17 RX ADMIN — Medication 400 MILLIGRAM(S): at 14:30

## 2022-12-17 RX ADMIN — BUDESONIDE AND FORMOTEROL FUMARATE DIHYDRATE 2 PUFF(S): 160; 4.5 AEROSOL RESPIRATORY (INHALATION) at 11:12

## 2022-12-17 RX ADMIN — MORPHINE SULFATE 4 MILLIGRAM(S): 50 CAPSULE, EXTENDED RELEASE ORAL at 03:32

## 2022-12-17 RX ADMIN — PIPERACILLIN AND TAZOBACTAM 25 GRAM(S): 4; .5 INJECTION, POWDER, LYOPHILIZED, FOR SOLUTION INTRAVENOUS at 06:29

## 2022-12-17 RX ADMIN — BUDESONIDE AND FORMOTEROL FUMARATE DIHYDRATE 2 PUFF(S): 160; 4.5 AEROSOL RESPIRATORY (INHALATION) at 21:39

## 2022-12-17 NOTE — ED PROVIDER NOTE - NS ED ROS FT
CONST: + fevers, no chills  EYES: no pain, no vision changes  ENT: no sore throat, no ear pain, no change in hearing  CV: no chest pain, no leg swelling  RESP: no shortness of breath, no cough  ABD: + abdominal pain, no nausea, + vomiting, no diarrhea  : no dysuria, no flank pain, no hematuria  MSK: no back pain, no extremity pain  NEURO: no headache or additional neurologic complaints  HEME: no easy bleeding  SKIN:  no rash

## 2022-12-17 NOTE — ED PROVIDER NOTE - OBJECTIVE STATEMENT
52 year old F with PMH HTN, DM, and s/p ventral hernia repair w/ mesh (9/14), c/b wound infection without abscess and admitted for IV abx (10/4), presenting with abdominal pain x2 days.  Patient states she has not had a bowel movement for 3 days.  Is not passing flatus.  Developed some vomiting today.  Pain acutely worsened today so decided to come to the emergency department.  States she may have had subjective fevers.  She denies chest pain, shortness of breath, dark or bloody stools, hematemesis, urinary symptoms, syncope.

## 2022-12-17 NOTE — PRE PROCEDURE NOTE - PRE PROCEDURE EVALUATION
Interventional Radiology    HPI: 52y Female with CT showing an anterior abdominal wall collection s/p ventral hernia repair. IR to perform image-guided drainage.     Allergies: cefpodoxime (Rash)  estrogens (Hives; Pruritus)    Medications (Abx/Cardiac/Anticoagulation/Blood Products)    piperacillin/tazobactam IVPB..: 25 mL/Hr IV Intermittent (12-17 @ 06:29)  piperacillin/tazobactam IVPB...: 200 mL/Hr IV Intermittent (12-17 @ 03:36)  vancomycin  IVPB: 166.67 mL/Hr IV Intermittent (12-17 @ 08:52)    Data:    T(C): 36.9  HR: 78  BP: 150/92  RR: 18  SpO2: 100%    Exam  General: No acute distress  Chest: Non labored breathing  Abdomen: Non-distended  Extremities: No swelling, warm    -WBC 8.10 / HgB 11.2 / Hct 35.9 / Plt 253  -Na 139 / Cl 97 / BUN 14 / Glucose 217  -K 4.3 / CO2 30 / Cr 0.46  -ALT 18 / Alk Phos 76 / T.Bili 0.4  -INR1.12    Imaging: CT abdomen/pelvis reviewed    Plan:   52y Female with CT showing an anterior abdominal wall collection s/p ventral hernia repair. IR to perform image-guided drainage.   -- Relevant imaging and labs were reviewed.   -- Risks, benefits, and alternatives were explained to the patient and informed consent was obtained.

## 2022-12-17 NOTE — H&P ADULT - NSHPPHYSICALEXAM_GEN_ALL_CORE
T(C): 36.7 (12-17-22 @ 00:30), Max: 36.9 (12-16-22 @ 19:56)  HR: 77 (12-17-22 @ 00:30) (77 - 80)  BP: 151/82 (12-17-22 @ 00:30) (130/108 - 151/82)  RR: 16 (12-17-22 @ 00:30) (16 - 16)  SpO2: 100% (12-17-22 @ 00:30) (98% - 100%)  Wt(kg): --    Physical Exam:    General: WN/WD, appears uncomfortable   Neurology: A&Ox3, nonfocal, follows commands  Eyes: PERRLA/ EOMI  ENT/Neck: Neck supple, trachea midline, No JVD  Respiratory: increased WOB  CV: Normal rate regular rhythm, S1S2, no murmurs, rubs or gallops  Abdominal: Soft, obese abdomen, distended, tympanitic, diffusely tender but not peritoneal. Previous surgery site with overlying skin crusting and induration, no erythema or warmth.  Extremities: No edema, + peripheral pulses  Skin: No Rashes, Hematoma, Ecchymosis

## 2022-12-17 NOTE — ED PROVIDER NOTE - PHYSICAL EXAMINATION
GENERAL: Awake, alert, writhing in pain  HEENT: NC/AT, moist mucous membranes, PERRL, EOMI  LUNGS: CTAB, no wheezes or crackles   CARDIAC: RRR, no m/r/g  ABDOMEN: Distended, diffusely TTP, non peritoneal, surgical scars  EXT: No edema, no calf tenderness, 2+ DP pulses bilaterally, no deformities.  NEURO: A&Ox3. Moving all extremities.  SKIN: Warm and dry. No rash.

## 2022-12-17 NOTE — ED PROVIDER NOTE - ATTENDING CONTRIBUTION TO CARE
Dr. Caldwell, Attending Physician-  I performed a face to face bedside interview with patient regarding history of present illness, review of symptoms and past medical history. I completed an independent physical exam.  I have discussed patient's plan of care with the resident.    52F, HTN, DM, prior ventral hernia repair complicated by wound infection, who presents with abdominal pain. For the past 3 days, no flatus and worsening abdominal pain. +emesis today. Denies hx of prior SBO. No headaches, cough, sputum, cp, diarrhea, dysuria, hematuria. Physical: afebrile, uncomfortable appearing, tachy, ctabl, abdomen diffusely ttp, no le edema. Plan: labs, urine, analgesia, CT abdomen for SBO evaluation.

## 2022-12-17 NOTE — H&P ADULT - ATTENDING COMMENTS
DOS 12/17    SBO - NGT NPO, IVF. Monitor abdominal exam, await passage of flatus. Likely due to intraabdominal adhesions. and less likely a fistula however this will be determined after drainage. A controlled fistula away from the mesh/midline or control of an abscess can be accomplished via IR.   Broad spectrum abx for now   Monitor labs  Pain control  DVT prophylxis      Isael Chung MD  Acute and Critical Care Surgery    The Acute Care Surgery (B Team) Attending Group Practice:  Dr. Sarah Rodrigues, Dr. Jin Gonzalez, Dr. Isael Chung,  Dr. Graeme Yeh and Dr. Varsha Henson     Urgent issues - spectra [id number] or [id number]  Nonurgent issues - [phone]  Patient appointments or after hours - [phone]

## 2022-12-17 NOTE — H&P ADULT - HISTORY OF PRESENT ILLNESS
51YO F hx HTN, DM, and s/p ventral hernia repair w/ mesh (9/14), c/b wound infection without abscess and admitted for IV abx (10/4), presenting with 1 day of abdominal pain and vomiting. Abdominal pain is diffuse, patient cannot localize and associated with decrease appetite and 1 episode of vomiting. Patient last had a BM 2 days ago, is not passing gas, and is frequently burping. She also endorses subjective fevers and chills. She denies drainage from her previous surgical wound.

## 2022-12-17 NOTE — H&P ADULT - ASSESSMENT
52F hx ventral hernia repair with mesh 9/2020, wound infection 10/2020, now with 1 day of abdominal pain and vomiting, CT significant for SBO and abdominal wall abscess at hernia repair site.    -admit to surgery, Dr. Chung  -NPO/IVF  -NGT to LCWS, >1L bilious output after placement  -IV for abscess  -will consult IR in am for abscess drainage     Discussed with Dr. Sheldon Mg PGY3  B team surgery  [id number]

## 2022-12-17 NOTE — ED PROVIDER NOTE - CLINICAL SUMMARY MEDICAL DECISION MAKING FREE TEXT BOX
52 year old F with PMH HTN, DM, and s/p ventral hernia repair w/ mesh (9/14), c/b wound infection without abscess and admitted for IV abx (10/4), presenting with abdominal pain x2 days. Hemodynamically stable, afebrile. Ill appearing, writhing in pain initial assessment. Abdomen distended and diffusely tender. Concern for incarcerated hernia vs SBO vs abscess. Will get labs including pre-ops, pain control, fluids, CT A/P. Surgical consult. Admit.

## 2022-12-17 NOTE — PROGRESS NOTE ADULT - ASSESSMENT
52F hx ventral hernia repair with mesh 9/2020, wound infection 10/2020, now with 1 day of abdominal pain and vomiting, CT significant for SBO and abdominal wall abscess at hernia repair site.      -NPO/IVF  -NGT to LCWS  -IV Antibiotics  - Consult IR for drainage of abscess.    - DVT prophylaxis           B team surgery  [id number]

## 2022-12-17 NOTE — H&P ADULT - NSHPLABSRESULTS_GEN_ALL_CORE
11.2   8.10  )-----------( 253      ( 16 Dec 2022 23:16 )             35.9     12-16    139  |  97<L>  |  14  ----------------------------<  217<H>  4.3   |  30  |  0.46<L>    Ca    10.3      16 Dec 2022 23:16    TPro  8.4<H>  /  Alb  4.8  /  TBili  0.4  /  DBili  x   /  AST  23  /  ALT  18  /  AlkPhos  76  12-16    LIVER FUNCTIONS - ( 16 Dec 2022 23:16 )  Alb: 4.8 g/dL / Pro: 8.4 g/dL / ALK PHOS: 76 U/L / ALT: 18 U/L / AST: 23 U/L / GGT: x           PT/INR - ( 16 Dec 2022 23:16 )   PT: 13.0 sec;   INR: 1.12 ratio         PTT - ( 16 Dec 2022 23:16 )  PTT:25.7 sec      < from: CT Abdomen and Pelvis w/ IV Cont (12.17.22 @ 01:01) >    FINDINGS:  LOWER CHEST: Trace bibasilar atelectasis.    LIVER: Diffuse hepatic steatosis  BILE DUCTS: Normal caliber.  GALLBLADDER: Contracted.  SPLEEN: Withinnormal limits.  PANCREAS: Within normal limits.  ADRENALS: Within normal limits.  KIDNEYS/URETERS: Symmetric enhancement. No hydronephrosis. Redemonstrated   indeterminate left renal hypoattenuating lesion measuring 2.1 cm.    BLADDER: Underdistended.  REPRODUCTIVE ORGANS: Uterus and adnexa within normal limits.    BOWEL: High-grade small bowel obstruction with transition point in the   left hemiabdomen (2-65). There is no bowel wall thickening, pneumatosis,   or adjacent free fluid to suggest ischemia. Marked fluid-filled   distention of the stomach.  PERITONEUM: No ascites.  VESSELS: Within normal limits.  RETROPERITONEUM/LYMPH NODES: No lymphadenopathy.  ABDOMINAL WALL: Rim-enhancing collection of fluid and gas in the anterior   abdominal wall adjacent to previously demonstrated ventral hernia repair,   measuring 5.8 x 7.3 cm consistent with an abscess. The presence of air   may be indicative hepatic gas producing organism however fistulization to   the adjacent colon is not excluded on this exam.. There is extensive   adjacent fat stranding in the anterior abdominal wall with overlying skin   thickening, compatible with cellulitis.  BONES: Mild degenerative changes.    IMPRESSION:    Small bowel obstruction with transition point in the left hemiabdomen as   above. No CT evidence of ischemia.  Rim-enhancing collection of fluid and gas in region of previously   demonstrated ventral hernia, compatible with abscess. The presence of gas   may be indicative of a gas producing organism however fistulization to   the adjacent colon cannot be excluded. There is adjacent fat stranding   and skin thickening compatible with cellulitis.    < end of copied text >

## 2022-12-17 NOTE — PATIENT PROFILE ADULT - FALL HARM RISK - HARM RISK INTERVENTIONS
Assistance with ambulation/Assistance OOB with selected safe patient handling equipment/Communicate Risk of Fall with Harm to all staff/Monitor gait and stability/Reinforce activity limits and safety measures with patient and family/Sit up slowly, dangle for a short time, stand at bedside before walking/Tailored Fall Risk Interventions/Visual Cue: Yellow wristband and red socks/Bed in lowest position, wheels locked, appropriate side rails in place/Call bell, personal items and telephone in reach/Instruct patient to call for assistance before getting out of bed or chair/Non-slip footwear when patient is out of bed/[place] to call system/Physically safe environment - no spills, clutter or unnecessary equipment/Purposeful Proactive Rounding/Room/bathroom lighting operational, light cord in reach

## 2022-12-18 LAB
ANION GAP SERPL CALC-SCNC: 10 MMOL/L — SIGNIFICANT CHANGE UP (ref 7–14)
APTT BLD: 29.8 SEC — SIGNIFICANT CHANGE UP (ref 27–36.3)
BUN SERPL-MCNC: 11 MG/DL — SIGNIFICANT CHANGE UP (ref 7–23)
CALCIUM SERPL-MCNC: 9.2 MG/DL — SIGNIFICANT CHANGE UP (ref 8.4–10.5)
CHLORIDE SERPL-SCNC: 99 MMOL/L — SIGNIFICANT CHANGE UP (ref 98–107)
CO2 SERPL-SCNC: 33 MMOL/L — HIGH (ref 22–31)
CREAT SERPL-MCNC: 0.59 MG/DL — SIGNIFICANT CHANGE UP (ref 0.5–1.3)
EGFR: 109 ML/MIN/1.73M2 — SIGNIFICANT CHANGE UP
GLUCOSE BLDC GLUCOMTR-MCNC: 114 MG/DL — HIGH (ref 70–99)
GLUCOSE BLDC GLUCOMTR-MCNC: 130 MG/DL — HIGH (ref 70–99)
GLUCOSE BLDC GLUCOMTR-MCNC: 135 MG/DL — HIGH (ref 70–99)
GLUCOSE BLDC GLUCOMTR-MCNC: 140 MG/DL — HIGH (ref 70–99)
GLUCOSE BLDC GLUCOMTR-MCNC: 199 MG/DL — HIGH (ref 70–99)
GLUCOSE SERPL-MCNC: 133 MG/DL — HIGH (ref 70–99)
HCT VFR BLD CALC: 32.5 % — LOW (ref 34.5–45)
HGB BLD-MCNC: 10.1 G/DL — LOW (ref 11.5–15.5)
INR BLD: 1.12 RATIO — SIGNIFICANT CHANGE UP (ref 0.88–1.16)
MAGNESIUM SERPL-MCNC: 1.9 MG/DL — SIGNIFICANT CHANGE UP (ref 1.6–2.6)
MCHC RBC-ENTMCNC: 23.4 PG — LOW (ref 27–34)
MCHC RBC-ENTMCNC: 31.1 GM/DL — LOW (ref 32–36)
MCV RBC AUTO: 75.2 FL — LOW (ref 80–100)
NRBC # BLD: 0 /100 WBCS — SIGNIFICANT CHANGE UP (ref 0–0)
NRBC # FLD: 0 K/UL — SIGNIFICANT CHANGE UP (ref 0–0)
PHOSPHATE SERPL-MCNC: 3.9 MG/DL — SIGNIFICANT CHANGE UP (ref 2.5–4.5)
PLATELET # BLD AUTO: 194 K/UL — SIGNIFICANT CHANGE UP (ref 150–400)
POTASSIUM SERPL-MCNC: 3.5 MMOL/L — SIGNIFICANT CHANGE UP (ref 3.5–5.3)
POTASSIUM SERPL-SCNC: 3.5 MMOL/L — SIGNIFICANT CHANGE UP (ref 3.5–5.3)
PROTHROM AB SERPL-ACNC: 13 SEC — SIGNIFICANT CHANGE UP (ref 10.5–13.4)
RBC # BLD: 4.32 M/UL — SIGNIFICANT CHANGE UP (ref 3.8–5.2)
RBC # FLD: 17 % — HIGH (ref 10.3–14.5)
SODIUM SERPL-SCNC: 142 MMOL/L — SIGNIFICANT CHANGE UP (ref 135–145)
VANCOMYCIN TROUGH SERPL-MCNC: 14 UG/ML — SIGNIFICANT CHANGE UP (ref 10–20)
WBC # BLD: 5.79 K/UL — SIGNIFICANT CHANGE UP (ref 3.8–10.5)
WBC # FLD AUTO: 5.79 K/UL — SIGNIFICANT CHANGE UP (ref 3.8–10.5)

## 2022-12-18 RX ORDER — ONDANSETRON 8 MG/1
4 TABLET, FILM COATED ORAL ONCE
Refills: 0 | Status: COMPLETED | OUTPATIENT
Start: 2022-12-18 | End: 2022-12-18

## 2022-12-18 RX ORDER — BENZOCAINE 10 %
1 GEL (GRAM) MUCOUS MEMBRANE ONCE
Refills: 0 | Status: COMPLETED | OUTPATIENT
Start: 2022-12-18 | End: 2022-12-18

## 2022-12-18 RX ADMIN — PIPERACILLIN AND TAZOBACTAM 25 GRAM(S): 4; .5 INJECTION, POWDER, LYOPHILIZED, FOR SOLUTION INTRAVENOUS at 06:58

## 2022-12-18 RX ADMIN — BUDESONIDE AND FORMOTEROL FUMARATE DIHYDRATE 2 PUFF(S): 160; 4.5 AEROSOL RESPIRATORY (INHALATION) at 10:47

## 2022-12-18 RX ADMIN — PIPERACILLIN AND TAZOBACTAM 25 GRAM(S): 4; .5 INJECTION, POWDER, LYOPHILIZED, FOR SOLUTION INTRAVENOUS at 14:19

## 2022-12-18 RX ADMIN — BUDESONIDE AND FORMOTEROL FUMARATE DIHYDRATE 2 PUFF(S): 160; 4.5 AEROSOL RESPIRATORY (INHALATION) at 21:25

## 2022-12-18 RX ADMIN — ONDANSETRON 4 MILLIGRAM(S): 8 TABLET, FILM COATED ORAL at 03:52

## 2022-12-18 RX ADMIN — INSULIN GLARGINE 10 UNIT(S): 100 INJECTION, SOLUTION SUBCUTANEOUS at 21:25

## 2022-12-18 RX ADMIN — PIPERACILLIN AND TAZOBACTAM 25 GRAM(S): 4; .5 INJECTION, POWDER, LYOPHILIZED, FOR SOLUTION INTRAVENOUS at 22:22

## 2022-12-18 RX ADMIN — Medication 166.67 MILLIGRAM(S): at 18:06

## 2022-12-18 RX ADMIN — Medication 1000 MILLIGRAM(S): at 07:31

## 2022-12-18 RX ADMIN — Medication 166.67 MILLIGRAM(S): at 10:46

## 2022-12-18 RX ADMIN — Medication 400 MILLIGRAM(S): at 07:01

## 2022-12-18 RX ADMIN — Medication 1000 MILLIGRAM(S): at 14:00

## 2022-12-18 RX ADMIN — Medication 1 SPRAY(S): at 18:07

## 2022-12-18 RX ADMIN — Medication 166.67 MILLIGRAM(S): at 03:17

## 2022-12-18 RX ADMIN — SODIUM CHLORIDE 125 MILLILITER(S): 9 INJECTION, SOLUTION INTRAVENOUS at 14:20

## 2022-12-18 RX ADMIN — Medication 400 MILLIGRAM(S): at 13:14

## 2022-12-18 NOTE — PROGRESS NOTE ADULT - ASSESSMENT
52F hx ventral hernia repair with mesh 9/2020, wound infection 10/2020, now with 1 day of abdominal pain and vomiting, CT significant for SBO and abdominal wall abscess at hernia repair site.    P:  -NPO with sips and chips  - /hr  -IV abx for abscess  -flush IR drain with 5cc saline daily    B team surgery  [id number]

## 2022-12-19 ENCOUNTER — TRANSCRIPTION ENCOUNTER (OUTPATIENT)
Age: 52
End: 2022-12-19

## 2022-12-19 LAB
ANION GAP SERPL CALC-SCNC: 9 MMOL/L — SIGNIFICANT CHANGE UP (ref 7–14)
BUN SERPL-MCNC: 7 MG/DL — SIGNIFICANT CHANGE UP (ref 7–23)
CALCIUM SERPL-MCNC: 9.2 MG/DL — SIGNIFICANT CHANGE UP (ref 8.4–10.5)
CHLORIDE SERPL-SCNC: 99 MMOL/L — SIGNIFICANT CHANGE UP (ref 98–107)
CO2 SERPL-SCNC: 35 MMOL/L — HIGH (ref 22–31)
CREAT SERPL-MCNC: 0.86 MG/DL — SIGNIFICANT CHANGE UP (ref 0.5–1.3)
EGFR: 82 ML/MIN/1.73M2 — SIGNIFICANT CHANGE UP
GLUCOSE BLDC GLUCOMTR-MCNC: 139 MG/DL — HIGH (ref 70–99)
GLUCOSE BLDC GLUCOMTR-MCNC: 161 MG/DL — HIGH (ref 70–99)
GLUCOSE BLDC GLUCOMTR-MCNC: 162 MG/DL — HIGH (ref 70–99)
GLUCOSE BLDC GLUCOMTR-MCNC: 246 MG/DL — HIGH (ref 70–99)
GLUCOSE SERPL-MCNC: 160 MG/DL — HIGH (ref 70–99)
HCT VFR BLD CALC: 34.1 % — LOW (ref 34.5–45)
HGB BLD-MCNC: 10.6 G/DL — LOW (ref 11.5–15.5)
MAGNESIUM SERPL-MCNC: 1.9 MG/DL — SIGNIFICANT CHANGE UP (ref 1.6–2.6)
MCHC RBC-ENTMCNC: 23.9 PG — LOW (ref 27–34)
MCHC RBC-ENTMCNC: 31.1 GM/DL — LOW (ref 32–36)
MCV RBC AUTO: 77 FL — LOW (ref 80–100)
NRBC # BLD: 0 /100 WBCS — SIGNIFICANT CHANGE UP (ref 0–0)
NRBC # FLD: 0 K/UL — SIGNIFICANT CHANGE UP (ref 0–0)
PHOSPHATE SERPL-MCNC: 4.1 MG/DL — SIGNIFICANT CHANGE UP (ref 2.5–4.5)
PLATELET # BLD AUTO: 202 K/UL — SIGNIFICANT CHANGE UP (ref 150–400)
POTASSIUM SERPL-MCNC: 3.7 MMOL/L — SIGNIFICANT CHANGE UP (ref 3.5–5.3)
POTASSIUM SERPL-SCNC: 3.7 MMOL/L — SIGNIFICANT CHANGE UP (ref 3.5–5.3)
RBC # BLD: 4.43 M/UL — SIGNIFICANT CHANGE UP (ref 3.8–5.2)
RBC # FLD: 17 % — HIGH (ref 10.3–14.5)
SODIUM SERPL-SCNC: 143 MMOL/L — SIGNIFICANT CHANGE UP (ref 135–145)
VANCOMYCIN TROUGH SERPL-MCNC: 14.6 UG/ML — SIGNIFICANT CHANGE UP (ref 10–20)
WBC # BLD: 6.66 K/UL — SIGNIFICANT CHANGE UP (ref 3.8–10.5)
WBC # FLD AUTO: 6.66 K/UL — SIGNIFICANT CHANGE UP (ref 3.8–10.5)

## 2022-12-19 PROCEDURE — 99232 SBSQ HOSP IP/OBS MODERATE 35: CPT | Mod: GC

## 2022-12-19 RX ORDER — POLYETHYLENE GLYCOL 3350 17 G/17G
17 POWDER, FOR SOLUTION ORAL DAILY
Refills: 0 | Status: DISCONTINUED | OUTPATIENT
Start: 2022-12-19 | End: 2022-12-19

## 2022-12-19 RX ORDER — SENNA PLUS 8.6 MG/1
2 TABLET ORAL AT BEDTIME
Refills: 0 | Status: DISCONTINUED | OUTPATIENT
Start: 2022-12-19 | End: 2022-12-19

## 2022-12-19 RX ORDER — INSULIN LISPRO 100/ML
VIAL (ML) SUBCUTANEOUS
Refills: 0 | Status: DISCONTINUED | OUTPATIENT
Start: 2022-12-19 | End: 2022-12-21

## 2022-12-19 RX ORDER — ACETAMINOPHEN 500 MG
975 TABLET ORAL EVERY 6 HOURS
Refills: 0 | Status: DISCONTINUED | OUTPATIENT
Start: 2022-12-19 | End: 2022-12-21

## 2022-12-19 RX ORDER — ENOXAPARIN SODIUM 100 MG/ML
60 INJECTION SUBCUTANEOUS EVERY 12 HOURS
Refills: 0 | Status: DISCONTINUED | OUTPATIENT
Start: 2022-12-19 | End: 2022-12-21

## 2022-12-19 RX ORDER — INSULIN LISPRO 100/ML
VIAL (ML) SUBCUTANEOUS AT BEDTIME
Refills: 0 | Status: DISCONTINUED | OUTPATIENT
Start: 2022-12-19 | End: 2022-12-21

## 2022-12-19 RX ORDER — POTASSIUM CHLORIDE 20 MEQ
20 PACKET (EA) ORAL ONCE
Refills: 0 | Status: COMPLETED | OUTPATIENT
Start: 2022-12-19 | End: 2022-12-19

## 2022-12-19 RX ADMIN — POLYETHYLENE GLYCOL 3350 17 GRAM(S): 17 POWDER, FOR SOLUTION ORAL at 15:03

## 2022-12-19 RX ADMIN — PIPERACILLIN AND TAZOBACTAM 25 GRAM(S): 4; .5 INJECTION, POWDER, LYOPHILIZED, FOR SOLUTION INTRAVENOUS at 07:24

## 2022-12-19 RX ADMIN — Medication 166.67 MILLIGRAM(S): at 11:07

## 2022-12-19 RX ADMIN — BUDESONIDE AND FORMOTEROL FUMARATE DIHYDRATE 2 PUFF(S): 160; 4.5 AEROSOL RESPIRATORY (INHALATION) at 09:20

## 2022-12-19 RX ADMIN — Medication 2: at 00:06

## 2022-12-19 RX ADMIN — SODIUM CHLORIDE 125 MILLILITER(S): 9 INJECTION, SOLUTION INTRAVENOUS at 00:06

## 2022-12-19 RX ADMIN — Medication 166.67 MILLIGRAM(S): at 19:46

## 2022-12-19 RX ADMIN — INSULIN GLARGINE 10 UNIT(S): 100 INJECTION, SOLUTION SUBCUTANEOUS at 21:59

## 2022-12-19 RX ADMIN — PIPERACILLIN AND TAZOBACTAM 25 GRAM(S): 4; .5 INJECTION, POWDER, LYOPHILIZED, FOR SOLUTION INTRAVENOUS at 15:04

## 2022-12-19 RX ADMIN — Medication 166.67 MILLIGRAM(S): at 02:01

## 2022-12-19 RX ADMIN — Medication 20 MILLIEQUIVALENT(S): at 09:20

## 2022-12-19 RX ADMIN — PIPERACILLIN AND TAZOBACTAM 25 GRAM(S): 4; .5 INJECTION, POWDER, LYOPHILIZED, FOR SOLUTION INTRAVENOUS at 22:01

## 2022-12-19 NOTE — DISCHARGE NOTE PROVIDER - NSDCFUADDINST_GEN_ALL_CORE_FT
WOUND CARE:  Please keep incisions clean and dry. Please do not Scrub or rub incisions. Do not use lotion or powder on incisions.   BATHING: You may shower and/or sponge bathe. You may use warm soapy water in the shower and rinse, pat dry.  ACTIVITY: No heavy lifting or straining. Otherwise, you may return to your usual level of physical activity. If you are taking narcotic pain medication DO NOT drive a car, operate machinery or make important decisions.  DIET: Return to your usual diet.  NOTIFY YOUR SURGEON IF YOU HAVE: any bleeding that does not stop, any pus draining from your wound(s), any fever (over 100.4 F) persistent nausea/vomiting, or if your pain is not controlled on your discharge pain medications, unable to urinate.  Please follow up with your primary care physician in one week regarding your hospitalization, bring copies of your discharge paperwork.  Please follow up with your surgeon, Dr. Gonzalez as an outpatient, please call to schedule appointment

## 2022-12-19 NOTE — DISCHARGE NOTE PROVIDER - CARE PROVIDERS DIRECT ADDRESSES
[email or web address]

## 2022-12-19 NOTE — DISCHARGE NOTE PROVIDER - NSDCMRMEDTOKEN_GEN_ALL_CORE_FT
acetaminophen 500 mg oral tablet: 2 tab(s) orally every 6 hours  amoxicillin-clavulanate 500 mg-125 mg oral tablet: 1 tab(s) orally 2 times a day   budesonide-formoterol 160 mcg-4.5 mcg/inh inhalation aerosol: 2 puff(s) inhaled 2 times a day  ibuprofen 600 mg oral tablet: 1 tab(s) orally every 6 hours  lisinopril 20 mg oral tablet: 1 tab(s) orally once a day  metFORMIN 1000 mg oral tablet: 1 tab(s) orally 2 times a day  oxyCODONE 5 mg oral tablet: 1 tab(s) orally every 6 hours, As Needed MDD:4 tabs  simvastatin 20 mg oral tablet: 1 tab(s) orally once a day (at bedtime)  torsemide 10 mg oral tablet: 1 tab(s) orally once a day  Trulicity Pen 0.75 mg/0.5 mL subcutaneous solution: 0.75 milligram(s) subcutaneously once a week   Ventolin HFA 90 mcg/inh inhalation aerosol: 2 puff(s) inhaled every 6 hours, As Needed -for shortness of breath and/or wheezing    acetaminophen 500 mg oral tablet: 2 tab(s) orally every 6 hours  Bactrim  mg-160 mg oral tablet: 1 tab(s) orally every 12 hours   budesonide-formoterol 160 mcg-4.5 mcg/inh inhalation aerosol: 2 puff(s) inhaled 2 times a day  ibuprofen 600 mg oral tablet: 1 tab(s) orally every 6 hours  lisinopril 20 mg oral tablet: 1 tab(s) orally once a day  metFORMIN 1000 mg oral tablet: 1 tab(s) orally 2 times a day  oxyCODONE 5 mg oral tablet: 1 tab(s) orally every 6 hours MDD:4 tabs  polyethylene glycol 3350 oral powder for reconstitution: 17 gram(s) orally once a day, As Needed   senna leaf extract oral tablet: 2 tab(s) orally once a day (at bedtime)  simvastatin 20 mg oral tablet: 1 tab(s) orally once a day (at bedtime)  torsemide 10 mg oral tablet: 1 tab(s) orally once a day  Trulicity Pen 0.75 mg/0.5 mL subcutaneous solution: 0.75 milligram(s) subcutaneously once a week   Ventolin HFA 90 mcg/inh inhalation aerosol: 2 puff(s) inhaled every 6 hours, As Needed -for shortness of breath and/or wheezing

## 2022-12-19 NOTE — DISCHARGE NOTE PROVIDER - HOSPITAL COURSE
51YO F hx HTN, DM, and s/p ventral hernia repair w/ mesh (9/14), c/b wound infection without abscess and admitted for IV abx (10/4), presenting with 1 day of abdominal pain and vomiting. Abdominal pain is diffuse, patient cannot localize and associated with decrease appetite and 1 episode of vomiting. Patient last had a BM 2 days ago, is not passing gas, and is frequently burping. She also endorses subjective fevers and chills. She denies drainage from her previous surgical wound.    Patient was admitted to surgical service under the care of Dr Chung    12/17 IR drained abdominal wall abscess. Patient tolerated operation well and there were no post-operative complications identified. Patient remained hemodynamically stable and transferred to the surgical floor.   12/18 NGT self dc without N/V  12/19 Patient passing gas, but no BM. Diet advanced to clear liquids  ** UP TO DATE UNTIL 12/19    Diet was restarted and advanced as tolerated. Pain control was transitioned from IV to PO pain meds. At this time, patient is currently ambulating, voiding, tolerating a regular diet. Pain well controlled on PO pain meds. Patient has been deemed stable for discharge home with follow up as an outpatient. 53YO F hx HTN, DM, and s/p ventral hernia repair w/ mesh (9/14), c/b wound infection without abscess and admitted for IV abx (10/4), presenting with 1 day of abdominal pain and vomiting. Abdominal pain is diffuse, patient cannot localize and associated with decrease appetite and 1 episode of vomiting. Patient last had a BM 2 days ago, is not passing gas, and is frequently burping. She also endorses subjective fevers and chills. She denies drainage from her previous surgical wound.    Patient was admitted to surgical service under the care of Dr Chung    12/17 IR drained abdominal wall abscess. Patient tolerated operation well and there were no post-operative complications identified. Patient remained hemodynamically stable and transferred to the surgical floor.   12/18 NGT self dc without N/V  12/19 Patient passing gas, but no BM. Diet advanced to clear liquids  12/20 Patient diet advanced to regular and patient tolerated. IR evaluated for possible tube check - draining well and will continue drain.   12/21 Patient stable for discharge to home with oral abx and Drain in place.     Diet was restarted and advanced as tolerated. Pain control was transitioned from IV to PO pain meds. At this time, patient is currently ambulating, voiding, tolerating a regular diet. Pain well controlled on PO pain meds. Patient has been deemed stable for discharge home with follow up as an outpatient.

## 2022-12-19 NOTE — PROGRESS NOTE ADULT - ASSESSMENT
52F hx ventral hernia repair with mesh 9/2020, wound infection 10/2020, now with 1 day of abdominal pain and vomiting, CT significant for SBO and abdominal wall abscess at hernia repair site. S/P IR drainage of abdominal wall abscess 12/17    Plan  - Diet: clear liquid  - Vanco + Zosyn  - F/U Vanc trough  - /hr  - Flush IR drain with 5cc saline daily    B team surgery  [id number]

## 2022-12-19 NOTE — PROGRESS NOTE ADULT - ASSESSMENT
52y Female s/p ventral hernia repair found to have abdominal wall collection s/p drain placement on 12/17 in Interventional Radiology.     Plan:  - Continue drainage, monitor output  - Can be discharged home with drain if outputs remain high  - Flush drain 5cc NS qd  - Will need noncontrast CT + IR tube check once outputs < 10cc/24hr, can be arranged as outpatient follow-up. Outpatient IR office [phone]    [id number]

## 2022-12-19 NOTE — DISCHARGE NOTE PROVIDER - PROVIDER TOKENS
PROVIDER:[TOKEN:[[id number]],FOLLOWUP:[1 week]],PROVIDER:[TOKEN:[[id number]],FOLLOWUP:[1 week]]

## 2022-12-19 NOTE — DISCHARGE NOTE PROVIDER - NSDCCPCAREPLAN_GEN_ALL_CORE_FT
PRINCIPAL DISCHARGE DIAGNOSIS  Diagnosis: Small bowel obstruction  Assessment and Plan of Treatment:       SECONDARY DISCHARGE DIAGNOSES  Diagnosis: Abdominal wall abscess  Assessment and Plan of Treatment:      PRINCIPAL DISCHARGE DIAGNOSIS  Diagnosis: Small bowel obstruction  Assessment and Plan of Treatment:       SECONDARY DISCHARGE DIAGNOSES  Diagnosis: Abdominal wall abscess  Assessment and Plan of Treatment: s/p drainage by IR

## 2022-12-19 NOTE — DISCHARGE NOTE PROVIDER - CARE PROVIDER_API CALL
Jin Gonzalez)  Surgery; Surgical Critical Care  [facility], [address] [place] [address]  Phone: [phone]  Fax: [phone]  Follow Up Time: 1 week    Yehuda Perez)  Diagnostic Radiology; [facility]  [address] [place] [address]  Phone: [phone]  Fax: [phone]  Follow Up Time: 1 week

## 2022-12-20 LAB
A1C WITH ESTIMATED AVERAGE GLUCOSE RESULT: 7.1 % — HIGH (ref 4–5.6)
ANION GAP SERPL CALC-SCNC: 6 MMOL/L — LOW (ref 7–14)
BUN SERPL-MCNC: 8 MG/DL — SIGNIFICANT CHANGE UP (ref 7–23)
CALCIUM SERPL-MCNC: 9.3 MG/DL — SIGNIFICANT CHANGE UP (ref 8.4–10.5)
CHLORIDE SERPL-SCNC: 100 MMOL/L — SIGNIFICANT CHANGE UP (ref 98–107)
CO2 SERPL-SCNC: 34 MMOL/L — HIGH (ref 22–31)
CREAT SERPL-MCNC: 0.89 MG/DL — SIGNIFICANT CHANGE UP (ref 0.5–1.3)
EGFR: 78 ML/MIN/1.73M2 — SIGNIFICANT CHANGE UP
ESTIMATED AVERAGE GLUCOSE: 157 — SIGNIFICANT CHANGE UP
GLUCOSE BLDC GLUCOMTR-MCNC: 177 MG/DL — HIGH (ref 70–99)
GLUCOSE BLDC GLUCOMTR-MCNC: 187 MG/DL — HIGH (ref 70–99)
GLUCOSE BLDC GLUCOMTR-MCNC: 202 MG/DL — HIGH (ref 70–99)
GLUCOSE BLDC GLUCOMTR-MCNC: 232 MG/DL — HIGH (ref 70–99)
GLUCOSE SERPL-MCNC: 177 MG/DL — HIGH (ref 70–99)
HCT VFR BLD CALC: 32.5 % — LOW (ref 34.5–45)
HGB BLD-MCNC: 10.5 G/DL — LOW (ref 11.5–15.5)
MAGNESIUM SERPL-MCNC: 2 MG/DL — SIGNIFICANT CHANGE UP (ref 1.6–2.6)
MCHC RBC-ENTMCNC: 24.7 PG — LOW (ref 27–34)
MCHC RBC-ENTMCNC: 32.3 GM/DL — SIGNIFICANT CHANGE UP (ref 32–36)
MCV RBC AUTO: 76.5 FL — LOW (ref 80–100)
NRBC # BLD: 0 /100 WBCS — SIGNIFICANT CHANGE UP (ref 0–0)
NRBC # FLD: 0 K/UL — SIGNIFICANT CHANGE UP (ref 0–0)
PHOSPHATE SERPL-MCNC: 3.9 MG/DL — SIGNIFICANT CHANGE UP (ref 2.5–4.5)
PLATELET # BLD AUTO: 206 K/UL — SIGNIFICANT CHANGE UP (ref 150–400)
POTASSIUM SERPL-MCNC: 3.5 MMOL/L — SIGNIFICANT CHANGE UP (ref 3.5–5.3)
POTASSIUM SERPL-SCNC: 3.5 MMOL/L — SIGNIFICANT CHANGE UP (ref 3.5–5.3)
RBC # BLD: 4.25 M/UL — SIGNIFICANT CHANGE UP (ref 3.8–5.2)
RBC # FLD: 16.9 % — HIGH (ref 10.3–14.5)
SODIUM SERPL-SCNC: 140 MMOL/L — SIGNIFICANT CHANGE UP (ref 135–145)
WBC # BLD: 6.75 K/UL — SIGNIFICANT CHANGE UP (ref 3.8–10.5)
WBC # FLD AUTO: 6.75 K/UL — SIGNIFICANT CHANGE UP (ref 3.8–10.5)

## 2022-12-20 PROCEDURE — 74176 CT ABD & PELVIS W/O CONTRAST: CPT | Mod: 26

## 2022-12-20 PROCEDURE — 99232 SBSQ HOSP IP/OBS MODERATE 35: CPT | Mod: GC

## 2022-12-20 RX ORDER — POTASSIUM CHLORIDE 20 MEQ
40 PACKET (EA) ORAL ONCE
Refills: 0 | Status: COMPLETED | OUTPATIENT
Start: 2022-12-20 | End: 2022-12-20

## 2022-12-20 RX ORDER — SENNA PLUS 8.6 MG/1
2 TABLET ORAL AT BEDTIME
Refills: 0 | Status: DISCONTINUED | OUTPATIENT
Start: 2022-12-20 | End: 2022-12-21

## 2022-12-20 RX ORDER — POLYETHYLENE GLYCOL 3350 17 G/17G
17 POWDER, FOR SOLUTION ORAL DAILY
Refills: 0 | Status: DISCONTINUED | OUTPATIENT
Start: 2022-12-20 | End: 2022-12-21

## 2022-12-20 RX ADMIN — INSULIN GLARGINE 10 UNIT(S): 100 INJECTION, SOLUTION SUBCUTANEOUS at 22:18

## 2022-12-20 RX ADMIN — ENOXAPARIN SODIUM 60 MILLIGRAM(S): 100 INJECTION SUBCUTANEOUS at 17:33

## 2022-12-20 RX ADMIN — Medication 40 MILLIEQUIVALENT(S): at 11:55

## 2022-12-20 RX ADMIN — Medication 975 MILLIGRAM(S): at 06:10

## 2022-12-20 RX ADMIN — Medication 4: at 12:45

## 2022-12-20 RX ADMIN — BUDESONIDE AND FORMOTEROL FUMARATE DIHYDRATE 2 PUFF(S): 160; 4.5 AEROSOL RESPIRATORY (INHALATION) at 09:22

## 2022-12-20 RX ADMIN — Medication 2: at 18:01

## 2022-12-20 RX ADMIN — POLYETHYLENE GLYCOL 3350 17 GRAM(S): 17 POWDER, FOR SOLUTION ORAL at 11:55

## 2022-12-20 RX ADMIN — SENNA PLUS 2 TABLET(S): 8.6 TABLET ORAL at 22:18

## 2022-12-20 RX ADMIN — Medication 975 MILLIGRAM(S): at 18:18

## 2022-12-20 RX ADMIN — PIPERACILLIN AND TAZOBACTAM 25 GRAM(S): 4; .5 INJECTION, POWDER, LYOPHILIZED, FOR SOLUTION INTRAVENOUS at 15:16

## 2022-12-20 RX ADMIN — PIPERACILLIN AND TAZOBACTAM 25 GRAM(S): 4; .5 INJECTION, POWDER, LYOPHILIZED, FOR SOLUTION INTRAVENOUS at 06:23

## 2022-12-20 RX ADMIN — Medication 2: at 08:46

## 2022-12-20 RX ADMIN — BUDESONIDE AND FORMOTEROL FUMARATE DIHYDRATE 2 PUFF(S): 160; 4.5 AEROSOL RESPIRATORY (INHALATION) at 22:17

## 2022-12-20 RX ADMIN — Medication 975 MILLIGRAM(S): at 17:18

## 2022-12-20 RX ADMIN — PIPERACILLIN AND TAZOBACTAM 25 GRAM(S): 4; .5 INJECTION, POWDER, LYOPHILIZED, FOR SOLUTION INTRAVENOUS at 22:17

## 2022-12-20 RX ADMIN — Medication 975 MILLIGRAM(S): at 05:40

## 2022-12-20 NOTE — PROGRESS NOTE ADULT - ASSESSMENT
52F hx ventral hernia repair with mesh 9/2020, wound infection 10/2020, now with 1 day of abdominal pain and vomiting, CT significant for SBO and abdominal wall abscess at hernia repair site. S/P IR drainage of abdominal wall abscess 12/17    Plan  - Diet: regular  - Zosyn  - Lantus 10 units, ISS  - Flush IR drain with 5cc saline daily    B team surgery  [id number] 52F hx ventral hernia repair with mesh 9/2020, wound infection 10/2020, now with 1 day of abdominal pain and vomiting, CT significant for SBO and abdominal wall abscess at hernia repair site. S/P IR drainage of abdominal wall abscess 12/17    Plan  - Possible IR tube check   - Diet: regular  - Zosyn  - Lantus 10 units, ISS  - Flush IR drain with 5cc saline daily    B team surgery  [id number] 52F hx ventral hernia repair with mesh 9/2020, wound infection 10/2020, now with 1 day of abdominal pain and vomiting, CT significant for SBO and abdominal wall abscess at hernia repair site. S/P IR drainage of abdominal wall abscess 12/17    Plan  - Possible IR tube check   - CT scan A/P without contrast  - Diet: regular  - Zosyn  - Lantus 10 units, ISS  - Flush IR drain with 5cc saline daily    B team surgery  [id number]

## 2022-12-21 ENCOUNTER — TRANSCRIPTION ENCOUNTER (OUTPATIENT)
Age: 52
End: 2022-12-21

## 2022-12-21 VITALS
RESPIRATION RATE: 18 BRPM | HEART RATE: 79 BPM | OXYGEN SATURATION: 96 % | SYSTOLIC BLOOD PRESSURE: 144 MMHG | TEMPERATURE: 98 F | DIASTOLIC BLOOD PRESSURE: 92 MMHG

## 2022-12-21 LAB
ANION GAP SERPL CALC-SCNC: 8 MMOL/L — SIGNIFICANT CHANGE UP (ref 7–14)
BUN SERPL-MCNC: 10 MG/DL — SIGNIFICANT CHANGE UP (ref 7–23)
CALCIUM SERPL-MCNC: 9.3 MG/DL — SIGNIFICANT CHANGE UP (ref 8.4–10.5)
CHLORIDE SERPL-SCNC: 103 MMOL/L — SIGNIFICANT CHANGE UP (ref 98–107)
CO2 SERPL-SCNC: 31 MMOL/L — SIGNIFICANT CHANGE UP (ref 22–31)
CREAT SERPL-MCNC: 0.82 MG/DL — SIGNIFICANT CHANGE UP (ref 0.5–1.3)
EGFR: 87 ML/MIN/1.73M2 — SIGNIFICANT CHANGE UP
GLUCOSE BLDC GLUCOMTR-MCNC: 167 MG/DL — HIGH (ref 70–99)
GLUCOSE BLDC GLUCOMTR-MCNC: 183 MG/DL — HIGH (ref 70–99)
GLUCOSE SERPL-MCNC: 175 MG/DL — HIGH (ref 70–99)
HCT VFR BLD CALC: 31.9 % — LOW (ref 34.5–45)
HGB BLD-MCNC: 10 G/DL — LOW (ref 11.5–15.5)
MAGNESIUM SERPL-MCNC: 2.1 MG/DL — SIGNIFICANT CHANGE UP (ref 1.6–2.6)
MCHC RBC-ENTMCNC: 23.8 PG — LOW (ref 27–34)
MCHC RBC-ENTMCNC: 31.3 GM/DL — LOW (ref 32–36)
MCV RBC AUTO: 76 FL — LOW (ref 80–100)
NRBC # BLD: 0 /100 WBCS — SIGNIFICANT CHANGE UP (ref 0–0)
NRBC # FLD: 0 K/UL — SIGNIFICANT CHANGE UP (ref 0–0)
PHOSPHATE SERPL-MCNC: 3.3 MG/DL — SIGNIFICANT CHANGE UP (ref 2.5–4.5)
PLATELET # BLD AUTO: 205 K/UL — SIGNIFICANT CHANGE UP (ref 150–400)
POTASSIUM SERPL-MCNC: 3.8 MMOL/L — SIGNIFICANT CHANGE UP (ref 3.5–5.3)
POTASSIUM SERPL-SCNC: 3.8 MMOL/L — SIGNIFICANT CHANGE UP (ref 3.5–5.3)
RBC # BLD: 4.2 M/UL — SIGNIFICANT CHANGE UP (ref 3.8–5.2)
RBC # FLD: 17 % — HIGH (ref 10.3–14.5)
SODIUM SERPL-SCNC: 142 MMOL/L — SIGNIFICANT CHANGE UP (ref 135–145)
WBC # BLD: 6.57 K/UL — SIGNIFICANT CHANGE UP (ref 3.8–10.5)
WBC # FLD AUTO: 6.57 K/UL — SIGNIFICANT CHANGE UP (ref 3.8–10.5)

## 2022-12-21 PROCEDURE — 99231 SBSQ HOSP IP/OBS SF/LOW 25: CPT | Mod: 24,GC

## 2022-12-21 RX ORDER — SENNA PLUS 8.6 MG/1
2 TABLET ORAL
Qty: 0 | Refills: 0 | DISCHARGE
Start: 2022-12-21

## 2022-12-21 RX ORDER — OXYCODONE HYDROCHLORIDE 5 MG/1
1 TABLET ORAL
Qty: 12 | Refills: 0
Start: 2022-12-21 | End: 2022-12-23

## 2022-12-21 RX ORDER — ACETAMINOPHEN 500 MG
1000 TABLET ORAL EVERY 6 HOURS
Refills: 0 | Status: DISCONTINUED | OUTPATIENT
Start: 2022-12-21 | End: 2022-12-21

## 2022-12-21 RX ORDER — POLYETHYLENE GLYCOL 3350 17 G/17G
17 POWDER, FOR SOLUTION ORAL
Qty: 510 | Refills: 0
Start: 2022-12-21 | End: 2023-01-19

## 2022-12-21 RX ADMIN — Medication 975 MILLIGRAM(S): at 02:55

## 2022-12-21 RX ADMIN — PIPERACILLIN AND TAZOBACTAM 25 GRAM(S): 4; .5 INJECTION, POWDER, LYOPHILIZED, FOR SOLUTION INTRAVENOUS at 06:05

## 2022-12-21 RX ADMIN — Medication 2: at 08:52

## 2022-12-21 RX ADMIN — Medication 2: at 12:43

## 2022-12-21 RX ADMIN — POLYETHYLENE GLYCOL 3350 17 GRAM(S): 17 POWDER, FOR SOLUTION ORAL at 12:31

## 2022-12-21 RX ADMIN — BUDESONIDE AND FORMOTEROL FUMARATE DIHYDRATE 2 PUFF(S): 160; 4.5 AEROSOL RESPIRATORY (INHALATION) at 09:43

## 2022-12-21 RX ADMIN — Medication 975 MILLIGRAM(S): at 03:25

## 2022-12-21 RX ADMIN — Medication 400 MILLIGRAM(S): at 11:42

## 2022-12-21 NOTE — DISCHARGE NOTE NURSING/CASE MANAGEMENT/SOCIAL WORK - NSDCPNINST_GEN_ALL_CORE
Please NOTIFY MD for any of the following s/s: S/S infection (Fever >100.4, chills, increased redness, increased bleeding, pus-like drainage from incision line), uncontrolled pain not relieved by pain medications, persistent nausea/vomiting or inability to tolerate diet.

## 2022-12-21 NOTE — DISCHARGE NOTE NURSING/CASE MANAGEMENT/SOCIAL WORK - PATIENT PORTAL LINK FT
You can access the FollowMyHealth Patient Portal offered by [facility] by registering at the following website: [email or web address]. By joining [facility]’s FollowMyHealth portal, you will also be able to view your health information using other applications (apps) compatible with our system.

## 2022-12-21 NOTE — PROGRESS NOTE ADULT - ATTENDING COMMENTS
I have personally interviewed and examined this patient, reviewed pertinent labs and imaging, and discussed the case with colleagues, residents, and physician assistants on B Team rounds.  More than 50% of this 30 minute encounter including face to face with the patient was spent counseling and/or coordination of care on infected seroma.  Time included review of vitals, labs, imaging, discussion with consultants and coordination with the operating room/emergency department.    51yo F s/p open ventral hernia repair (9/2022, onlay phasix), complicated by post-op wound infection in 10/2022, now returning with SBO and infected seroma. Pt underwent IR drainage - serous fluid in bulb. Denies GI function. NGT in place with bilious output and pt reports moderate volume (~300cc) emesis after returning from IR. SBO likely adhesive.     - flush IR drain with saline daily    - Continue abx   - follow IR cultures   - await return GI function   - gastrographin challenge after NGT decompression     The active care issues are:  1. infected seroma   2. sbo    The Acute Care Surgery (B Team) Attending Group Practice:  Dr. Varsha Henson    urgent issues - spectra [id number]  nonurgent issues - [phone]  patient appointments or afterhours - [phone]
I have personally interviewed and examined this patient, reviewed pertinent labs and imaging, and discussed the case with colleagues, residents, and physician assistants on B Team rounds.  More than 50% of this 30 minute encounter including face to face with the patient was spent counseling and/or coordination of care on infected seroma.  Time included review of vitals, labs, imaging, discussion with consultants and coordination with the operating room/emergency department.    53yo F s/p open ventral hernia repair (9/2022, onlay phasix), complicated by post-op wound infection in 10/2022, now returning with SBO and infected seroma. Pt underwent IR drainage - serous fluid in bulb. Passing flatus. Tolerating clears. Feels upper abdominal "indigestion" but otherwise feeling better. Would be interested in trial of regular diet.     - flush IR drain with saline daily    - Continue zosyn, d/c vanc  - follow IR cultures   - advance diet as tolerated     The active care issues are:  1. infected seroma   2. sbo    The Acute Care Surgery (B Team) Attending Group Practice:  Dr. Varsha Henson    urgent issues - spectra [id number]  nonurgent issues - [phone]  patient appointments or afterhours - [phone] .
resolved aSBO, on regular diet  postop seroma, now drained  continue antibiotics (bactrim) for cellulitis  d/c planning
I have personally interviewed and examined this patient, reviewed pertinent labs and imaging, and discussed the case with colleagues, residents, and physician assistants on B Team rounds.  More than 50% of this 30 minute encounter including face to face with the patient was spent counseling and/or coordination of care on infected seroma.  Time included review of vitals, labs, imaging, discussion with consultants and coordination with the operating room/emergency department.    53yo F s/p open ventral hernia repair (9/2022, onlay phasix), complicated by post-op wound infection in 10/2022, now returning with SBO and infected seroma. Pt underwent IR drainage - serous fluid in bulb, no growth on cultures. Passing flatus. Tolerating regular diet. Reports indigestion has resolved. Pt would like to have BM and to have drain evaluated prior to discharge.     - Repeat CT per IR, and possible IR tube check   - Add miralax and senna   - Continue regular diet   - Continue zosyn, plan for 10 d course   - follow IR cultures     The active care issues are:  1. infected seroma   2. sbo    The Acute Care Surgery (B Team) Attending Group Practice:  Dr. Varsha Henson    urgent issues - spectra [id number]  nonurgent issues - [phone]  patient appointments or afterhours - [phone] .

## 2022-12-21 NOTE — PROGRESS NOTE ADULT - REASON FOR ADMISSION
SBO and abdominal wall abscess

## 2022-12-21 NOTE — PROGRESS NOTE ADULT - ASSESSMENT
52F hx ventral hernia repair with mesh 9/2020, wound infection 10/2020, now with 1 day of abdominal pain and vomiting, CT significant for SBO and abdominal wall abscess at hernia repair site. S/P IR drainage of abdominal wall abscess 12/17    Plan  - Patient having JOCELYN output ~ 20cc so not candidate for tube check at the moment  - Drain teaching  - Antibiotics 10 days total (Bactrim)  - Diet: regular  - Zosyn  - Lantus 10 units, ISS  - Flush IR drain with 5cc saline daily    B team surgery  [id number]

## 2022-12-21 NOTE — PROGRESS NOTE ADULT - SUBJECTIVE AND OBJECTIVE BOX
52y Female s/p abdominal wall drain placement on 12/17 in Interventional Radiology.     Patient seen and examined bedside resting comfortably. Patient reports having some constipation and indigestion.     T(F): 97.7 (12-19-22 @ 10:10), Max: 98.9 (12-18-22 @ 17:36)  HR: 70 (12-19-22 @ 10:10) (60 - 75)  BP: 137/75 (12-19-22 @ 10:10) (100/53 - 155/86)  RR: 18 (12-19-22 @ 10:10) (18 - 18)  SpO2: 100% (12-19-22 @ 10:10) (94% - 100%)  Wt(kg): --    LABS:                        10.6   6.66  )-----------( 202      ( 19 Dec 2022 05:25 )             34.1     12-19    143  |  99  |  7   ----------------------------<  160<H>  3.7   |  35<H>  |  0.86    Ca    9.2      19 Dec 2022 05:25  Phos  4.1     12-19  Mg     1.90     12-19      PT/INR - ( 18 Dec 2022 08:12 )   PT: 13.0 sec;   INR: 1.12 ratio         PTT - ( 18 Dec 2022 08:12 )  PTT:29.8 sec  I&O's Detail    18 Dec 2022 07:01  -  19 Dec 2022 07:00  --------------------------------------------------------  IN:    IV PiggyBack: 600 mL    Lactated Ringers: 1725 mL    Oral Fluid: 300 mL  Total IN: 2625 mL    OUT:    Drain (mL): 15 mL    Voided (mL): 1375 mL  Total OUT: 1390 mL    Total NET: 1235 mL            PHYSICAL EXAM:  General: Nontoxic, in NAD  Abdominal wall drain: dressing c/d/i, flushed with 5cc NS        
GENERAL SURGERY PROGRESS NOTE    SUBJECTIVE  No acute events overnight. Seen and examined by surgery team this morning. Patient reports copious sternutations this morning resulting in accidental removal of NGT.     10-point review of systems completed and negative except as noted above.      OBJECTIVE    MEDICATIONS  acetaminophen   IVPB .. 1000 milliGRAM(s) IV Intermittent every 6 hours  albuterol    90 MICROgram(s) HFA Inhaler 2 Puff(s) Inhalation every 6 hours PRN  benzocaine 20% Spray 1 Spray(s) Mucosal once  budesonide 160 MICROgram(s)/formoterol 4.5 MICROgram(s) Inhaler 2 Puff(s) Inhalation two times a day  dextrose 50% Injectable 25 Gram(s) IV Push once  dextrose 50% Injectable 12.5 Gram(s) IV Push once  influenza   Vaccine 0.5 milliLiter(s) IntraMuscular once  insulin glargine Injectable (LANTUS) 10 Unit(s) SubCutaneous at bedtime  insulin lispro (ADMELOG) corrective regimen sliding scale   SubCutaneous every 6 hours  lactated ringers. 1000 milliLiter(s) IV Continuous <Continuous>  piperacillin/tazobactam IVPB.. 3.375 Gram(s) IV Intermittent every 8 hours  vancomycin  IVPB 1250 milliGRAM(s) IV Intermittent every 8 hours      PHYSICAL EXAM  T(C): 37.2 (12-18-22 @ 10:01), Max: 37.2 (12-18-22 @ 10:01)  HR: 75 (12-18-22 @ 10:01) (60 - 88)  BP: 127/81 (12-18-22 @ 10:01) (127/81 - 149/92)  RR: 17 (12-18-22 @ 10:01) (16 - 19)  SpO2: 94% (12-18-22 @ 10:01) (94% - 100%)    12-17-22 @ 07:01  -  12-18-22 @ 07:00  --------------------------------------------------------  IN: 3075 mL / OUT: 3270 mL / NET: -195 mL      Physical Exam:  General: NAD. Morbidly obese  Neuro: AAOx3  Respiratory: non-labored breathing on room air  CV: pulse present  Abdomen: soft, nontender, mildly distended, no rebound or guarding  Extremities: Grossly symmetric    LABS                        10.1   5.79  )-----------( 194      ( 18 Dec 2022 08:12 )             32.5     12-18    142  |  99  |  11  ----------------------------<  133<H>  3.5   |  33<H>  |  0.59    Ca    9.2      18 Dec 2022 08:12  Phos  3.9     12-18  Mg     1.90     12-18    TPro  8.4<H>  /  Alb  4.8  /  TBili  0.4  /  DBili  x   /  AST  23  /  ALT  18  /  AlkPhos  76  12-16    PT/INR - ( 18 Dec 2022 08:12 )   PT: 13.0 sec;   INR: 1.12 ratio         PTT - ( 18 Dec 2022 08:12 )  PTT:29.8 sec      RADIOLOGY & ADDITIONAL STUDIES  
INTERVAL EVENTS: No acute events overnight.  SUBJECTIVE: Patient seen and examined at bedside with surgical team, patient without complaints. Denies fever, chills, CP, SOB nausea, vomiting, abdominal pain. Patient is passing gas and had a BM yesterday (12/21)    OBJECTIVE:    Vital Signs Last 24 Hrs  T(C): 36.7 (21 Dec 2022 02:00), Max: 37.4 (20 Dec 2022 21:45)  T(F): 98 (21 Dec 2022 02:00), Max: 99.3 (20 Dec 2022 21:45)  HR: 72 (21 Dec 2022 02:00) (70 - 78)  BP: 132/76 (21 Dec 2022 02:00) (120/74 - 150/88)  BP(mean): --  RR: 16 (21 Dec 2022 02:00) (16 - 17)  SpO2: 100% (21 Dec 2022 02:00) (96% - 100%)    Parameters below as of 21 Dec 2022 02:00  Patient On (Oxygen Delivery Method): room air    I&O's Detail    20 Dec 2022 07:01  -  21 Dec 2022 07:00  --------------------------------------------------------  IN:    IV PiggyBack: 100 mL    Oral Fluid: 1200 mL  Total IN: 1300 mL    OUT:    Drain (mL): 22.5 mL    Voided (mL): 0 mL  Total OUT: 22.5 mL    Total NET: 1277.5 mL      MEDICATIONS  (STANDING):  acetaminophen   IVPB .. 1000 milliGRAM(s) IV Intermittent every 6 hours  budesonide 160 MICROgram(s)/formoterol 4.5 MICROgram(s) Inhaler 2 Puff(s) Inhalation two times a day  dextrose 50% Injectable 25 Gram(s) IV Push once  dextrose 50% Injectable 12.5 Gram(s) IV Push once  enoxaparin Injectable 60 milliGRAM(s) SubCutaneous every 12 hours  influenza   Vaccine 0.5 milliLiter(s) IntraMuscular once  insulin glargine Injectable (LANTUS) 10 Unit(s) SubCutaneous at bedtime  insulin lispro (ADMELOG) corrective regimen sliding scale   SubCutaneous three times a day before meals  insulin lispro (ADMELOG) corrective regimen sliding scale   SubCutaneous at bedtime  piperacillin/tazobactam IVPB.. 3.375 Gram(s) IV Intermittent every 8 hours  polyethylene glycol 3350 17 Gram(s) Oral daily  senna 2 Tablet(s) Oral at bedtime    MEDICATIONS  (PRN):  albuterol    90 MICROgram(s) HFA Inhaler 2 Puff(s) Inhalation every 6 hours PRN Shortness of Breath and/or Wheezing      PHYSICAL EXAM:  Constitutional: A&Ox3, NAD  Respiratory: Unlabored breathing  Abdomen: Soft, nondistended, NTTP. No rebound or guarding. Patient has IR drain in place which is draining serosanginous fluid.  Extremities: WWP, BOWLING spontaneously    LABS:                        10.0   6.57  )-----------( 205      ( 21 Dec 2022 06:20 )             31.9     12-21    142  |  103  |  10  ----------------------------<  175<H>  3.8   |  31  |  0.82    Ca    9.3      21 Dec 2022 06:20  Phos  3.3     12-21  Mg     2.10     12-21                IMAGING:    
Surgery Progress Note    INTERVAL EVENTS:   No acute events overnight.    SUBJECTIVE: Patient seen and examined at bedside with surgical team, still feels bloated. Pain improved, less nauseous.      OBJECTIVE:    Vital Signs Last 24 Hrs  T(C): 36.8 (17 Dec 2022 04:30), Max: 36.9 (16 Dec 2022 19:56)  T(F): 98.2 (17 Dec 2022 04:30), Max: 98.5 (16 Dec 2022 19:56)  HR: 66 (17 Dec 2022 04:30) (66 - 80)  BP: 137/67 (17 Dec 2022 04:30) (130/108 - 151/82)  BP(mean): --  RR: 18 (17 Dec 2022 04:30) (16 - 18)  SpO2: 100% (17 Dec 2022 04:30) (98% - 100%)    Parameters below as of 17 Dec 2022 04:30  Patient On (Oxygen Delivery Method): nasal cannula  O2 Flow (L/min): 3  I&O's Detail    16 Dec 2022 07:01  -  17 Dec 2022 07:00  --------------------------------------------------------  IN:    IV PiggyBack: 100 mL    Lactated Ringers: 125 mL  Total IN: 225 mL    OUT:    Oral Fluid: 0 mL    Voided (mL): 300 mL  Total OUT: 300 mL    Total NET: -75 mL      MEDICATIONS  (STANDING):  budesonide 160 MICROgram(s)/formoterol 4.5 MICROgram(s) Inhaler 2 Puff(s) Inhalation two times a day  dextrose 50% Injectable 25 Gram(s) IV Push once  dextrose 50% Injectable 12.5 Gram(s) IV Push once  enoxaparin Injectable 40 milliGRAM(s) SubCutaneous every 24 hours  influenza   Vaccine 0.5 milliLiter(s) IntraMuscular once  insulin glargine Injectable (LANTUS) 10 Unit(s) SubCutaneous at bedtime  insulin lispro (ADMELOG) corrective regimen sliding scale   SubCutaneous every 6 hours  lactated ringers. 1000 milliLiter(s) (125 mL/Hr) IV Continuous <Continuous>  piperacillin/tazobactam IVPB.. 3.375 Gram(s) IV Intermittent every 8 hours  vancomycin  IVPB 1250 milliGRAM(s) IV Intermittent every 8 hours    MEDICATIONS  (PRN):  albuterol    90 MICROgram(s) HFA Inhaler 2 Puff(s) Inhalation every 6 hours PRN Shortness of Breath and/or Wheezing      PHYSICAL EXAM:  Constitutional: NAD, NGT in place.   Respiratory: Unlabored breathing  Abdomen: Soft, distended, NTTP. No rebound or guarding. Previous surgery site with overlying skin crusting and induration.   Extremities: WWP, BOWLING spontaneously    LABS:                        11.2   8.10  )-----------( 253      ( 16 Dec 2022 23:16 )             35.9     12-16    139  |  97<L>  |  14  ----------------------------<  217<H>  4.3   |  30  |  0.46<L>    Ca    10.3      16 Dec 2022 23:16    TPro  8.4<H>  /  Alb  4.8  /  TBili  0.4  /  DBili  x   /  AST  23  /  ALT  18  /  AlkPhos  76  12-16    PT/INR - ( 16 Dec 2022 23:16 )   PT: 13.0 sec;   INR: 1.12 ratio         PTT - ( 16 Dec 2022 23:16 )  PTT:25.7 sec  LIVER FUNCTIONS - ( 16 Dec 2022 23:16 )  Alb: 4.8 g/dL / Pro: 8.4 g/dL / ALK PHOS: 76 U/L / ALT: 18 U/L / AST: 23 U/L / GGT: x             ABO Interpretation: O (12-16-22 @ 23:40)      IMAGING:    
TEAM [ B ] Surgery Daily Progress Note  =====================================================    SUBJECTIVE: Patient seen and examined at bedside on AM rounds. Patient reports that they're feeling well though experiencing mild indigestion. She self dc ngt yesterday. Patient is tolerating sips and chips without N/V. + Gas/- BM. Denies fever, chills, chest pain, SOB      ALLERGIES:  cefpodoxime (Rash)  estrogens (Hives; Pruritus)  peanuts (Short breath)      --------------------------------------------------------------------------------------    MEDICATIONS:    Neurologic Medications    Respiratory Medications  albuterol    90 MICROgram(s) HFA Inhaler 2 Puff(s) Inhalation every 6 hours PRN Shortness of Breath and/or Wheezing  budesonide 160 MICROgram(s)/formoterol 4.5 MICROgram(s) Inhaler 2 Puff(s) Inhalation two times a day    Cardiovascular Medications    Gastrointestinal Medications  lactated ringers. 1000 milliLiter(s) IV Continuous <Continuous>  potassium chloride   Powder 20 milliEquivalent(s) Oral once    Genitourinary Medications    Hematologic/Oncologic Medications  influenza   Vaccine 0.5 milliLiter(s) IntraMuscular once    Antimicrobial/Immunologic Medications  piperacillin/tazobactam IVPB.. 3.375 Gram(s) IV Intermittent every 8 hours  vancomycin  IVPB 1250 milliGRAM(s) IV Intermittent every 8 hours    Endocrine/Metabolic Medications  dextrose 50% Injectable 25 Gram(s) IV Push once  dextrose 50% Injectable 12.5 Gram(s) IV Push once  insulin glargine Injectable (LANTUS) 10 Unit(s) SubCutaneous at bedtime  insulin lispro (ADMELOG) corrective regimen sliding scale   SubCutaneous every 6 hours    Topical/Other Medications    --------------------------------------------------------------------------------------    VITAL SIGNS:  ICU Vital Signs Last 24 Hrs  T(C): 36.4 (19 Dec 2022 02:00), Max: 37.2 (18 Dec 2022 10:01)  T(F): 97.6 (19 Dec 2022 02:00), Max: 98.9 (18 Dec 2022 10:01)  HR: 60 (19 Dec 2022 02:00) (60 - 75)  BP: 155/86 (19 Dec 2022 02:00) (100/53 - 155/86)  BP(mean): --  ABP: --  ABP(mean): --  RR: 18 (18 Dec 2022 21:30) (17 - 18)  SpO2: 97% (19 Dec 2022 02:00) (94% - 97%)    O2 Parameters below as of 19 Dec 2022 02:00  Patient On (Oxygen Delivery Method): room air  --------------------------------------------------------------------------------------    EXAM    General: NAD, resting in bed comfortably.  Cardiac: regular rate, warm and well perfused  Respiratory: Nonlabored respirations, normal cw expansion, 5L NC  Abdomen: soft, nontender, nondistended, IR drain with serous output  Extremities: normal strength, FROM, no deformities    --------------------------------------------------------------------------------------    LABS                        10.6   6.66  )-----------( 202      ( 19 Dec 2022 05:25 )             34.1   12-19    143  |  99  |  x   ----------------------------<  x   3.7   |  x   |  x     Ca    9.2      18 Dec 2022 08:12  Phos  4.1     12-19  Mg     1.90     12-19    --------------------------------------------------------------------------------------    INS AND OUTS:  I&O's Detail    18 Dec 2022 07:01  -  19 Dec 2022 07:00  --------------------------------------------------------  IN:    IV PiggyBack: 600 mL    Lactated Ringers: 1725 mL    Oral Fluid: 300 mL  Total IN: 2625 mL    OUT:    Drain (mL): 15 mL    Voided (mL): 1375 mL  Total OUT: 1390 mL    Total NET: 1235 mL  --------------------------------------------------------------------------------------
TEAM [ B ] Surgery Daily Progress Note  =====================================================    SUBJECTIVE: Patient seen and examined at bedside on AM rounds. Patient reports that they're feeling well. Patient is tolerating regular diet, she is passing gas but no BM. Denies fever, chills, N/V, chest pain, SOB      ALLERGIES:  cefpodoxime (Rash)  estrogens (Hives; Pruritus)  peanuts (Short breath)      --------------------------------------------------------------------------------------    MEDICATIONS:    Neurologic Medications  acetaminophen     Tablet .. 975 milliGRAM(s) Oral every 6 hours PRN Mild Pain (1 - 3)    Respiratory Medications  albuterol    90 MICROgram(s) HFA Inhaler 2 Puff(s) Inhalation every 6 hours PRN Shortness of Breath and/or Wheezing  budesonide 160 MICROgram(s)/formoterol 4.5 MICROgram(s) Inhaler 2 Puff(s) Inhalation two times a day    Cardiovascular Medications    Gastrointestinal Medications    Genitourinary Medications    Hematologic/Oncologic Medications  enoxaparin Injectable 60 milliGRAM(s) SubCutaneous every 12 hours  influenza   Vaccine 0.5 milliLiter(s) IntraMuscular once    Antimicrobial/Immunologic Medications  piperacillin/tazobactam IVPB.. 3.375 Gram(s) IV Intermittent every 8 hours    Endocrine/Metabolic Medications  dextrose 50% Injectable 25 Gram(s) IV Push once  dextrose 50% Injectable 12.5 Gram(s) IV Push once  insulin glargine Injectable (LANTUS) 10 Unit(s) SubCutaneous at bedtime  insulin lispro (ADMELOG) corrective regimen sliding scale   SubCutaneous three times a day before meals  insulin lispro (ADMELOG) corrective regimen sliding scale   SubCutaneous at bedtime    Topical/Other Medications    --------------------------------------------------------------------------------------    VITAL SIGNS:  ICU Vital Signs Last 24 Hrs  T(C): 37.2 (20 Dec 2022 06:01), Max: 37.3 (20 Dec 2022 02:15)  T(F): 98.9 (20 Dec 2022 06:01), Max: 99.1 (20 Dec 2022 02:15)  HR: 77 (20 Dec 2022 06:01) (70 - 109)  BP: 133/77 (20 Dec 2022 06:01) (116/78 - 151/93)  BP(mean): --  ABP: --  ABP(mean): --  RR: 16 (20 Dec 2022 06:01) (16 - 18)  SpO2: 97% (20 Dec 2022 06:01) (94% - 100%)    O2 Parameters below as of 20 Dec 2022 06:01  Patient On (Oxygen Delivery Method): room air    --------------------------------------------------------------------------------------    EXAM    General: NAD, resting in bed comfortably.  Cardiac: regular rate, warm and well perfused  Respiratory: Nonlabored respirations, normal cw expansion.  Abdomen: soft, nontender, nondistended, IR drain with serous output  Extremities: normal strength, FROM, no deformities    --------------------------------------------------------------------------------------    LABS                        10.5   6.75  )-----------( 206      ( 20 Dec 2022 05:21 )             32.5       12-20    140  |  100  |  8   ----------------------------<  177<H>  3.5   |  34<H>  |  0.89    Ca    9.3      20 Dec 2022 05:21  Phos  3.9     12-20  Mg     2.00     12-20  --------------------------------------------------------------------------------------    INS AND OUTS:  I&O's Detail    19 Dec 2022 07:01  -  20 Dec 2022 07:00  --------------------------------------------------------  IN:    IV PiggyBack: 300 mL    Lactated Ringers: 300 mL    Oral Fluid: 1320 mL  Total IN: 1920 mL    OUT:    Drain (mL): 10 mL    Voided (mL): 750 mL  Total OUT: 760 mL    Total NET: 1160 mL    --------------------------------------------------------------------------------------

## 2022-12-21 NOTE — CHART NOTE - NSCHARTNOTEFT_GEN_A_CORE
Post Operative Note  Patient: FRANCESCA MCCORD 52y (1970) Female   MRN: [id number]  Location: [place] [address] [facility]  Visit: 12-17-22 Inpatient  Date: 12-17-22 @ 20:35    Procedure: S/P IR abdominal drain placement    Subjective: Patient seen and examined post operatively. Reports pain as controlled. Denies nausea, vomiting, fever, chills, chest pain, SOB, cough. Patient still endorsing some emesis post-operatively.      Objective:  Vitals: T(F): 98.2 (12-17-22 @ 17:32), Max: 98.6 (12-17-22 @ 15:30)  HR: 70 (12-17-22 @ 17:32)  BP: 149/92 (12-17-22 @ 17:32) (137/67 - 151/82)  RR: 18 (12-17-22 @ 17:32)  SpO2: 98% (12-17-22 @ 17:32)  Vent Settings:     In:   12-16-22 @ 07:01  -  12-17-22 @ 07:00  --------------------------------------------------------  IN: 225 mL    12-17-22 @ 07:01  -  12-17-22 @ 20:35  --------------------------------------------------------  IN: 1575 mL      IV Fluids: lactated ringers. 1000 milliLiter(s) (125 mL/Hr) IV Continuous <Continuous>      Out:   12-16-22 @ 07:01  -  12-17-22 @ 07:00  --------------------------------------------------------  OUT: 300 mL    12-17-22 @ 07:01  -  12-17-22 @ 20:35  --------------------------------------------------------  OUT: 2600 mL      EBL:     Voided Urine:   12-16-22 @ 07:01  -  12-17-22 @ 07:00  --------------------------------------------------------  OUT: 300 mL    12-17-22 @ 07:01  -  12-17-22 @ 20:35  --------------------------------------------------------  OUT: 2600 mL    Chest Tube:      NG Tube:   12-17-22 @ 07:01  -  12-17-22 @ 20:35  --------------------------------------------------------  OUT: 1600 mL          Physical Examination:  General: NAD, resting comfortably in bed  HEENT: Normocephalic atraumatic  Respiratory: Nonlabored respirations, normal CW expansion.  Cardio: S1S2, regular rate and rhythm.  Abdomen: softly, mildly distended, appropriately tender, IR drain in place draining cloudy serous fluid  Vascular: extremities are warm and well perfused.     Imaging:  No post-op imaging studies    Assessment:  52yFemale patient S/P abdominal IR drain placement    Plan:  - IV Abx: zosyn  - Pain control tylenol standing  - Diet: NPO w/ NGT to LCWS  - IVF -> LR@ 125  - Monitor I&O's  - Monitor drain output    Date/Time: 12-17-22 @ 20:35
IR Follow-Up     imaging & chart reviewed  discussed with team  plan for outpatient drain check and OK to d/c from IR standpoint    please call IR booking to set-up outpatient drain check in 2 weeks : [phone]    --  Garcia Castro DO/FRANKIE  Interventional Radiology Resident (PGY-4)  Available on [employer] TEAMS    For EMERGENT inquiries/questions:  IR Pager ([facility]): [phone]  IR Pager ([facility]): [phone] ; [id number]    For non-emergent consults/questions:   Please place a sunrise order "Consult- Interventional Radiology" with an appropriate callback number    For questions about scheduling during appropriate work hours, call IR :  [facility]: [phone]  [facility]: [phone]    For outpatient IR booking:  [facility]: [phone]  [facility]: [phone]
PRE-INTERVENTIONAL RADIOLOGY PROCEDURE NOTE      Patient Age: 52    Patient Gender: F    Procedure: Abdominal abscess drainage.     Diagnosis/Indication: abdominal wall collection     Interventional Radiology Attending Physician: Ana Blackman    Ordering Attending Physician: Varsha Henson.     Pertinent Medical History:  s/p ventral hernia repair 09/22    Pertinent labs:            LABS:                        11.2   8.10  )-----------( 253      ( 16 Dec 2022 23:16 )             35.9     12-16    139  |  97<L>  |  14  ----------------------------<  217<H>  4.3   |  30  |  0.46<L>    Ca    10.3      16 Dec 2022 23:16    TPro  8.4<H>  /  Alb  4.8  /  TBili  0.4  /  DBili  x   /  AST  23  /  ALT  18  /  AlkPhos  76  12-16    PT/INR - ( 16 Dec 2022 23:16 )   PT: 13.0 sec;   INR: 1.12 ratio         PTT - ( 16 Dec 2022 23:16 )  PTT:25.7 sec            Patient and Family Aware ? Yes.

## 2022-12-25 LAB
CULTURE RESULTS: SIGNIFICANT CHANGE UP
SPECIMEN SOURCE: SIGNIFICANT CHANGE UP

## 2023-01-05 ENCOUNTER — OUTPATIENT (OUTPATIENT)
Dept: OUTPATIENT SERVICES | Facility: HOSPITAL | Age: 53
LOS: 1 days | End: 2023-01-05
Payer: MEDICAID

## 2023-01-05 ENCOUNTER — APPOINTMENT (OUTPATIENT)
Dept: CT IMAGING | Facility: HOSPITAL | Age: 53
End: 2023-01-05

## 2023-01-05 ENCOUNTER — RESULT REVIEW (OUTPATIENT)
Age: 53
End: 2023-01-05

## 2023-01-05 VITALS
SYSTOLIC BLOOD PRESSURE: 149 MMHG | RESPIRATION RATE: 18 BRPM | TEMPERATURE: 98 F | DIASTOLIC BLOOD PRESSURE: 84 MMHG | OXYGEN SATURATION: 100 % | HEART RATE: 66 BPM

## 2023-01-05 VITALS
RESPIRATION RATE: 18 BRPM | SYSTOLIC BLOOD PRESSURE: 161 MMHG | DIASTOLIC BLOOD PRESSURE: 88 MMHG | OXYGEN SATURATION: 96 % | HEART RATE: 87 BPM | TEMPERATURE: 99 F

## 2023-01-05 DIAGNOSIS — Z98.890 OTHER SPECIFIED POSTPROCEDURAL STATES: Chronic | ICD-10-CM

## 2023-01-05 DIAGNOSIS — Z98.890 OTHER SPECIFIED POSTPROCEDURAL STATES: ICD-10-CM

## 2023-01-05 PROCEDURE — 49423 EXCHANGE DRAINAGE CATHETER: CPT

## 2023-01-05 PROCEDURE — 75894 X-RAYS TRANSCATH THERAPY: CPT | Mod: 26

## 2023-01-10 DIAGNOSIS — T81.89XA OTHER COMPLICATIONS OF PROCEDURES, NOT ELSEWHERE CLASSIFIED, INITIAL ENCOUNTER: ICD-10-CM

## 2023-01-10 DIAGNOSIS — Z46.82 ENCOUNTER FOR FITTING AND ADJUSTMENT OF NON-VASCULAR CATHETER: ICD-10-CM

## 2023-01-11 ENCOUNTER — APPOINTMENT (OUTPATIENT)
Dept: ENDOCRINOLOGY | Facility: CLINIC | Age: 53
End: 2023-01-11
Payer: MEDICAID

## 2023-01-11 VITALS
OXYGEN SATURATION: 96 % | DIASTOLIC BLOOD PRESSURE: 102 MMHG | HEART RATE: 96 BPM | SYSTOLIC BLOOD PRESSURE: 152 MMHG | HEIGHT: 60 IN | TEMPERATURE: 97.8 F | WEIGHT: 293 LBS | BODY MASS INDEX: 57.52 KG/M2

## 2023-01-11 DIAGNOSIS — E78.5 HYPERLIPIDEMIA, UNSPECIFIED: ICD-10-CM

## 2023-01-11 PROCEDURE — 99215 OFFICE O/P EST HI 40 MIN: CPT

## 2023-01-11 NOTE — ASSESSMENT
[FreeTextEntry1] : #Type 2 diabetes\par -Patient will family history of type 2 diabetes.  Hemoglobin A1c is reasonable control A1c is 7.1% last  month.\par -Her current regimen appears to be metformin 1000 mg daily, glimepiride 4 mg daily along with Trulicity 0.75mg weekly \par -Blood glucose at home appear to be mostly at home\par Plan:\par -Recommend to increase GLP-1 agonist further for more weight loss benefits.  We will increase Trulicity to 1.5 mg weekly.  \par -Ideally would like metformin to be 1000 mg twice a day however patient refusing to take more medications throughout the day.  For now can continue with metformin 1000 mg daily along with glimepiride 4 mg daily.\par \par Patient counseled extensively about the complications of diabetes including but not limited to nephropathy, neuropathy, and retinopathy. We discussed the importance of annual foot and optho exams. Explained that ideally blood sugars in the morning prior to breakfast should be between 80 and 130. Blood sugars should be checked 2 hours after eating and should be <180. If blood sugar is <70, patient should treat the blood sugar FIRST and then contact provider. Advised patient to let us know if BG persistently <70 or >200\par \par #Hyperlipidemia\par -Patient is on Simvastatin 20 mg daily.  She will need a lipid panel \par \par #Obesity\par - Patient is on GLP-1 as noted above however would recommend for patient to see the weight loss center given morbid obesity\par \par

## 2023-01-11 NOTE — HISTORY OF PRESENT ILLNESS
[FreeTextEntry1] : Patient was in the hosptial in sept for hernia repair. patient is went back to the hospital in oct in 2022 \par \par #Diabetes Mellitus Type 2   \par Diagnosis-  about 2 years ago \par Current regimen: MFM 1g with dinner + Trulicity 0.75mg (last dose in december) + glimiperide 4mg daily at night \par \par Other diabetic medications discontinued in the past:\par 1. Januvia                           Reason for discontinuation: told to stop it last week \par \par PCP/prior endocrinologist: Never seen one\par Signs/symptoms: Feels well \par Last HgbA1c: 7.1% in 12/2022\par Home blood sugars: \par fasting- 107-110\par Hypoglycemia: Denies\par \par FH of diabetes: denies \par mother with lupus \par History of CAD: Denies\par History of CVA: Denies\par \par Diet: \par breakfast: 10:30-11am- oatmeal/waffles  + tea  \par Dinner: soup, left overs\par will skip lunch\par Snacks: chips, pretzels, cookies \par Met with diabetes educator? yes \par \par eGFR: 116    Alb/creat: \par Follow with nephrology? \par \par Last eye exam:  december 2022\par Evidence of retinopathy? Denies \par \par Last foot exam: Denies \par Any issues? 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

## 2023-01-18 DIAGNOSIS — E11.9 TYPE 2 DIABETES MELLITUS W/OUT COMPLICATIONS: ICD-10-CM

## 2023-01-18 LAB
CULTURE RESULTS: SIGNIFICANT CHANGE UP
SPECIMEN SOURCE: SIGNIFICANT CHANGE UP

## 2023-01-19 ENCOUNTER — APPOINTMENT (OUTPATIENT)
Dept: CT IMAGING | Facility: HOSPITAL | Age: 53
End: 2023-01-19

## 2023-02-14 NOTE — BH CONSULTATION LIAISON ASSESSMENT NOTE - CURRENT MEDICATION
MEDICATIONS  (STANDING):  budesonide 160 MICROgram(s)/formoterol 4.5 MICROgram(s) Inhaler 2 Puff(s) Inhalation two times a day  dextrose 5%. 1000 milliLiter(s) (100 mL/Hr) IV Continuous <Continuous>  dextrose 5%. 1000 milliLiter(s) (50 mL/Hr) IV Continuous <Continuous>  dextrose 50% Injectable 25 Gram(s) IV Push once  dextrose 50% Injectable 12.5 Gram(s) IV Push once  dextrose 50% Injectable 25 Gram(s) IV Push once  enoxaparin Injectable 40 milliGRAM(s) SubCutaneous every 24 hours  glucagon  Injectable 1 milliGRAM(s) IntraMuscular once  influenza   Vaccine 0.5 milliLiter(s) IntraMuscular once  insulin lispro (ADMELOG) corrective regimen sliding scale   SubCutaneous every 6 hours  lactated ringers. 1000 milliLiter(s) (125 mL/Hr) IV Continuous <Continuous>  lisinopril 20 milliGRAM(s) Oral daily  magnesium sulfate  IVPB 2 Gram(s) IV Intermittent once  torsemide 10 milliGRAM(s) Oral daily    MEDICATIONS  (PRN):  ALBUTerol    90 MICROgram(s) HFA Inhaler 2 Puff(s) Inhalation every 6 hours PRN Shortness of Breath and/or Wheezing  dextrose Oral Gel 15 Gram(s) Oral once PRN Blood Glucose LESS THAN 70 milliGRAM(s)/deciliter  
Detail Level: None
Expiration Date (Optional): 10/31/23
Urine Pregnancy Test Result: negative
Lot # (Optional): [id number]
Performed By: Fatmata John

## 2023-03-18 ENCOUNTER — INPATIENT (INPATIENT)
Facility: HOSPITAL | Age: 53
LOS: 10 days | Discharge: HOME | End: 2023-03-29
Attending: STUDENT IN AN ORGANIZED HEALTH CARE EDUCATION/TRAINING PROGRAM | Admitting: STUDENT IN AN ORGANIZED HEALTH CARE EDUCATION/TRAINING PROGRAM
Payer: MEDICAID

## 2023-03-18 VITALS
OXYGEN SATURATION: 97 % | TEMPERATURE: 98 F | HEART RATE: 100 BPM | SYSTOLIC BLOOD PRESSURE: 143 MMHG | DIASTOLIC BLOOD PRESSURE: 72 MMHG | RESPIRATION RATE: 16 BRPM

## 2023-03-18 DIAGNOSIS — Z98.890 OTHER SPECIFIED POSTPROCEDURAL STATES: Chronic | ICD-10-CM

## 2023-03-18 PROCEDURE — 99285 EMERGENCY DEPT VISIT HI MDM: CPT

## 2023-03-18 NOTE — ED ADULT TRIAGE NOTE - CHIEF COMPLAINT QUOTE
Pt c/o intermittent subjective fevers and abscess/pus near umbilical area x6 months. S/p hernia repair September 2022, reports multiple ED visits for same complaint. Denies pain or discomfort. PMH asthma, DM, HTN

## 2023-03-19 DIAGNOSIS — L02.211 CUTANEOUS ABSCESS OF ABDOMINAL WALL: ICD-10-CM

## 2023-03-19 LAB
ALBUMIN SERPL ELPH-MCNC: 4.6 G/DL — SIGNIFICANT CHANGE UP (ref 3.3–5)
ALP SERPL-CCNC: 87 U/L — SIGNIFICANT CHANGE UP (ref 40–120)
ALT FLD-CCNC: 19 U/L — SIGNIFICANT CHANGE UP (ref 4–33)
ANION GAP SERPL CALC-SCNC: 14 MMOL/L — SIGNIFICANT CHANGE UP (ref 7–14)
ANION GAP SERPL CALC-SCNC: 9 MMOL/L — SIGNIFICANT CHANGE UP (ref 7–14)
AST SERPL-CCNC: 42 U/L — HIGH (ref 4–32)
BASE EXCESS BLDV CALC-SCNC: 4.5 MMOL/L — HIGH (ref -2–3)
BASOPHILS # BLD AUTO: 0.05 K/UL — SIGNIFICANT CHANGE UP (ref 0–0.2)
BASOPHILS NFR BLD AUTO: 0.7 % — SIGNIFICANT CHANGE UP (ref 0–2)
BILIRUB SERPL-MCNC: 0.4 MG/DL — SIGNIFICANT CHANGE UP (ref 0.2–1.2)
BLOOD GAS VENOUS COMPREHENSIVE RESULT: SIGNIFICANT CHANGE UP
BUN SERPL-MCNC: 14 MG/DL — SIGNIFICANT CHANGE UP (ref 7–23)
BUN SERPL-MCNC: 17 MG/DL — SIGNIFICANT CHANGE UP (ref 7–23)
CALCIUM SERPL-MCNC: 9.4 MG/DL — SIGNIFICANT CHANGE UP (ref 8.4–10.5)
CALCIUM SERPL-MCNC: 9.8 MG/DL — SIGNIFICANT CHANGE UP (ref 8.4–10.5)
CHLORIDE BLDV-SCNC: 104 MMOL/L — SIGNIFICANT CHANGE UP (ref 96–108)
CHLORIDE SERPL-SCNC: 101 MMOL/L — SIGNIFICANT CHANGE UP (ref 98–107)
CHLORIDE SERPL-SCNC: 103 MMOL/L — SIGNIFICANT CHANGE UP (ref 98–107)
CO2 BLDV-SCNC: 32.8 MMOL/L — HIGH (ref 22–26)
CO2 SERPL-SCNC: 20 MMOL/L — LOW (ref 22–31)
CO2 SERPL-SCNC: 27 MMOL/L — SIGNIFICANT CHANGE UP (ref 22–31)
CREAT SERPL-MCNC: 0.56 MG/DL — SIGNIFICANT CHANGE UP (ref 0.5–1.3)
CREAT SERPL-MCNC: 0.58 MG/DL — SIGNIFICANT CHANGE UP (ref 0.5–1.3)
CRP SERPL-MCNC: 11.6 MG/L — HIGH
EGFR: 110 ML/MIN/1.73M2 — SIGNIFICANT CHANGE UP
EGFR: 110 ML/MIN/1.73M2 — SIGNIFICANT CHANGE UP
EOSINOPHIL # BLD AUTO: 0.19 K/UL — SIGNIFICANT CHANGE UP (ref 0–0.5)
EOSINOPHIL NFR BLD AUTO: 2.5 % — SIGNIFICANT CHANGE UP (ref 0–6)
ERYTHROCYTE [SEDIMENTATION RATE] IN BLOOD: 18 MM/HR — SIGNIFICANT CHANGE UP (ref 4–25)
FLUAV AG NPH QL: SIGNIFICANT CHANGE UP
FLUBV AG NPH QL: SIGNIFICANT CHANGE UP
GAS PNL BLDV: 136 MMOL/L — SIGNIFICANT CHANGE UP (ref 136–145)
GLUCOSE BLDC GLUCOMTR-MCNC: 126 MG/DL — HIGH (ref 70–99)
GLUCOSE BLDC GLUCOMTR-MCNC: 159 MG/DL — HIGH (ref 70–99)
GLUCOSE BLDC GLUCOMTR-MCNC: 162 MG/DL — HIGH (ref 70–99)
GLUCOSE BLDC GLUCOMTR-MCNC: 214 MG/DL — HIGH (ref 70–99)
GLUCOSE BLDC GLUCOMTR-MCNC: 237 MG/DL — HIGH (ref 70–99)
GLUCOSE BLDV-MCNC: 235 MG/DL — HIGH (ref 70–99)
GLUCOSE SERPL-MCNC: 155 MG/DL — HIGH (ref 70–99)
GLUCOSE SERPL-MCNC: 229 MG/DL — HIGH (ref 70–99)
HCG SERPL-ACNC: <5 MIU/ML — SIGNIFICANT CHANGE UP
HCO3 BLDV-SCNC: 31 MMOL/L — HIGH (ref 22–29)
HCT VFR BLD CALC: 36.6 % — SIGNIFICANT CHANGE UP (ref 34.5–45)
HCT VFR BLDA CALC: 34 % — LOW (ref 34.5–46.5)
HGB BLD CALC-MCNC: 11.3 G/DL — LOW (ref 11.7–16.1)
HGB BLD-MCNC: 11.2 G/DL — LOW (ref 11.5–15.5)
IANC: 3.21 K/UL — SIGNIFICANT CHANGE UP (ref 1.8–7.4)
IMM GRANULOCYTES NFR BLD AUTO: 0.1 % — SIGNIFICANT CHANGE UP (ref 0–0.9)
LACTATE BLDV-MCNC: 1.5 MMOL/L — SIGNIFICANT CHANGE UP (ref 0.5–2)
LYMPHOCYTES # BLD AUTO: 3.52 K/UL — HIGH (ref 1–3.3)
LYMPHOCYTES # BLD AUTO: 46.6 % — HIGH (ref 13–44)
MCHC RBC-ENTMCNC: 23.1 PG — LOW (ref 27–34)
MCHC RBC-ENTMCNC: 30.6 GM/DL — LOW (ref 32–36)
MCV RBC AUTO: 75.6 FL — LOW (ref 80–100)
MONOCYTES # BLD AUTO: 0.57 K/UL — SIGNIFICANT CHANGE UP (ref 0–0.9)
MONOCYTES NFR BLD AUTO: 7.5 % — SIGNIFICANT CHANGE UP (ref 2–14)
NEUTROPHILS # BLD AUTO: 3.21 K/UL — SIGNIFICANT CHANGE UP (ref 1.8–7.4)
NEUTROPHILS NFR BLD AUTO: 42.6 % — LOW (ref 43–77)
NRBC # BLD: 0 /100 WBCS — SIGNIFICANT CHANGE UP (ref 0–0)
NRBC # FLD: 0 K/UL — SIGNIFICANT CHANGE UP (ref 0–0)
PCO2 BLDV: 55 MMHG — HIGH (ref 39–52)
PH BLDV: 7.36 — SIGNIFICANT CHANGE UP (ref 7.32–7.43)
PLATELET # BLD AUTO: 190 K/UL — SIGNIFICANT CHANGE UP (ref 150–400)
PO2 BLDV: 53 MMHG — HIGH (ref 25–45)
POTASSIUM BLDV-SCNC: 4.6 MMOL/L — SIGNIFICANT CHANGE UP (ref 3.5–5.1)
POTASSIUM SERPL-MCNC: 4.4 MMOL/L — SIGNIFICANT CHANGE UP (ref 3.5–5.3)
POTASSIUM SERPL-MCNC: SIGNIFICANT CHANGE UP MMOL/L (ref 3.5–5.3)
POTASSIUM SERPL-SCNC: 4.4 MMOL/L — SIGNIFICANT CHANGE UP (ref 3.5–5.3)
POTASSIUM SERPL-SCNC: SIGNIFICANT CHANGE UP MMOL/L (ref 3.5–5.3)
PROCALCITONIN SERPL-MCNC: 0.04 NG/ML — SIGNIFICANT CHANGE UP (ref 0.02–0.1)
PROT SERPL-MCNC: 8.3 G/DL — SIGNIFICANT CHANGE UP (ref 6–8.3)
RBC # BLD: 4.84 M/UL — SIGNIFICANT CHANGE UP (ref 3.8–5.2)
RBC # FLD: 17 % — HIGH (ref 10.3–14.5)
RSV RNA NPH QL NAA+NON-PROBE: SIGNIFICANT CHANGE UP
SAO2 % BLDV: 81.4 % — SIGNIFICANT CHANGE UP (ref 67–88)
SARS-COV-2 RNA SPEC QL NAA+PROBE: SIGNIFICANT CHANGE UP
SODIUM SERPL-SCNC: 135 MMOL/L — SIGNIFICANT CHANGE UP (ref 135–145)
SODIUM SERPL-SCNC: 139 MMOL/L — SIGNIFICANT CHANGE UP (ref 135–145)
WBC # BLD: 7.55 K/UL — SIGNIFICANT CHANGE UP (ref 3.8–10.5)
WBC # FLD AUTO: 7.55 K/UL — SIGNIFICANT CHANGE UP (ref 3.8–10.5)

## 2023-03-19 PROCEDURE — 74177 CT ABD & PELVIS W/CONTRAST: CPT | Mod: 26,MD

## 2023-03-19 RX ORDER — ALBUTEROL 90 UG/1
2 AEROSOL, METERED ORAL EVERY 6 HOURS
Refills: 0 | Status: DISCONTINUED | OUTPATIENT
Start: 2023-03-19 | End: 2023-03-29

## 2023-03-19 RX ORDER — SIMVASTATIN 20 MG/1
1 TABLET, FILM COATED ORAL
Qty: 0 | Refills: 0 | DISCHARGE

## 2023-03-19 RX ORDER — BUDESONIDE AND FORMOTEROL FUMARATE DIHYDRATE 160; 4.5 UG/1; UG/1
2 AEROSOL RESPIRATORY (INHALATION)
Refills: 0 | Status: DISCONTINUED | OUTPATIENT
Start: 2023-03-19 | End: 2023-03-29

## 2023-03-19 RX ORDER — SENNA PLUS 8.6 MG/1
2 TABLET ORAL AT BEDTIME
Refills: 0 | Status: DISCONTINUED | OUTPATIENT
Start: 2023-03-19 | End: 2023-03-21

## 2023-03-19 RX ORDER — INFLUENZA VIRUS VACCINE 15; 15; 15; 15 UG/.5ML; UG/.5ML; UG/.5ML; UG/.5ML
0.5 SUSPENSION INTRAMUSCULAR ONCE
Refills: 0 | Status: DISCONTINUED | OUTPATIENT
Start: 2023-03-19 | End: 2023-03-29

## 2023-03-19 RX ORDER — PIPERACILLIN AND TAZOBACTAM 4; .5 G/20ML; G/20ML
3.38 INJECTION, POWDER, LYOPHILIZED, FOR SOLUTION INTRAVENOUS ONCE
Refills: 0 | Status: COMPLETED | OUTPATIENT
Start: 2023-03-19 | End: 2023-03-19

## 2023-03-19 RX ORDER — BUDESONIDE AND FORMOTEROL FUMARATE DIHYDRATE 160; 4.5 UG/1; UG/1
2 AEROSOL RESPIRATORY (INHALATION)
Qty: 0 | Refills: 0 | DISCHARGE

## 2023-03-19 RX ORDER — INSULIN LISPRO 100/ML
VIAL (ML) SUBCUTANEOUS AT BEDTIME
Refills: 0 | Status: DISCONTINUED | OUTPATIENT
Start: 2023-03-19 | End: 2023-03-22

## 2023-03-19 RX ORDER — LISINOPRIL 2.5 MG/1
1 TABLET ORAL
Qty: 0 | Refills: 0 | DISCHARGE

## 2023-03-19 RX ORDER — SIMVASTATIN 20 MG/1
20 TABLET, FILM COATED ORAL AT BEDTIME
Refills: 0 | Status: DISCONTINUED | OUTPATIENT
Start: 2023-03-19 | End: 2023-03-29

## 2023-03-19 RX ORDER — LISINOPRIL 2.5 MG/1
20 TABLET ORAL DAILY
Refills: 0 | Status: DISCONTINUED | OUTPATIENT
Start: 2023-03-19 | End: 2023-03-29

## 2023-03-19 RX ORDER — POLYETHYLENE GLYCOL 3350 17 G/17G
17 POWDER, FOR SOLUTION ORAL AT BEDTIME
Refills: 0 | Status: DISCONTINUED | OUTPATIENT
Start: 2023-03-19 | End: 2023-03-25

## 2023-03-19 RX ORDER — LIDOCAINE HCL 20 MG/ML
50 VIAL (ML) INJECTION ONCE
Refills: 0 | Status: COMPLETED | OUTPATIENT
Start: 2023-03-19 | End: 2023-03-19

## 2023-03-19 RX ORDER — ENOXAPARIN SODIUM 100 MG/ML
40 INJECTION SUBCUTANEOUS EVERY 24 HOURS
Refills: 0 | Status: DISCONTINUED | OUTPATIENT
Start: 2023-03-19 | End: 2023-03-29

## 2023-03-19 RX ORDER — INSULIN GLARGINE 100 [IU]/ML
10 INJECTION, SOLUTION SUBCUTANEOUS AT BEDTIME
Refills: 0 | Status: DISCONTINUED | OUTPATIENT
Start: 2023-03-19 | End: 2023-03-29

## 2023-03-19 RX ORDER — ACETAMINOPHEN 500 MG
650 TABLET ORAL EVERY 6 HOURS
Refills: 0 | Status: DISCONTINUED | OUTPATIENT
Start: 2023-03-19 | End: 2023-03-23

## 2023-03-19 RX ORDER — PIPERACILLIN AND TAZOBACTAM 4; .5 G/20ML; G/20ML
3.38 INJECTION, POWDER, LYOPHILIZED, FOR SOLUTION INTRAVENOUS EVERY 8 HOURS
Refills: 0 | Status: DISCONTINUED | OUTPATIENT
Start: 2023-03-19 | End: 2023-03-24

## 2023-03-19 RX ORDER — INSULIN LISPRO 100/ML
VIAL (ML) SUBCUTANEOUS
Refills: 0 | Status: DISCONTINUED | OUTPATIENT
Start: 2023-03-19 | End: 2023-03-22

## 2023-03-19 RX ORDER — VANCOMYCIN HCL 1 G
1000 VIAL (EA) INTRAVENOUS EVERY 12 HOURS
Refills: 0 | Status: DISCONTINUED | OUTPATIENT
Start: 2023-03-19 | End: 2023-03-21

## 2023-03-19 RX ORDER — INSULIN LISPRO 100/ML
7 VIAL (ML) SUBCUTANEOUS
Refills: 0 | Status: DISCONTINUED | OUTPATIENT
Start: 2023-03-19 | End: 2023-03-22

## 2023-03-19 RX ADMIN — Medication 2: at 10:49

## 2023-03-19 RX ADMIN — PIPERACILLIN AND TAZOBACTAM 200 GRAM(S): 4; .5 INJECTION, POWDER, LYOPHILIZED, FOR SOLUTION INTRAVENOUS at 07:03

## 2023-03-19 RX ADMIN — PIPERACILLIN AND TAZOBACTAM 200 GRAM(S): 4; .5 INJECTION, POWDER, LYOPHILIZED, FOR SOLUTION INTRAVENOUS at 10:45

## 2023-03-19 RX ADMIN — Medication 4: at 16:39

## 2023-03-19 RX ADMIN — BUDESONIDE AND FORMOTEROL FUMARATE DIHYDRATE 2 PUFF(S): 160; 4.5 AEROSOL RESPIRATORY (INHALATION) at 21:28

## 2023-03-19 RX ADMIN — Medication 250 MILLIGRAM(S): at 17:18

## 2023-03-19 RX ADMIN — PIPERACILLIN AND TAZOBACTAM 25 GRAM(S): 4; .5 INJECTION, POWDER, LYOPHILIZED, FOR SOLUTION INTRAVENOUS at 18:33

## 2023-03-19 RX ADMIN — Medication 650 MILLIGRAM(S): at 10:54

## 2023-03-19 RX ADMIN — SIMVASTATIN 20 MILLIGRAM(S): 20 TABLET, FILM COATED ORAL at 21:27

## 2023-03-19 RX ADMIN — INSULIN GLARGINE 10 UNIT(S): 100 INJECTION, SOLUTION SUBCUTANEOUS at 21:55

## 2023-03-19 RX ADMIN — PIPERACILLIN AND TAZOBACTAM 25 GRAM(S): 4; .5 INJECTION, POWDER, LYOPHILIZED, FOR SOLUTION INTRAVENOUS at 23:32

## 2023-03-19 RX ADMIN — Medication 7 UNIT(S): at 10:50

## 2023-03-19 RX ADMIN — SENNA PLUS 2 TABLET(S): 8.6 TABLET ORAL at 21:27

## 2023-03-19 RX ADMIN — POLYETHYLENE GLYCOL 3350 17 GRAM(S): 17 POWDER, FOR SOLUTION ORAL at 22:06

## 2023-03-19 RX ADMIN — Medication 7 UNIT(S): at 16:38

## 2023-03-19 RX ADMIN — Medication 50 MILLILITER(S): at 10:06

## 2023-03-19 NOTE — PATIENT PROFILE ADULT - FALL HARM RISK - RISK INTERVENTIONS

## 2023-03-19 NOTE — ED ADULT NURSE REASSESSMENT NOTE - NS ED NURSE REASSESS COMMENT FT1
Pt remains in ED in NAD. Pt A&Ox4, respirations even/unlabored on room air. Pt ambulatory with steady gait to restroom independently. ABT Zosyn administered per provider order and tolerated well. Pt denies having any c/o pain or discomfort at this time. All safety precautions maintained.

## 2023-03-19 NOTE — ED PROVIDER NOTE - ATTENDING CONTRIBUTION TO CARE
The patient is a 52y Female who has a past medical and surgery history of  HTN DM Asthma ovarian cystectomy PTED with ventral abdominal wall abscess in dec 2022 PTED today with lester pus draining out of wound no drain in place   Vital Signs Last 24 Hrs  T(F): 98.2 HR: 89 BP: 127/71 RR: 17 SpO2: 99% (19 Mar 2023 02:27)   PE: as described; my additions and exceptions are noted in the chart    DATA:  LAB:   Blood Gas Venous - Lactate: 1.5 mmol/L (03-19-23 @ 03:49)                        11.2   7.55  )-----------( 190      ( 19 Mar 2023 03:49 )             36.6   03-19    135  |  101  |  17  ----------------------------<  229<H>  TNP   |  20<L>  |  0.58    Ca    9.8      19 Mar 2023 03:49 TPro  8.3  /  Alb  4.6  /  TBili  0.4  /  DBili  x   /  AST  42<H>  /  ALT  19  /  AlkPhos  87  03-19     CT IMPRESSION:  No intra-abdominal abscess. Ventral wall abscess containing fluid and   foci of air. Significant inflammatory fatty stranding. Questionable sinus   tract the umbilicus.    IMPRESSION/RISK:  Dx= abdominal abscess    Consideration include  Plan  Vanco piperacillin/tazobactam IV   IR call back at 6:36am; no acute intervention at this time   Surgery consulted

## 2023-03-19 NOTE — H&P ADULT - NSHPPOAPULMEMBOLUS_GEN_A_CORE
Patient called and would like to speak to a nurse about getting a referral to  for a counselor.       Please call patient back to discuss    
Spoke with patient who states that she is struggling a lot with anxiety with all of the covid situation    Pt states she is under a lot of stress, daughter is moved out to [place], Son lives in [place] and her father was just diagnosed with stage 4 cancer    Pt is very tearful on the phone, she states she is not suicidal but is feeling very overwhelmed and very lonely.  Pt states in the past she has had very good luck with therapy but after insurance change she was not able to see someone that fit well.     Pt currently sees Carmina Grewal for Psych but patient would like to speak with a therapist as well to help her manage everything better.    Lots of support given, pt has crisis center number and feels she knows she can call anytime if she needs us.    Stat referral placed to .  Patient is requesting so that she can call right after this phone call to try to get scheduled    Also discussed contacting [facility] to see if she can be set up with a therapist in the mean time if she is unable to get scheduled     Per Verbal order from Teresa schilling to place referral           
no

## 2023-03-19 NOTE — ED PROVIDER NOTE - CLINICAL SUMMARY MEDICAL DECISION MAKING FREE TEXT BOX
Lizbet Juan MD, PGY-3: 53YO F hx DM, ventral hernia repair with mesh, complications of ventral wall abscess s/p IR drainage and drain placement, p/w purulent drainage from the umbilicus, intermittent fevers. vss, pe + actively draining purulent fluid. concern for recurrent abscess, low suspicion intraabdominal abscess given minimal abd ttp. plan for basic labs, esr/cpr, vbg, ct a/p, surgery consult.

## 2023-03-19 NOTE — H&P ADULT - ASSESSMENT
- Admit to B Team Surgery under Dr. Varsha Henson, floor bed   - Will open area of drainage and explore wound at bedside, send wound cx   - IV abx  - Regular, consistent carb diet  - Home meds  - Restart inpatient insulin regimen, consider endocrinology consult if continues to be uncontrolled; f/u AM a1c      D/w Dr. Sixto Vaz, PGY-2  B Team Surgery   #[id number]      - Admit to B Team Surgery under Dr. Varsha Henson, floor bed   - Umbilicus and fistula tract from prior drain site probed at bedside, drainage coming from deep within umbilicus; betadine applied, repacked with corner of gauze  - Dressing changes PRN   - IV abx  - Regular, consistent carb diet  - Home meds  - Restart inpatient insulin regimen, consider endocrinology consult if continues to be uncontrolled; f/u AM a1c      D/w Dr. Henson, seen at bedside with senior resident on call     YOLY Vaz, PGY-2  B Team Surgery   #[id number]  52F PMH DM, HTN incarcerated ventral hernia s/p repair 9/2022, c/b infected seroma 10/2022, SBO with abdominal wall abscess s/p IR drain 12/2022, presents with purulent drainage from umbilicus for past 2 weeks. Of note, patient originally presented with A1c of 10 in September, most recently improved to 7 in December.     PLAN:   - Admit to B Team Surgery under Dr. Varsha Henson, floor bed   - Umbilicus and fistula tract from prior drain site probed at bedside, drainage coming from deep within umbilicus; betadine applied, repacked with corner of gauze  - Dressing changes PRN   - IV abx  - Regular, consistent carb diet  - Home meds  - Restart inpatient insulin regimen, consider endocrinology consult if continues to be uncontrolled; f/u AM a1c      D/w Dr. Henson, seen at bedside with senior resident on call     YOLY Vaz, PGY-2  B Team Surgery   #[id number]

## 2023-03-19 NOTE — ED PROVIDER NOTE - PROGRESS NOTE DETAILS
Lizbet Juan MD, PGY-3: surgery aware, will sandro pt. spoke with IR, no indications for drain placement given small size of abscess.

## 2023-03-19 NOTE — ED ADULT NURSE NOTE - OBJECTIVE STATEMENT
Break RN: Patient received to room 14, A/Ox4, ambulatory, c/o abscess. Patient states she had hernia repair surgery in September and has since had intermittent fevers and pus at umbilical site. States she has been to hospital multiple times for same complaints. Denies chest pain, SOB, N/V and pain to umbilical area. Abdomen appears round, skin around umbilicus is dry and hard, drainage noted. 20G IV placed to left AC. Safety maintained, call bell within reach.

## 2023-03-19 NOTE — ED ADULT NURSE REASSESSMENT NOTE - NS ED NURSE REASSESS COMMENT FT1
Received report from night RN, pt offers no complaints, appears comfortable, RR even and unlabored, pending dispo Received report from night RN, pt offers no complaints, appears comfortable, surgical site dressing intact, noted to have some drainage, RR even and unlabored, pending dispo Breanne: pre-op labs for VATS tomorrow.

## 2023-03-19 NOTE — H&P ADULT - HISTORY OF PRESENT ILLNESS
HPI:        PAST MEDICAL & SURGICAL HISTORY:  HTN (hypertension)      DM (diabetes mellitus)      Asthma      S/P ovarian cystectomy      S/P repair of ventral hernia        Allergies    cefpodoxime (Rash)  estrogens (Hives; Pruritus)  peanuts (Short breath)    Intolerances        SOCIAL HISTORY:    FAMILY HISTORY:          Physical Exam:  General: NAD, resting comfortably  HEENT: NC/AT, EOMI, normal hearing, no oral lesions, no LAD, neck supple  Pulmonary: normal resp effort, CTA-B  Cardiovascular: NSR, no murmurs  Abdominal: soft, ND/NT, no organomegaly  Extremities: WWP, normal strength, no clubbing/cyanosis/edema  Neuro: A/O x 3, CNs II-XII grossly intact, normal sensation, no focal deficits  Pulses: palpable distal pulses    Vital Signs Last 24 Hrs  T(C): 36.2 (19 Mar 2023 06:57), Max: 36.9 (18 Mar 2023 23:52)  T(F): 97.1 (19 Mar 2023 06:57), Max: 98.4 (18 Mar 2023 23:52)  HR: 87 (19 Mar 2023 06:57) (87 - 100)  BP: 120/67 (19 Mar 2023 06:57) (120/67 - 143/72)  BP(mean): --  RR: 17 (19 Mar 2023 06:57) (16 - 17)  SpO2: 98% (19 Mar 2023 06:57) (97% - 99%)    Parameters below as of 19 Mar 2023 06:57  Patient On (Oxygen Delivery Method): room air        I&O's Summary          LABS:                        11.2   7.55  )-----------( 190      ( 19 Mar 2023 03:49 )             36.6     03-19    135  |  101  |  17  ----------------------------<  229<H>  TNP   |  20<L>  |  0.58    Ca    9.8      19 Mar 2023 03:49    TPro  8.3  /  Alb  4.6  /  TBili  0.4  /  DBili  x   /  AST  42<H>  /  ALT  19  /  AlkPhos  87  03-19        CAPILLARY BLOOD GLUCOSE        LIVER FUNCTIONS - ( 19 Mar 2023 03:49 )  Alb: 4.6 g/dL / Pro: 8.3 g/dL / ALK PHOS: 87 U/L / ALT: 19 U/L / AST: 42 U/L / GGT: x             Cultures:      RADIOLOGY & ADDITIONAL STUDIES:  < from: CT Abdomen and Pelvis w/ IV Cont (03.19.23 @ 05:19) >    BOWEL: No bowel obstruction. Appendix is normal.  PERITONEUM: No ascites.  VESSELS: Within normal limits.  RETROPERITONEUM/LYMPH NODES: There are a few subcentimeter   retroperitoneal and pelvic sidewall lymph nodes.  ABDOMINAL WALL: Asymmetrically enlarged inferior right rectus abdominis,   stable since 9/11/2022. Redemonstration of ventral wall collection with   interval removal of pigtail drainage catheter. The collection contains   low-density fluid and foci of air. Significant inflammatory fatty   stranding throughout the pannus with questionable sinus tract to the   umbilicus.  BONES: Degenerative changes. 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    IMPRESSION:  No intra-abdominal abscess. Ventral wall abscess containing fluid and   foci of air. Significant inflammatory fatty stranding. Questionable sinus   tract the umbilicus.    < end of copied text >   HPI:  52F PMH DM, incarcerated ventral hernia s/p repair with overlay phasix mesh       PAST MEDICAL & SURGICAL HISTORY:  HTN (hypertension)      DM (diabetes mellitus)      Asthma      S/P ovarian cystectomy      S/P repair of ventral hernia        Allergies    cefpodoxime (Rash)  estrogens (Hives; Pruritus)  peanuts (Short breath)    Intolerances        SOCIAL HISTORY:    FAMILY HISTORY:          Physical Exam:  General: NAD, resting comfortably  HEENT: NC/AT, EOMI, normal hearing, no oral lesions, no LAD, neck supple  Pulmonary: normal resp effort, CTA-B  Cardiovascular: NSR, no murmurs  Abdominal: soft, ND/NT, no organomegaly  Extremities: WWP, normal strength, no clubbing/cyanosis/edema  Neuro: A/O x 3, CNs II-XII grossly intact, normal sensation, no focal deficits  Pulses: palpable distal pulses    Vital Signs Last 24 Hrs  T(C): 36.2 (19 Mar 2023 06:57), Max: 36.9 (18 Mar 2023 23:52)  T(F): 97.1 (19 Mar 2023 06:57), Max: 98.4 (18 Mar 2023 23:52)  HR: 87 (19 Mar 2023 06:57) (87 - 100)  BP: 120/67 (19 Mar 2023 06:57) (120/67 - 143/72)  BP(mean): --  RR: 17 (19 Mar 2023 06:57) (16 - 17)  SpO2: 98% (19 Mar 2023 06:57) (97% - 99%)    Parameters below as of 19 Mar 2023 06:57  Patient On (Oxygen Delivery Method): room air        I&O's Summary          LABS:                        11.2   7.55  )-----------( 190      ( 19 Mar 2023 03:49 )             36.6     03-19    135  |  101  |  17  ----------------------------<  229<H>  TNP   |  20<L>  |  0.58    Ca    9.8      19 Mar 2023 03:49    TPro  8.3  /  Alb  4.6  /  TBili  0.4  /  DBili  x   /  AST  42<H>  /  ALT  19  /  AlkPhos  87  03-19        CAPILLARY BLOOD GLUCOSE        LIVER FUNCTIONS - ( 19 Mar 2023 03:49 )  Alb: 4.6 g/dL / Pro: 8.3 g/dL / ALK PHOS: 87 U/L / ALT: 19 U/L / AST: 42 U/L / GGT: x             Cultures:      RADIOLOGY & ADDITIONAL STUDIES:  < from: CT Abdomen and Pelvis w/ IV Cont (03.19.23 @ 05:19) >    BOWEL: No bowel obstruction. Appendix is normal.  PERITONEUM: No ascites.  VESSELS: Within normal limits.  RETROPERITONEUM/LYMPH NODES: There are a few subcentimeter   retroperitoneal and pelvic sidewall lymph nodes.  ABDOMINAL WALL: Asymmetrically enlarged inferior right rectus abdominis,   stable since 9/11/2022. Redemonstration of ventral wall collection with   interval removal of pigtail drainage catheter. The collection contains   low-density fluid and foci of air. Significant inflammatory fatty   stranding throughout the pannus with questionable sinus tract to the   umbilicus.  BONES: Degenerative changes. 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    IMPRESSION:  No intra-abdominal abscess. Ventral wall abscess containing fluid and   foci of air. Significant inflammatory fatty stranding. Questionable sinus   tract the umbilicus.    < end of copied text >   HPI:  52F PMH DM, incarcerated ventral hernia s/p repair with overlay phasix mesh       PAST MEDICAL & SURGICAL HISTORY:  HTN (hypertension)      DM (diabetes mellitus)      Asthma      S/P ovarian cystectomy      S/P repair of ventral hernia        Allergies    cefpodoxime (Rash)  estrogens (Hives; Pruritus)  peanuts (Short breath)    Intolerances      Physical Exam:  General: NAD, resting comfortably  HEENT: NC/AT, EOMI, normal hearing  Pulmonary: normal resp effort, bilateral chest rise   Cardiovascular: RRR  Abdominal: soft, ND/NT, no organomegaly  Skin: umbilicus with purulent drainage; fistula tract from prior drain site probed. Skin changes around umbilicus, chronic. No palpable collections or fluctuance, however pus expressed with pressure on abdomen.   Extremities: WWP, normal strength, no clubbing/cyanosis/edema  Neuro: A/O x 3, CNs II-XII grossly intact, normal sensation, no focal deficits    Vital Signs Last 24 Hrs  T(C): 36.2 (19 Mar 2023 06:57), Max: 36.9 (18 Mar 2023 23:52)  T(F): 97.1 (19 Mar 2023 06:57), Max: 98.4 (18 Mar 2023 23:52)  HR: 87 (19 Mar 2023 06:57) (87 - 100)  BP: 120/67 (19 Mar 2023 06:57) (120/67 - 143/72)  BP(mean): --  RR: 17 (19 Mar 2023 06:57) (16 - 17)  SpO2: 98% (19 Mar 2023 06:57) (97% - 99%)    Parameters below as of 19 Mar 2023 06:57  Patient On (Oxygen Delivery Method): room air        I&O's Summary          LABS:                        11.2   7.55  )-----------( 190      ( 19 Mar 2023 03:49 )             36.6     03-19    135  |  101  |  17  ----------------------------<  229<H>  TNP   |  20<L>  |  0.58    Ca    9.8      19 Mar 2023 03:49    TPro  8.3  /  Alb  4.6  /  TBili  0.4  /  DBili  x   /  AST  42<H>  /  ALT  19  /  AlkPhos  87  03-19        CAPILLARY BLOOD GLUCOSE        LIVER FUNCTIONS - ( 19 Mar 2023 03:49 )  Alb: 4.6 g/dL / Pro: 8.3 g/dL / ALK PHOS: 87 U/L / ALT: 19 U/L / AST: 42 U/L / GGT: x             Cultures:      RADIOLOGY & ADDITIONAL STUDIES:  < from: CT Abdomen and Pelvis w/ IV Cont (03.19.23 @ 05:19) >    BOWEL: No bowel obstruction. Appendix is normal.  PERITONEUM: No ascites.  VESSELS: Within normal limits.  RETROPERITONEUM/LYMPH NODES: There are a few subcentimeter   retroperitoneal and pelvic sidewall lymph nodes.  ABDOMINAL WALL: Asymmetrically enlarged inferior right rectus abdominis,   stable since 9/11/2022. Redemonstration of ventral wall collection with   interval removal of pigtail drainage catheter. The collection contains   low-density fluid and foci of air. Significant inflammatory fatty   stranding throughout the pannus with questionable sinus tract to the   umbilicus.  BONES: Degenerative changes. 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    IMPRESSION:  No intra-abdominal abscess. Ventral wall abscess containing fluid and   foci of air. Significant inflammatory fatty stranding. Questionable sinus   tract the umbilicus.    < end of copied text >   HPI:  52F PMH DM, HTN incarcerated ventral hernia s/p repair 9/2022, c/b infected seroma 10/2022, SBO with abdominal wall abscess s/p IR drain 12/2022, presents with purulent drainage from umbilicus for past 2 weeks. Patient initially presented in September with incarcerated ventral hernia, s/p repair with overlay phasix mesh. In October, pt. represented with serous drainage from umbilicus, was started on zosyn and eventually discharged with Augmentin. Pt. represented in December 2022 with SBO and abdominal wall abscess. IR was consulted and drain was placed, with no growth from drain cultures. Pt. was discharged with Bactrim, however states that she did not take any antibiotics at home after discharge. Pt. reports was supposed to have drain removed by IR in mid January, however drain accidentally came out early January. She has had serous drainage from midline since February, however turned purulent for past two weeks. States she has had subjective fevers on and off since February, also that she will have often have normal oral temperature but with high rectal temp. At this time denies pain, nausea, vomiting. Has been passing flatus and having regular bowel movements. Of note, patient originally presented with A1c of 10 in September, most recently improved to 7 in December.     In ED, patient afebrile, hemodynamically stable, with no leukocytosis, however with serum glucose in 200s. CTAP performed showing abdominal wall abscess. IR already consulted with no plans for intervention due to controlled drainage from old drain tract.      PAST MEDICAL & SURGICAL HISTORY:  HTN (hypertension)      DM (diabetes mellitus)      Asthma      S/P ovarian cystectomy      S/P repair of ventral hernia        Allergies    cefpodoxime (Rash)  estrogens (Hives; Pruritus)  peanuts (Short breath)    Intolerances      Physical Exam:  General: NAD, resting comfortably  HEENT: NC/AT, EOMI, normal hearing  Pulmonary: normal resp effort, bilateral chest rise   Cardiovascular: RRR  Abdominal: soft, ND/NT, no organomegaly  Skin: umbilicus with purulent drainage; fistula tract from prior drain site probed. Skin changes around umbilicus, chronic. No palpable collections or fluctuance, however pus expressed with pressure on abdomen.   Extremities: WWP, normal strength, no clubbing/cyanosis/edema  Neuro: A/O x 3, CNs II-XII grossly intact, normal sensation, no focal deficits    Vital Signs Last 24 Hrs  T(C): 36.2 (19 Mar 2023 06:57), Max: 36.9 (18 Mar 2023 23:52)  T(F): 97.1 (19 Mar 2023 06:57), Max: 98.4 (18 Mar 2023 23:52)  HR: 87 (19 Mar 2023 06:57) (87 - 100)  BP: 120/67 (19 Mar 2023 06:57) (120/67 - 143/72)  BP(mean): --  RR: 17 (19 Mar 2023 06:57) (16 - 17)  SpO2: 98% (19 Mar 2023 06:57) (97% - 99%)    Parameters below as of 19 Mar 2023 06:57  Patient On (Oxygen Delivery Method): room air        I&O's Summary          LABS:                        11.2   7.55  )-----------( 190      ( 19 Mar 2023 03:49 )             36.6     03-19    135  |  101  |  17  ----------------------------<  229<H>  TNP   |  20<L>  |  0.58    Ca    9.8      19 Mar 2023 03:49    TPro  8.3  /  Alb  4.6  /  TBili  0.4  /  DBili  x   /  AST  42<H>  /  ALT  19  /  AlkPhos  87  03-19        CAPILLARY BLOOD GLUCOSE        LIVER FUNCTIONS - ( 19 Mar 2023 03:49 )  Alb: 4.6 g/dL / Pro: 8.3 g/dL / ALK PHOS: 87 U/L / ALT: 19 U/L / AST: 42 U/L / GGT: x             Cultures:      RADIOLOGY & ADDITIONAL STUDIES:  < from: CT Abdomen and Pelvis w/ IV Cont (03.19.23 @ 05:19) >    BOWEL: No bowel obstruction. Appendix is normal.  PERITONEUM: No ascites.  VESSELS: Within normal limits.  RETROPERITONEUM/LYMPH NODES: There are a few subcentimeter   retroperitoneal and pelvic sidewall lymph nodes.  ABDOMINAL WALL: Asymmetrically enlarged inferior right rectus abdominis,   stable since 9/11/2022. Redemonstration of ventral wall collection with   interval removal of pigtail drainage catheter. The collection contains   low-density fluid and foci of air. Significant inflammatory fatty   stranding throughout the pannus with questionable sinus tract to the   umbilicus.  BONES: Degenerative changes. 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    IMPRESSION:  No intra-abdominal abscess. Ventral wall abscess containing fluid and   foci of air. Significant inflammatory fatty stranding. Questionable sinus   tract the umbilicus.    < end of copied text >

## 2023-03-19 NOTE — H&P ADULT - ATTENDING COMMENTS
I have reviewed the history, pertinent labs and imaging, and discussed the care with the consult resident.  More than 50% of this 55 minute encounter including face to face with the patient was spent counseling and/or coordination of care on abdominal drainage.  Time included review of vitals, labs, imaging, discussion with consultants and coordination with the operating room/emergency department.    52F PMH DM, HTN incarcerated ventral hernia s/p repair 9/2022 with phasix mesh onlay, c/b infected seroma 10/2022, SBO with abdominal wall abscess s/p IR drain 12/2022, dislodged in Jan 2023, returning with drainage from periumbilical area (prior drain tract). Pt reports subjective fevers at home. CT with phlegmon/inflammatory changes in pannus above fascia. No discrete collection to drain.     - Attempt to dilate drainage tract to promote improved drainage   - abx   - pain control   - reg diet      The active issues are:  1. SQ phlegmon    The Acute Care Surgery (B Team) Attending Group Practice:  Dr. Varsha Henson    urgent issues - spectra [id number]  nonurgent issues - [phone]  patient appointments or afterhours - [phone]

## 2023-03-19 NOTE — CHART NOTE - NSCHARTNOTEFT_GEN_A_CORE
Consulted for abdominal wall collection. The collection is open to air and spontaneously decompressing. There is no significant fluid component and the majority of the collection appears phlegmonous. There is no role for drain placement at this time. Recommend surgery consult.      --  Kimani Lares MD, PGY-6  Vascular and Interventional Radiology  Available on [employer] Teams    - Nonemergent consults:  [facility] order "Consult- Interventional Radiology"  - Emergent issues (pager): [facility] [phone]; [facility] [phone]; [phone]  - Scheduling questions: [facility] [phone]; [facility] [phone]  - Clinic/outpatient booking: [facility] [phone]; [facility] [phone]

## 2023-03-19 NOTE — ED PROVIDER NOTE - OBJECTIVE STATEMENT
53YO F hx DM, ventral hernia repair with mesh, complications of ventral wall abscess s/p IR drainage and drain placement, now removed, p/w purulent drainage from umbilicus. onset x months. endorses episodic fevers. denies cp, sob, n/v, diarrhea. pus is actively draining from the skin.

## 2023-03-19 NOTE — ED PROVIDER NOTE - PHYSICAL EXAMINATION
Gen: WDWN, NAD  HEENT: EOMI, no nasal discharge, mucous membranes moist  CV: RRR, 2+ radial pulse R  Resp: no accessory muscle use, no increased work of breathing  GI: Abdomen soft non-distended, NTTP  MSK: no bruising, no LE edema  Derm: + purulent drainage around umbilicus, no skin induration.   Neuro: A&Ox4, following commands, moving all four extremities spontaneously  Psych: appropriate mood

## 2023-03-19 NOTE — ED ADULT NURSE REASSESSMENT NOTE - GENERAL PATIENT STATE
comfortable appearance/smiling/interactive
comfortable appearance/resting/sleeping
Moderate/Good Relaxation between Contractions

## 2023-03-20 LAB
A1C WITH ESTIMATED AVERAGE GLUCOSE RESULT: 7.6 % — HIGH (ref 4–5.6)
ANION GAP SERPL CALC-SCNC: 11 MMOL/L — SIGNIFICANT CHANGE UP (ref 7–14)
BUN SERPL-MCNC: 13 MG/DL — SIGNIFICANT CHANGE UP (ref 7–23)
CALCIUM SERPL-MCNC: 9.8 MG/DL — SIGNIFICANT CHANGE UP (ref 8.4–10.5)
CHLORIDE SERPL-SCNC: 97 MMOL/L — LOW (ref 98–107)
CO2 SERPL-SCNC: 30 MMOL/L — SIGNIFICANT CHANGE UP (ref 22–31)
CREAT SERPL-MCNC: 0.52 MG/DL — SIGNIFICANT CHANGE UP (ref 0.5–1.3)
EGFR: 112 ML/MIN/1.73M2 — SIGNIFICANT CHANGE UP
ESTIMATED AVERAGE GLUCOSE: 171 — SIGNIFICANT CHANGE UP
GLUCOSE BLDC GLUCOMTR-MCNC: 137 MG/DL — HIGH (ref 70–99)
GLUCOSE BLDC GLUCOMTR-MCNC: 164 MG/DL — HIGH (ref 70–99)
GLUCOSE BLDC GLUCOMTR-MCNC: 199 MG/DL — HIGH (ref 70–99)
GLUCOSE BLDC GLUCOMTR-MCNC: 236 MG/DL — HIGH (ref 70–99)
GLUCOSE SERPL-MCNC: 249 MG/DL — HIGH (ref 70–99)
HCT VFR BLD CALC: 32.9 % — LOW (ref 34.5–45)
HGB BLD-MCNC: 10.4 G/DL — LOW (ref 11.5–15.5)
MAGNESIUM SERPL-MCNC: 1.6 MG/DL — SIGNIFICANT CHANGE UP (ref 1.6–2.6)
MCHC RBC-ENTMCNC: 23.8 PG — LOW (ref 27–34)
MCHC RBC-ENTMCNC: 31.6 GM/DL — LOW (ref 32–36)
MCV RBC AUTO: 75.3 FL — LOW (ref 80–100)
NRBC # BLD: 0 /100 WBCS — SIGNIFICANT CHANGE UP (ref 0–0)
NRBC # FLD: 0 K/UL — SIGNIFICANT CHANGE UP (ref 0–0)
PHOSPHATE SERPL-MCNC: 4.2 MG/DL — SIGNIFICANT CHANGE UP (ref 2.5–4.5)
PLATELET # BLD AUTO: 177 K/UL — SIGNIFICANT CHANGE UP (ref 150–400)
POTASSIUM SERPL-MCNC: 4.2 MMOL/L — SIGNIFICANT CHANGE UP (ref 3.5–5.3)
POTASSIUM SERPL-SCNC: 4.2 MMOL/L — SIGNIFICANT CHANGE UP (ref 3.5–5.3)
RBC # BLD: 4.37 M/UL — SIGNIFICANT CHANGE UP (ref 3.8–5.2)
RBC # FLD: 16.9 % — HIGH (ref 10.3–14.5)
SODIUM SERPL-SCNC: 138 MMOL/L — SIGNIFICANT CHANGE UP (ref 135–145)
WBC # BLD: 6.24 K/UL — SIGNIFICANT CHANGE UP (ref 3.8–10.5)
WBC # FLD AUTO: 6.24 K/UL — SIGNIFICANT CHANGE UP (ref 3.8–10.5)

## 2023-03-20 PROCEDURE — 99232 SBSQ HOSP IP/OBS MODERATE 35: CPT

## 2023-03-20 RX ORDER — FLUCONAZOLE 150 MG/1
150 TABLET ORAL ONCE
Refills: 0 | Status: COMPLETED | OUTPATIENT
Start: 2023-03-20 | End: 2023-03-20

## 2023-03-20 RX ADMIN — BUDESONIDE AND FORMOTEROL FUMARATE DIHYDRATE 2 PUFF(S): 160; 4.5 AEROSOL RESPIRATORY (INHALATION) at 21:59

## 2023-03-20 RX ADMIN — LISINOPRIL 20 MILLIGRAM(S): 2.5 TABLET ORAL at 06:23

## 2023-03-20 RX ADMIN — Medication 250 MILLIGRAM(S): at 21:57

## 2023-03-20 RX ADMIN — BUDESONIDE AND FORMOTEROL FUMARATE DIHYDRATE 2 PUFF(S): 160; 4.5 AEROSOL RESPIRATORY (INHALATION) at 09:21

## 2023-03-20 RX ADMIN — PIPERACILLIN AND TAZOBACTAM 25 GRAM(S): 4; .5 INJECTION, POWDER, LYOPHILIZED, FOR SOLUTION INTRAVENOUS at 10:28

## 2023-03-20 RX ADMIN — SENNA PLUS 2 TABLET(S): 8.6 TABLET ORAL at 22:00

## 2023-03-20 RX ADMIN — Medication 7 UNIT(S): at 16:54

## 2023-03-20 RX ADMIN — PIPERACILLIN AND TAZOBACTAM 25 GRAM(S): 4; .5 INJECTION, POWDER, LYOPHILIZED, FOR SOLUTION INTRAVENOUS at 17:41

## 2023-03-20 RX ADMIN — SIMVASTATIN 20 MILLIGRAM(S): 20 TABLET, FILM COATED ORAL at 22:00

## 2023-03-20 RX ADMIN — Medication 7 UNIT(S): at 11:39

## 2023-03-20 RX ADMIN — FLUCONAZOLE 150 MILLIGRAM(S): 150 TABLET ORAL at 16:54

## 2023-03-20 RX ADMIN — Medication 4: at 11:38

## 2023-03-20 RX ADMIN — INSULIN GLARGINE 10 UNIT(S): 100 INJECTION, SOLUTION SUBCUTANEOUS at 21:57

## 2023-03-20 RX ADMIN — Medication 7 UNIT(S): at 07:47

## 2023-03-20 RX ADMIN — Medication 10 MILLIGRAM(S): at 06:24

## 2023-03-20 RX ADMIN — Medication 250 MILLIGRAM(S): at 09:21

## 2023-03-20 RX ADMIN — ENOXAPARIN SODIUM 40 MILLIGRAM(S): 100 INJECTION SUBCUTANEOUS at 13:44

## 2023-03-20 RX ADMIN — POLYETHYLENE GLYCOL 3350 17 GRAM(S): 17 POWDER, FOR SOLUTION ORAL at 22:04

## 2023-03-20 NOTE — CHART NOTE - NSCHARTNOTEFT_GEN_A_CORE
I personally saw and examined the patient. Prelim recs as below:    #Lichen Simplex Chronicus around the umbilicus  Lichen simplex chronicus (LSC) is a lichenified well-demarcated scaly plaque that is induced by chronic rubbing and/or scratching. Lichenification refers to thickened skin with enhanced skin markings that occurs as a result of friction from excessive scratching and rubbing.   - Recommend dry skin care, including emollients, bathing routines, and fragrance-free products.  - Start triamcinolone 0.1% ointment BID to affected areas for up to 2 weeks on, then 1 week off. SED

## 2023-03-20 NOTE — CONSULT NOTE ADULT - ASSESSMENT
#Lichen Simplex Chronicus, sachin-umbilically  Lichen simplex chronicus (LSC) is a lichenified well-demarcated scaly plaque that is induced by chronic rubbing and/or scratching. Lichenification refers to thickened skin with enhanced skin markings that occurs as a result of friction from excessive scratching and rubbing.   - Recommend dry skin care, including emollients, bathing routines, and fragrance-free products.  - Start triamcinolone 0.1% ointment BID to affected areas for up to 2 weeks on, then 1 week off for roughness/itch -- not for color. SED  - Counseled on the timeline of roughness/itch resolution and longer timeline of color resolution    The patient's chart was reviewed in addition to being seen and examined virtually with the dermatology attending via photos.  Recommendations were communicated with the primary team.  Please page [phone] with a 10-[phone] call-back number for further related questions.    Jasbir Bashir MD  Resident Physician, PGY-3  [facility]  Pager: [phone]  Office: [phone]

## 2023-03-20 NOTE — PROGRESS NOTE ADULT - ASSESSMENT
52F PMH DM, HTN incarcerated ventral hernia s/p repair 9/2022, c/b infected seroma 10/2022, SBO with abdominal wall abscess s/p IR drain 12/2022, presents with purulent drainage from umbilicus for past 2 weeks.     PLAN  - daily dressing changes     - Labs: f/u AM labs  - Diet: Regular   - Activity: OOB with assistance  - Pain medication as needed   - Incentive spirometry   - DVT PPX: LVX  - Dispo: pending     B  [id number]

## 2023-03-20 NOTE — PROGRESS NOTE ADULT - SUBJECTIVE AND OBJECTIVE BOX
SURGERY DAILY PROGRESS NOTE    --------------- OVERNIGHT/INTERVAL EVENTS ---------------  -  - No acute events overnight     --------------- SUBJECTIVE ---------------  - Patient describes no acute issues or concerns at this time  - -/- BM +void. Denies CP, SOB, n/v, abdominal pain.   - Patient seen and examined at bedside with surgical team    --------------- OBJECTIVE ---------------    -----VITALS-----  T(C): 37, Max: 37 (03-20)  HR: 76 (68 - 98)  BP: 130/86 (117/75 - 143/91)  RR: 18 (18 - 20)  SpO2: 97% (97% - 100%) room air        -----PHYSICAL EXAM-----  GENERAL: NAD, lying in bed   NEURO: AOx3, awake alert appropriate  HEENT: NCAT, trachea midline  PULM: Respirations non-labored  ABD: Soft, non-tender, non-distended, no peritonitis/rebound tenderness  EXT: Warm, well perfused    -----DRAINS-----  JOCELYN:      Chest Tube:      NG Tube:       -----INs & OUTs-----    03-19 - 03-20  --------------------------------------------------------  IN:    IV PiggyBack: 350 mL    Oral Fluid: 360 mL  Total IN: 710 mL    OUT:    Voided (mL): 1000 mL  Total OUT: 1000 mL          -----MEDICATIONS-----  STANDING  budesonide 160 MICROgram(s)/formoterol 4.5 MICROgram(s) Inhaler 2 Puff(s) Inhalation two times a day  enoxaparin Injectable 40 milliGRAM(s) SubCutaneous every 24 hours  influenza   Vaccine 0.5 milliLiter(s) IntraMuscular once  insulin glargine Injectable (LANTUS) 10 Unit(s) SubCutaneous at bedtime  insulin lispro (ADMELOG) corrective regimen sliding scale   SubCutaneous three times a day before meals  insulin lispro (ADMELOG) corrective regimen sliding scale   SubCutaneous at bedtime  insulin lispro Injectable (ADMELOG) 7 Unit(s) SubCutaneous three times a day before meals  lisinopril 20 milliGRAM(s) Oral daily  piperacillin/tazobactam IVPB.. 3.375 Gram(s) IV Intermittent every 8 hours  polyethylene glycol 3350 17 Gram(s) Oral at bedtime  senna 2 Tablet(s) Oral at bedtime  simvastatin 20 milliGRAM(s) Oral at bedtime  torsemide 10 milliGRAM(s) Oral daily  vancomycin  IVPB 1000 milliGRAM(s) IV Intermittent every 12 hours    PRN  acetaminophen     Tablet .. 650 milliGRAM(s) Oral every 6 hours PRN Mild Pain (1 - 3)  albuterol    90 MICROgram(s) HFA Inhaler 2 Puff(s) Inhalation every 6 hours PRN Shortness of Breath and/or Wheezing      -----LABS-----  139  |  103  |  14  ----------------------------<  155<H>    (03-19)  4.4   |  27  |  0.56            Ca    9.4      03-19  Mg    x  Phos  x                 SURGERY DAILY PROGRESS NOTE    --------------- OVERNIGHT/INTERVAL EVENTS ---------------  - No acute events overnight     --------------- SUBJECTIVE ---------------  - Patient describes no acute issues or concerns at this time  - -/- BM +void. Denies CP, SOB, n/v, abdominal pain.   - Patient seen and examined at bedside with surgical team    --------------- OBJECTIVE ---------------    -----VITALS-----  T(C): 37, Max: 37 (03-20)  HR: 76 (68 - 98)  BP: 130/86 (117/75 - 143/91)  RR: 18 (18 - 20)  SpO2: 97% (97% - 100%) room air        -----PHYSICAL EXAM-----  GENERAL: NAD, lying in bed   NEURO: AOx3, awake alert appropriate  HEENT: NCAT, trachea midline  PULM: Respirations non-labored  ABD: Soft, non-tender, non-distended, no peritonitis/rebound tenderness  - Draining from umbilicus  EXT: Warm, well perfused     -----INs & OUTs-----    03-19 - 03-20  --------------------------------------------------------  IN:    IV PiggyBack: 350 mL    Oral Fluid: 360 mL  Total IN: 710 mL    OUT:    Voided (mL): 1000 mL  Total OUT: 1000 mL          -----MEDICATIONS-----  STANDING  budesonide 160 MICROgram(s)/formoterol 4.5 MICROgram(s) Inhaler 2 Puff(s) Inhalation two times a day  enoxaparin Injectable 40 milliGRAM(s) SubCutaneous every 24 hours  influenza   Vaccine 0.5 milliLiter(s) IntraMuscular once  insulin glargine Injectable (LANTUS) 10 Unit(s) SubCutaneous at bedtime  insulin lispro (ADMELOG) corrective regimen sliding scale   SubCutaneous three times a day before meals  insulin lispro (ADMELOG) corrective regimen sliding scale   SubCutaneous at bedtime  insulin lispro Injectable (ADMELOG) 7 Unit(s) SubCutaneous three times a day before meals  lisinopril 20 milliGRAM(s) Oral daily  piperacillin/tazobactam IVPB.. 3.375 Gram(s) IV Intermittent every 8 hours  polyethylene glycol 3350 17 Gram(s) Oral at bedtime  senna 2 Tablet(s) Oral at bedtime  simvastatin 20 milliGRAM(s) Oral at bedtime  torsemide 10 milliGRAM(s) Oral daily  vancomycin  IVPB 1000 milliGRAM(s) IV Intermittent every 12 hours    PRN  acetaminophen     Tablet .. 650 milliGRAM(s) Oral every 6 hours PRN Mild Pain (1 - 3)  albuterol    90 MICROgram(s) HFA Inhaler 2 Puff(s) Inhalation every 6 hours PRN Shortness of Breath and/or Wheezing      -----LABS-----  139  |  103  |  14  ----------------------------<  155<H>    (03-19)  4.4   |  27  |  0.56            Ca    9.4      03-19  Mg    x  Phos  x

## 2023-03-21 LAB
-  AMIKACIN: SIGNIFICANT CHANGE UP
-  AMOXICILLIN/CLAVULANIC ACID: SIGNIFICANT CHANGE UP
-  AMPICILLIN/SULBACTAM: SIGNIFICANT CHANGE UP
-  AMPICILLIN: SIGNIFICANT CHANGE UP
-  AMPICILLIN: SIGNIFICANT CHANGE UP
-  AZTREONAM: SIGNIFICANT CHANGE UP
-  CEFAZOLIN: SIGNIFICANT CHANGE UP
-  CEFEPIME: SIGNIFICANT CHANGE UP
-  CEFOXITIN: SIGNIFICANT CHANGE UP
-  CEFTRIAXONE: SIGNIFICANT CHANGE UP
-  CIPROFLOXACIN: SIGNIFICANT CHANGE UP
-  ERTAPENEM: SIGNIFICANT CHANGE UP
-  GENTAMICIN: SIGNIFICANT CHANGE UP
-  LEVOFLOXACIN: SIGNIFICANT CHANGE UP
-  MEROPENEM: SIGNIFICANT CHANGE UP
-  PIPERACILLIN/TAZOBACTAM: SIGNIFICANT CHANGE UP
-  TETRACYCLINE: SIGNIFICANT CHANGE UP
-  TOBRAMYCIN: SIGNIFICANT CHANGE UP
-  TRIMETHOPRIM/SULFAMETHOXAZOLE: SIGNIFICANT CHANGE UP
-  VANCOMYCIN: SIGNIFICANT CHANGE UP
ANION GAP SERPL CALC-SCNC: 12 MMOL/L — SIGNIFICANT CHANGE UP (ref 7–14)
BUN SERPL-MCNC: 12 MG/DL — SIGNIFICANT CHANGE UP (ref 7–23)
CALCIUM SERPL-MCNC: 9.4 MG/DL — SIGNIFICANT CHANGE UP (ref 8.4–10.5)
CHLORIDE SERPL-SCNC: 99 MMOL/L — SIGNIFICANT CHANGE UP (ref 98–107)
CO2 SERPL-SCNC: 28 MMOL/L — SIGNIFICANT CHANGE UP (ref 22–31)
CREAT SERPL-MCNC: 0.55 MG/DL — SIGNIFICANT CHANGE UP (ref 0.5–1.3)
EGFR: 111 ML/MIN/1.73M2 — SIGNIFICANT CHANGE UP
GLUCOSE BLDC GLUCOMTR-MCNC: 158 MG/DL — HIGH (ref 70–99)
GLUCOSE BLDC GLUCOMTR-MCNC: 177 MG/DL — HIGH (ref 70–99)
GLUCOSE BLDC GLUCOMTR-MCNC: 178 MG/DL — HIGH (ref 70–99)
GLUCOSE BLDC GLUCOMTR-MCNC: 237 MG/DL — HIGH (ref 70–99)
GLUCOSE SERPL-MCNC: 159 MG/DL — HIGH (ref 70–99)
HCT VFR BLD CALC: 32.1 % — LOW (ref 34.5–45)
HGB BLD-MCNC: 10.2 G/DL — LOW (ref 11.5–15.5)
MAGNESIUM SERPL-MCNC: 1.7 MG/DL — SIGNIFICANT CHANGE UP (ref 1.6–2.6)
MCHC RBC-ENTMCNC: 23.6 PG — LOW (ref 27–34)
MCHC RBC-ENTMCNC: 31.8 GM/DL — LOW (ref 32–36)
MCV RBC AUTO: 74.3 FL — LOW (ref 80–100)
METHOD TYPE: SIGNIFICANT CHANGE UP
METHOD TYPE: SIGNIFICANT CHANGE UP
NRBC # BLD: 0 /100 WBCS — SIGNIFICANT CHANGE UP (ref 0–0)
NRBC # FLD: 0 K/UL — SIGNIFICANT CHANGE UP (ref 0–0)
PHOSPHATE SERPL-MCNC: 4.7 MG/DL — HIGH (ref 2.5–4.5)
PLATELET # BLD AUTO: 189 K/UL — SIGNIFICANT CHANGE UP (ref 150–400)
POTASSIUM SERPL-MCNC: 4.1 MMOL/L — SIGNIFICANT CHANGE UP (ref 3.5–5.3)
POTASSIUM SERPL-SCNC: 4.1 MMOL/L — SIGNIFICANT CHANGE UP (ref 3.5–5.3)
RBC # BLD: 4.32 M/UL — SIGNIFICANT CHANGE UP (ref 3.8–5.2)
RBC # FLD: 16.6 % — HIGH (ref 10.3–14.5)
SODIUM SERPL-SCNC: 139 MMOL/L — SIGNIFICANT CHANGE UP (ref 135–145)
VANCOMYCIN TROUGH SERPL-MCNC: 7.2 UG/ML — LOW (ref 10–20)
WBC # BLD: 6.07 K/UL — SIGNIFICANT CHANGE UP (ref 3.8–10.5)
WBC # FLD AUTO: 6.07 K/UL — SIGNIFICANT CHANGE UP (ref 3.8–10.5)

## 2023-03-21 PROCEDURE — 99232 SBSQ HOSP IP/OBS MODERATE 35: CPT

## 2023-03-21 RX ORDER — POLYETHYLENE GLYCOL 3350 17 G/17G
17 POWDER, FOR SOLUTION ORAL DAILY
Refills: 0 | Status: DISCONTINUED | OUTPATIENT
Start: 2023-03-21 | End: 2023-03-21

## 2023-03-21 RX ORDER — VANCOMYCIN HCL 1 G
1000 VIAL (EA) INTRAVENOUS EVERY 8 HOURS
Refills: 0 | Status: DISCONTINUED | OUTPATIENT
Start: 2023-03-21 | End: 2023-03-21

## 2023-03-21 RX ORDER — SENNA PLUS 8.6 MG/1
2 TABLET ORAL AT BEDTIME
Refills: 0 | Status: DISCONTINUED | OUTPATIENT
Start: 2023-03-21 | End: 2023-03-29

## 2023-03-21 RX ORDER — VANCOMYCIN HCL 1 G
1000 VIAL (EA) INTRAVENOUS EVERY 8 HOURS
Refills: 0 | Status: DISCONTINUED | OUTPATIENT
Start: 2023-03-21 | End: 2023-03-22

## 2023-03-21 RX ADMIN — Medication 2: at 08:09

## 2023-03-21 RX ADMIN — PIPERACILLIN AND TAZOBACTAM 25 GRAM(S): 4; .5 INJECTION, POWDER, LYOPHILIZED, FOR SOLUTION INTRAVENOUS at 11:03

## 2023-03-21 RX ADMIN — Medication 2: at 11:34

## 2023-03-21 RX ADMIN — Medication 1 APPLICATION(S): at 09:43

## 2023-03-21 RX ADMIN — POLYETHYLENE GLYCOL 3350 17 GRAM(S): 17 POWDER, FOR SOLUTION ORAL at 22:03

## 2023-03-21 RX ADMIN — Medication 250 MILLIGRAM(S): at 13:57

## 2023-03-21 RX ADMIN — Medication 1 APPLICATION(S): at 22:03

## 2023-03-21 RX ADMIN — ENOXAPARIN SODIUM 40 MILLIGRAM(S): 100 INJECTION SUBCUTANEOUS at 13:57

## 2023-03-21 RX ADMIN — PIPERACILLIN AND TAZOBACTAM 25 GRAM(S): 4; .5 INJECTION, POWDER, LYOPHILIZED, FOR SOLUTION INTRAVENOUS at 02:55

## 2023-03-21 RX ADMIN — BUDESONIDE AND FORMOTEROL FUMARATE DIHYDRATE 2 PUFF(S): 160; 4.5 AEROSOL RESPIRATORY (INHALATION) at 09:42

## 2023-03-21 RX ADMIN — PIPERACILLIN AND TAZOBACTAM 25 GRAM(S): 4; .5 INJECTION, POWDER, LYOPHILIZED, FOR SOLUTION INTRAVENOUS at 17:48

## 2023-03-21 RX ADMIN — Medication 10 MILLIGRAM(S): at 07:09

## 2023-03-21 RX ADMIN — LISINOPRIL 20 MILLIGRAM(S): 2.5 TABLET ORAL at 07:09

## 2023-03-21 RX ADMIN — BUDESONIDE AND FORMOTEROL FUMARATE DIHYDRATE 2 PUFF(S): 160; 4.5 AEROSOL RESPIRATORY (INHALATION) at 22:03

## 2023-03-21 RX ADMIN — Medication 7 UNIT(S): at 11:34

## 2023-03-21 RX ADMIN — Medication 4: at 16:40

## 2023-03-21 RX ADMIN — INSULIN GLARGINE 10 UNIT(S): 100 INJECTION, SOLUTION SUBCUTANEOUS at 22:02

## 2023-03-21 RX ADMIN — SENNA PLUS 2 TABLET(S): 8.6 TABLET ORAL at 22:03

## 2023-03-21 RX ADMIN — Medication 7 UNIT(S): at 08:09

## 2023-03-21 RX ADMIN — Medication 250 MILLIGRAM(S): at 22:04

## 2023-03-21 RX ADMIN — SIMVASTATIN 20 MILLIGRAM(S): 20 TABLET, FILM COATED ORAL at 22:03

## 2023-03-21 RX ADMIN — Medication 7 UNIT(S): at 16:40

## 2023-03-21 NOTE — PROGRESS NOTE ADULT - SUBJECTIVE AND OBJECTIVE BOX
SURGERY DAILY PROGRESS NOTE    --------------- OVERNIGHT/INTERVAL EVENTS ---------------  - No acute events overnight     --------------- SUBJECTIVE ---------------    Pt seen and examined on AM rounds. No acute events overnight. Denies abdominal pain, N/V, fever, chills, SOB, chest pain.       --------------- OBJECTIVE ---------------    -----VITALS-----  T(C): 36.3 (03-21-23 @ 05:04), Max: 37.3 (03-20-23 @ 18:08)  HR: 71 (03-21-23 @ 05:04) (68 - 84)  BP: 130/87 (03-21-23 @ 05:04) (124/80 - 142/81)  ABP: --  ABP(mean): --  RR: 20 (03-21-23 @ 05:04) (17 - 20)  SpO2: 100% (03-21-23 @ 05:04) (95% - 100%)  Wt(kg): --  CVP(mm Hg): --      03-20 @ 07:01  -  03-21 @ 07:00  --------------------------------------------------------  IN:    Oral Fluid: 240 mL  Total IN: 240 mL    OUT:    Voided (mL): 1450 mL  Total OUT: 1450 mL    Total NET: -1210 mL    -----PHYSICAL EXAM-----  GENERAL: NAD, lying in bed   NEURO: AOx3, awake alert appropriate  PULM: Respirations non-labored  ABD: Soft, non-tender, non-distended, no peritonitis/rebound tenderness  - Draining from umbilicus  EXT: Warm, well perfused     -----MEDICATIONS-----  STANDING  budesonide 160 MICROgram(s)/formoterol 4.5 MICROgram(s) Inhaler 2 Puff(s) Inhalation two times a day  enoxaparin Injectable 40 milliGRAM(s) SubCutaneous every 24 hours  influenza   Vaccine 0.5 milliLiter(s) IntraMuscular once  insulin glargine Injectable (LANTUS) 10 Unit(s) SubCutaneous at bedtime  insulin lispro (ADMELOG) corrective regimen sliding scale   SubCutaneous three times a day before meals  insulin lispro (ADMELOG) corrective regimen sliding scale   SubCutaneous at bedtime  insulin lispro Injectable (ADMELOG) 7 Unit(s) SubCutaneous three times a day before meals  lisinopril 20 milliGRAM(s) Oral daily  piperacillin/tazobactam IVPB.. 3.375 Gram(s) IV Intermittent every 8 hours  polyethylene glycol 3350 17 Gram(s) Oral at bedtime  senna 2 Tablet(s) Oral at bedtime  simvastatin 20 milliGRAM(s) Oral at bedtime  torsemide 10 milliGRAM(s) Oral daily  vancomycin  IVPB 1000 milliGRAM(s) IV Intermittent every 12 hours    PRN  acetaminophen     Tablet .. 650 milliGRAM(s) Oral every 6 hours PRN Mild Pain (1 - 3)  albuterol    90 MICROgram(s) HFA Inhaler 2 Puff(s) Inhalation every 6 hours PRN Shortness of Breath and/or Wheezing      -----LABS-----    CBC (03-20 @ 08:10)                              10.4<L>                         6.24    )----------------(  177        --    % Neutrophils, --    % Lymphocytes, ANC: --                                  32.9<L>    BMP (03-20 @ 08:10)             138     |  97<L>   |  13    		Ca++ --      Ca 9.8                ---------------------------------( 249<H>		Mg 1.60               4.2     |  30      |  0.52  			Ph 4.2

## 2023-03-21 NOTE — PROGRESS NOTE ADULT - ASSESSMENT
52F PMH DM, HTN incarcerated ventral hernia s/p repair 9/2022, c/b infected seroma 10/2022, SBO with abdominal wall abscess s/p IR drain 12/2022, presents with purulent drainage from umbilicus for past 2 weeks.     PLAN  - Diet: Regular   - daily dressing changes  - Labs: f/u AM labs  - Activity: OOB with assistance  - Pain medication as needed   - Incentive spirometry   - DVT PPX: LVX  - Dispo: pending     B  [id number]

## 2023-03-22 ENCOUNTER — TRANSCRIPTION ENCOUNTER (OUTPATIENT)
Age: 53
End: 2023-03-22

## 2023-03-22 DIAGNOSIS — E11.9 TYPE 2 DIABETES MELLITUS WITHOUT COMPLICATIONS: ICD-10-CM

## 2023-03-22 DIAGNOSIS — E78.5 HYPERLIPIDEMIA, UNSPECIFIED: ICD-10-CM

## 2023-03-22 DIAGNOSIS — I10 ESSENTIAL (PRIMARY) HYPERTENSION: ICD-10-CM

## 2023-03-22 LAB
-  AMOXICILLIN/CLAVULANIC ACID: SIGNIFICANT CHANGE UP
-  AMPICILLIN/SULBACTAM: SIGNIFICANT CHANGE UP
-  AMPICILLIN: SIGNIFICANT CHANGE UP
-  CEFAZOLIN: SIGNIFICANT CHANGE UP
-  CEFTRIAXONE: SIGNIFICANT CHANGE UP
-  CIPROFLOXACIN: SIGNIFICANT CHANGE UP
-  CLINDAMYCIN: SIGNIFICANT CHANGE UP
-  DAPTOMYCIN: SIGNIFICANT CHANGE UP
-  ERYTHROMYCIN: SIGNIFICANT CHANGE UP
-  GENTAMICIN: SIGNIFICANT CHANGE UP
-  LEVOFLOXACIN: SIGNIFICANT CHANGE UP
-  LINEZOLID: SIGNIFICANT CHANGE UP
-  MEROPENEM: SIGNIFICANT CHANGE UP
-  MOXIFLOXACIN: SIGNIFICANT CHANGE UP
-  OXACILLIN: SIGNIFICANT CHANGE UP
-  PENICILLIN: SIGNIFICANT CHANGE UP
-  RIFAMPIN: SIGNIFICANT CHANGE UP
-  TETRACYCLINE: SIGNIFICANT CHANGE UP
-  TRIMETHOPRIM/SULFAMETHOXAZOLE: SIGNIFICANT CHANGE UP
-  VANCOMYCIN: SIGNIFICANT CHANGE UP
ANION GAP SERPL CALC-SCNC: 12 MMOL/L — SIGNIFICANT CHANGE UP (ref 7–14)
BUN SERPL-MCNC: 15 MG/DL — SIGNIFICANT CHANGE UP (ref 7–23)
CALCIUM SERPL-MCNC: 9.6 MG/DL — SIGNIFICANT CHANGE UP (ref 8.4–10.5)
CHLORIDE SERPL-SCNC: 100 MMOL/L — SIGNIFICANT CHANGE UP (ref 98–107)
CO2 SERPL-SCNC: 28 MMOL/L — SIGNIFICANT CHANGE UP (ref 22–31)
CREAT SERPL-MCNC: 0.59 MG/DL — SIGNIFICANT CHANGE UP (ref 0.5–1.3)
EGFR: 109 ML/MIN/1.73M2 — SIGNIFICANT CHANGE UP
GLUCOSE BLDC GLUCOMTR-MCNC: 177 MG/DL — HIGH (ref 70–99)
GLUCOSE BLDC GLUCOMTR-MCNC: 188 MG/DL — HIGH (ref 70–99)
GLUCOSE BLDC GLUCOMTR-MCNC: 235 MG/DL — HIGH (ref 70–99)
GLUCOSE BLDC GLUCOMTR-MCNC: 257 MG/DL — HIGH (ref 70–99)
GLUCOSE SERPL-MCNC: 167 MG/DL — HIGH (ref 70–99)
HCT VFR BLD CALC: 34.3 % — LOW (ref 34.5–45)
HGB BLD-MCNC: 10.7 G/DL — LOW (ref 11.5–15.5)
MAGNESIUM SERPL-MCNC: 1.8 MG/DL — SIGNIFICANT CHANGE UP (ref 1.6–2.6)
MCHC RBC-ENTMCNC: 23.5 PG — LOW (ref 27–34)
MCHC RBC-ENTMCNC: 31.2 GM/DL — LOW (ref 32–36)
MCV RBC AUTO: 75.4 FL — LOW (ref 80–100)
METHOD TYPE: SIGNIFICANT CHANGE UP
NRBC # BLD: 0 /100 WBCS — SIGNIFICANT CHANGE UP (ref 0–0)
NRBC # FLD: 0 K/UL — SIGNIFICANT CHANGE UP (ref 0–0)
PHOSPHATE SERPL-MCNC: 3.6 MG/DL — SIGNIFICANT CHANGE UP (ref 2.5–4.5)
PLATELET # BLD AUTO: 194 K/UL — SIGNIFICANT CHANGE UP (ref 150–400)
POTASSIUM SERPL-MCNC: 4.3 MMOL/L — SIGNIFICANT CHANGE UP (ref 3.5–5.3)
POTASSIUM SERPL-SCNC: 4.3 MMOL/L — SIGNIFICANT CHANGE UP (ref 3.5–5.3)
RBC # BLD: 4.55 M/UL — SIGNIFICANT CHANGE UP (ref 3.8–5.2)
RBC # FLD: 16.8 % — HIGH (ref 10.3–14.5)
SODIUM SERPL-SCNC: 140 MMOL/L — SIGNIFICANT CHANGE UP (ref 135–145)
VANCOMYCIN TROUGH SERPL-MCNC: 6.6 UG/ML — LOW (ref 10–20)
WBC # BLD: 6.6 K/UL — SIGNIFICANT CHANGE UP (ref 3.8–10.5)
WBC # FLD AUTO: 6.6 K/UL — SIGNIFICANT CHANGE UP (ref 3.8–10.5)

## 2023-03-22 PROCEDURE — 99231 SBSQ HOSP IP/OBS SF/LOW 25: CPT

## 2023-03-22 RX ORDER — VANCOMYCIN HCL 1 G
1250 VIAL (EA) INTRAVENOUS EVERY 12 HOURS
Refills: 0 | Status: DISCONTINUED | OUTPATIENT
Start: 2023-03-22 | End: 2023-03-24

## 2023-03-22 RX ORDER — INSULIN LISPRO 100/ML
VIAL (ML) SUBCUTANEOUS AT BEDTIME
Refills: 0 | Status: DISCONTINUED | OUTPATIENT
Start: 2023-03-22 | End: 2023-03-29

## 2023-03-22 RX ORDER — SODIUM CHLORIDE 9 MG/ML
1000 INJECTION, SOLUTION INTRAVENOUS
Refills: 0 | Status: DISCONTINUED | OUTPATIENT
Start: 2023-03-22 | End: 2023-03-23

## 2023-03-22 RX ORDER — INSULIN LISPRO 100/ML
9 VIAL (ML) SUBCUTANEOUS
Refills: 0 | Status: DISCONTINUED | OUTPATIENT
Start: 2023-03-22 | End: 2023-03-27

## 2023-03-22 RX ORDER — MAGNESIUM SULFATE 500 MG/ML
1 VIAL (ML) INJECTION ONCE
Refills: 0 | Status: COMPLETED | OUTPATIENT
Start: 2023-03-22 | End: 2023-03-22

## 2023-03-22 RX ORDER — INSULIN LISPRO 100/ML
VIAL (ML) SUBCUTANEOUS
Refills: 0 | Status: DISCONTINUED | OUTPATIENT
Start: 2023-03-22 | End: 2023-03-29

## 2023-03-22 RX ADMIN — Medication 9 UNIT(S): at 11:46

## 2023-03-22 RX ADMIN — Medication 100 GRAM(S): at 10:31

## 2023-03-22 RX ADMIN — PIPERACILLIN AND TAZOBACTAM 25 GRAM(S): 4; .5 INJECTION, POWDER, LYOPHILIZED, FOR SOLUTION INTRAVENOUS at 19:10

## 2023-03-22 RX ADMIN — Medication 1: at 17:15

## 2023-03-22 RX ADMIN — ENOXAPARIN SODIUM 40 MILLIGRAM(S): 100 INJECTION SUBCUTANEOUS at 13:09

## 2023-03-22 RX ADMIN — Medication 9 UNIT(S): at 17:15

## 2023-03-22 RX ADMIN — BUDESONIDE AND FORMOTEROL FUMARATE DIHYDRATE 2 PUFF(S): 160; 4.5 AEROSOL RESPIRATORY (INHALATION) at 21:57

## 2023-03-22 RX ADMIN — Medication 1 APPLICATION(S): at 10:31

## 2023-03-22 RX ADMIN — Medication 7 UNIT(S): at 07:46

## 2023-03-22 RX ADMIN — SENNA PLUS 2 TABLET(S): 8.6 TABLET ORAL at 21:57

## 2023-03-22 RX ADMIN — LISINOPRIL 20 MILLIGRAM(S): 2.5 TABLET ORAL at 05:34

## 2023-03-22 RX ADMIN — BUDESONIDE AND FORMOTEROL FUMARATE DIHYDRATE 2 PUFF(S): 160; 4.5 AEROSOL RESPIRATORY (INHALATION) at 10:30

## 2023-03-22 RX ADMIN — Medication 1 APPLICATION(S): at 22:05

## 2023-03-22 RX ADMIN — Medication 250 MILLIGRAM(S): at 05:35

## 2023-03-22 RX ADMIN — SIMVASTATIN 20 MILLIGRAM(S): 20 TABLET, FILM COATED ORAL at 21:57

## 2023-03-22 RX ADMIN — Medication 166.67 MILLIGRAM(S): at 16:46

## 2023-03-22 RX ADMIN — Medication 2: at 07:46

## 2023-03-22 RX ADMIN — PIPERACILLIN AND TAZOBACTAM 25 GRAM(S): 4; .5 INJECTION, POWDER, LYOPHILIZED, FOR SOLUTION INTRAVENOUS at 11:46

## 2023-03-22 RX ADMIN — Medication 3: at 11:46

## 2023-03-22 RX ADMIN — PIPERACILLIN AND TAZOBACTAM 25 GRAM(S): 4; .5 INJECTION, POWDER, LYOPHILIZED, FOR SOLUTION INTRAVENOUS at 01:16

## 2023-03-22 RX ADMIN — INSULIN GLARGINE 10 UNIT(S): 100 INJECTION, SOLUTION SUBCUTANEOUS at 22:14

## 2023-03-22 RX ADMIN — POLYETHYLENE GLYCOL 3350 17 GRAM(S): 17 POWDER, FOR SOLUTION ORAL at 21:57

## 2023-03-22 RX ADMIN — Medication 10 MILLIGRAM(S): at 05:35

## 2023-03-22 NOTE — PROGRESS NOTE ADULT - SUBJECTIVE AND OBJECTIVE BOX
SURGERY DAILY PROGRESS NOTE    --------------- OVERNIGHT/INTERVAL EVENTS ---------------  - No acute events overnight     --------------- SUBJECTIVE ---------------    Pt seen and examined on AM rounds. No acute events overnight. Pain controlled. Tolerating diet.     Denies abdominal pain, N/V, fever, chills, SOB, chest pain.       --------------- OBJECTIVE ---------------    -----VITALS-----  T(C): 36.3 (03-21-23 @ 05:04), Max: 37.3 (03-20-23 @ 18:08)  HR: 71 (03-21-23 @ 05:04) (68 - 84)  BP: 130/87 (03-21-23 @ 05:04) (124/80 - 142/81)  ABP: --  ABP(mean): --  RR: 20 (03-21-23 @ 05:04) (17 - 20)  SpO2: 100% (03-21-23 @ 05:04) (95% - 100%)  Wt(kg): --  CVP(mm Hg): --      03-20 @ 07:01  -  03-21 @ 07:00  --------------------------------------------------------  IN:    Oral Fluid: 240 mL  Total IN: 240 mL    OUT:    Voided (mL): 1450 mL  Total OUT: 1450 mL    Total NET: -1210 mL    -----PHYSICAL EXAM-----  GENERAL: NAD, lying in bed   NEURO: AOx3, awake alert appropriate  PULM: Respirations non-labored  ABD: Soft, non-tender, non-distended, no peritonitis/rebound tenderness  - Pouch over umbilicus   EXT: Warm, well perfused     -----MEDICATIONS-----  STANDING  budesonide 160 MICROgram(s)/formoterol 4.5 MICROgram(s) Inhaler 2 Puff(s) Inhalation two times a day  enoxaparin Injectable 40 milliGRAM(s) SubCutaneous every 24 hours  influenza   Vaccine 0.5 milliLiter(s) IntraMuscular once  insulin glargine Injectable (LANTUS) 10 Unit(s) SubCutaneous at bedtime  insulin lispro (ADMELOG) corrective regimen sliding scale   SubCutaneous three times a day before meals  insulin lispro (ADMELOG) corrective regimen sliding scale   SubCutaneous at bedtime  insulin lispro Injectable (ADMELOG) 7 Unit(s) SubCutaneous three times a day before meals  lisinopril 20 milliGRAM(s) Oral daily  piperacillin/tazobactam IVPB.. 3.375 Gram(s) IV Intermittent every 8 hours  polyethylene glycol 3350 17 Gram(s) Oral at bedtime  senna 2 Tablet(s) Oral at bedtime  simvastatin 20 milliGRAM(s) Oral at bedtime  torsemide 10 milliGRAM(s) Oral daily  vancomycin  IVPB 1000 milliGRAM(s) IV Intermittent every 12 hours    PRN  acetaminophen     Tablet .. 650 milliGRAM(s) Oral every 6 hours PRN Mild Pain (1 - 3)  albuterol    90 MICROgram(s) HFA Inhaler 2 Puff(s) Inhalation every 6 hours PRN Shortness of Breath and/or Wheezing      -----LABS-----    CBC (03-20 @ 08:10)                              10.4<L>                         6.24    )----------------(  177        --    % Neutrophils, --    % Lymphocytes, ANC: --                                  32.9<L>    BMP (03-20 @ 08:10)             138     |  97<L>   |  13    		Ca++ --      Ca 9.8                ---------------------------------( 249<H>		Mg 1.60               4.2     |  30      |  0.52  			Ph 4.2

## 2023-03-22 NOTE — PROGRESS NOTE ADULT - ASSESSMENT
52F PMH DM, HTN incarcerated ventral hernia s/p repair 9/2022, c/b infected seroma 10/2022, SBO with abdominal wall abscess s/p IR drain 12/2022, presents with purulent drainage from umbilicus for past 2 weeks.     PLAN  - Diet: Regular   - monitor pouch outputs  - OR planning   - Labs: f/u AM labs  - Activity: OOB with assistance  - Pain medication as needed   - Incentive spirometry   - DVT PPX: LVX  - Dispo: pending     B  [id number]

## 2023-03-22 NOTE — CONSULT NOTE ADULT - ASSESSMENT
This is a 53 yo F /w a PMH of DM2, HTN, asthma and complicated surgical course over the last few months with initial incarcerated hernia --> infected seroma --> SBO --> abdominal wall abscess, now presenting with umbilical drainage. Endocrine consulted for further diabetes management.    #DM2 - HbA1c 7.6%  -c.w lantus 10 units nightly  -increase admelog to 9 units before meals  -low admelog correction scale before meals and before bedtime  -carb consistent diet  -RD consult  -FSG goal 100-180  -FSG before meals and before bedtime  -hypoglycemia protocol in place if needed    #Discharge  -in order to facilitate safe discharge from diabetes standpoint, recommend pharmacy consult to help patient clarify proper medication reconcilliation  -based on above results can determine proper discharge recs    #HTN  -c/w lisinopril 20mg daily if no contraindications    #HLD  -c/w simvastatin 20mg daily, can be evaluated further as an outpatient     Case discussed with Dr. Francis Lomax MD  Endocrine Fellow  Can be reached via teams. For follow up questions, discharge recommendations, or new consults, please call answering service at [phone] (weekdays); [phone] (nights/weekends) This is a 53 yo F /w a PMH of DM2, HTN, asthma and complicated surgical course over the last few months with initial incarcerated hernia --> infected seroma --> SBO --> abdominal wall abscess, now presenting with umbilical drainage. Endocrine consulted for further diabetes management.    #DM2 - HbA1c 7.6%  -c.w lantus 10 units nightly  -increase admelog to 9 units before meals  -low admelog correction scale before meals and before bedtime  -carb consistent diet  -RD consult  -FSG goal 100-180  -FSG before meals and before bedtime  -hypoglycemia protocol in place if needed    #Discharge  -in order to facilitate safe discharge from diabetes standpoint, recommend pharmacy consult to help patient clarify proper medication reconcilliation as discrepency with outpt notes, pt recall, and pharmacy medications   -based on above results can determine proper discharge recs    #HTN  -c/w lisinopril 20mg daily if no contraindications    #HLD  -c/w simvastatin 20mg daily, can be evaluated further as an outpatient     Case discussed with Dr. Francis Lomax MD  Endocrine Fellow  Can be reached via teams. For follow up questions, discharge recommendations, or new consults, please call answering service at [phone] (weekdays); [phone] (nights/weekends)

## 2023-03-22 NOTE — CONSULT NOTE ADULT - SUBJECTIVE AND OBJECTIVE BOX
HPI:  HPI:  52F PMH DM, HTN incarcerated ventral hernia s/p repair 9/2022, c/b infected seroma 10/2022, SBO with abdominal wall abscess s/p IR drain 12/2022, presents with purulent drainage from umbilicus for past 2 weeks. Patient initially presented in September with incarcerated ventral hernia, s/p repair with overlay phasix mesh. In October, pt. represented with serous drainage from umbilicus, was started on zosyn and eventually discharged with Augmentin. Pt. represented in December 2022 with SBO and abdominal wall abscess. IR was consulted and drain was placed, with no growth from drain cultures. Pt. was discharged with Bactrim, however states that she did not take any antibiotics at home after discharge. Pt. reports was supposed to have drain removed by IR in mid January, however drain accidentally came out early January. She has had serous drainage from midline since February, however turned purulent for past two weeks. States she has had subjective fevers on and off since February, also that she will have often have normal oral temperature but with high rectal temp. At this time denies pain, nausea, vomiting. Has been passing flatus and having regular bowel movements. Of note, patient originally presented with A1c of 10 in September, most recently improved to 7 in December.     Dermatology consulted for rough itchy skin around the umbilicus since October 2022. Patient has been using coconut oil to the area, but no other treatments. She scratches often at the area and the involved skin has slowly expanded.    Review of Systems:   REVIEW OF SYSTEMS      General: no fevers/chills, no lethary	    Skin/Breast: see HPI  	  Ophthalmologic: no eye pain or change in vision  	  ENMT: no dysphagia or change in hearing    Respiratory and Thorax: no SOB or cough  	  Cardiovascular: no palpitations or chest pain    Gastrointestinal: no abdomenal pain or blood in stool     Genitourinary: no dysuria or frequency    Musculoskeletal: no joint pains or weakness	    Neurological:no weakness, numbness , or tingling    MEDICATIONS  (STANDING):  budesonide 160 MICROgram(s)/formoterol 4.5 MICROgram(s) Inhaler 2 Puff(s) Inhalation two times a day  enoxaparin Injectable 40 milliGRAM(s) SubCutaneous every 24 hours  influenza   Vaccine 0.5 milliLiter(s) IntraMuscular once  insulin glargine Injectable (LANTUS) 10 Unit(s) SubCutaneous at bedtime  insulin lispro (ADMELOG) corrective regimen sliding scale   SubCutaneous three times a day before meals  insulin lispro (ADMELOG) corrective regimen sliding scale   SubCutaneous at bedtime  insulin lispro Injectable (ADMELOG) 7 Unit(s) SubCutaneous three times a day before meals  lisinopril 20 milliGRAM(s) Oral daily  piperacillin/tazobactam IVPB.. 3.375 Gram(s) IV Intermittent every 8 hours  polyethylene glycol 3350 17 Gram(s) Oral at bedtime  senna 2 Tablet(s) Oral at bedtime  simvastatin 20 milliGRAM(s) Oral at bedtime  torsemide 10 milliGRAM(s) Oral daily  triamcinolone 0.1% Ointment 1 Application(s) Topical every 12 hours    ALLERGIES: cefpodoxime  estrogens  peanuts        SOCIAL HISTORY:  none pertinent    FAMILY HISTORY: none pertinent    VITAL SIGNS LAST 24 HOURS:  T(F): 97.3 (03-21 @ 05:04), Max: 99.1 (03-20 @ 18:08)  HR: 71 (03-21 @ 05:04) (68 - 84)  BP: 130/87 (03-21 @ 05:04) (124/80 - 142/81)  RR: 20 (03-21 @ 05:04) (17 - 20)      PHYSICAL EXAM:     The patient was alert and oriented X 3, well nourished, and in no  apparent distress.  OP showed no ulcerations  There was no visible lymphadenopathy.  Conjunctiva were non injected  There was no clubbing or edema of extremities.  The scalp, hair, face, eyebrows, lips, OP, neck, chest, back,   extremities X 4, nails were examined.  There was no hyperhidrosis or bromhidrosis.    Of note on skin exam:     lichenified dark plaques encircling the umbilicus  scant drainage from the umbilicus     LABS:                        10.2   6.07  )-----------( 189      ( 21 Mar 2023 06:45 )             32.1     03-21    139  |  99  |  12  ----------------------------<  159<H>  4.1   |  28  |  0.55    Ca    9.4      21 Mar 2023 06:45  Phos  4.7     03-21  Mg     1.70     03-21        
  HPI:  HPI:  52F PMH DM, HTN incarcerated ventral hernia s/p repair 9/2022, c/b infected seroma 10/2022, SBO with abdominal wall abscess s/p IR drain 12/2022, presents with purulent drainage from umbilicus for past 2 weeks. Patient initially presented in September with incarcerated ventral hernia, s/p repair with overlay phasix mesh. In October, pt. represented with serous drainage from umbilicus, was started on zosyn and eventually discharged with Augmentin. Pt. represented in December 2022 with SBO and abdominal wall abscess. IR was consulted and drain was placed, with no growth from drain cultures. Pt. was discharged with Bactrim, however states that she did not take any antibiotics at home after discharge. Pt. reports was supposed to have drain removed by IR in mid January, however drain accidentally came out early January. She has had serous drainage from midline since February, however turned purulent for past two weeks. States she has had subjective fevers on and off since February, also that she will have often have normal oral temperature but with high rectal temp. At this time denies pain, nausea, vomiting. Has been passing flatus and having regular bowel movements. Of note, patient originally presented with A1c of 10 in September, most recently improved to 7 in December.     In ED, patient afebrile, hemodynamically stable, with no leukocytosis, however with serum glucose in 200s. CTAP performed showing abdominal wall abscess. IR already consulted with no plans for intervention due to controlled drainage from old drain tract.      PAST MEDICAL & SURGICAL HISTORY:  HTN (hypertension)      DM (diabetes mellitus)      Asthma      S/P ovarian cystectomy      S/P repair of ventral hernia        Allergies    cefpodoxime (Rash)  estrogens (Hives; Pruritus)  peanuts (Short breath)    Intolerances      Physical Exam:  General: NAD, resting comfortably  HEENT: NC/AT, EOMI, normal hearing  Pulmonary: normal resp effort, bilateral chest rise   Cardiovascular: RRR  Abdominal: soft, ND/NT, no organomegaly  Skin: umbilicus with purulent drainage; fistula tract from prior drain site probed. Skin changes around umbilicus, chronic. No palpable collections or fluctuance, however pus expressed with pressure on abdomen.   Extremities: WWP, normal strength, no clubbing/cyanosis/edema  Neuro: A/O x 3, CNs II-XII grossly intact, normal sensation, no focal deficits    Vital Signs Last 24 Hrs  T(C): 36.2 (19 Mar 2023 06:57), Max: 36.9 (18 Mar 2023 23:52)  T(F): 97.1 (19 Mar 2023 06:57), Max: 98.4 (18 Mar 2023 23:52)  HR: 87 (19 Mar 2023 06:57) (87 - 100)  BP: 120/67 (19 Mar 2023 06:57) (120/67 - 143/72)  BP(mean): --  RR: 17 (19 Mar 2023 06:57) (16 - 17)  SpO2: 98% (19 Mar 2023 06:57) (97% - 99%)    Parameters below as of 19 Mar 2023 06:57  Patient On (Oxygen Delivery Method): room air        I&O's Summary          LABS:                        11.2   7.55  )-----------( 190      ( 19 Mar 2023 03:49 )             36.6     03-19    135  |  101  |  17  ----------------------------<  229<H>  TNP   |  20<L>  |  0.58    Ca    9.8      19 Mar 2023 03:49    TPro  8.3  /  Alb  4.6  /  TBili  0.4  /  DBili  x   /  AST  42<H>  /  ALT  19  /  AlkPhos  87  03-19        CAPILLARY BLOOD GLUCOSE        LIVER FUNCTIONS - ( 19 Mar 2023 03:49 )  Alb: 4.6 g/dL / Pro: 8.3 g/dL / ALK PHOS: 87 U/L / ALT: 19 U/L / AST: 42 U/L / GGT: x             Cultures:      RADIOLOGY & ADDITIONAL STUDIES:  < from: CT Abdomen and Pelvis w/ IV Cont (03.19.23 @ 05:19) >    BOWEL: No bowel obstruction. Appendix is normal.  PERITONEUM: No ascites.  VESSELS: Within normal limits.  RETROPERITONEUM/LYMPH NODES: There are a few subcentimeter   retroperitoneal and pelvic sidewall lymph nodes.  ABDOMINAL WALL: Asymmetrically enlarged inferior right rectus abdominis,   stable since 9/11/2022. Redemonstration of ventral wall collection with   interval removal of pigtail drainage catheter. The collection contains   low-density fluid and foci of air. Significant inflammatory fatty   stranding throughout the pannus with questionable sinus tract to the   umbilicus.  BONES: Degenerative changes. 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    IMPRESSION:  No intra-abdominal abscess. Ventral wall abscess containing fluid and   foci of air. Significant inflammatory fatty stranding. Questionable sinus   tract the umbilicus.    < end of copied text >   (19 Mar 2023 09:15)      Consulted for: DM2 management  This is a 51 yo F /w a PMH of DM2, HTN, asthma and complicated surgical course over the last few months with initial incarcerated hernia --> infected seroma --> SBO --> abdominal wall abscess, now presenting with umbilical drainage. Endocrine consulted for further diabetes management.    Per patient she was started on metformin back in 2008. Has history of being on and off both systemic steroids for asthma and intrarticular glucocorticoid injections in both shoulders (unclear when last dose of any steroid as patient doesnt recall). Now follows with Dr. Lisa Alegria with [facility]. Patient denies complications of retinopathy, neuropathy, nephropathy.   Patient reports discrepancy in home medications compared to last note with Dr. Alegria.    Per Dr. Alegria, patient should be on metformin 1000mg BID (noted only taking once a day, which patient still dose), glimepiride 4mg daily and ozempic (unclear dose). Patient had an issue with receiving trulicity and was planned to witch to metformin.    I called the patient's pharmacy, [facility] in [place] and they report patient picked up this month the following medications:  basaglar 10 units nightly  bydureon 2mg weekly  glimeperide 1mg BID  januvia 100mg daily (patient previously told by Dr. Alegria to discontinue this)  metformin 1000mg BID (only takes once a day as above)  lisinopril 20mg daily  simvastatin 20mg daily  lasix 20mg daily     PAST MEDICAL & SURGICAL HISTORY:  HTN (hypertension)      DM (diabetes mellitus)      Asthma      S/P ovarian cystectomy      S/P repair of ventral hernia          FAMILY HISTORY: no history of diabetes      Social History: denies all toxic habits    Home Medications:  budesonide-formoterol 160 mcg-4.5 mcg/inh inhalation aerosol: 2 puff(s) inhaled 2 times a day (19 Mar 2023 10:34)  lisinopril 20 mg oral tablet: 1 tab(s) orally once a day (19 Mar 2023 10:34)  senna leaf extract oral tablet: 2 tab(s) orally once a day (at bedtime) (19 Mar 2023 10:34)  simvastatin 20 mg oral tablet: 1 tab(s) orally once a day (at bedtime) (19 Mar 2023 10:34)  torsemide 10 mg oral tablet: 1 tab(s) orally once a day (19 Mar 2023 10:34)      MEDICATIONS  (STANDING):  budesonide 160 MICROgram(s)/formoterol 4.5 MICROgram(s) Inhaler 2 Puff(s) Inhalation two times a day  enoxaparin Injectable 40 milliGRAM(s) SubCutaneous every 24 hours  influenza   Vaccine 0.5 milliLiter(s) IntraMuscular once  insulin glargine Injectable (LANTUS) 10 Unit(s) SubCutaneous at bedtime  insulin lispro (ADMELOG) corrective regimen sliding scale   SubCutaneous three times a day before meals  insulin lispro (ADMELOG) corrective regimen sliding scale   SubCutaneous at bedtime  insulin lispro Injectable (ADMELOG) 9 Unit(s) SubCutaneous three times a day before meals  lisinopril 20 milliGRAM(s) Oral daily  piperacillin/tazobactam IVPB.. 3.375 Gram(s) IV Intermittent every 8 hours  polyethylene glycol 3350 17 Gram(s) Oral at bedtime  senna 2 Tablet(s) Oral at bedtime  simvastatin 20 milliGRAM(s) Oral at bedtime  torsemide 10 milliGRAM(s) Oral daily  triamcinolone 0.1% Ointment 1 Application(s) Topical every 12 hours  vancomycin  IVPB 1000 milliGRAM(s) IV Intermittent every 8 hours    MEDICATIONS  (PRN):  acetaminophen     Tablet .. 650 milliGRAM(s) Oral every 6 hours PRN Mild Pain (1 - 3)  albuterol    90 MICROgram(s) HFA Inhaler 2 Puff(s) Inhalation every 6 hours PRN Shortness of Breath and/or Wheezing      Allergies    cefpodoxime (Rash)  estrogens (Hives; Pruritus)  peanuts (Short breath)    Intolerances      Review of Systems:  Constitutional: No fever  Eyes: No blurry vision  Neuro: No tremors  HEENT: No pain  Cardiovascular: No chest pain, palpitations  Respiratory: No SOB, no cough  GI: No nausea, vomiting, abdominal pain  : No dysuria  Skin: no rash  Psych: no depression  Endocrine: no polyuria, polydipsia  Hem/lymph: no swelling  Osteoporosis: no fractures    PHYSICAL EXAM:  VITALS: T(C): 36.8 (03-22-23 @ 13:16)  T(F): 98.2 (03-22-23 @ 13:16), Max: 98.2 (03-22-23 @ 10:00)  HR: 79 (03-22-23 @ 13:16) (74 - 92)  BP: 111/66 (03-22-23 @ 13:16) (109/58 - 125/79)  RR:  (17 - 18)  SpO2:  (96% - 100%)  Wt(kg): --  GENERAL: NAD  EYES: No proptosis, no lid lag, anicteric  THYROID: Normal size, no palpable nodules  RESPIRATORY: Clear to auscultation bilaterally  CARDIOVASCULAR: Regular rate and rhythm  GI: Soft, nontender, non distended  EXT: b/l feet without wounds; 2+ pulses  PSYCH: Alert and oriented x 3, reactive mood    POCT Blood Glucose.: 257 mg/dL (03-22-23 @ 11:34)  POCT Blood Glucose.: 188 mg/dL (03-22-23 @ 07:38)  POCT Blood Glucose.: 177 mg/dL (03-21-23 @ 21:11)  POCT Blood Glucose.: 237 mg/dL (03-21-23 @ 16:23)  POCT Blood Glucose.: 178 mg/dL (03-21-23 @ 11:22)  POCT Blood Glucose.: 158 mg/dL (03-21-23 @ 07:35)  POCT Blood Glucose.: 199 mg/dL (03-20-23 @ 21:19)  POCT Blood Glucose.: 137 mg/dL (03-20-23 @ 16:49)  POCT Blood Glucose.: 236 mg/dL (03-20-23 @ 11:08)  POCT Blood Glucose.: 164 mg/dL (03-20-23 @ 07:41)  POCT Blood Glucose.: 126 mg/dL (03-19-23 @ 21:44)  POCT Blood Glucose.: 214 mg/dL (03-19-23 @ 16:32)                            10.7   6.60  )-----------( 194      ( 22 Mar 2023 06:00 )             34.3       03-22    140  |  100  |  15  ----------------------------<  167<H>  4.3   |  28  |  0.59    eGFR: 109    Ca    9.6      03-22  Mg     1.80     03-22  Phos  3.6     03-22        Thyroid Function Tests:      A1C with Estimated Average Glucose Result: 7.6 % (03-20-23 @ 08:10)  A1C with Estimated Average Glucose Result: 7.1 % (12-20-22 @ 05:21)  A1C with Estimated Average Glucose Result: 8.5 % (09-12-22 @ 00:19)          Radiology:

## 2023-03-22 NOTE — CHART NOTE - NSCHARTNOTEFT_GEN_A_CORE
PRE-OPERATIVE DIAGNOSIS: Abdominal wall collection (S/P drainage 12/22) now with umbilical drainage  SURGEON: Dr. Gonzalez  PROCEDURE: Excision of abdominal sinus tract, possible wound VAC placement      VITAL SIGNS:  T(C): 36.8 (22 Mar 2023 13:16), Max: 36.8 (22 Mar 2023 10:00)  T(F): 98.2 (22 Mar 2023 13:16), Max: 98.2 (22 Mar 2023 10:00)  HR: 79 (22 Mar 2023 13:16) (74 - 92)  BP: 111/66 (22 Mar 2023 13:16) (109/58 - 125/79)  RR: 18 (22 Mar 2023 13:16) (17 - 18)  SpO2: 100% (22 Mar 2023 13:16) (96% - 100%)      CBC:                    10.7   6.60  )-----------( 194      ( 22 Mar 2023 06:00 )             34.3     CHEM:  140  |  100  |  15  ----------------------------<  167<H>  4.3   |  28  |  0.59    Ca    9.6      22 Mar 2023 06:00  Phos  3.6     03-22  Mg     1.80     03-22      ASSESSMENT:  Patient is a 52 year old female with a PMHx of DM2, HTN, incarcerated ventral hernia (S/P repair 9/22 - complicated by infected seroma 10/22) and SBO with abdominal wall abscess (S/P IR drain 12/22) who presented with purulent drainage from umbilicus x2 weeks.       PLAN:  - OR 3/22/23 for excision of abdominal sinus tract, possible wound VAC placement with Dr. Gonzalez  - NPO past midnight  - IV fluids while NPO  - COVID swab negative from 3/19/23      #[id number]  B Team Surgery PRE-OPERATIVE DIAGNOSIS: Abdominal wall collection (S/P drainage 12/22) now with umbilical drainage  SURGEON: Dr. Gonzalez  PROCEDURE: Excision of abdominal sinus tract, possible wound VAC placement      VITAL SIGNS:  T(C): 36.8 (22 Mar 2023 13:16), Max: 36.8 (22 Mar 2023 10:00)  T(F): 98.2 (22 Mar 2023 13:16), Max: 98.2 (22 Mar 2023 10:00)  HR: 79 (22 Mar 2023 13:16) (74 - 92)  BP: 111/66 (22 Mar 2023 13:16) (109/58 - 125/79)  RR: 18 (22 Mar 2023 13:16) (17 - 18)  SpO2: 100% (22 Mar 2023 13:16) (96% - 100%)      CBC:                    10.7   6.60  )-----------( 194      ( 22 Mar 2023 06:00 )             34.3     CHEM:  140  |  100  |  15  ----------------------------<  167<H>  4.3   |  28  |  0.59    Ca    9.6      22 Mar 2023 06:00  Phos  3.6     03-22  Mg     1.80     03-22      ASSESSMENT:  Patient is a 52 year old female with a PMHx of DM2, HTN, incarcerated ventral hernia (S/P repair 9/22 - complicated by infected seroma 10/22) and SBO with abdominal wall abscess (S/P IR drain 12/22) who presented with purulent drainage from umbilicus x2 weeks.       PLAN:  - OR 3/22/23 for excision of abdominal sinus tract, possible wound VAC placement with Dr. Gonzalez  - NPO past midnight  - IV fluids while NPO  - COVID swab negative from 3/19/23  - Per endocrinology continue Lantus 10 units tonight      #[id number]  B Team Surgery

## 2023-03-22 NOTE — CONSULT NOTE ADULT - ATTENDING COMMENTS
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
case d/w Dr. Lomax  pt not seen    agree with basal bolus plan outlined above, needs medication reconcilation prior to dc for safe dc planning

## 2023-03-23 ENCOUNTER — RESULT REVIEW (OUTPATIENT)
Age: 53
End: 2023-03-23

## 2023-03-23 LAB
ANION GAP SERPL CALC-SCNC: 10 MMOL/L — SIGNIFICANT CHANGE UP (ref 7–14)
APTT BLD: 31.8 SEC — SIGNIFICANT CHANGE UP (ref 27–36.3)
BLD GP AB SCN SERPL QL: NEGATIVE — SIGNIFICANT CHANGE UP
BUN SERPL-MCNC: 14 MG/DL — SIGNIFICANT CHANGE UP (ref 7–23)
CALCIUM SERPL-MCNC: 9.3 MG/DL — SIGNIFICANT CHANGE UP (ref 8.4–10.5)
CHLORIDE SERPL-SCNC: 99 MMOL/L — SIGNIFICANT CHANGE UP (ref 98–107)
CO2 SERPL-SCNC: 28 MMOL/L — SIGNIFICANT CHANGE UP (ref 22–31)
CREAT SERPL-MCNC: 0.58 MG/DL — SIGNIFICANT CHANGE UP (ref 0.5–1.3)
EGFR: 110 ML/MIN/1.73M2 — SIGNIFICANT CHANGE UP
GLUCOSE BLDC GLUCOMTR-MCNC: 153 MG/DL — HIGH (ref 70–99)
GLUCOSE BLDC GLUCOMTR-MCNC: 184 MG/DL — HIGH (ref 70–99)
GLUCOSE BLDC GLUCOMTR-MCNC: 213 MG/DL — HIGH (ref 70–99)
GLUCOSE BLDC GLUCOMTR-MCNC: 220 MG/DL — HIGH (ref 70–99)
GLUCOSE BLDC GLUCOMTR-MCNC: 243 MG/DL — HIGH (ref 70–99)
GLUCOSE BLDC GLUCOMTR-MCNC: 311 MG/DL — HIGH (ref 70–99)
GLUCOSE SERPL-MCNC: 173 MG/DL — HIGH (ref 70–99)
HCG SERPL-ACNC: <5 MIU/ML — SIGNIFICANT CHANGE UP
HCG UR QL: NEGATIVE — SIGNIFICANT CHANGE UP
HCT VFR BLD CALC: 33 % — LOW (ref 34.5–45)
HGB BLD-MCNC: 10.4 G/DL — LOW (ref 11.5–15.5)
INR BLD: 1.1 RATIO — SIGNIFICANT CHANGE UP (ref 0.88–1.16)
MAGNESIUM SERPL-MCNC: 2 MG/DL — SIGNIFICANT CHANGE UP (ref 1.6–2.6)
MCHC RBC-ENTMCNC: 24 PG — LOW (ref 27–34)
MCHC RBC-ENTMCNC: 31.5 GM/DL — LOW (ref 32–36)
MCV RBC AUTO: 76.2 FL — LOW (ref 80–100)
NRBC # BLD: 0 /100 WBCS — SIGNIFICANT CHANGE UP (ref 0–0)
NRBC # FLD: 0 K/UL — SIGNIFICANT CHANGE UP (ref 0–0)
PHOSPHATE SERPL-MCNC: 3.6 MG/DL — SIGNIFICANT CHANGE UP (ref 2.5–4.5)
PLATELET # BLD AUTO: 183 K/UL — SIGNIFICANT CHANGE UP (ref 150–400)
POTASSIUM SERPL-MCNC: 4.2 MMOL/L — SIGNIFICANT CHANGE UP (ref 3.5–5.3)
POTASSIUM SERPL-SCNC: 4.2 MMOL/L — SIGNIFICANT CHANGE UP (ref 3.5–5.3)
PROTHROM AB SERPL-ACNC: 12.8 SEC — SIGNIFICANT CHANGE UP (ref 10.5–13.4)
RBC # BLD: 4.33 M/UL — SIGNIFICANT CHANGE UP (ref 3.8–5.2)
RBC # FLD: 16.9 % — HIGH (ref 10.3–14.5)
RH IG SCN BLD-IMP: POSITIVE — SIGNIFICANT CHANGE UP
SODIUM SERPL-SCNC: 137 MMOL/L — SIGNIFICANT CHANGE UP (ref 135–145)
WBC # BLD: 6.92 K/UL — SIGNIFICANT CHANGE UP (ref 3.8–10.5)
WBC # FLD AUTO: 6.92 K/UL — SIGNIFICANT CHANGE UP (ref 3.8–10.5)

## 2023-03-23 PROCEDURE — 88305 TISSUE EXAM BY PATHOLOGIST: CPT | Mod: 26

## 2023-03-23 PROCEDURE — 11770 EXC PILONIDAL CYST SIMPLE: CPT | Mod: 59

## 2023-03-23 PROCEDURE — 99233 SBSQ HOSP IP/OBS HIGH 50: CPT | Mod: GC,57,25

## 2023-03-23 PROCEDURE — 88304 TISSUE EXAM BY PATHOLOGIST: CPT | Mod: 26

## 2023-03-23 PROCEDURE — 97606 NEG PRS WND THER DME>50 SQCM: CPT | Mod: 59

## 2023-03-23 RX ORDER — OXYCODONE HYDROCHLORIDE 5 MG/1
10 TABLET ORAL EVERY 4 HOURS
Refills: 0 | Status: DISCONTINUED | OUTPATIENT
Start: 2023-03-23 | End: 2023-03-24

## 2023-03-23 RX ORDER — OXYCODONE HYDROCHLORIDE 5 MG/1
5 TABLET ORAL EVERY 4 HOURS
Refills: 0 | Status: DISCONTINUED | OUTPATIENT
Start: 2023-03-23 | End: 2023-03-24

## 2023-03-23 RX ORDER — ACETAMINOPHEN 500 MG
1000 TABLET ORAL EVERY 6 HOURS
Refills: 0 | Status: COMPLETED | OUTPATIENT
Start: 2023-03-23 | End: 2023-03-24

## 2023-03-23 RX ORDER — ONDANSETRON 8 MG/1
4 TABLET, FILM COATED ORAL ONCE
Refills: 0 | Status: COMPLETED | OUTPATIENT
Start: 2023-03-23 | End: 2023-03-23

## 2023-03-23 RX ORDER — ACETAMINOPHEN 500 MG
975 TABLET ORAL EVERY 8 HOURS
Refills: 0 | Status: COMPLETED | OUTPATIENT
Start: 2023-03-23 | End: 2024-02-19

## 2023-03-23 RX ORDER — ACETAMINOPHEN 500 MG
975 TABLET ORAL EVERY 8 HOURS
Refills: 0 | Status: DISCONTINUED | OUTPATIENT
Start: 2023-03-23 | End: 2023-03-23

## 2023-03-23 RX ORDER — HYDROMORPHONE HYDROCHLORIDE 2 MG/ML
1 INJECTION INTRAMUSCULAR; INTRAVENOUS; SUBCUTANEOUS
Refills: 0 | Status: DISCONTINUED | OUTPATIENT
Start: 2023-03-23 | End: 2023-03-23

## 2023-03-23 RX ORDER — HYDROMORPHONE HYDROCHLORIDE 2 MG/ML
0.5 INJECTION INTRAMUSCULAR; INTRAVENOUS; SUBCUTANEOUS
Refills: 0 | Status: DISCONTINUED | OUTPATIENT
Start: 2023-03-23 | End: 2023-03-23

## 2023-03-23 RX ADMIN — Medication 1000 MILLIGRAM(S): at 22:17

## 2023-03-23 RX ADMIN — OXYCODONE HYDROCHLORIDE 5 MILLIGRAM(S): 5 TABLET ORAL at 17:55

## 2023-03-23 RX ADMIN — Medication 400 MILLIGRAM(S): at 22:02

## 2023-03-23 RX ADMIN — BUDESONIDE AND FORMOTEROL FUMARATE DIHYDRATE 2 PUFF(S): 160; 4.5 AEROSOL RESPIRATORY (INHALATION) at 22:03

## 2023-03-23 RX ADMIN — SIMVASTATIN 20 MILLIGRAM(S): 20 TABLET, FILM COATED ORAL at 22:03

## 2023-03-23 RX ADMIN — Medication 1: at 08:08

## 2023-03-23 RX ADMIN — Medication 166.67 MILLIGRAM(S): at 19:26

## 2023-03-23 RX ADMIN — SODIUM CHLORIDE 50 MILLILITER(S): 9 INJECTION, SOLUTION INTRAVENOUS at 00:15

## 2023-03-23 RX ADMIN — Medication 10 MILLIGRAM(S): at 05:22

## 2023-03-23 RX ADMIN — HYDROMORPHONE HYDROCHLORIDE 1 MILLIGRAM(S): 2 INJECTION INTRAMUSCULAR; INTRAVENOUS; SUBCUTANEOUS at 16:24

## 2023-03-23 RX ADMIN — SENNA PLUS 2 TABLET(S): 8.6 TABLET ORAL at 22:03

## 2023-03-23 RX ADMIN — ONDANSETRON 4 MILLIGRAM(S): 8 TABLET, FILM COATED ORAL at 16:30

## 2023-03-23 RX ADMIN — PIPERACILLIN AND TAZOBACTAM 25 GRAM(S): 4; .5 INJECTION, POWDER, LYOPHILIZED, FOR SOLUTION INTRAVENOUS at 02:26

## 2023-03-23 RX ADMIN — PIPERACILLIN AND TAZOBACTAM 25 GRAM(S): 4; .5 INJECTION, POWDER, LYOPHILIZED, FOR SOLUTION INTRAVENOUS at 22:02

## 2023-03-23 RX ADMIN — HYDROMORPHONE HYDROCHLORIDE 0.5 MILLIGRAM(S): 2 INJECTION INTRAMUSCULAR; INTRAVENOUS; SUBCUTANEOUS at 16:56

## 2023-03-23 RX ADMIN — Medication 1 APPLICATION(S): at 10:00

## 2023-03-23 RX ADMIN — BUDESONIDE AND FORMOTEROL FUMARATE DIHYDRATE 2 PUFF(S): 160; 4.5 AEROSOL RESPIRATORY (INHALATION) at 09:49

## 2023-03-23 RX ADMIN — PIPERACILLIN AND TAZOBACTAM 25 GRAM(S): 4; .5 INJECTION, POWDER, LYOPHILIZED, FOR SOLUTION INTRAVENOUS at 09:49

## 2023-03-23 RX ADMIN — LISINOPRIL 20 MILLIGRAM(S): 2.5 TABLET ORAL at 05:22

## 2023-03-23 RX ADMIN — HYDROMORPHONE HYDROCHLORIDE 1 MILLIGRAM(S): 2 INJECTION INTRAMUSCULAR; INTRAVENOUS; SUBCUTANEOUS at 16:51

## 2023-03-23 RX ADMIN — INSULIN GLARGINE 10 UNIT(S): 100 INJECTION, SOLUTION SUBCUTANEOUS at 23:22

## 2023-03-23 RX ADMIN — POLYETHYLENE GLYCOL 3350 17 GRAM(S): 17 POWDER, FOR SOLUTION ORAL at 22:03

## 2023-03-23 RX ADMIN — HYDROMORPHONE HYDROCHLORIDE 0.5 MILLIGRAM(S): 2 INJECTION INTRAMUSCULAR; INTRAVENOUS; SUBCUTANEOUS at 17:15

## 2023-03-23 RX ADMIN — Medication 2: at 23:22

## 2023-03-23 RX ADMIN — Medication 2: at 17:36

## 2023-03-23 RX ADMIN — OXYCODONE HYDROCHLORIDE 5 MILLIGRAM(S): 5 TABLET ORAL at 17:18

## 2023-03-23 RX ADMIN — Medication 166.67 MILLIGRAM(S): at 07:04

## 2023-03-23 RX ADMIN — OXYCODONE HYDROCHLORIDE 10 MILLIGRAM(S): 5 TABLET ORAL at 23:44

## 2023-03-23 NOTE — CHART NOTE - NSCHARTNOTEFT_GEN_A_CORE
This is a 53 yo F /w a PMH of DM2, HTN, asthma and complicated surgical course over the last few months with initial incarcerated hernia --> infected seroma --> SBO --> abdominal wall abscess, now presenting with umbilical drainage. Endocrine consulted for further diabetes management.    Attempted to see pt. Pt in OR.     #DM2 - HbA1c 7.6%  -c.w lantus 10 units nightly  -Continue admelog to 9 units before meals  -low admelog correction scale before meals and before bedtime  -carb consistent diet  -RD consult  -FSG goal 100-180: fs stable this am. Not much data today. Therefore will continue to monitor fs on current insulin dosing   -FSG before meals and before bedtime  -hypoglycemia protocol in place if needed    #Discharge  -in order to facilitate safe discharge from diabetes standpoint, recommend pharmacy consult to help patient clarify proper medication reconcilliation as discrepency with outpt notes, pt recall, and pharmacy medications   -based on above results can determine proper discharge recs    #HTN  -c/w lisinopril 20mg daily if no contraindications    #HLD  -c/w simvastatin 20mg daily, can be evaluated further as an outpatient       MEDICATIONS  (STANDING):  budesonide 160 MICROgram(s)/formoterol 4.5 MICROgram(s) Inhaler 2 Puff(s) Inhalation two times a day  enoxaparin Injectable 40 milliGRAM(s) SubCutaneous every 24 hours  influenza   Vaccine 0.5 milliLiter(s) IntraMuscular once  insulin glargine Injectable (LANTUS) 10 Unit(s) SubCutaneous at bedtime  insulin lispro (ADMELOG) corrective regimen sliding scale   SubCutaneous three times a day before meals  insulin lispro (ADMELOG) corrective regimen sliding scale   SubCutaneous at bedtime  insulin lispro Injectable (ADMELOG) 9 Unit(s) SubCutaneous three times a day before meals  lisinopril 20 milliGRAM(s) Oral daily  piperacillin/tazobactam IVPB.. 3.375 Gram(s) IV Intermittent every 8 hours  polyethylene glycol 3350 17 Gram(s) Oral at bedtime  senna 2 Tablet(s) Oral at bedtime  simvastatin 20 milliGRAM(s) Oral at bedtime  torsemide 10 milliGRAM(s) Oral daily  triamcinolone 0.1% Ointment 1 Application(s) Topical every 12 hours  vancomycin  IVPB 1250 milliGRAM(s) IV Intermittent every 12 hours    MEDICATIONS  (PRN):  acetaminophen     Tablet .. 650 milliGRAM(s) Oral every 6 hours PRN Mild Pain (1 - 3)  albuterol    90 MICROgram(s) HFA Inhaler 2 Puff(s) Inhalation every 6 hours PRN Shortness of Breath and/or Wheezing  HYDROmorphone  Injectable 0.5 milliGRAM(s) IV Push every 10 minutes PRN Moderate Pain (4 - 6)  HYDROmorphone  Injectable 1 milliGRAM(s) IV Push every 10 minutes PRN Severe Pain (7 - 10)      Allergies: cefpodoxime (Rash)  estrogens (Hives; Pruritus)  peanuts (Short breath)    PHYSICAL EXAM:  VITALS: T(C): 36.5 (03-23-23 @ 15:54)  T(F): 97.7 (03-23-23 @ 15:54), Max: 99.8 (03-22-23 @ 18:32)  HR: 74 (03-23-23 @ 17:00) (70 - 94)  BP: 143/65 (03-23-23 @ 16:45) (115/67 - 164/88)  RR:  (13 - 30)  SpO2:  (94% - 100%)    CAPILLARY BLOOD GLUCOSE  POCT Blood Glucose.: 220 mg/dL (23 Mar 2023 10:28)  POCT Blood Glucose.: 184 mg/dL (23 Mar 2023 08:06)  POCT Blood Glucose.: 153 mg/dL (23 Mar 2023 06:29)  POCT Blood Glucose.: 235 mg/dL (22 Mar 2023 22:09)    A1C with Estimated Average Glucose (03.20.23 @ 08:10)    A1C with Estimated Average Glucose Result: 7.6    Estimated Average Glucose: 171      03-23    137  |  99  |  14  ----------------------------<  173<H>  4.2   |  28  |  0.58    eGFR: 110    Ca    9.3      03-23  Mg     2.00     03-23  Phos  3.6     03-23      Diet, Consistent Carbohydrate/No Snacks (03-19-23 @ 10:22) [Active]    Pam Rdz  Nurse Practitioner  Division of Endocrinology & Diabetes  In house pager #[phone]    If before 9AM or after 6PM, or on weekends/holidays, please call endocrine answering service for assistance ([phone]).For nonurgent matters email [email or web address] for assistance. This is a 53 yo F /w a PMH of DM2, HTN, asthma and complicated surgical course over the last few months with initial incarcerated hernia --> infected seroma --> SBO --> abdominal wall abscess, now presenting with umbilical drainage. Endocrine consulted for further diabetes management.    Attempted to see pt. Pt in OR.     #DM2 - HbA1c 7.6%  -c.w lantus 10 units nightly  -Continue admelog to 9 units before meals  -low admelog correction scale before meals and before bedtime  -carb consistent diet  -RD consult  -FSG goal 100-180: fs stable this am. Not much data today as pt was NPO and in OR. Therefore will continue to monitor fs on current insulin dosing   -FSG before meals and before bedtime  -hypoglycemia protocol in place if needed  -Please consider changing abx solution from dextrose to sodium chloride     #Discharge  -in order to facilitate safe discharge from diabetes standpoint, recommend pharmacy consult to help patient clarify proper medication reconciliation as discrepancy with outpt notes, pt recall, and pharmacy medications   -based on above results can determine proper discharge recs    #HTN  -c/w lisinopril 20mg daily if no contraindications    #HLD  -c/w simvastatin 20mg daily, can be evaluated further as an outpatient       MEDICATIONS  (STANDING):  budesonide 160 MICROgram(s)/formoterol 4.5 MICROgram(s) Inhaler 2 Puff(s) Inhalation two times a day  enoxaparin Injectable 40 milliGRAM(s) SubCutaneous every 24 hours  influenza   Vaccine 0.5 milliLiter(s) IntraMuscular once  insulin glargine Injectable (LANTUS) 10 Unit(s) SubCutaneous at bedtime  insulin lispro (ADMELOG) corrective regimen sliding scale   SubCutaneous three times a day before meals  insulin lispro (ADMELOG) corrective regimen sliding scale   SubCutaneous at bedtime  insulin lispro Injectable (ADMELOG) 9 Unit(s) SubCutaneous three times a day before meals  lisinopril 20 milliGRAM(s) Oral daily  piperacillin/tazobactam IVPB.. 3.375 Gram(s) IV Intermittent every 8 hours  polyethylene glycol 3350 17 Gram(s) Oral at bedtime  senna 2 Tablet(s) Oral at bedtime  simvastatin 20 milliGRAM(s) Oral at bedtime  torsemide 10 milliGRAM(s) Oral daily  triamcinolone 0.1% Ointment 1 Application(s) Topical every 12 hours  vancomycin  IVPB 1250 milliGRAM(s) IV Intermittent every 12 hours    MEDICATIONS  (PRN):  acetaminophen     Tablet .. 650 milliGRAM(s) Oral every 6 hours PRN Mild Pain (1 - 3)  albuterol    90 MICROgram(s) HFA Inhaler 2 Puff(s) Inhalation every 6 hours PRN Shortness of Breath and/or Wheezing  HYDROmorphone  Injectable 0.5 milliGRAM(s) IV Push every 10 minutes PRN Moderate Pain (4 - 6)  HYDROmorphone  Injectable 1 milliGRAM(s) IV Push every 10 minutes PRN Severe Pain (7 - 10)      Allergies: cefpodoxime (Rash)  estrogens (Hives; Pruritus)  peanuts (Short breath)    PHYSICAL EXAM:  VITALS: T(C): 36.5 (03-23-23 @ 15:54)  T(F): 97.7 (03-23-23 @ 15:54), Max: 99.8 (03-22-23 @ 18:32)  HR: 74 (03-23-23 @ 17:00) (70 - 94)  BP: 143/65 (03-23-23 @ 16:45) (115/67 - 164/88)  RR:  (13 - 30)  SpO2:  (94% - 100%)    CAPILLARY BLOOD GLUCOSE  POCT Blood Glucose.: 220 mg/dL (23 Mar 2023 10:28)  POCT Blood Glucose.: 184 mg/dL (23 Mar 2023 08:06)  POCT Blood Glucose.: 153 mg/dL (23 Mar 2023 06:29)  POCT Blood Glucose.: 235 mg/dL (22 Mar 2023 22:09)    A1C with Estimated Average Glucose (03.20.23 @ 08:10)    A1C with Estimated Average Glucose Result: 7.6    Estimated Average Glucose: 171      03-23    137  |  99  |  14  ----------------------------<  173<H>  4.2   |  28  |  0.58    eGFR: 110    Ca    9.3      03-23  Mg     2.00     03-23  Phos  3.6     03-23      Diet, Consistent Carbohydrate/No Snacks (03-19-23 @ 10:22) [Active]    Pam Rdz  Nurse Practitioner  Division of Endocrinology & Diabetes  In house pager #[phone]    If before 9AM or after 6PM, or on weekends/holidays, please call endocrine answering service for assistance ([phone]).For nonurgent matters email [email or web address] for assistance.

## 2023-03-23 NOTE — PROGRESS NOTE ADULT - ASSESSMENT
Patient is a 52 year old female with a PMHx of DM2, HTN, incarcerated ventral hernia (S/P repair 9/22 - complicated by infected seroma 10/22) and SBO with abdominal wall abscess (S/P IR drain 12/22) who presented with purulent drainage from umbilicus x2 weeks.      PLAN:  - Planned for OR today, however patient refusing  - NPO  - LR @50cc/hr while NPO  - Continue with IV Zosyn and IV Vancomycin  - Psych consult  - Out of bed  - Pain control  - VTE prophylaxis with Lovenox subcutaneous      #[id number]  B Team Surgery

## 2023-03-23 NOTE — CHART NOTE - NSCHARTNOTEFT_GEN_A_CORE
Post Operative Check    Patient is post op from excision of umbilical sinus tract and scar and wound vac placement. Abdominal pain is controlled. Patient endorses R shoulder/back pain that started 3 days ago when trying to wipe after BM. Denies nausea, vomiting, fevers, chills, SOB, chest pain.    Vitals    T(C): 36.5 (03-23-23 @ 18:55), Max: 37.5 (03-22-23 @ 21:27)  HR: 76 (03-23-23 @ 18:55) (70 - 94)  BP: 158/85 (03-23-23 @ 18:55) (115/91 - 164/88)  RR: 18 (03-23-23 @ 18:55) (13 - 30)  SpO2: 100% (03-23-23 @ 18:55) (94% - 100%)      03-22 @ 07:01  -  03-23 @ 07:00  --------------------------------------------------------  IN:    IV PiggyBack: 200 mL    Lactated Ringers: 450 mL    Oral Fluid: 720 mL  Total IN: 1370 mL    OUT:    Drain (mL): 0 mL    Voided (mL): 400 mL  Total OUT: 400 mL    Total NET: 970 mL          Labs                        10.4   6.92  )-----------( 183      ( 23 Mar 2023 06:20 )             33.0       CBC Full  -  ( 23 Mar 2023 06:20 )  WBC Count : 6.92 K/uL  Hemoglobin : 10.4 g/dL  Hematocrit : 33.0 %  Platelet Count - Automated : 183 K/uL  Mean Cell Volume : 76.2 fL  Mean Cell Hemoglobin : 24.0 pg  Mean Cell Hemoglobin Concentration : 31.5 gm/dL  Auto Neutrophil # : x  Auto Lymphocyte # : x  Auto Monocyte # : x  Auto Eosinophil # : x  Auto Basophil # : x  Auto Neutrophil % : x  Auto Lymphocyte % : x  Auto Monocyte % : x  Auto Eosinophil % : x  Auto Basophil % : x      Physical Exam  General: resting in bed  Resp: unlabored breathing, on RA  Cardio: regular rate  Abdomen: soft, nondistended, lower midline wound vac to suction, appropriately tender      Patient is a 52y old Female s/p Post Operative Check    Patient is post op from excision of umbilical sinus tract and scar and wound vac placement. Abdominal pain is controlled. Patient endorses ongoing R shoulder/back pain that started 3 days ago when trying to wipe after BM. Denies nausea, vomiting, fevers, chills, SOB, chest pain.    Vitals    T(C): 36.5 (03-23-23 @ 18:55), Max: 37.5 (03-22-23 @ 21:27)  HR: 76 (03-23-23 @ 18:55) (70 - 94)  BP: 158/85 (03-23-23 @ 18:55) (115/91 - 164/88)  RR: 18 (03-23-23 @ 18:55) (13 - 30)  SpO2: 100% (03-23-23 @ 18:55) (94% - 100%)      03-22 @ 07:01  -  03-23 @ 07:00  --------------------------------------------------------  IN:    IV PiggyBack: 200 mL    Lactated Ringers: 450 mL    Oral Fluid: 720 mL  Total IN: 1370 mL    OUT:    Drain (mL): 0 mL    Voided (mL): 400 mL  Total OUT: 400 mL    Total NET: 970 mL          Labs                        10.4   6.92  )-----------( 183      ( 23 Mar 2023 06:20 )             33.0       CBC Full  -  ( 23 Mar 2023 06:20 )  WBC Count : 6.92 K/uL  Hemoglobin : 10.4 g/dL  Hematocrit : 33.0 %  Platelet Count - Automated : 183 K/uL  Mean Cell Volume : 76.2 fL  Mean Cell Hemoglobin : 24.0 pg  Mean Cell Hemoglobin Concentration : 31.5 gm/dL  Auto Neutrophil # : x  Auto Lymphocyte # : x  Auto Monocyte # : x  Auto Eosinophil # : x  Auto Basophil # : x  Auto Neutrophil % : x  Auto Lymphocyte % : x  Auto Monocyte % : x  Auto Eosinophil % : x  Auto Basophil % : x      Physical Exam  General: resting in bed  Resp: unlabored breathing, on RA  Cardio: regular rate  Abdomen: soft, nondistended, lower midline wound vac to suction, appropriately tender      Patient is a 52y old Female s/p umbilical sinus tract excision and wound vac placement, recovering well.    Plan:  - IV vanc/zosyn  - pain control  - consistent carb diet  - wound care  - f/u AM labs      B Team Surgery  [id number]

## 2023-03-23 NOTE — PROGRESS NOTE ADULT - SUBJECTIVE AND OBJECTIVE BOX
Surgery Daily Progress Note  =====================================================  Interval / Overnight Events: No acute events overnight.      HPI:  Patient is a 52 year old female with a PMHx of DM2, HTN, incarcerated ventral hernia (S/P repair 9/22 - complicated by infected seroma 10/22) and SBO with abdominal wall abscess (S/P IR drain 12/22) who presented with purulent drainage from umbilicus x2 weeks. (19 Mar 2023 09:15)      PAST MEDICAL & SURGICAL HISTORY:  HTN (hypertension)  DM (diabetes mellitus)  Asthma  S/P ovarian cystectomy  S/P repair of ventral hernia      ALLERGIES:  cefpodoxime (Rash)  estrogens (Hives; Pruritus)  peanuts (Short breath)    --------------------------------------------------------------------------------------    MEDICATIONS:    Neurologic Medications  acetaminophen     Tablet .. 650 milliGRAM(s) Oral every 6 hours PRN Mild Pain (1 - 3)    Respiratory Medications  albuterol    90 MICROgram(s) HFA Inhaler 2 Puff(s) Inhalation every 6 hours PRN Shortness of Breath and/or Wheezing  budesonide 160 MICROgram(s)/formoterol 4.5 MICROgram(s) Inhaler 2 Puff(s) Inhalation two times a day    Cardiovascular Medications  lisinopril 20 milliGRAM(s) Oral daily  torsemide 10 milliGRAM(s) Oral daily    Gastrointestinal Medications  lactated ringers. 1000 milliLiter(s) IV Continuous <Continuous>  polyethylene glycol 3350 17 Gram(s) Oral at bedtime  senna 2 Tablet(s) Oral at bedtime    Hematologic/Oncologic Medications  enoxaparin Injectable 40 milliGRAM(s) SubCutaneous every 24 hours  influenza   Vaccine 0.5 milliLiter(s) IntraMuscular once    Antimicrobial/Immunologic Medications  piperacillin/tazobactam IVPB.. 3.375 Gram(s) IV Intermittent every 8 hours  vancomycin  IVPB 1250 milliGRAM(s) IV Intermittent every 12 hours    Endocrine/Metabolic Medications  insulin glargine Injectable (LANTUS) 10 Unit(s) SubCutaneous at bedtime  insulin lispro (ADMELOG) corrective regimen sliding scale   SubCutaneous three times a day before meals  insulin lispro (ADMELOG) corrective regimen sliding scale   SubCutaneous at bedtime  insulin lispro Injectable (ADMELOG) 9 Unit(s) SubCutaneous three times a day before meals  simvastatin 20 milliGRAM(s) Oral at bedtime    Topical/Other Medications  triamcinolone 0.1% Ointment 1 Application(s) Topical every 12 hours    --------------------------------------------------------------------------------------    VITAL SIGNS:  T(C): 36.7 (23 Mar 2023 05:20), Max: 37.7 (22 Mar 2023 18:32)  T(F): 98 (23 Mar 2023 05:20), Max: 99.8 (22 Mar 2023 18:32)  HR: 87 (23 Mar 2023 05:20) (70 - 91)  BP: 115/91 (23 Mar 2023 05:20) (109/58 - 135/69)  RR: 18 (23 Mar 2023 05:20) (17 - 18)  SpO2: 94% (23 Mar 2023 05:20) (94% - 100%)    --------------------------------------------------------------------------------------    INS AND OUTS:    22 Mar 2023 07:01  -  23 Mar 2023 07:00  --------------------------------------------------------  IN:    Oral Fluid: 720 mL  Total IN: 720 mL    OUT:    Drain (mL): 0 mL  Total OUT: 0 mL    Total NET: 720 mL    --------------------------------------------------------------------------------------    EXAM    NEUROLOGY  Exam: Normal, in no acute distress.    HEENT  Exam: Normocephalic, atraumatic.    RESPIRATORY  Exam: Normal expansion / effort.    CARDIOVASCULAR  Exam: S1, S2.  Regular rate and rhythm.    GI/NUTRITION  Exam: Abdomen soft, Non-tender, Non-distended.  Pouch over umbilicus  Current Diet: NPO    MUSCULOSKELETAL  Exam: All extremities moving spontaneously without limitations.      METABOLIC / FLUIDS / ELECTROLYTES  lactated ringers. 1000 milliLiter(s) IV Continuous <Continuous>      HEMATOLOGIC  [x] VTE Prophylaxis: enoxaparin Injectable 40 milliGRAM(s) SubCutaneous every 24 hours      INFECTIOUS DISEASE  Antimicrobials/Immunologic Medications:  influenza   Vaccine 0.5 milliLiter(s) IntraMuscular once  piperacillin/tazobactam IVPB.. 3.375 Gram(s) IV Intermittent every 8 hours  vancomycin  IVPB 1250 milliGRAM(s) IV Intermittent every 12 hours    --------------------------------------------------------------------------------------

## 2023-03-24 ENCOUNTER — TRANSCRIPTION ENCOUNTER (OUTPATIENT)
Age: 53
End: 2023-03-24

## 2023-03-24 LAB
ANION GAP SERPL CALC-SCNC: 12 MMOL/L — SIGNIFICANT CHANGE UP (ref 7–14)
BUN SERPL-MCNC: 18 MG/DL — SIGNIFICANT CHANGE UP (ref 7–23)
CALCIUM SERPL-MCNC: 9 MG/DL — SIGNIFICANT CHANGE UP (ref 8.4–10.5)
CHLORIDE SERPL-SCNC: 97 MMOL/L — LOW (ref 98–107)
CO2 SERPL-SCNC: 25 MMOL/L — SIGNIFICANT CHANGE UP (ref 22–31)
CREAT SERPL-MCNC: 0.68 MG/DL — SIGNIFICANT CHANGE UP (ref 0.5–1.3)
CULTURE RESULTS: SIGNIFICANT CHANGE UP
EGFR: 105 ML/MIN/1.73M2 — SIGNIFICANT CHANGE UP
GLUCOSE BLDC GLUCOMTR-MCNC: 170 MG/DL — HIGH (ref 70–99)
GLUCOSE BLDC GLUCOMTR-MCNC: 187 MG/DL — HIGH (ref 70–99)
GLUCOSE BLDC GLUCOMTR-MCNC: 188 MG/DL — HIGH (ref 70–99)
GLUCOSE BLDC GLUCOMTR-MCNC: 230 MG/DL — HIGH (ref 70–99)
GLUCOSE SERPL-MCNC: 206 MG/DL — HIGH (ref 70–99)
HCT VFR BLD CALC: 31.5 % — LOW (ref 34.5–45)
HGB BLD-MCNC: 10.1 G/DL — LOW (ref 11.5–15.5)
MAGNESIUM SERPL-MCNC: 2 MG/DL — SIGNIFICANT CHANGE UP (ref 1.6–2.6)
MCHC RBC-ENTMCNC: 24.2 PG — LOW (ref 27–34)
MCHC RBC-ENTMCNC: 32.1 GM/DL — SIGNIFICANT CHANGE UP (ref 32–36)
MCV RBC AUTO: 75.4 FL — LOW (ref 80–100)
NRBC # BLD: 0 /100 WBCS — SIGNIFICANT CHANGE UP (ref 0–0)
NRBC # FLD: 0 K/UL — SIGNIFICANT CHANGE UP (ref 0–0)
ORGANISM # SPEC MICROSCOPIC CNT: SIGNIFICANT CHANGE UP
PHOSPHATE SERPL-MCNC: 3.6 MG/DL — SIGNIFICANT CHANGE UP (ref 2.5–4.5)
PLATELET # BLD AUTO: 183 K/UL — SIGNIFICANT CHANGE UP (ref 150–400)
POTASSIUM SERPL-MCNC: 4.5 MMOL/L — SIGNIFICANT CHANGE UP (ref 3.5–5.3)
POTASSIUM SERPL-SCNC: 4.5 MMOL/L — SIGNIFICANT CHANGE UP (ref 3.5–5.3)
RBC # BLD: 4.18 M/UL — SIGNIFICANT CHANGE UP (ref 3.8–5.2)
RBC # FLD: 17 % — HIGH (ref 10.3–14.5)
SODIUM SERPL-SCNC: 134 MMOL/L — LOW (ref 135–145)
SPECIMEN SOURCE: SIGNIFICANT CHANGE UP
VANCOMYCIN TROUGH SERPL-MCNC: 9.1 UG/ML — LOW (ref 10–20)
WBC # BLD: 8.36 K/UL — SIGNIFICANT CHANGE UP (ref 3.8–10.5)
WBC # FLD AUTO: 8.36 K/UL — SIGNIFICANT CHANGE UP (ref 3.8–10.5)

## 2023-03-24 PROCEDURE — 99232 SBSQ HOSP IP/OBS MODERATE 35: CPT

## 2023-03-24 RX ORDER — ONDANSETRON 8 MG/1
4 TABLET, FILM COATED ORAL EVERY 6 HOURS
Refills: 0 | Status: DISCONTINUED | OUTPATIENT
Start: 2023-03-24 | End: 2023-03-29

## 2023-03-24 RX ORDER — OXYCODONE HYDROCHLORIDE 5 MG/1
5 TABLET ORAL EVERY 4 HOURS
Refills: 0 | Status: DISCONTINUED | OUTPATIENT
Start: 2023-03-24 | End: 2023-03-29

## 2023-03-24 RX ORDER — OXYCODONE HYDROCHLORIDE 5 MG/1
10 TABLET ORAL EVERY 4 HOURS
Refills: 0 | Status: DISCONTINUED | OUTPATIENT
Start: 2023-03-24 | End: 2023-03-29

## 2023-03-24 RX ORDER — LIDOCAINE 4 G/100G
1 CREAM TOPICAL DAILY
Refills: 0 | Status: DISCONTINUED | OUTPATIENT
Start: 2023-03-24 | End: 2023-03-29

## 2023-03-24 RX ADMIN — OXYCODONE HYDROCHLORIDE 10 MILLIGRAM(S): 5 TABLET ORAL at 19:40

## 2023-03-24 RX ADMIN — POLYETHYLENE GLYCOL 3350 17 GRAM(S): 17 POWDER, FOR SOLUTION ORAL at 21:56

## 2023-03-24 RX ADMIN — OXYCODONE HYDROCHLORIDE 10 MILLIGRAM(S): 5 TABLET ORAL at 20:10

## 2023-03-24 RX ADMIN — LIDOCAINE 1 PATCH: 4 CREAM TOPICAL at 18:10

## 2023-03-24 RX ADMIN — ENOXAPARIN SODIUM 40 MILLIGRAM(S): 100 INJECTION SUBCUTANEOUS at 14:05

## 2023-03-24 RX ADMIN — Medication 1: at 17:09

## 2023-03-24 RX ADMIN — INSULIN GLARGINE 10 UNIT(S): 100 INJECTION, SOLUTION SUBCUTANEOUS at 22:00

## 2023-03-24 RX ADMIN — Medication 400 MILLIGRAM(S): at 03:57

## 2023-03-24 RX ADMIN — Medication 1: at 07:55

## 2023-03-24 RX ADMIN — OXYCODONE HYDROCHLORIDE 10 MILLIGRAM(S): 5 TABLET ORAL at 12:16

## 2023-03-24 RX ADMIN — Medication 9 UNIT(S): at 07:55

## 2023-03-24 RX ADMIN — OXYCODONE HYDROCHLORIDE 10 MILLIGRAM(S): 5 TABLET ORAL at 05:55

## 2023-03-24 RX ADMIN — LISINOPRIL 20 MILLIGRAM(S): 2.5 TABLET ORAL at 05:00

## 2023-03-24 RX ADMIN — BUDESONIDE AND FORMOTEROL FUMARATE DIHYDRATE 2 PUFF(S): 160; 4.5 AEROSOL RESPIRATORY (INHALATION) at 21:56

## 2023-03-24 RX ADMIN — Medication 9 UNIT(S): at 17:10

## 2023-03-24 RX ADMIN — OXYCODONE HYDROCHLORIDE 10 MILLIGRAM(S): 5 TABLET ORAL at 08:32

## 2023-03-24 RX ADMIN — OXYCODONE HYDROCHLORIDE 10 MILLIGRAM(S): 5 TABLET ORAL at 09:04

## 2023-03-24 RX ADMIN — LIDOCAINE 1 PATCH: 4 CREAM TOPICAL at 11:41

## 2023-03-24 RX ADMIN — Medication 1 TABLET(S): at 17:10

## 2023-03-24 RX ADMIN — Medication 1000 MILLIGRAM(S): at 14:57

## 2023-03-24 RX ADMIN — Medication 1 APPLICATION(S): at 21:56

## 2023-03-24 RX ADMIN — Medication 166.67 MILLIGRAM(S): at 07:51

## 2023-03-24 RX ADMIN — SIMVASTATIN 20 MILLIGRAM(S): 20 TABLET, FILM COATED ORAL at 21:57

## 2023-03-24 RX ADMIN — Medication 2: at 11:41

## 2023-03-24 RX ADMIN — Medication 10 MILLIGRAM(S): at 05:00

## 2023-03-24 RX ADMIN — SENNA PLUS 2 TABLET(S): 8.6 TABLET ORAL at 21:57

## 2023-03-24 RX ADMIN — OXYCODONE HYDROCHLORIDE 10 MILLIGRAM(S): 5 TABLET ORAL at 04:55

## 2023-03-24 RX ADMIN — PIPERACILLIN AND TAZOBACTAM 25 GRAM(S): 4; .5 INJECTION, POWDER, LYOPHILIZED, FOR SOLUTION INTRAVENOUS at 05:01

## 2023-03-24 RX ADMIN — Medication 400 MILLIGRAM(S): at 14:27

## 2023-03-24 RX ADMIN — Medication 9 UNIT(S): at 11:41

## 2023-03-24 RX ADMIN — OXYCODONE HYDROCHLORIDE 10 MILLIGRAM(S): 5 TABLET ORAL at 12:49

## 2023-03-24 RX ADMIN — Medication 1000 MILLIGRAM(S): at 04:12

## 2023-03-24 RX ADMIN — OXYCODONE HYDROCHLORIDE 10 MILLIGRAM(S): 5 TABLET ORAL at 00:36

## 2023-03-24 NOTE — PROGRESS NOTE ADULT - SUBJECTIVE AND OBJECTIVE BOX
Chief Complaint:     History:    MEDICATIONS  (STANDING):  acetaminophen     Tablet .. 975 milliGRAM(s) Oral every 8 hours  acetaminophen   IVPB .. 1000 milliGRAM(s) IV Intermittent every 6 hours  budesonide 160 MICROgram(s)/formoterol 4.5 MICROgram(s) Inhaler 2 Puff(s) Inhalation two times a day  enoxaparin Injectable 40 milliGRAM(s) SubCutaneous every 24 hours  influenza   Vaccine 0.5 milliLiter(s) IntraMuscular once  insulin glargine Injectable (LANTUS) 10 Unit(s) SubCutaneous at bedtime  insulin lispro (ADMELOG) corrective regimen sliding scale   SubCutaneous three times a day before meals  insulin lispro (ADMELOG) corrective regimen sliding scale   SubCutaneous at bedtime  insulin lispro Injectable (ADMELOG) 9 Unit(s) SubCutaneous three times a day before meals  lidocaine   4% Patch 1 Patch Transdermal daily  lisinopril 20 milliGRAM(s) Oral daily  polyethylene glycol 3350 17 Gram(s) Oral at bedtime  senna 2 Tablet(s) Oral at bedtime  simvastatin 20 milliGRAM(s) Oral at bedtime  torsemide 10 milliGRAM(s) Oral daily  triamcinolone 0.1% Ointment 1 Application(s) Topical every 12 hours  trimethoprim  160 mG/sulfamethoxazole 800 mG 1 Tablet(s) Oral every 12 hours    MEDICATIONS  (PRN):  albuterol    90 MICROgram(s) HFA Inhaler 2 Puff(s) Inhalation every 6 hours PRN Shortness of Breath and/or Wheezing  ondansetron    Tablet 4 milliGRAM(s) Oral every 6 hours PRN Nausea and/or Vomiting  oxyCODONE    IR 5 milliGRAM(s) Oral every 4 hours PRN Moderate Pain (4 - 6)  oxyCODONE    IR 10 milliGRAM(s) Oral every 4 hours PRN Severe Pain (7 - 10)      Allergies: cefpodoxime (Rash)  estrogens (Hives; Pruritus)  peanuts (Short breath)      Review of Systems:  Respiratory: No SOB, no cough  GI: No nausea, vomiting, abdominal pain  Endocrine: no polyuria, polydipsia    PHYSICAL EXAM:  VITALS: T(C): 36.9 (03-24-23 @ 09:34)  T(F): 98.4 (03-24-23 @ 09:34), Max: 98.4 (03-24-23 @ 09:34)  HR: 77 (03-24-23 @ 09:34) (63 - 83)  BP: 125/68 (03-24-23 @ 09:34) (115/64 - 164/88)  RR:  (13 - 30)  SpO2:  (97% - 100%)  Wt(kg): --  GENERAL: NAD, well-groomed, well-developed  RESPIRATORY: No labored breathing   GI: Soft, nontender, non distended  PSYCH: Alert and oriented x 3, normal affect, normal mood      CAPILLARY BLOOD GLUCOSE  POCT Blood Glucose.: 230 mg/dL (24 Mar 2023 11:19)  POCT Blood Glucose.: 187 mg/dL (24 Mar 2023 07:30)  POCT Blood Glucose.: 311 mg/dL (23 Mar 2023 23:05)  POCT Blood Glucose.: 243 mg/dL (23 Mar 2023 18:52)  POCT Blood Glucose.: 213 mg/dL (23 Mar 2023 17:31)    A1C with Estimated Average Glucose (03.20.23 @ 08:10)    A1C with Estimated Average Glucose Result: 7.6    Estimated Average Glucose: 171      03-24    134<L>  |  97<L>  |  18  ----------------------------<  206<H>  4.5   |  25  |  0.68    eGFR: 105    Ca    9.0      03-24  Mg     2.00     03-24  Phos  3.6     03-24      Diet, Consistent Carbohydrate/No Snacks (03-19-23 @ 10:22) [Active]                           Chief Complaint: Type 2 DM    History: Pt seen at bedside. Pt tolerating oral diet. Pt denies nausea and vomiting/any signs of hypoglycemia. Pt reports a fair appetite post surgery.      MEDICATIONS  (STANDING):  acetaminophen     Tablet .. 975 milliGRAM(s) Oral every 8 hours  acetaminophen   IVPB .. 1000 milliGRAM(s) IV Intermittent every 6 hours  budesonide 160 MICROgram(s)/formoterol 4.5 MICROgram(s) Inhaler 2 Puff(s) Inhalation two times a day  enoxaparin Injectable 40 milliGRAM(s) SubCutaneous every 24 hours  influenza   Vaccine 0.5 milliLiter(s) IntraMuscular once  insulin glargine Injectable (LANTUS) 10 Unit(s) SubCutaneous at bedtime  insulin lispro (ADMELOG) corrective regimen sliding scale   SubCutaneous three times a day before meals  insulin lispro (ADMELOG) corrective regimen sliding scale   SubCutaneous at bedtime  insulin lispro Injectable (ADMELOG) 9 Unit(s) SubCutaneous three times a day before meals  lidocaine   4% Patch 1 Patch Transdermal daily  lisinopril 20 milliGRAM(s) Oral daily  polyethylene glycol 3350 17 Gram(s) Oral at bedtime  senna 2 Tablet(s) Oral at bedtime  simvastatin 20 milliGRAM(s) Oral at bedtime  torsemide 10 milliGRAM(s) Oral daily  triamcinolone 0.1% Ointment 1 Application(s) Topical every 12 hours  trimethoprim  160 mG/sulfamethoxazole 800 mG 1 Tablet(s) Oral every 12 hours    MEDICATIONS  (PRN):  albuterol    90 MICROgram(s) HFA Inhaler 2 Puff(s) Inhalation every 6 hours PRN Shortness of Breath and/or Wheezing  ondansetron    Tablet 4 milliGRAM(s) Oral every 6 hours PRN Nausea and/or Vomiting  oxyCODONE    IR 5 milliGRAM(s) Oral every 4 hours PRN Moderate Pain (4 - 6)  oxyCODONE    IR 10 milliGRAM(s) Oral every 4 hours PRN Severe Pain (7 - 10)      Allergies: cefpodoxime (Rash)  estrogens (Hives; Pruritus)  peanuts (Short breath)      Review of Systems:  Respiratory: No SOB, no cough  GI: No nausea, vomiting, abdominal pain  Endocrine: no polyuria, polydipsia    PHYSICAL EXAM:  VITALS: T(C): 36.9 (03-24-23 @ 09:34)  T(F): 98.4 (03-24-23 @ 09:34), Max: 98.4 (03-24-23 @ 09:34)  HR: 77 (03-24-23 @ 09:34) (63 - 83)  BP: 125/68 (03-24-23 @ 09:34) (115/64 - 164/88)  RR:  (13 - 30)  SpO2:  (97% - 100%)  Wt(kg): --  GENERAL: NAD, well-groomed, well-developed  RESPIRATORY: No labored breathing   GI: Soft, nontender, non distended  PSYCH: Alert and oriented x 3, normal affect, normal mood      CAPILLARY BLOOD GLUCOSE  POCT Blood Glucose.: 230 mg/dL (24 Mar 2023 11:19)  POCT Blood Glucose.: 187 mg/dL (24 Mar 2023 07:30)  POCT Blood Glucose.: 311 mg/dL (23 Mar 2023 23:05)  POCT Blood Glucose.: 243 mg/dL (23 Mar 2023 18:52)  POCT Blood Glucose.: 213 mg/dL (23 Mar 2023 17:31)    A1C with Estimated Average Glucose (03.20.23 @ 08:10)    A1C with Estimated Average Glucose Result: 7.6    Estimated Average Glucose: 171      03-24    134<L>  |  97<L>  |  18  ----------------------------<  206<H>  4.5   |  25  |  0.68    eGFR: 105    Ca    9.0      03-24  Mg     2.00     03-24  Phos  3.6     03-24      Diet, Consistent Carbohydrate/No Snacks (03-19-23 @ 10:22) [Active]

## 2023-03-24 NOTE — PROGRESS NOTE ADULT - ASSESSMENT
POST ANESTHESIA EVALUATION    Postop Day 1        MENTAL STATUS:   [ x ] Awake   [  ] Arousable   [  ] Sedated    AIRWAY PATENCY:   [ x ] Satisfactory   [  ] Other:     NAUSEA / VOMITING:  [ x ] None   [  ] Controlled with current regimen   [  ] Other    PAIN:   [  ] None   [ x ] Controlled with current regimen   [  ] Other    [ x ] No apparent anesthesia complications        Ildefonso Krishnamurthy M.D.  Department of Anesthesiology  [facility]

## 2023-03-24 NOTE — PROGRESS NOTE ADULT - ASSESSMENT
Patient is a 52 year old female with a PMHx of DM2, HTN, incarcerated ventral hernia (S/P repair 9/22 - complicated by infected seroma 10/22) and SBO with abdominal wall abscess (S/P IR drain 12/22) who presented with purulent drainage from umbilicus x2 weeks.  - S/P sinus tract excision, wound vac placement (3/23)    PLAN:  - Labs: f/u AM labs  - Diet: Regular, diabetic   - Activity: OOB with assistance  - Pain medication as needed   - Incentive spirometry   - DVT PPX: LVX  - Dispo: pending      - Continue with IV Zosyn and IV Vancomycin  - Psych consult     #[id number]  B Team Surgery Patient is a 52 year old female with a PMHx of DM2, HTN, incarcerated ventral hernia (S/P repair 9/22 - complicated by infected seroma 10/22) and SBO with abdominal wall abscess (S/P IR drain 12/22) who presented with purulent drainage from umbilicus x2 weeks.  - S/P sinus tract excision, wound vac placement (3/23)    PLAN:  - Continue with IV Zosyn and IV Vancomycin  - Psych consult    - Labs: f/u AM labs  - Diet: Regular, diabetic   - Activity: OOB with assistance  - Pain medication as needed   - Incentive spirometry   - DVT PPX: LVX  - Dispo: pending      #[id number]  B Team Surgery

## 2023-03-24 NOTE — PROGRESS NOTE ADULT - SUBJECTIVE AND OBJECTIVE BOX
Surgery Daily Progress Note  =====================================================  Interval / Overnight Events:   - No acute events overnight.      HPI:  Patient is a 52 year old female with a PMHx of DM2, HTN, incarcerated ventral hernia (S/P repair 9/22 - complicated by infected seroma 10/22) and SBO with abdominal wall abscess (S/P IR drain 12/22) who presented with purulent drainage from umbilicus x2 weeks. (19 Mar 2023 09:15)      PAST MEDICAL & SURGICAL HISTORY:  HTN (hypertension)  DM (diabetes mellitus)  Asthma  S/P ovarian cystectomy  S/P repair of ventral hernia      ALLERGIES:  cefpodoxime (Rash)  estrogens (Hives; Pruritus)  peanuts (Short breath)    --------------------------------------------------------------------------------------    MEDICATIONS:    Neurologic Medications  acetaminophen     Tablet .. 650 milliGRAM(s) Oral every 6 hours PRN Mild Pain (1 - 3)    Respiratory Medications  albuterol    90 MICROgram(s) HFA Inhaler 2 Puff(s) Inhalation every 6 hours PRN Shortness of Breath and/or Wheezing  budesonide 160 MICROgram(s)/formoterol 4.5 MICROgram(s) Inhaler 2 Puff(s) Inhalation two times a day    Cardiovascular Medications  lisinopril 20 milliGRAM(s) Oral daily  torsemide 10 milliGRAM(s) Oral daily    Gastrointestinal Medications  lactated ringers. 1000 milliLiter(s) IV Continuous <Continuous>  polyethylene glycol 3350 17 Gram(s) Oral at bedtime  senna 2 Tablet(s) Oral at bedtime    Hematologic/Oncologic Medications  enoxaparin Injectable 40 milliGRAM(s) SubCutaneous every 24 hours  influenza   Vaccine 0.5 milliLiter(s) IntraMuscular once    Antimicrobial/Immunologic Medications  piperacillin/tazobactam IVPB.. 3.375 Gram(s) IV Intermittent every 8 hours  vancomycin  IVPB 1250 milliGRAM(s) IV Intermittent every 12 hours    Endocrine/Metabolic Medications  insulin glargine Injectable (LANTUS) 10 Unit(s) SubCutaneous at bedtime  insulin lispro (ADMELOG) corrective regimen sliding scale   SubCutaneous three times a day before meals  insulin lispro (ADMELOG) corrective regimen sliding scale   SubCutaneous at bedtime  insulin lispro Injectable (ADMELOG) 9 Unit(s) SubCutaneous three times a day before meals  simvastatin 20 milliGRAM(s) Oral at bedtime    Topical/Other Medications  triamcinolone 0.1% Ointment 1 Application(s) Topical every 12 hours    --------------------------------------------------------------------------------------    VITAL SIGNS:  T(C): 36.7 (23 Mar 2023 05:20), Max: 37.7 (22 Mar 2023 18:32)  T(F): 98 (23 Mar 2023 05:20), Max: 99.8 (22 Mar 2023 18:32)  HR: 87 (23 Mar 2023 05:20) (70 - 91)  BP: 115/91 (23 Mar 2023 05:20) (109/58 - 135/69)  RR: 18 (23 Mar 2023 05:20) (17 - 18)  SpO2: 94% (23 Mar 2023 05:20) (94% - 100%)    --------------------------------------------------------------------------------------    INS AND OUTS:    22 Mar 2023 07:01  -  23 Mar 2023 07:00  --------------------------------------------------------  IN:    Oral Fluid: 720 mL  Total IN: 720 mL    OUT:    Drain (mL): 0 mL  Total OUT: 0 mL    Total NET: 720 mL    --------------------------------------------------------------------------------------    EXAM    NEUROLOGY  Exam: Normal, in no acute distress.    HEENT  Exam: Normocephalic, atraumatic.    RESPIRATORY  Exam: Normal expansion / effort.    CARDIOVASCULAR  Exam: S1, S2.  Regular rate and rhythm.    GI/NUTRITION  Exam: Abdomen soft, Non-tender, Non-distended.  Pouch over umbilicus  Current Diet: NPO    MUSCULOSKELETAL  Exam: All extremities moving spontaneously without limitations.      METABOLIC / FLUIDS / ELECTROLYTES  lactated ringers. 1000 milliLiter(s) IV Continuous <Continuous>      HEMATOLOGIC  [x] VTE Prophylaxis: enoxaparin Injectable 40 milliGRAM(s) SubCutaneous every 24 hours      INFECTIOUS DISEASE  Antimicrobials/Immunologic Medications:  influenza   Vaccine 0.5 milliLiter(s) IntraMuscular once  piperacillin/tazobactam IVPB.. 3.375 Gram(s) IV Intermittent every 8 hours  vancomycin  IVPB 1250 milliGRAM(s) IV Intermittent every 12 hours    -------------------------------------------------------------------------------------- Surgery Daily Progress Note  =====================================================  Interval / Overnight Events:   - No acute events overnight.    Subjective: Some pain at incision site    HPI:  Patient is a 52 year old female with a PMHx of DM2, HTN, incarcerated ventral hernia (S/P repair 9/22 - complicated by infected seroma 10/22) and SBO with abdominal wall abscess (S/P IR drain 12/22) who presented with purulent drainage from umbilicus x2 weeks. (19 Mar 2023 09:15)      PAST MEDICAL & SURGICAL HISTORY:  HTN (hypertension)  DM (diabetes mellitus)  Asthma  S/P ovarian cystectomy  S/P repair of ventral hernia      ALLERGIES:  cefpodoxime (Rash)  estrogens (Hives; Pruritus)  peanuts (Short breath)    --------------------------------------------------------------------------------------    MEDICATIONS:    Neurologic Medications  acetaminophen     Tablet .. 650 milliGRAM(s) Oral every 6 hours PRN Mild Pain (1 - 3)    Respiratory Medications  albuterol    90 MICROgram(s) HFA Inhaler 2 Puff(s) Inhalation every 6 hours PRN Shortness of Breath and/or Wheezing  budesonide 160 MICROgram(s)/formoterol 4.5 MICROgram(s) Inhaler 2 Puff(s) Inhalation two times a day    Cardiovascular Medications  lisinopril 20 milliGRAM(s) Oral daily  torsemide 10 milliGRAM(s) Oral daily    Gastrointestinal Medications  lactated ringers. 1000 milliLiter(s) IV Continuous <Continuous>  polyethylene glycol 3350 17 Gram(s) Oral at bedtime  senna 2 Tablet(s) Oral at bedtime    Hematologic/Oncologic Medications  enoxaparin Injectable 40 milliGRAM(s) SubCutaneous every 24 hours  influenza   Vaccine 0.5 milliLiter(s) IntraMuscular once    Antimicrobial/Immunologic Medications  piperacillin/tazobactam IVPB.. 3.375 Gram(s) IV Intermittent every 8 hours  vancomycin  IVPB 1250 milliGRAM(s) IV Intermittent every 12 hours    Endocrine/Metabolic Medications  insulin glargine Injectable (LANTUS) 10 Unit(s) SubCutaneous at bedtime  insulin lispro (ADMELOG) corrective regimen sliding scale   SubCutaneous three times a day before meals  insulin lispro (ADMELOG) corrective regimen sliding scale   SubCutaneous at bedtime  insulin lispro Injectable (ADMELOG) 9 Unit(s) SubCutaneous three times a day before meals  simvastatin 20 milliGRAM(s) Oral at bedtime    Topical/Other Medications  triamcinolone 0.1% Ointment 1 Application(s) Topical every 12 hours    --------------------------------------------------------------------------------------    VITAL SIGNS:  T(C): 36.7 (23 Mar 2023 05:20), Max: 37.7 (22 Mar 2023 18:32)  T(F): 98 (23 Mar 2023 05:20), Max: 99.8 (22 Mar 2023 18:32)  HR: 87 (23 Mar 2023 05:20) (70 - 91)  BP: 115/91 (23 Mar 2023 05:20) (109/58 - 135/69)  RR: 18 (23 Mar 2023 05:20) (17 - 18)  SpO2: 94% (23 Mar 2023 05:20) (94% - 100%)    --------------------------------------------------------------------------------------    INS AND OUTS:    22 Mar 2023 07:01  -  23 Mar 2023 07:00  --------------------------------------------------------  IN:    Oral Fluid: 720 mL  Total IN: 720 mL    OUT:    Drain (mL): 0 mL  Total OUT: 0 mL    Total NET: 720 mL    --------------------------------------------------------------------------------------    EXAM    NEUROLOGY  Exam: Normal, in no acute distress.    HEENT  Exam: Normocephalic, atraumatic.    RESPIRATORY  Exam: Normal expansion / effort.    CARDIOVASCULAR  Exam: S1, S2.  Regular rate and rhythm.    GI/NUTRITION  Exam: Abdomen soft, Non-tender, Non-distended. Wound vac  Current Diet: Regular  -     MUSCULOSKELETAL  Exam: All extremities moving spontaneously without limitations.      METABOLIC / FLUIDS / ELECTROLYTES  lactated ringers. 1000 milliLiter(s) IV Continuous <Continuous>      HEMATOLOGIC  [x] VTE Prophylaxis: enoxaparin Injectable 40 milliGRAM(s) SubCutaneous every 24 hours      INFECTIOUS DISEASE  Antimicrobials/Immunologic Medications:  influenza   Vaccine 0.5 milliLiter(s) IntraMuscular once  piperacillin/tazobactam IVPB.. 3.375 Gram(s) IV Intermittent every 8 hours  vancomycin  IVPB 1250 milliGRAM(s) IV Intermittent every 12 hours    --------------------------------------------------------------------------------------

## 2023-03-24 NOTE — PROVIDER CONTACT NOTE (CRITICAL VALUE NOTIFICATION) - SITUATION
abcess culture 3/19 moderate proteus mirablis, moderate enterococus faecalis, moderate cornybacterium amycolatum, moderate MRSA
Abscess culture Moderate Enterococcus Bacillus, Moderate MRSA, Moderate Clonum Bacterium, Moderate Proteus Marobilus

## 2023-03-24 NOTE — DISCHARGE NOTE PROVIDER - HOSPITAL COURSE
Patient is a 52 year old female with a PMHx of DM2, HTN, incarcerated ventral hernia (S/P repair 9/22 - complicated by infected seroma 10/22) and SBO with abdominal wall abscess (S/P IR drain 12/22) who presented with purulent drainage from umbilicus x2 weeks.    On 3/19 CT Abdomen / Pelvis performed showing no intra-abdominal abscess.  Ventral wall abscess containing fluid and foci of air.  Significant inflammatory fatty stranding.  Questionable sinus tract the umbilicus.  Patient was started on IV Vancomycin and IV Zosyn.    On 3/22 endocrinology was consulted for diabetes management.    On 3/23 patient went to the OR for excision of umbilical sinus tract and scar.  VAC placed.    On 3/24 IV Vancomycin and IV Zosyn were switched to PO Bactrim.    At the time of discharge, the patient was hemodynamically stable, was tolerating PO diet, was voiding urine and passing stool.  She was ambulating and was comfortable with adequate pain control.  The patient was instructed to follow up with Dr. Gonzalez within 1 week after discharge from the hospital.  The patient / family felt comfortable with discharge.  The patient had no other issues.    Per attending, patient deemed medically stable and ready for discharge at this time with wound VAC. Patient is a 52 year old female with a PMHx of DM2, HTN, incarcerated ventral hernia (S/P repair 9/22 - complicated by infected seroma 10/22) and SBO with abdominal wall abscess (S/P IR drain 12/22) who presented with purulent drainage from umbilicus x2 weeks.    On 3/19 CT Abdomen / Pelvis performed showing no intra-abdominal abscess.  Ventral wall abscess containing fluid and foci of air.  Significant inflammatory fatty stranding.  Questionable sinus tract the umbilicus.  Patient was started on IV Vancomycin and IV Zosyn.    On 3/22 endocrinology was consulted for diabetes management.    On 3/23 patient went to the OR for excision of umbilical sinus tract and scar.  VAC placed.    On 3/24 IV Vancomycin and IV Zosyn were discontinued and switched to PO Bactrim.    On 3/25 physical therapy evaluated patient and recommended home with no needs.    At the time of discharge, the patient was hemodynamically stable, was tolerating PO diet, was voiding urine and passing stool.  She was ambulating and was comfortable with adequate pain control.  The patient was instructed to follow up with Dr. Gonzalez within 1 week after discharge from the hospital.  The patient / family felt comfortable with discharge.  The patient had no other issues.    Per attending, patient deemed medically stable and ready for discharge at this time with wound VAC.

## 2023-03-24 NOTE — DISCHARGE NOTE PROVIDER - NSDCFUADDINST_GEN_ALL_CORE_FT
WOUND CARE: Please keep incisions clean and dry.  Please do not scrub or rub incisions.  Do not use lotion or powder on incisions.   BATHING: Please do not submerge wound underwater.  You may shower and / or sponge bathe.  ACTIVITY: No heavy lifting or straining.  Otherwise, you may return to your usual level of physical activity.  If you are taking narcotic pain medication (such as Oxycodone or Percocet) DO NOT drive a car, operate machinery or make important decisions.  DIET: Resume a consistent carbohydrate diet.  NOTIFY YOUR SURGEON IF: You have any bleeding that does not stop, any pus draining from your wound(s), any fever (over 100.4 F) or chills, persistent nausea / vomiting, persistent diarrhea or if your pain is not controlled on your discharge pain medications.  FOLLOW-UP:   1. Please follow up with your primary care physician in one week regarding your hospitalization.    2. Please follow-up with your surgeon, Dr. Gonzalez within 7 days following discharge.  Please call [phone] to schedule an appointment.  3. Please follow-up with your endocrinologist, Dr. Alegria within 7 days following discharge.  Please call [phone] to schedule an appointment.

## 2023-03-24 NOTE — DISCHARGE NOTE PROVIDER - NSDCCPCAREPLAN_GEN_ALL_CORE_FT
PRINCIPAL DISCHARGE DIAGNOSIS  Diagnosis: Abdominal wall abscess  Assessment and Plan of Treatment:   3/19/23: CT Abdomen / Pelvis performed showing no intra-abdominal abscess.  Ventral wall abscess containing fluid and foci of air.  Significant inflammatory fatty stranding.  Questionable sinus tract the umbilicus.     PRINCIPAL DISCHARGE DIAGNOSIS  Diagnosis: Abdominal wall abscess  Assessment and Plan of Treatment: 3/19/23: CT Abdomen / Pelvis performed showing no intra-abdominal abscess.  Ventral wall abscess containing fluid and foci of air.  Significant inflammatory fatty stranding.  Questionable sinus tract the umbilicus.  Please continue taking antibiotics, Augmentin, twice a day for the next 2 weeks. [facility]      SECONDARY DISCHARGE DIAGNOSES  Diagnosis: DM2 (diabetes mellitus, type 2)  Assessment and Plan of Treatment: Please resume your diabetes medications:  - Glimepiride 4mg twice a day  - Metformin 1000mg twice a day  - Bydureon 2mg injection once a week  You have an endocrinology appointment scheduled with Dr. Lisa Alegria on 5/10/23 at 10:20AM    Diagnosis: Hyperlipidemia  Assessment and Plan of Treatment: Your cholesterol levels were elevated on lab work during your hospital stay. Please follow up with your PCP during your April appointment regarding your atorvastatin dose.

## 2023-03-24 NOTE — DISCHARGE NOTE PROVIDER - PROVIDER TOKENS
PROVIDER:[TOKEN:[[id number]],FOLLOWUP:[1 week]],FREE:[LAST:[[facility]],PHONE:[phone]],FAX:[(   )    -],ADDRESS:[[address] [place] [address]],FOLLOWUP:[1 week]]

## 2023-03-24 NOTE — PROVIDER CONTACT NOTE (CRITICAL VALUE NOTIFICATION) - TEST AND RESULT REPORTED:
abcess culture 3/19 moderate proteus mirablis, moderate enterococus faecalis, moderate cornybacterium amycolatum, moderate MRSA

## 2023-03-24 NOTE — PROGRESS NOTE ADULT - ASSESSMENT
This is a 51 yo F /w a PMH of DM2, HTN, asthma and complicated surgical course over the last few months with initial incarcerated hernia --> infected seroma --> SBO --> abdominal wall abscess, now presenting with umbilical drainage. Endocrine consulted for further diabetes management.    #DM2 - HbA1c 7.6%  -c.w lantus 10 units nightly  -increase admelog to 9 units before meals  -low admelog correction scale before meals and before bedtime  -carb consistent diet  -RD consult  -FSG goal 100-180  -FSG before meals and before bedtime  -hypoglycemia protocol in place if needed    #Discharge  -in order to facilitate safe discharge from diabetes standpoint, recommend pharmacy consult to help patient clarify proper medication reconcilliation as discrepency with outpt notes, pt recall, and pharmacy medications   -based on above results can determine proper discharge recs    #HTN  -Goal </80  -c/w lisinopril 20mg daily if no contraindications  -Titration by primary team     #HLD  -c/w simvastatin 20mg daily, can be evaluated further as an outpatient     Pam Rdz  Nurse Practitioner  Division of Endocrinology & Diabetes  In house pager #[phone]    If before 9AM or after 6PM, or on weekends/holidays, please call endocrine answering service for assistance ([phone]).For nonurgent matters email [email or web address] for assistance.  This is a 51 yo F /w a PMH of DM2, HTN, asthma and complicated surgical course over the last few months with initial incarcerated hernia --> infected seroma --> SBO --> abdominal wall abscess, now presenting with umbilical drainage. Endocrine consulted for further diabetes management.    Home Regimen: Confirmed with [facility]; Metformin 1g BID with meals, Glimepiride 4mg p.o. BID with meals, Bydurean 2mg sq weekly, and Basaglar 10units sq qhs (pt is not taking insulin)  #DM2 - HbA1c 7.6%  -FSG goal 100-180: above goal at dinner last night post OR. above goal at lunch today ; pts appetite is fair and without much data since pt was npo yesterday, therefor will continue to monitor fs on current insulin regimen   -Continue Lantus 10 units sq qhs (noted a delta over 100 from bedtime to am fs)  -Continue Admelog to 9 units before meals  -Continue Admelog low correction scale before meals and before bedtime  -carb consistent diet  -RD consult  -FSG before meals and before bedtime  -hypoglycemia protocol in place if needed    #Discharge  Ideally would stop Glimepiride and prescribe basal insulin plus Metformin 1g BID, and Bydurean 2mg sq weekly . Pt is refusing basal insulin at this time and prefers to stay on oral regimen plus Bydurean  Alternate plan is to continue Glimepiride 4mg p.o. BID (although reports midday hypoglycemia about once a month; s/p surgery pt would need glucose control so will continue for now) , Metformin 1g BID, and Bydurean 2mg sq weekly  Please instruct pt once she is home if glucose tightly controlled (FS <100)would recommend decreasing Glimepiride to 2mg p.o. bid with meals   Ensure patient has working glucometer, test strips, lancets, alcohol pads, and BD marissa pen needles  Please also prescribe glucose tabs, Baqsimi nasal spray or glucagon emergency kit for hypoglycemia risk   Please follow up with Dr. Lisa Alegria on 5/10 at 10:20 am at [facility] [place]; [address] [place] [address];[phone]  Please follow up with your pcp, endocrinology, opthalmology, and podiatry as an outpt    #HTN  -Goal </80  -c/w lisinopril 20mg daily if no contraindications  -Titration by primary team     #HLD  -LDL goal <70   -c/w simvastatin 20mg daily, can be evaluated further as an outpatient     Pam Rdz  Nurse Practitioner  Division of Endocrinology & Diabetes  In house pager #[phone]    If before 9AM or after 6PM, or on weekends/holidays, please call endocrine answering service for assistance ([phone]).For nonurgent matters email [email or web address] for assistance.  This is a 53 yo F /w a PMH of DM2, HTN, asthma and complicated surgical course over the last few months with initial incarcerated hernia --> infected seroma --> SBO --> abdominal wall abscess, now presenting with umbilical drainage. Endocrine consulted for further diabetes management.    Home Regimen: Confirmed with [facility]; Metformin 1g BID with meals, Glimepiride 4mg p.o. BID with meals, Bydurean 2mg sq weekly, and Basaglar 10units sq qhs (pt is not taking insulin)  #DM2 - HbA1c 7.6%  -FSG goal 100-180: above goal at dinner last night post OR. above goal at lunch today ; pts appetite is fair and without much data since pt was npo yesterday, therefor will continue to monitor fs on current insulin regimen   -Continue Lantus 10 units sq qhs   -Continue Admelog to 9 units before meals  -Continue Admelog low correction scale before meals and before bedtime  -carb consistent diet  -RD consult  -FSG before meals and before bedtime  -hypoglycemia protocol in place if needed    #Discharge  Ideally would stop Glimepiride and prescribe basal insulin plus Metformin 1g BID, and Bydurean 2mg sq weekly . Pt is refusing basal insulin at this time and prefers to stay on oral regimen plus Bydurean  Alternate plan is to continue Glimepiride 4mg p.o. BID (although reports midday hypoglycemia about once a month; s/p surgery pt would need glucose control so will continue for now) , Metformin 1g BID, and Bydurean 2mg sq weekly  Please instruct pt once she is home if glucose tightly controlled (FS <100)would recommend decreasing Glimepiride to 2mg p.o. bid with meals   Ensure patient has working glucometer, test strips, lancets, alcohol pads, and BD marissa pen needles  Please also prescribe glucose tabs, Baqsimi nasal spray or glucagon emergency kit for hypoglycemia risk   Please follow up with Dr. Lisa Alegria on 5/10 at 10:20 am at [facility] [place]; [address] [place] [address] [address] [place] [address];[phone]  Please follow up with your pcp, endocrinology, opthalmology, and podiatry as an outpt    #HTN  -Goal </80  -c/w lisinopril 20mg daily if no contraindications  -Titration by primary team     #HLD  -LDL goal <70   -c/w simvastatin 20mg daily, can be evaluated further as an outpatient     Pam Rdz  Nurse Practitioner  Division of Endocrinology & Diabetes  In house pager #[phone]    If before 9AM or after 6PM, or on weekends/holidays, please call endocrine answering service for assistance ([phone]).For nonurgent matters email [email or web address] for assistance.

## 2023-03-24 NOTE — DISCHARGE NOTE PROVIDER - NSDCMRMEDTOKEN_GEN_ALL_CORE_FT
budesonide-formoterol 160 mcg-4.5 mcg/inh inhalation aerosol: 2 puff(s) inhaled 2 times a day  lisinopril 20 mg oral tablet: 1 tab(s) orally once a day  metFORMIN 1000 mg oral tablet: 1 tab(s) orally 2 times a day  polyethylene glycol 3350 oral powder for reconstitution: 17 gram(s) orally once a day, As Needed   senna leaf extract oral tablet: 2 tab(s) orally once a day (at bedtime)  simvastatin 20 mg oral tablet: 1 tab(s) orally once a day (at bedtime)  torsemide 10 mg oral tablet: 1 tab(s) orally once a day  Trulicity Pen 0.75 mg/0.5 mL subcutaneous solution: 0.75 milligram(s) subcutaneously once a week   Ventolin HFA 90 mcg/inh inhalation aerosol: 2 puff(s) inhaled every 6 hours, As Needed -for shortness of breath and/or wheezing    budesonide-formoterol 160 mcg-4.5 mcg/inh inhalation aerosol: 2 puff(s) inhaled 2 times a day  exenatide 2 mg subcutaneous injection, extended release: 2 milligram(s) subcutaneously once a week  glimepiride 4 mg oral tablet: 1 tab(s) orally 2 times a day with meals  lisinopril 20 mg oral tablet: 1 tab(s) orally once a day  metFORMIN 1000 mg oral tablet: 1 tab(s) orally 2 times a day  polyethylene glycol 3350 oral powder for reconstitution: 17 gram(s) orally once a day, As Needed   senna leaf extract oral tablet: 2 tab(s) orally once a day (at bedtime)  simvastatin 20 mg oral tablet: 1 tab(s) orally once a day (at bedtime)  sulfamethoxazole-trimethoprim 800 mg-160 mg oral tablet: 1 tab(s) orally every 12 hours  torsemide 10 mg oral tablet: 1 tab(s) orally once a day  Ventolin HFA 90 mcg/inh inhalation aerosol: 2 puff(s) inhaled every 6 hours, As Needed -for shortness of breath and/or wheezing

## 2023-03-24 NOTE — DISCHARGE NOTE PROVIDER - CARE PROVIDER_API CALL
Jin Gonzalez)  Surgery; Surgical Critical Care  [facility], [address] [place] [address]  Phone: [phone]  Fax: [phone]  Follow Up Time: 1 week    [facility],   [address] [place] [address]  Phone: [phone]  Fax: (   )    -  Follow Up Time: 1 week

## 2023-03-25 LAB
GLUCOSE BLDC GLUCOMTR-MCNC: 132 MG/DL — HIGH (ref 70–99)
GLUCOSE BLDC GLUCOMTR-MCNC: 176 MG/DL — HIGH (ref 70–99)
GLUCOSE BLDC GLUCOMTR-MCNC: 177 MG/DL — HIGH (ref 70–99)
GLUCOSE BLDC GLUCOMTR-MCNC: 178 MG/DL — HIGH (ref 70–99)

## 2023-03-25 RX ORDER — ACETAMINOPHEN 500 MG
975 TABLET ORAL EVERY 8 HOURS
Refills: 0 | Status: DISCONTINUED | OUTPATIENT
Start: 2023-03-25 | End: 2023-03-29

## 2023-03-25 RX ORDER — POLYETHYLENE GLYCOL 3350 17 G/17G
17 POWDER, FOR SOLUTION ORAL
Refills: 0 | Status: DISCONTINUED | OUTPATIENT
Start: 2023-03-25 | End: 2023-03-29

## 2023-03-25 RX ADMIN — Medication 1: at 17:15

## 2023-03-25 RX ADMIN — Medication 1: at 12:30

## 2023-03-25 RX ADMIN — Medication 9 UNIT(S): at 08:00

## 2023-03-25 RX ADMIN — Medication 1 ENEMA: at 22:16

## 2023-03-25 RX ADMIN — Medication 1 TABLET(S): at 17:35

## 2023-03-25 RX ADMIN — Medication 975 MILLIGRAM(S): at 23:00

## 2023-03-25 RX ADMIN — LIDOCAINE 1 PATCH: 4 CREAM TOPICAL at 12:30

## 2023-03-25 RX ADMIN — OXYCODONE HYDROCHLORIDE 10 MILLIGRAM(S): 5 TABLET ORAL at 01:58

## 2023-03-25 RX ADMIN — OXYCODONE HYDROCHLORIDE 10 MILLIGRAM(S): 5 TABLET ORAL at 06:45

## 2023-03-25 RX ADMIN — LIDOCAINE 1 PATCH: 4 CREAM TOPICAL at 23:46

## 2023-03-25 RX ADMIN — LIDOCAINE 1 PATCH: 4 CREAM TOPICAL at 19:16

## 2023-03-25 RX ADMIN — Medication 9 UNIT(S): at 17:15

## 2023-03-25 RX ADMIN — POLYETHYLENE GLYCOL 3350 17 GRAM(S): 17 POWDER, FOR SOLUTION ORAL at 10:00

## 2023-03-25 RX ADMIN — Medication 1 APPLICATION(S): at 22:01

## 2023-03-25 RX ADMIN — OXYCODONE HYDROCHLORIDE 10 MILLIGRAM(S): 5 TABLET ORAL at 12:30

## 2023-03-25 RX ADMIN — SENNA PLUS 2 TABLET(S): 8.6 TABLET ORAL at 22:16

## 2023-03-25 RX ADMIN — OXYCODONE HYDROCHLORIDE 10 MILLIGRAM(S): 5 TABLET ORAL at 23:48

## 2023-03-25 RX ADMIN — OXYCODONE HYDROCHLORIDE 10 MILLIGRAM(S): 5 TABLET ORAL at 13:00

## 2023-03-25 RX ADMIN — LISINOPRIL 20 MILLIGRAM(S): 2.5 TABLET ORAL at 06:44

## 2023-03-25 RX ADMIN — BUDESONIDE AND FORMOTEROL FUMARATE DIHYDRATE 2 PUFF(S): 160; 4.5 AEROSOL RESPIRATORY (INHALATION) at 09:44

## 2023-03-25 RX ADMIN — OXYCODONE HYDROCHLORIDE 10 MILLIGRAM(S): 5 TABLET ORAL at 23:49

## 2023-03-25 RX ADMIN — Medication 10 MILLIGRAM(S): at 06:44

## 2023-03-25 RX ADMIN — Medication 975 MILLIGRAM(S): at 22:15

## 2023-03-25 RX ADMIN — Medication 9 UNIT(S): at 12:30

## 2023-03-25 RX ADMIN — Medication 975 MILLIGRAM(S): at 15:25

## 2023-03-25 RX ADMIN — POLYETHYLENE GLYCOL 3350 17 GRAM(S): 17 POWDER, FOR SOLUTION ORAL at 17:35

## 2023-03-25 RX ADMIN — ENOXAPARIN SODIUM 40 MILLIGRAM(S): 100 INJECTION SUBCUTANEOUS at 14:55

## 2023-03-25 RX ADMIN — Medication 975 MILLIGRAM(S): at 14:55

## 2023-03-25 RX ADMIN — BUDESONIDE AND FORMOTEROL FUMARATE DIHYDRATE 2 PUFF(S): 160; 4.5 AEROSOL RESPIRATORY (INHALATION) at 22:16

## 2023-03-25 RX ADMIN — INSULIN GLARGINE 10 UNIT(S): 100 INJECTION, SOLUTION SUBCUTANEOUS at 22:15

## 2023-03-25 RX ADMIN — Medication 1 TABLET(S): at 06:44

## 2023-03-25 RX ADMIN — SIMVASTATIN 20 MILLIGRAM(S): 20 TABLET, FILM COATED ORAL at 22:16

## 2023-03-25 NOTE — DOWNTIME INTERRUPTION NOTE - WHICH MANUAL FORMS INITIATED?
See paper chart for clinical documentation recorded during [facility] downtime
Due to the [facility] bundled upgrade / Downtime 03/24-03/25/2023 all the notes and flow sheets related to Respiratory Care Services provided to this patient related to mechanical ventilation, Noninvasive Ventilation (NIV) and High Flow Nasal Cannula (HFNC) during this [facility] downtime are filed in patient’s paper chart.

## 2023-03-25 NOTE — PHYSICAL THERAPY INITIAL EVALUATION ADULT - ADDITIONAL COMMENTS
Pt reports she will be going to her brothers apartment upon discharge.  Apartment has elevator access.  No history of falls.  Pt does not ambulate with DME.

## 2023-03-25 NOTE — PROGRESS NOTE ADULT - ASSESSMENT
Patient is a 52 year old female with a PMHx of DM2, HTN, incarcerated ventral hernia (S/P repair 9/22 - complicated by infected seroma 10/22) and SBO with abdominal wall abscess (S/P IR drain 12/22) who presented with purulent drainage from umbilicus x2 weeks.  - S/P sinus tract excision, wound vac placement (3/23)    PLAN:  - Continue with Bactrim  - Labs: f/u AM labs  - Diet: Regular, diabetic   - Activity: OOB with assistance  - Pain medication as needed   - Incentive spirometry   - DVT PPX: LVX  - Dispo: Setting up Home VAC machine and VNS    Akash Byrnes MD, PGY 4  B Team Surgery  [id number]

## 2023-03-25 NOTE — PHYSICAL THERAPY INITIAL EVALUATION ADULT - PERTINENT HX OF CURRENT PROBLEM, REHAB EVAL
52F PMH DM, HTN incarcerated ventral hernia s/p repair 9/2022, c/b infected seroma 10/2022, SBO with abdominal wall abscess s/p IR drain 12/2022, presents with purulent drainage from umbilicus for past 2 weeks.

## 2023-03-25 NOTE — PROGRESS NOTE ADULT - SUBJECTIVE AND OBJECTIVE BOX
Surgery Progress Note    S: Patient seen and examined. No acute events overnight. Reports constipation. Still passing flatus.     O:  Physical Exam:  Gen: Laying in bed, NAD  HEENT: atrumatic, EMOI  Resp: Unlabored breathing, on CPAP for sleeping  Abd: soft, sachin-incisional tenderness, nondistended, no rebound or guarding.   Ext: Moves 4 extremities spontaneously    Vital Signs Last 24 Hrs  T(C): 37.2 (25 Mar 2023 09:40), Max: 37.2 (25 Mar 2023 09:40)  T(F): 99 (25 Mar 2023 09:40), Max: 99 (25 Mar 2023 09:40)  HR: 72 (25 Mar 2023 09:40) (59 - 75)  BP: 109/55 (25 Mar 2023 09:40) (93/62 - 127/80)  BP(mean): --  RR: 19 (25 Mar 2023 09:40) (18 - 19)  SpO2: 92% (25 Mar 2023 09:40) (92% - 99%)    Parameters below as of 25 Mar 2023 09:40  Patient On (Oxygen Delivery Method): room air        I&O's Detail    24 Mar 2023 07:01  -  25 Mar 2023 07:00  --------------------------------------------------------  IN:  Total IN: 0 mL    OUT:    Voided (mL): 800 mL  Total OUT: 800 mL    Total NET: -800 mL      25 Mar 2023 07:01  -  25 Mar 2023 11:13  --------------------------------------------------------  IN:  Total IN: 0 mL    OUT:    VAC (Vacuum Assisted Closure) System (mL): 0 mL  Total OUT: 0 mL    Total NET: 0 mL                                10.1   8.36  )-----------( 183      ( 24 Mar 2023 05:04 )             31.5       03-24    134<L>  |  97<L>  |  18  ----------------------------<  206<H>  4.5   |  25  |  0.68    Ca    9.0      24 Mar 2023 05:04  Phos  3.6     03-24  Mg     2.00     03-24

## 2023-03-26 LAB
ANION GAP SERPL CALC-SCNC: 9 MMOL/L — SIGNIFICANT CHANGE UP (ref 7–14)
BUN SERPL-MCNC: 17 MG/DL — SIGNIFICANT CHANGE UP (ref 7–23)
CALCIUM SERPL-MCNC: 9.5 MG/DL — SIGNIFICANT CHANGE UP (ref 8.4–10.5)
CHLORIDE SERPL-SCNC: 100 MMOL/L — SIGNIFICANT CHANGE UP (ref 98–107)
CO2 SERPL-SCNC: 30 MMOL/L — SIGNIFICANT CHANGE UP (ref 22–31)
CREAT SERPL-MCNC: 0.74 MG/DL — SIGNIFICANT CHANGE UP (ref 0.5–1.3)
EGFR: 98 ML/MIN/1.73M2 — SIGNIFICANT CHANGE UP
GLUCOSE BLDC GLUCOMTR-MCNC: 134 MG/DL — HIGH (ref 70–99)
GLUCOSE BLDC GLUCOMTR-MCNC: 151 MG/DL — HIGH (ref 70–99)
GLUCOSE BLDC GLUCOMTR-MCNC: 219 MG/DL — HIGH (ref 70–99)
GLUCOSE BLDC GLUCOMTR-MCNC: 224 MG/DL — HIGH (ref 70–99)
GLUCOSE SERPL-MCNC: 153 MG/DL — HIGH (ref 70–99)
HCT VFR BLD CALC: 31.3 % — LOW (ref 34.5–45)
HGB BLD-MCNC: 9.8 G/DL — LOW (ref 11.5–15.5)
MAGNESIUM SERPL-MCNC: 2.1 MG/DL — SIGNIFICANT CHANGE UP (ref 1.6–2.6)
MCHC RBC-ENTMCNC: 23.7 PG — LOW (ref 27–34)
MCHC RBC-ENTMCNC: 31.3 GM/DL — LOW (ref 32–36)
MCV RBC AUTO: 75.8 FL — LOW (ref 80–100)
NRBC # BLD: 0 /100 WBCS — SIGNIFICANT CHANGE UP (ref 0–0)
NRBC # FLD: 0 K/UL — SIGNIFICANT CHANGE UP (ref 0–0)
PHOSPHATE SERPL-MCNC: 4.3 MG/DL — SIGNIFICANT CHANGE UP (ref 2.5–4.5)
PLATELET # BLD AUTO: 198 K/UL — SIGNIFICANT CHANGE UP (ref 150–400)
POTASSIUM SERPL-MCNC: 4.5 MMOL/L — SIGNIFICANT CHANGE UP (ref 3.5–5.3)
POTASSIUM SERPL-SCNC: 4.5 MMOL/L — SIGNIFICANT CHANGE UP (ref 3.5–5.3)
RBC # BLD: 4.13 M/UL — SIGNIFICANT CHANGE UP (ref 3.8–5.2)
RBC # FLD: 17 % — HIGH (ref 10.3–14.5)
SODIUM SERPL-SCNC: 139 MMOL/L — SIGNIFICANT CHANGE UP (ref 135–145)
WBC # BLD: 6.56 K/UL — SIGNIFICANT CHANGE UP (ref 3.8–10.5)
WBC # FLD AUTO: 6.56 K/UL — SIGNIFICANT CHANGE UP (ref 3.8–10.5)

## 2023-03-26 RX ADMIN — OXYCODONE HYDROCHLORIDE 10 MILLIGRAM(S): 5 TABLET ORAL at 04:20

## 2023-03-26 RX ADMIN — LISINOPRIL 20 MILLIGRAM(S): 2.5 TABLET ORAL at 06:48

## 2023-03-26 RX ADMIN — ENOXAPARIN SODIUM 40 MILLIGRAM(S): 100 INJECTION SUBCUTANEOUS at 14:45

## 2023-03-26 RX ADMIN — Medication 1 TABLET(S): at 06:48

## 2023-03-26 RX ADMIN — Medication 1: at 07:52

## 2023-03-26 RX ADMIN — Medication 10 MILLIGRAM(S): at 06:48

## 2023-03-26 RX ADMIN — Medication 2: at 12:29

## 2023-03-26 RX ADMIN — Medication 1 APPLICATION(S): at 22:03

## 2023-03-26 RX ADMIN — SENNA PLUS 2 TABLET(S): 8.6 TABLET ORAL at 22:04

## 2023-03-26 RX ADMIN — Medication 9 UNIT(S): at 12:30

## 2023-03-26 RX ADMIN — Medication 975 MILLIGRAM(S): at 15:40

## 2023-03-26 RX ADMIN — INSULIN GLARGINE 10 UNIT(S): 100 INJECTION, SOLUTION SUBCUTANEOUS at 22:42

## 2023-03-26 RX ADMIN — BUDESONIDE AND FORMOTEROL FUMARATE DIHYDRATE 2 PUFF(S): 160; 4.5 AEROSOL RESPIRATORY (INHALATION) at 09:05

## 2023-03-26 RX ADMIN — Medication 9 UNIT(S): at 17:24

## 2023-03-26 RX ADMIN — OXYCODONE HYDROCHLORIDE 10 MILLIGRAM(S): 5 TABLET ORAL at 03:20

## 2023-03-26 RX ADMIN — Medication 1 TABLET(S): at 18:18

## 2023-03-26 RX ADMIN — SIMVASTATIN 20 MILLIGRAM(S): 20 TABLET, FILM COATED ORAL at 22:03

## 2023-03-26 RX ADMIN — POLYETHYLENE GLYCOL 3350 17 GRAM(S): 17 POWDER, FOR SOLUTION ORAL at 06:48

## 2023-03-26 RX ADMIN — Medication 975 MILLIGRAM(S): at 06:47

## 2023-03-26 RX ADMIN — LIDOCAINE 1 PATCH: 4 CREAM TOPICAL at 12:30

## 2023-03-26 RX ADMIN — Medication 9 UNIT(S): at 07:52

## 2023-03-26 RX ADMIN — Medication 975 MILLIGRAM(S): at 07:30

## 2023-03-26 RX ADMIN — Medication 975 MILLIGRAM(S): at 14:45

## 2023-03-26 RX ADMIN — BUDESONIDE AND FORMOTEROL FUMARATE DIHYDRATE 2 PUFF(S): 160; 4.5 AEROSOL RESPIRATORY (INHALATION) at 22:04

## 2023-03-26 RX ADMIN — POLYETHYLENE GLYCOL 3350 17 GRAM(S): 17 POWDER, FOR SOLUTION ORAL at 18:18

## 2023-03-26 NOTE — PROGRESS NOTE ADULT - SUBJECTIVE AND OBJECTIVE BOX
Surgery Progress Note    S: Patient seen and examined. No acute events overnight. Reports tolerating diet without nausea, vomiting, passing flatus, having bowel movements. Still reports pain on the right abdomen.      O:  Physical Exam:  Gen: Laying in bed, NAD  HEENT: atrumatic, EMOI  Resp: Unlabored breathing  Abd: soft, sachin-incisional tenderness, nondistended, no rebound or guarding.    Ext: Moves 4 extremities spontaneously    Vital Signs Last 24 Hrs  T(C): 37.1 (26 Mar 2023 02:00), Max: 37.2 (25 Mar 2023 09:40)  T(F): 98.7 (26 Mar 2023 02:00), Max: 99 (25 Mar 2023 09:40)  HR: 60 (26 Mar 2023 02:00) (57 - 78)  BP: 114/59 (26 Mar 2023 02:00) (105/58 - 125/80)  BP(mean): --  RR: 16 (26 Mar 2023 02:00) (16 - 19)  SpO2: 97% (26 Mar 2023 02:00) (92% - 99%)    Parameters below as of 26 Mar 2023 02:00  Patient On (Oxygen Delivery Method): BiPAP/CPAP        I&O's Detail    24 Mar 2023 07:01  -  25 Mar 2023 07:00  --------------------------------------------------------  IN:  Total IN: 0 mL    OUT:    Voided (mL): 800 mL  Total OUT: 800 mL    Total NET: -800 mL      25 Mar 2023 07:01  -  26 Mar 2023 06:45  --------------------------------------------------------  IN:    Oral Fluid: 480 mL    Other (mL): 440 mL  Total IN: 920 mL    OUT:    IV PiggyBack: 0 mL    Other (mL): 700 mL    VAC (Vacuum Assisted Closure) System (mL): 0 mL    Voided (mL): 250 mL  Total OUT: 950 mL    Total NET: -30 mL

## 2023-03-26 NOTE — PROGRESS NOTE ADULT - ASSESSMENT
Patient is a 52 year old female with a PMHx of DM2, HTN, incarcerated ventral hernia (S/P repair 9/22 - complicated by infected seroma 10/22) and SBO with abdominal wall abscess (S/P IR drain 12/22) who presented with purulent drainage from umbilicus x2 weeks.  - S/P sinus tract excision, wound vac placement (3/23)    PLAN:  - Continue with Bactrim  - VAC change tomorrow, Monday  - Labs: f/u AM labs  - Diet: Regular, diabetic   - Activity: OOB with assistance  - Pain medication as needed   - Incentive spirometry   - DVT PPX: LVX  - Dispo: Setting up Home VAC machine and VNS    Akash Byrnes MD, PGY 4  B Team Surgery  [id number]

## 2023-03-27 LAB
GLUCOSE BLDC GLUCOMTR-MCNC: 144 MG/DL — HIGH (ref 70–99)
GLUCOSE BLDC GLUCOMTR-MCNC: 174 MG/DL — HIGH (ref 70–99)
GLUCOSE BLDC GLUCOMTR-MCNC: 207 MG/DL — HIGH (ref 70–99)
GLUCOSE BLDC GLUCOMTR-MCNC: 219 MG/DL — HIGH (ref 70–99)

## 2023-03-27 PROCEDURE — 99232 SBSQ HOSP IP/OBS MODERATE 35: CPT

## 2023-03-27 RX ORDER — DEXTROSE 50 % IN WATER 50 %
25 SYRINGE (ML) INTRAVENOUS ONCE
Refills: 0 | Status: DISCONTINUED | OUTPATIENT
Start: 2023-03-27 | End: 2023-03-29

## 2023-03-27 RX ORDER — SODIUM CHLORIDE 9 MG/ML
1000 INJECTION, SOLUTION INTRAVENOUS
Refills: 0 | Status: DISCONTINUED | OUTPATIENT
Start: 2023-03-27 | End: 2023-03-29

## 2023-03-27 RX ORDER — GLUCAGON INJECTION, SOLUTION 0.5 MG/.1ML
1 INJECTION, SOLUTION SUBCUTANEOUS ONCE
Refills: 0 | Status: DISCONTINUED | OUTPATIENT
Start: 2023-03-27 | End: 2023-03-29

## 2023-03-27 RX ORDER — DEXTROSE 50 % IN WATER 50 %
12.5 SYRINGE (ML) INTRAVENOUS ONCE
Refills: 0 | Status: DISCONTINUED | OUTPATIENT
Start: 2023-03-27 | End: 2023-03-29

## 2023-03-27 RX ORDER — INSULIN LISPRO 100/ML
10 VIAL (ML) SUBCUTANEOUS
Refills: 0 | Status: DISCONTINUED | OUTPATIENT
Start: 2023-03-27 | End: 2023-03-29

## 2023-03-27 RX ORDER — DEXTROSE 50 % IN WATER 50 %
15 SYRINGE (ML) INTRAVENOUS ONCE
Refills: 0 | Status: DISCONTINUED | OUTPATIENT
Start: 2023-03-27 | End: 2023-03-29

## 2023-03-27 RX ADMIN — Medication 975 MILLIGRAM(S): at 22:00

## 2023-03-27 RX ADMIN — Medication 975 MILLIGRAM(S): at 07:14

## 2023-03-27 RX ADMIN — INSULIN GLARGINE 10 UNIT(S): 100 INJECTION, SOLUTION SUBCUTANEOUS at 22:00

## 2023-03-27 RX ADMIN — Medication 1 TABLET(S): at 17:22

## 2023-03-27 RX ADMIN — OXYCODONE HYDROCHLORIDE 10 MILLIGRAM(S): 5 TABLET ORAL at 23:50

## 2023-03-27 RX ADMIN — Medication 2: at 11:45

## 2023-03-27 RX ADMIN — OXYCODONE HYDROCHLORIDE 10 MILLIGRAM(S): 5 TABLET ORAL at 01:14

## 2023-03-27 RX ADMIN — Medication 10 UNIT(S): at 17:23

## 2023-03-27 RX ADMIN — Medication 1: at 17:22

## 2023-03-27 RX ADMIN — OXYCODONE HYDROCHLORIDE 10 MILLIGRAM(S): 5 TABLET ORAL at 17:24

## 2023-03-27 RX ADMIN — BUDESONIDE AND FORMOTEROL FUMARATE DIHYDRATE 2 PUFF(S): 160; 4.5 AEROSOL RESPIRATORY (INHALATION) at 22:02

## 2023-03-27 RX ADMIN — SENNA PLUS 2 TABLET(S): 8.6 TABLET ORAL at 22:01

## 2023-03-27 RX ADMIN — Medication 1 TABLET(S): at 07:13

## 2023-03-27 RX ADMIN — BUDESONIDE AND FORMOTEROL FUMARATE DIHYDRATE 2 PUFF(S): 160; 4.5 AEROSOL RESPIRATORY (INHALATION) at 11:46

## 2023-03-27 RX ADMIN — ENOXAPARIN SODIUM 40 MILLIGRAM(S): 100 INJECTION SUBCUTANEOUS at 13:44

## 2023-03-27 RX ADMIN — OXYCODONE HYDROCHLORIDE 10 MILLIGRAM(S): 5 TABLET ORAL at 16:06

## 2023-03-27 RX ADMIN — Medication 975 MILLIGRAM(S): at 07:24

## 2023-03-27 RX ADMIN — LISINOPRIL 20 MILLIGRAM(S): 2.5 TABLET ORAL at 07:14

## 2023-03-27 RX ADMIN — SIMVASTATIN 20 MILLIGRAM(S): 20 TABLET, FILM COATED ORAL at 22:01

## 2023-03-27 RX ADMIN — Medication 10 MILLIGRAM(S): at 07:14

## 2023-03-27 RX ADMIN — OXYCODONE HYDROCHLORIDE 10 MILLIGRAM(S): 5 TABLET ORAL at 00:42

## 2023-03-27 RX ADMIN — OXYCODONE HYDROCHLORIDE 10 MILLIGRAM(S): 5 TABLET ORAL at 23:19

## 2023-03-27 RX ADMIN — POLYETHYLENE GLYCOL 3350 17 GRAM(S): 17 POWDER, FOR SOLUTION ORAL at 17:22

## 2023-03-27 RX ADMIN — Medication 9 UNIT(S): at 11:45

## 2023-03-27 RX ADMIN — LIDOCAINE 1 PATCH: 4 CREAM TOPICAL at 00:03

## 2023-03-27 RX ADMIN — Medication 1 APPLICATION(S): at 22:01

## 2023-03-27 RX ADMIN — Medication 9 UNIT(S): at 08:14

## 2023-03-27 NOTE — PROGRESS NOTE ADULT - ASSESSMENT
Patient is a 52 year old female with a PMHx of DM2, HTN, incarcerated ventral hernia (S/P repair 9/22 - complicated by infected seroma 10/22) and SBO with abdominal wall abscess (S/P IR drain 12/22) who presented with purulent drainage from umbilicus x2 weeks.  Patient is now S/P sinus tract excision and wound VAC placement on 3/22/23.      PLAN:  - Regular diet  - Continue with PO Bactrim  - VAC change today  - Out of bed  - Pain control  - VTE prophylaxis with Lovenox subcutaneous  - Discharge planning with VAC      #[id number]  B Team Surgery

## 2023-03-27 NOTE — PROGRESS NOTE ADULT - SUBJECTIVE AND OBJECTIVE BOX
Chief Complaint: DM 2    History: Patient seen at bedside, sitting in chair. Reports she is eating meals, denies n/v, denies s/s of hypoglycemia  Glucose elevated at lunch today    MEDICATIONS  (STANDING):  acetaminophen     Tablet .. 975 milliGRAM(s) Oral every 8 hours  budesonide 160 MICROgram(s)/formoterol 4.5 MICROgram(s) Inhaler 2 Puff(s) Inhalation two times a day  enoxaparin Injectable 40 milliGRAM(s) SubCutaneous every 24 hours  influenza   Vaccine 0.5 milliLiter(s) IntraMuscular once  insulin glargine Injectable (LANTUS) 10 Unit(s) SubCutaneous at bedtime  insulin lispro (ADMELOG) corrective regimen sliding scale   SubCutaneous three times a day before meals  insulin lispro (ADMELOG) corrective regimen sliding scale   SubCutaneous at bedtime  insulin lispro Injectable (ADMELOG) 9 Unit(s) SubCutaneous three times a day before meals  lidocaine   4% Patch 1 Patch Transdermal daily  lisinopril 20 milliGRAM(s) Oral daily  polyethylene glycol 3350 17 Gram(s) Oral two times a day  senna 2 Tablet(s) Oral at bedtime  simvastatin 20 milliGRAM(s) Oral at bedtime  torsemide 10 milliGRAM(s) Oral daily  triamcinolone 0.1% Ointment 1 Application(s) Topical every 12 hours  trimethoprim  160 mG/sulfamethoxazole 800 mG 1 Tablet(s) Oral every 12 hours    MEDICATIONS  (PRN):  albuterol    90 MICROgram(s) HFA Inhaler 2 Puff(s) Inhalation every 6 hours PRN Shortness of Breath and/or Wheezing  ondansetron    Tablet 4 milliGRAM(s) Oral every 6 hours PRN Nausea and/or Vomiting  oxyCODONE    IR 5 milliGRAM(s) Oral every 4 hours PRN Moderate Pain (4 - 6)  oxyCODONE    IR 10 milliGRAM(s) Oral every 4 hours PRN Severe Pain (7 - 10)  saline laxative (FLEET) Rectal Enema 1 Enema Rectal daily PRN Constipation    Allergies  cefpodoxime (Rash)  estrogens (Hives; Pruritus)  peanuts (Short breath)    Review of Systems:  HEENT: No pain  Cardiovascular: No chest pain  Respiratory: No SOB  GI: No nausea, vomiting    PHYSICAL EXAM:  VITALS: T(C): 36.7 (03-27-23 @ 14:57)  T(F): 98.1 (03-27-23 @ 14:57), Max: 98.9 (03-26-23 @ 18:00)  HR: 71 (03-27-23 @ 14:57) (67 - 85)  BP: 120/61 (03-27-23 @ 14:57) (115/69 - 126/71)  RR:  (17 - 18)  SpO2:  (97% - 99%)  Wt(kg): --  GENERAL: NAD  EYES: No proptosis, no lid lag, anicteric  HEENT:  Atraumatic, Normocephalic, moist mucous membranes  RESPIRATORY: unlabored respirations     CAPILLARY BLOOD GLUCOSE    POCT Blood Glucose.: 219 mg/dL (27 Mar 2023 11:23)  POCT Blood Glucose.: 144 mg/dL (27 Mar 2023 07:43)  POCT Blood Glucose.: 224 mg/dL (26 Mar 2023 22:36)  POCT Blood Glucose.: 134 mg/dL (26 Mar 2023 16:39)      03-26    139  |  100  |  17  ----------------------------<  153<H>  4.5   |  30  |  0.74    eGFR: 98    Ca    9.5      03-26  Mg     2.10     03-26  Phos  4.3     03-26      A1C with Estimated Average Glucose Result: 7.6 % (03-20-23 @ 08:10)  A1C with Estimated Average Glucose Result: 7.1 % (12-20-22 @ 05:21)  A1C with Estimated Average Glucose Result: 8.5 % (09-12-22 @ 00:19)    Diet, Consistent Carbohydrate/No Snacks (03-19-23 @ 10:22) [Active]

## 2023-03-27 NOTE — ADVANCED PRACTICE NURSE CONSULT - ASSESSMENT
Midline Abdomen: See A&I flowsheet for full assessment. If NPWT VAC therapy is off for more than two hours, please remove VAC, apply default dressing and notify WOCN team or Surgery Team B. Default dressing: Cleanse with NS, pat dry. Apply Liquid barrier film to periwound skin (allow to dry). Loosely pack cavity with hydrofiber (Aquacel) as primary dressing to absorb and fill dead space. Cover with abd pads and secure with paper tape. Change daily and PRN if soiled.  Midline Abdomen: See A&I flowsheet for full assessment. If NPWT VAC therapy is off for more than two hours, please remove VAC, apply default dressing and notify WOCN team or Surgery Team B. Default dressing: Cleanse with NS, pat dry. Apply Liquid barrier film to periwound skin (allow to dry). Loosely pack cavity with hydrofiber (Aquacel) as primary dressing to absorb and fill dead space. Cover with silicone foam with border. Change daily and PRN if soiled.

## 2023-03-27 NOTE — PROGRESS NOTE ADULT - SUBJECTIVE AND OBJECTIVE BOX
Surgery Daily Progress Note  =====================================================  Interval / Overnight Events: No acute events overnight.      HPI:  Patient is a 52 year old female with a PMHx of DM2, HTN, incarcerated ventral hernia (S/P repair 9/22 - complicated by infected seroma 10/22) and SBO with abdominal wall abscess (S/P IR drain 12/22) who presented with purulent drainage from umbilicus x2 weeks. (19 Mar 2023 09:15)      PAST MEDICAL & SURGICAL HISTORY:  HTN (hypertension)  DM (diabetes mellitus)  Asthma  S/P ovarian cystectomy  S/P repair of ventral hernia      ALLERGIES:  cefpodoxime (Rash)  estrogens (Hives; Pruritus)  peanuts (Short breath)      --------------------------------------------------------------------------------------    MEDICATIONS:    Neurologic Medications  acetaminophen     Tablet .. 975 milliGRAM(s) Oral every 8 hours  ondansetron    Tablet 4 milliGRAM(s) Oral every 6 hours PRN Nausea and/or Vomiting  oxyCODONE    IR 5 milliGRAM(s) Oral every 4 hours PRN Moderate Pain (4 - 6)  oxyCODONE    IR 10 milliGRAM(s) Oral every 4 hours PRN Severe Pain (7 - 10)    Respiratory Medications  albuterol    90 MICROgram(s) HFA Inhaler 2 Puff(s) Inhalation every 6 hours PRN Shortness of Breath and/or Wheezing  budesonide 160 MICROgram(s)/formoterol 4.5 MICROgram(s) Inhaler 2 Puff(s) Inhalation two times a day    Cardiovascular Medications  lisinopril 20 milliGRAM(s) Oral daily  torsemide 10 milliGRAM(s) Oral daily    Gastrointestinal Medications  polyethylene glycol 3350 17 Gram(s) Oral two times a day  saline laxative (FLEET) Rectal Enema 1 Enema Rectal daily PRN Constipation  senna 2 Tablet(s) Oral at bedtime    Hematologic/Oncologic Medications  enoxaparin Injectable 40 milliGRAM(s) SubCutaneous every 24 hours  influenza   Vaccine 0.5 milliLiter(s) IntraMuscular once    Antimicrobial/Immunologic Medications  trimethoprim  160 mG/sulfamethoxazole 800 mG 1 Tablet(s) Oral every 12 hours    Endocrine/Metabolic Medications  insulin glargine Injectable (LANTUS) 10 Unit(s) SubCutaneous at bedtime  insulin lispro (ADMELOG) corrective regimen sliding scale   SubCutaneous three times a day before meals  insulin lispro (ADMELOG) corrective regimen sliding scale   SubCutaneous at bedtime  insulin lispro Injectable (ADMELOG) 9 Unit(s) SubCutaneous three times a day before meals  simvastatin 20 milliGRAM(s) Oral at bedtime    Topical/Other Medications  lidocaine   4% Patch 1 Patch Transdermal daily  triamcinolone 0.1% Ointment 1 Application(s) Topical every 12 hours    --------------------------------------------------------------------------------------    VITAL SIGNS:  T(C): 36.8 (27 Mar 2023 06:00), Max: 37.2 (26 Mar 2023 18:00)  T(F): 98.2 (27 Mar 2023 06:00), Max: 98.9 (26 Mar 2023 18:00)  HR: 67 (27 Mar 2023 06:00) (67 - 89)  BP: 123/67 (27 Mar 2023 06:00) (114/69 - 126/71)  RR: 18 (27 Mar 2023 06:00) (17 - 18)  SpO2: 97% (27 Mar 2023 06:00) (97% - 99%)    --------------------------------------------------------------------------------------    INS AND OUTS:    26 Mar 2023 07:01  -  27 Mar 2023 07:00  --------------------------------------------------------  IN:    Oral Fluid: 560 mL  Total IN: 560 mL    OUT:    VAC (Vacuum Assisted Closure) System (mL): 50 mL  Total OUT: 50 mL    Total NET: 510 mL    --------------------------------------------------------------------------------------    EXAM    NEUROLOGY  Exam: Normal, in no acute distress.    HEENT  Exam: Normocephalic, atraumatic.    RESPIRATORY  Exam: Normal expansion / effort.    CARDIOVASCULAR  Exam: S1, S2.  Regular rate and rhythm.    GI/NUTRITION  Exam: Abdomen soft, Non-tender, Non-distended.  VAC in place.  Current Diet: Regular diet    MUSCULOSKELETAL  Exam: All extremities moving spontaneously without limitations.      HEMATOLOGIC  [x] VTE Prophylaxis: enoxaparin Injectable 40 milliGRAM(s) SubCutaneous every 24 hours      INFECTIOUS DISEASE  Antimicrobials/Immunologic Medications:  influenza   Vaccine 0.5 milliLiter(s) IntraMuscular once  trimethoprim  160 mG/sulfamethoxazole 800 mG 1 Tablet(s) Oral every 12 hours    --------------------------------------------------------------------------------------

## 2023-03-27 NOTE — PROGRESS NOTE ADULT - ASSESSMENT
This is a 51 yo F /w a PMH of DM2, HTN, asthma and complicated surgical course over the last few months with initial incarcerated hernia --> infected seroma --> SBO --> abdominal wall abscess, now presenting with umbilical drainage. Endocrine consulted for further diabetes management.    1. DM 2  HbA1c 7.6%  Home Regimen - Confirmed with [facility]: Metformin 1g BID with meals, Glimepiride 4mg BID with meals, Bydureon 2 mg SQ weekly, and Basaglar 10 units at bedtime (patient was not taking this consistently)    While inpatient:   BG target 100-180 mg/dl  Continue Lantus 10 units SQ qHS   Increase Admelog to 10 units SQ TID before meals (Hold if NPO/skips meal)   Continue Admelog low correction scale before meals and before bedtime  Consistent carbohydrate diet  FSG before meals and before bedtime  Hypoglycemia protocol - will reorder    Discharge Plan:   Would prefer basal/oral/GLP1RA plan for discharge - however patient is REFUSING basal insulin  Therefore, tentative discharge plan is: Resume Glimepiride 4 mg BID, Metformin 1000 mg PO BID, Bydureon 2 mg SQ weekly  Ensure patient has working glucometer, test strips, lancets, alcohol pads, and BD marissa pen needles  Please also prescribe glucose tabs, Baqsimi nasal spray or glucagon emergency kit for hypoglycemia risk   Followup with PCP, endocrinology, opthalmology, and podiatry as outpatient  Followup with [facility]; [address] [place] [id number]; [phone]  Dr. Lisa Alegria on 5/10/23 at 10:20 AM    2. HTN  Goal /80  Continue Lisinopril 20mg daily if no contraindications  Titration by primary team     3. HLD  LDL goal <70  Recommend to check fasting lipid panel    Continue Simvastatin 20 mg daily if no contraindications    Inocencia Padgett  Nurse Practitioner  Division of Endocrinology & Diabetes  In house pager #[phone]/long range pager #[phone]    If before 9AM or after 6PM, or on weekends/holidays, please call endocrine answering service for assistance ([phone]).  For nonurgent matters email [email or web address] for assistance.

## 2023-03-27 NOTE — ADVANCED PRACTICE NURSE CONSULT - REASON FOR CONSULT
Patient seen on skin care rounds for NPWT/VAC placement to midline abdomen s/p sinus tract excision on 3/22/23. Wound care team will continue to see patient on M W F schedule.

## 2023-03-28 ENCOUNTER — TRANSCRIPTION ENCOUNTER (OUTPATIENT)
Age: 53
End: 2023-03-28

## 2023-03-28 LAB
ANION GAP SERPL CALC-SCNC: 11 MMOL/L — SIGNIFICANT CHANGE UP (ref 7–14)
BUN SERPL-MCNC: 16 MG/DL — SIGNIFICANT CHANGE UP (ref 7–23)
CALCIUM SERPL-MCNC: 9.6 MG/DL — SIGNIFICANT CHANGE UP (ref 8.4–10.5)
CHLORIDE SERPL-SCNC: 97 MMOL/L — LOW (ref 98–107)
CHOLEST SERPL-MCNC: 153 MG/DL — SIGNIFICANT CHANGE UP
CO2 SERPL-SCNC: 27 MMOL/L — SIGNIFICANT CHANGE UP (ref 22–31)
CREAT SERPL-MCNC: 0.78 MG/DL — SIGNIFICANT CHANGE UP (ref 0.5–1.3)
EGFR: 92 ML/MIN/1.73M2 — SIGNIFICANT CHANGE UP
GLUCOSE BLDC GLUCOMTR-MCNC: 160 MG/DL — HIGH (ref 70–99)
GLUCOSE BLDC GLUCOMTR-MCNC: 168 MG/DL — HIGH (ref 70–99)
GLUCOSE BLDC GLUCOMTR-MCNC: 186 MG/DL — HIGH (ref 70–99)
GLUCOSE BLDC GLUCOMTR-MCNC: 187 MG/DL — HIGH (ref 70–99)
GLUCOSE SERPL-MCNC: 167 MG/DL — HIGH (ref 70–99)
HCT VFR BLD CALC: 33.4 % — LOW (ref 34.5–45)
HDLC SERPL-MCNC: 43 MG/DL — LOW
HGB BLD-MCNC: 10.5 G/DL — LOW (ref 11.5–15.5)
LIPID PNL WITH DIRECT LDL SERPL: 94 MG/DL — SIGNIFICANT CHANGE UP
MAGNESIUM SERPL-MCNC: 2.1 MG/DL — SIGNIFICANT CHANGE UP (ref 1.6–2.6)
MCHC RBC-ENTMCNC: 24.1 PG — LOW (ref 27–34)
MCHC RBC-ENTMCNC: 31.4 GM/DL — LOW (ref 32–36)
MCV RBC AUTO: 76.6 FL — LOW (ref 80–100)
NON HDL CHOLESTEROL: 110 MG/DL — SIGNIFICANT CHANGE UP
NRBC # BLD: 0 /100 WBCS — SIGNIFICANT CHANGE UP (ref 0–0)
NRBC # FLD: 0 K/UL — SIGNIFICANT CHANGE UP (ref 0–0)
PHOSPHATE SERPL-MCNC: 4.2 MG/DL — SIGNIFICANT CHANGE UP (ref 2.5–4.5)
PLATELET # BLD AUTO: 211 K/UL — SIGNIFICANT CHANGE UP (ref 150–400)
POTASSIUM SERPL-MCNC: 4.8 MMOL/L — SIGNIFICANT CHANGE UP (ref 3.5–5.3)
POTASSIUM SERPL-SCNC: 4.8 MMOL/L — SIGNIFICANT CHANGE UP (ref 3.5–5.3)
RBC # BLD: 4.36 M/UL — SIGNIFICANT CHANGE UP (ref 3.8–5.2)
RBC # FLD: 17.2 % — HIGH (ref 10.3–14.5)
SODIUM SERPL-SCNC: 135 MMOL/L — SIGNIFICANT CHANGE UP (ref 135–145)
TRIGL SERPL-MCNC: 80 MG/DL — SIGNIFICANT CHANGE UP
WBC # BLD: 6.68 K/UL — SIGNIFICANT CHANGE UP (ref 3.8–10.5)
WBC # FLD AUTO: 6.68 K/UL — SIGNIFICANT CHANGE UP (ref 3.8–10.5)

## 2023-03-28 PROCEDURE — 99232 SBSQ HOSP IP/OBS MODERATE 35: CPT

## 2023-03-28 RX ADMIN — Medication 975 MILLIGRAM(S): at 06:51

## 2023-03-28 RX ADMIN — Medication 10 UNIT(S): at 11:41

## 2023-03-28 RX ADMIN — Medication 975 MILLIGRAM(S): at 23:10

## 2023-03-28 RX ADMIN — ENOXAPARIN SODIUM 40 MILLIGRAM(S): 100 INJECTION SUBCUTANEOUS at 13:40

## 2023-03-28 RX ADMIN — OXYCODONE HYDROCHLORIDE 5 MILLIGRAM(S): 5 TABLET ORAL at 04:35

## 2023-03-28 RX ADMIN — Medication 10 UNIT(S): at 07:44

## 2023-03-28 RX ADMIN — OXYCODONE HYDROCHLORIDE 10 MILLIGRAM(S): 5 TABLET ORAL at 22:33

## 2023-03-28 RX ADMIN — LIDOCAINE 1 PATCH: 4 CREAM TOPICAL at 20:13

## 2023-03-28 RX ADMIN — Medication 1: at 16:49

## 2023-03-28 RX ADMIN — Medication 1 TABLET(S): at 08:25

## 2023-03-28 RX ADMIN — Medication 1: at 11:41

## 2023-03-28 RX ADMIN — SIMVASTATIN 20 MILLIGRAM(S): 20 TABLET, FILM COATED ORAL at 22:36

## 2023-03-28 RX ADMIN — OXYCODONE HYDROCHLORIDE 10 MILLIGRAM(S): 5 TABLET ORAL at 23:10

## 2023-03-28 RX ADMIN — LIDOCAINE 1 PATCH: 4 CREAM TOPICAL at 13:39

## 2023-03-28 RX ADMIN — INSULIN GLARGINE 10 UNIT(S): 100 INJECTION, SOLUTION SUBCUTANEOUS at 22:35

## 2023-03-28 RX ADMIN — Medication 1: at 07:43

## 2023-03-28 RX ADMIN — BUDESONIDE AND FORMOTEROL FUMARATE DIHYDRATE 2 PUFF(S): 160; 4.5 AEROSOL RESPIRATORY (INHALATION) at 22:34

## 2023-03-28 RX ADMIN — Medication 975 MILLIGRAM(S): at 22:35

## 2023-03-28 RX ADMIN — POLYETHYLENE GLYCOL 3350 17 GRAM(S): 17 POWDER, FOR SOLUTION ORAL at 22:34

## 2023-03-28 RX ADMIN — OXYCODONE HYDROCHLORIDE 5 MILLIGRAM(S): 5 TABLET ORAL at 04:55

## 2023-03-28 RX ADMIN — Medication 1 TABLET(S): at 22:35

## 2023-03-28 RX ADMIN — SENNA PLUS 2 TABLET(S): 8.6 TABLET ORAL at 22:36

## 2023-03-28 RX ADMIN — Medication 10 UNIT(S): at 16:49

## 2023-03-28 NOTE — DISCHARGE NOTE NURSING/CASE MANAGEMENT/SOCIAL WORK - NSSCTYPOFSERV_GEN_ALL_CORE
A visiting nurse will visit you the day after discharge. The nurse will call you in the morning to set up a visit time. If you do not hear from the nurse, please call the agency.

## 2023-03-28 NOTE — DISCHARGE NOTE NURSING/CASE MANAGEMENT/SOCIAL WORK - NSDCPEFALRISK_GEN_ALL_CORE
For information on Fall & Injury Prevention, visit: [email or web address] OR  [email or web address] OR  https://www.cdc.gov/steadi/patient.html

## 2023-03-28 NOTE — PROGRESS NOTE ADULT - ASSESSMENT
Patient is a 52 year old female with a PMHx of DM2, HTN, incarcerated ventral hernia (S/P repair 9/22 - complicated by infected seroma 10/22) and SBO with abdominal wall abscess (S/P IR drain 12/22) who presented with purulent drainage from umbilicus x2 weeks.  Patient is now S/P sinus tract excision and wound VAC placement on 3/22/23.      PLAN:  - Regular diet  - Continue with PO Bactrim  - Out of bed  - Pain control  - VTE prophylaxis with Lovenox subcutaneous  - Discharge planning with VAC      #[id number]  B Team Surgery

## 2023-03-28 NOTE — DISCHARGE NOTE NURSING/CASE MANAGEMENT/SOCIAL WORK - PATIENT PORTAL LINK FT
You can access the FollowMyHealth Patient Portal offered by [facility] by registering at the following website: [email or web address]. By joining [employer]’s FollowMyHealth portal, you will also be able to view your health information using other applications (apps) compatible with our system.

## 2023-03-28 NOTE — PROGRESS NOTE ADULT - SUBJECTIVE AND OBJECTIVE BOX
Chief Complaint: DM2    History: Patient seen at the bedside. Patient denies nausea, vomiting, abdominal pain, denies hypoglycemia.     MEDICATIONS  (STANDING):  acetaminophen     Tablet .. 975 milliGRAM(s) Oral every 8 hours  budesonide 160 MICROgram(s)/formoterol 4.5 MICROgram(s) Inhaler 2 Puff(s) Inhalation two times a day  dextrose 5%. 1000 milliLiter(s) (50 mL/Hr) IV Continuous <Continuous>  dextrose 5%. 1000 milliLiter(s) (100 mL/Hr) IV Continuous <Continuous>  dextrose 50% Injectable 25 Gram(s) IV Push once  dextrose 50% Injectable 12.5 Gram(s) IV Push once  dextrose Oral Gel 15 Gram(s) Oral once  enoxaparin Injectable 40 milliGRAM(s) SubCutaneous every 24 hours  glucagon  Injectable 1 milliGRAM(s) IntraMuscular once  influenza   Vaccine 0.5 milliLiter(s) IntraMuscular once  insulin glargine Injectable (LANTUS) 10 Unit(s) SubCutaneous at bedtime  insulin lispro (ADMELOG) corrective regimen sliding scale   SubCutaneous three times a day before meals  insulin lispro (ADMELOG) corrective regimen sliding scale   SubCutaneous at bedtime  insulin lispro Injectable (ADMELOG) 10 Unit(s) SubCutaneous three times a day before meals  lidocaine   4% Patch 1 Patch Transdermal daily  lisinopril 20 milliGRAM(s) Oral daily  polyethylene glycol 3350 17 Gram(s) Oral two times a day  senna 2 Tablet(s) Oral at bedtime  simvastatin 20 milliGRAM(s) Oral at bedtime  torsemide 10 milliGRAM(s) Oral daily  triamcinolone 0.1% Ointment 1 Application(s) Topical every 12 hours  trimethoprim  160 mG/sulfamethoxazole 800 mG 1 Tablet(s) Oral every 12 hours    MEDICATIONS  (PRN):  albuterol    90 MICROgram(s) HFA Inhaler 2 Puff(s) Inhalation every 6 hours PRN Shortness of Breath and/or Wheezing  ondansetron    Tablet 4 milliGRAM(s) Oral every 6 hours PRN Nausea and/or Vomiting  oxyCODONE    IR 5 milliGRAM(s) Oral every 4 hours PRN Moderate Pain (4 - 6)  oxyCODONE    IR 10 milliGRAM(s) Oral every 4 hours PRN Severe Pain (7 - 10)  saline laxative (FLEET) Rectal Enema 1 Enema Rectal daily PRN Constipation    Allergies  cefpodoxime (Rash)  estrogens (Hives; Pruritus)  peanuts (Short breath)    Review of Systems:  HEENT: No pain  Cardiovascular: No chest pain  Respiratory: No SOB  GI: No nausea, vomiting, abdominal pain    UNABLE TO OBTAIN    PHYSICAL EXAM:  VITALS: T(C): 36.6 (03-28-23 @ 13:45)  T(F): 97.8 (03-28-23 @ 13:45), Max: 98.7 (03-27-23 @ 22:00)  HR: 61 (03-28-23 @ 13:45) (61 - 84)  BP: 116/58 (03-28-23 @ 13:45) (94/67 - 140/90)  RR:  (16 - 18)  SpO2:  (95% - 98%)  Wt(kg): --  GENERAL: Obese female NAD  EYES: No proptosis, anicteric  HEENT: Atraumatic, Normocephalic, moist mucous membranes  RESPIRATORY: Unlaboured respirations    CAPILLARY BLOOD GLUCOSE      POCT Blood Glucose.: 187 mg/dL (28 Mar 2023 11:34)  POCT Blood Glucose.: 160 mg/dL (28 Mar 2023 07:29)  POCT Blood Glucose.: 207 mg/dL (27 Mar 2023 21:37)  POCT Blood Glucose.: 174 mg/dL (27 Mar 2023 16:45)      03-28    135  |  97<L>  |  16  ----------------------------<  167<H>  4.8   |  27  |  0.78    eGFR: 92    Ca    9.6      03-28  Mg     2.10     03-28  Phos  4.2     03-28        Thyroid Function Tests:          Anion Gap, Serum: 11 mmol/L (03-28-23 @ 06:37)  Anion Gap, Serum: 9 mmol/L (03-26-23 @ 06:29)          A1C with Estimated Average Glucose Result: 7.6 % (03-20-23 @ 08:10)  A1C with Estimated Average Glucose Result: 7.1 % (12-20-22 @ 05:21)  A1C with Estimated Average Glucose Result: 8.5 % (09-12-22 @ 00:19)      Diet, Consistent Carbohydrate/No Snacks (03-19-23 @ 10:22)       Chief Complaint: DM2    History: Patient seen at bedside. Tolerating meals, denies n/v, denies s/s of hypoglycemia     MEDICATIONS  (STANDING):  acetaminophen     Tablet .. 975 milliGRAM(s) Oral every 8 hours  budesonide 160 MICROgram(s)/formoterol 4.5 MICROgram(s) Inhaler 2 Puff(s) Inhalation two times a day  dextrose 5%. 1000 milliLiter(s) (50 mL/Hr) IV Continuous <Continuous>  dextrose 5%. 1000 milliLiter(s) (100 mL/Hr) IV Continuous <Continuous>  dextrose 50% Injectable 25 Gram(s) IV Push once  dextrose 50% Injectable 12.5 Gram(s) IV Push once  dextrose Oral Gel 15 Gram(s) Oral once  enoxaparin Injectable 40 milliGRAM(s) SubCutaneous every 24 hours  glucagon  Injectable 1 milliGRAM(s) IntraMuscular once  influenza   Vaccine 0.5 milliLiter(s) IntraMuscular once  insulin glargine Injectable (LANTUS) 10 Unit(s) SubCutaneous at bedtime  insulin lispro (ADMELOG) corrective regimen sliding scale   SubCutaneous three times a day before meals  insulin lispro (ADMELOG) corrective regimen sliding scale   SubCutaneous at bedtime  insulin lispro Injectable (ADMELOG) 10 Unit(s) SubCutaneous three times a day before meals  lidocaine   4% Patch 1 Patch Transdermal daily  lisinopril 20 milliGRAM(s) Oral daily  polyethylene glycol 3350 17 Gram(s) Oral two times a day  senna 2 Tablet(s) Oral at bedtime  simvastatin 20 milliGRAM(s) Oral at bedtime  torsemide 10 milliGRAM(s) Oral daily  triamcinolone 0.1% Ointment 1 Application(s) Topical every 12 hours  trimethoprim  160 mG/sulfamethoxazole 800 mG 1 Tablet(s) Oral every 12 hours    MEDICATIONS  (PRN):  albuterol    90 MICROgram(s) HFA Inhaler 2 Puff(s) Inhalation every 6 hours PRN Shortness of Breath and/or Wheezing  ondansetron    Tablet 4 milliGRAM(s) Oral every 6 hours PRN Nausea and/or Vomiting  oxyCODONE    IR 5 milliGRAM(s) Oral every 4 hours PRN Moderate Pain (4 - 6)  oxyCODONE    IR 10 milliGRAM(s) Oral every 4 hours PRN Severe Pain (7 - 10)  saline laxative (FLEET) Rectal Enema 1 Enema Rectal daily PRN Constipation    Allergies  cefpodoxime (Rash)  estrogens (Hives; Pruritus)  peanuts (Short breath)    Review of Systems:  HEENT: No pain  Cardiovascular: No chest pain  Respiratory: No SOB  GI: No nausea, vomiting    PHYSICAL EXAM:  VITALS: T(C): 36.6 (03-28-23 @ 13:45)  T(F): 97.8 (03-28-23 @ 13:45), Max: 98.7 (03-27-23 @ 22:00)  HR: 61 (03-28-23 @ 13:45) (61 - 84)  BP: 116/58 (03-28-23 @ 13:45) (94/67 - 140/90)  RR:  (16 - 18)  SpO2:  (95% - 98%)  Wt(kg): --  GENERAL: NAD  EYES: No proptosis, anicteric  HEENT: Atraumatic, Normocephalic, moist mucous membranes  RESPIRATORY: Unlabored respirations    CAPILLARY BLOOD GLUCOSE    POCT Blood Glucose.: 187 mg/dL (28 Mar 2023 11:34)  POCT Blood Glucose.: 160 mg/dL (28 Mar 2023 07:29)  POCT Blood Glucose.: 207 mg/dL (27 Mar 2023 21:37)  POCT Blood Glucose.: 174 mg/dL (27 Mar 2023 16:45)      03-28    135  |  97<L>  |  16  ----------------------------<  167<H>  4.8   |  27  |  0.78    eGFR: 92    Ca    9.6      03-28  Mg     2.10     03-28  Phos  4.2     03-28      Anion Gap, Serum: 11 mmol/L (03-28-23 @ 06:37)  Anion Gap, Serum: 9 mmol/L (03-26-23 @ 06:29)      A1C with Estimated Average Glucose Result: 7.6 % (03-20-23 @ 08:10)  A1C with Estimated Average Glucose Result: 7.1 % (12-20-22 @ 05:21)  A1C with Estimated Average Glucose Result: 8.5 % (09-12-22 @ 00:19)      Diet, Consistent Carbohydrate/No Snacks (03-19-23 @ 10:22)

## 2023-03-28 NOTE — PROGRESS NOTE ADULT - SUBJECTIVE AND OBJECTIVE BOX
Surgery Daily Progress Note  =====================================================  Interval / Overnight Events: No acute events overnight.      HPI:  Patient is a 52 year old female with a PMHx of DM2, HTN, incarcerated ventral hernia (S/P repair 9/22 - complicated by infected seroma 10/22) and SBO with abdominal wall abscess (S/P IR drain 12/22) who presented with purulent drainage from umbilicus x2 weeks. (19 Mar 2023 09:15)      PAST MEDICAL & SURGICAL HISTORY:  HTN (hypertension)  DM (diabetes mellitus)  Asthma  S/P ovarian cystectomy  S/P repair of ventral hernia      ALLERGIES:  cefpodoxime (Rash)  estrogens (Hives; Pruritus)  peanuts (Short breath)      --------------------------------------------------------------------------------------    MEDICATIONS:    Neurologic Medications  acetaminophen     Tablet .. 975 milliGRAM(s) Oral every 8 hours  ondansetron    Tablet 4 milliGRAM(s) Oral every 6 hours PRN Nausea and/or Vomiting  oxyCODONE    IR 5 milliGRAM(s) Oral every 4 hours PRN Moderate Pain (4 - 6)  oxyCODONE    IR 10 milliGRAM(s) Oral every 4 hours PRN Severe Pain (7 - 10)    Respiratory Medications  albuterol    90 MICROgram(s) HFA Inhaler 2 Puff(s) Inhalation every 6 hours PRN Shortness of Breath and/or Wheezing  budesonide 160 MICROgram(s)/formoterol 4.5 MICROgram(s) Inhaler 2 Puff(s) Inhalation two times a day    Cardiovascular Medications  lisinopril 20 milliGRAM(s) Oral daily  torsemide 10 milliGRAM(s) Oral daily    Gastrointestinal Medications  polyethylene glycol 3350 17 Gram(s) Oral two times a day  saline laxative (FLEET) Rectal Enema 1 Enema Rectal daily PRN Constipation  senna 2 Tablet(s) Oral at bedtime    Hematologic/Oncologic Medications  enoxaparin Injectable 40 milliGRAM(s) SubCutaneous every 24 hours  influenza   Vaccine 0.5 milliLiter(s) IntraMuscular once    Antimicrobial/Immunologic Medications  trimethoprim  160 mG/sulfamethoxazole 800 mG 1 Tablet(s) Oral every 12 hours    Endocrine/Metabolic Medications  insulin glargine Injectable (LANTUS) 10 Unit(s) SubCutaneous at bedtime  insulin lispro (ADMELOG) corrective regimen sliding scale   SubCutaneous three times a day before meals  insulin lispro (ADMELOG) corrective regimen sliding scale   SubCutaneous at bedtime  insulin lispro Injectable (ADMELOG) 9 Unit(s) SubCutaneous three times a day before meals  simvastatin 20 milliGRAM(s) Oral at bedtime    Topical/Other Medications  lidocaine   4% Patch 1 Patch Transdermal daily  triamcinolone 0.1% Ointment 1 Application(s) Topical every 12 hours    --------------------------------------------------------------------------------------    VITAL SIGNS:  T(C): 37.1 (03-27-23 @ 22:00), Max: 37.1 (03-27-23 @ 22:00)  HR: 72 (03-28-23 @ 04:38) (63 - 84)  BP: 124/71 (03-27-23 @ 22:00) (115/69 - 140/90)  ABP: --  ABP(mean): --  RR: 18 (03-27-23 @ 22:00) (17 - 18)  SpO2: 96% (03-28-23 @ 04:38) (96% - 98%)  Wt(kg): --  CVP(mm Hg): --      03-26 @ 07:01  -  03-27 @ 07:00  --------------------------------------------------------  IN:    Oral Fluid: 560 mL  Total IN: 560 mL    OUT:    VAC (Vacuum Assisted Closure) System (mL): 50 mL  Total OUT: 50 mL    Total NET: 510 mL      03-27 @ 07:01 - 03-28 @ 06:40  --------------------------------------------------------  IN:    Oral Fluid: 600 mL  Total IN: 600 mL    OUT:    VAC (Vacuum Assisted Closure) System (mL): 0 mL    Voided (mL): 700 mL  Total OUT: 700 mL    Total NET: -100 mL  --------------------------------------------------------------------------------------    EXAM    NEUROLOGY  Exam: Normal, in no acute distress.    HEENT  Exam: Normocephalic, atraumatic.    RESPIRATORY  Exam: Normal expansion / effort.    CARDIOVASCULAR  Exam: S1, S2.  Regular rate and rhythm.    GI/NUTRITION  Exam: Abdomen soft, Non-tender, Non-distended.  VAC in place.  Current Diet: Regular diet    MUSCULOSKELETAL  Exam: All extremities moving spontaneously without limitations.      HEMATOLOGIC  [x] VTE Prophylaxis: enoxaparin Injectable 40 milliGRAM(s) SubCutaneous every 24 hours      INFECTIOUS DISEASE  Antimicrobials/Immunologic Medications:  influenza   Vaccine 0.5 milliLiter(s) IntraMuscular once  trimethoprim  160 mG/sulfamethoxazole 800 mG 1 Tablet(s) Oral every 12 hours    --------------------------------------------------------------------------------------      LABS:

## 2023-03-28 NOTE — PROGRESS NOTE ADULT - ASSESSMENT
This is a 53 yo F /w a PMH of DM2, HTN, asthma and complicated surgical course over the last few months with initial incarcerated hernia --> infected seroma --> SBO --> abdominal wall abscess, now presenting with umbilical drainage. Endocrine consulted for further diabetes management.    1. DM 2  HbA1c 7.6%  Home Regimen - Confirmed with [facility]: Metformin 1g BID with meals, Glimepiride 4mg BID with meals, Bydureon 2 mg SQ weekly, and Basaglar 10 units at bedtime (patient was not taking this consistently)    While inpatient:   BG target 100-180 mg/dl  Continue Lantus 10 units SQ qHS   Continue Admelog to 10 units SQ TID before meals (Hold if NPO/skips meal)   Continue Admelog low correction scale before meals and before bedtime  Consistent carbohydrate diet  FSG before meals and before bedtime  Hypoglycemia protocol - will reorder    Discharge Plan:   Would prefer basal/oral/GLP1RA plan for discharge - however patient is REFUSING basal insulin  Therefore, tentative discharge plan is: Resume Glimepiride 4 mg BID, Metformin 1000 mg PO BID, Bydureon 2 mg SQ weekly  Ensure patient has working glucometer, test strips, lancets, alcohol pads, and BD marissa pen needles  Please also prescribe glucose tabs, Baqsimi nasal spray or glucagon emergency kit for hypoglycemia risk   Followup with PCP, endocrinology, opthalmology, and podiatry as outpatient  Followup with [facility] at [place]; [address] [place] [address]; [phone]  Dr. Lisa Alegria on 5/10/23 at 10:20 AM    2. HTN  Goal /80  Continue Lisinopril 20mg daily if no contraindications  Titration by primary team     3. HLD  LDL goal <70  Recommend to check fasting lipid panel    Continue Simvastatin 20 mg daily if no contraindications    Inocencia Padgett  Nurse Practitioner  Division of Endocrinology & Diabetes  In house pager #[phone]/long range pager #[phone]    If before 9AM or after 6PM, or on weekends/holidays, please call endocrine answering service for assistance ([phone]).  For nonurgent matters email [email or web address] for assistance.  This is a 51 yo F /w a PMH of DM2, HTN, asthma and complicated surgical course over the last few months with initial incarcerated hernia --> infected seroma --> SBO --> abdominal wall abscess, now presenting with umbilical drainage. Endocrine consulted for further diabetes management.    1. DM 2  HbA1c 7.6%  Home Regimen - Confirmed with [facility]: Metformin 1g BID with meals, Glimepiride 4mg BID with meals, Bydureon 2 mg SQ weekly, and Basaglar 10 units at bedtime (patient was not taking this consistently)    While inpatient:   BG target 100-180 mg/dl  Continue Lantus 10 units SQ qHS   Continue Admelog to 10 units SQ TID before meals (Hold if NPO/skips meal)   Continue Admelog low correction scale before meals and before bedtime  Consistent carbohydrate diet  FSG before meals and before bedtime  Hypoglycemia protocol    Discharge Plan:   Would prefer basal/oral/GLP1RA plan for discharge - however patient is REFUSING basal insulin  Therefore, tentative discharge plan is: Resume Glimepiride 4 mg BID, Metformin 1000 mg PO BID, Bydureon 2 mg SQ weekly  Ensure patient has working glucometer, test strips, lancets, alcohol pads, and BD marissa pen needles  Please also prescribe glucose tabs, Baqsimi nasal spray or glucagon emergency kit for hypoglycemia risk   Followup with PCP, endocrinology, opthalmology, and podiatry as outpatient  Followup with [facility] at [place]; [address] [place] [address]; [phone]  Dr. Lisa Alegria on 5/10/23 at 10:20 AM    2. HTN  Goal /80  Continue Lisinopril 20 mg daily if no contraindications  Titration by primary team     3. HLD  LDL goal <70  LDL resulted 94  Consider increase in statin. Currently on Simvastatin 20 mg daily    Inocencia Padgett  Nurse Practitioner  Division of Endocrinology & Diabetes  In house pager #[phone]/long range pager #[phone]    If before 9AM or after 6PM, or on weekends/holidays, please call endocrine answering service for assistance ([phone]).  For nonurgent matters email [email or web address] for assistance.

## 2023-03-29 VITALS — HEART RATE: 78 BPM | OXYGEN SATURATION: 98 %

## 2023-03-29 LAB
GLUCOSE BLDC GLUCOMTR-MCNC: 173 MG/DL — HIGH (ref 70–99)
GLUCOSE BLDC GLUCOMTR-MCNC: 241 MG/DL — HIGH (ref 70–99)
GLUCOSE BLDC GLUCOMTR-MCNC: 241 MG/DL — HIGH (ref 70–99)

## 2023-03-29 RX ORDER — EXENATIDE 250 UG/ML
2 INJECTION SUBCUTANEOUS
Qty: 0 | Refills: 0 | DISCHARGE

## 2023-03-29 RX ORDER — GLIMEPIRIDE 1 MG
1 TABLET ORAL
Qty: 0 | Refills: 0 | DISCHARGE

## 2023-03-29 RX ADMIN — Medication 10 MILLIGRAM(S): at 07:14

## 2023-03-29 RX ADMIN — LIDOCAINE 1 PATCH: 4 CREAM TOPICAL at 14:39

## 2023-03-29 RX ADMIN — LIDOCAINE 1 PATCH: 4 CREAM TOPICAL at 18:57

## 2023-03-29 RX ADMIN — OXYCODONE HYDROCHLORIDE 10 MILLIGRAM(S): 5 TABLET ORAL at 12:00

## 2023-03-29 RX ADMIN — ENOXAPARIN SODIUM 40 MILLIGRAM(S): 100 INJECTION SUBCUTANEOUS at 14:40

## 2023-03-29 RX ADMIN — Medication 1 TABLET(S): at 10:15

## 2023-03-29 RX ADMIN — Medication 10 UNIT(S): at 12:18

## 2023-03-29 RX ADMIN — Medication 10 UNIT(S): at 07:55

## 2023-03-29 RX ADMIN — Medication 975 MILLIGRAM(S): at 17:08

## 2023-03-29 RX ADMIN — Medication 2: at 12:18

## 2023-03-29 RX ADMIN — LISINOPRIL 20 MILLIGRAM(S): 2.5 TABLET ORAL at 07:14

## 2023-03-29 RX ADMIN — Medication 1 TABLET(S): at 17:08

## 2023-03-29 RX ADMIN — Medication 975 MILLIGRAM(S): at 08:00

## 2023-03-29 RX ADMIN — Medication 975 MILLIGRAM(S): at 17:48

## 2023-03-29 RX ADMIN — Medication 975 MILLIGRAM(S): at 07:14

## 2023-03-29 RX ADMIN — Medication 10 UNIT(S): at 17:08

## 2023-03-29 RX ADMIN — OXYCODONE HYDROCHLORIDE 10 MILLIGRAM(S): 5 TABLET ORAL at 11:05

## 2023-03-29 RX ADMIN — LIDOCAINE 1 PATCH: 4 CREAM TOPICAL at 02:00

## 2023-03-29 RX ADMIN — BUDESONIDE AND FORMOTEROL FUMARATE DIHYDRATE 2 PUFF(S): 160; 4.5 AEROSOL RESPIRATORY (INHALATION) at 10:16

## 2023-03-29 RX ADMIN — Medication 2: at 17:08

## 2023-03-29 RX ADMIN — Medication 1: at 07:54

## 2023-03-29 NOTE — DIETITIAN INITIAL EVALUATION ADULT - ADD RECOMMEND
1) Monitor weights, PO intake/diet tolerance, skin integrity, pertinent labs.   2) Please consistently document % PO intake in nursing flowsheets to assess adequacy of nutritional intake/monitor need for further nutritional intervention(s).   3) RDN remains available should patient exhibit interest in discussing recommended diet modifications.

## 2023-03-29 NOTE — DIETITIAN INITIAL EVALUATION ADULT - PERTINENT LABORATORY DATA
03-28    135  |  97<L>  |  16  ----------------------------<  167<H>  4.8   |  27  |  0.78    Ca    9.6      28 Mar 2023 06:37  Phos  4.2     03-28  Mg     2.10     03-28    A1C with Estimated Average Glucose Result: 7.6 % (03-20-23 @ 08:10)  A1C with Estimated Average Glucose Result: 7.1 % (12-20-22 @ 05:21)  A1C with Estimated Average Glucose Result: 8.5 % (09-12-22 @ 00:19)    CAPILLARY BLOOD GLUCOSE  POCT Blood Glucose.: 241 mg/dL (29 Mar 2023 11:51)  POCT Blood Glucose.: 173 mg/dL (29 Mar 2023 07:32)  POCT Blood Glucose.: 186 mg/dL (28 Mar 2023 22:04)  POCT Blood Glucose.: 168 mg/dL (28 Mar 2023 16:44)

## 2023-03-29 NOTE — CHART NOTE - NSCHARTNOTEFT_GEN_A_CORE
This is a 53 yo F /w a PMH of DM2, HTN, asthma and complicated surgical course over the last few months with initial incarcerated hernia --> infected seroma --> SBO --> abdominal wall abscess, now presenting with umbilical drainage. Endocrine consulted for further diabetes management.    1. DM 2  HbA1c 7.6%  Home Regimen - Confirmed with [facility]: Metformin 1g BID with meals, Glimepiride 4mg BID with meals, Bydureon 2 mg SQ weekly, and Basaglar 10 units at bedtime (patient was not taking this consistently)    While inpatient:   BG target 100-180 mg/dl  Continue Lantus 10 units SQ qHS   Continue Admelog to 10 units SQ TID before meals (Hold if NPO/skips meal)   Continue Admelog low correction scale before meals and before bedtime  Consistent carbohydrate diet  FSG before meals and before bedtime  Hypoglycemia protocol    Discharge Plan:   Recommend for discharge: Glimepiride 4 mg BID, Metformin 1000 mg PO BID, Bydureon 2 mg SQ weekly  Would prefer basal/oral/GLP1RA plan for discharge - however patient is REFUSING basal insulin  Ensure patient has working glucometer, test strips, lancets, alcohol pads, and BD marissa pen needles  Please also prescribe glucose tabs, Baqsimi nasal spray or glucagon emergency kit for hypoglycemia risk   Followup with PCP, endocrinology, opthalmology, and podiatry as outpatient  Followup with [facility] at [place]; [address] [place] [address] [address] [place] [address]; [phone]  Dr. Lisa Alegria on 5/10/23 at 10:20 AM    2. HTN  Goal /80  Continue Lisinopril 20 mg daily if no contraindications  Titration by primary team     3. HLD  LDL goal <70  LDL resulted 94  Consider increase in statin. Currently on Simvastatin 20 mg daily    CAPILLARY BLOOD GLUCOSE    POCT Blood Glucose.: 173 mg/dL (29 Mar 2023 07:32)  POCT Blood Glucose.: 186 mg/dL (28 Mar 2023 22:04)  POCT Blood Glucose.: 168 mg/dL (28 Mar 2023 16:44)  POCT Blood Glucose.: 187 mg/dL (28 Mar 2023 11:34)      03-28    135  |  97<L>  |  16  ----------------------------<  167<H>  4.8   |  27  |  0.78    Ca    9.6      28 Mar 2023 06:37  Phos  4.2     03-28  Mg     2.10     03-28        MEDICATIONS  (STANDING):  acetaminophen     Tablet .. 975 milliGRAM(s) Oral every 8 hours  budesonide 160 MICROgram(s)/formoterol 4.5 MICROgram(s) Inhaler 2 Puff(s) Inhalation two times a day  dextrose 5%. 1000 milliLiter(s) (100 mL/Hr) IV Continuous <Continuous>  dextrose 5%. 1000 milliLiter(s) (50 mL/Hr) IV Continuous <Continuous>  dextrose 50% Injectable 25 Gram(s) IV Push once  dextrose 50% Injectable 12.5 Gram(s) IV Push once  dextrose Oral Gel 15 Gram(s) Oral once  enoxaparin Injectable 40 milliGRAM(s) SubCutaneous every 24 hours  glucagon  Injectable 1 milliGRAM(s) IntraMuscular once  influenza   Vaccine 0.5 milliLiter(s) IntraMuscular once  insulin glargine Injectable (LANTUS) 10 Unit(s) SubCutaneous at bedtime  insulin lispro (ADMELOG) corrective regimen sliding scale   SubCutaneous at bedtime  insulin lispro (ADMELOG) corrective regimen sliding scale   SubCutaneous three times a day before meals  insulin lispro Injectable (ADMELOG) 10 Unit(s) SubCutaneous three times a day before meals  lidocaine   4% Patch 1 Patch Transdermal daily  lisinopril 20 milliGRAM(s) Oral daily  polyethylene glycol 3350 17 Gram(s) Oral two times a day  senna 2 Tablet(s) Oral at bedtime  simvastatin 20 milliGRAM(s) Oral at bedtime  torsemide 10 milliGRAM(s) Oral daily  triamcinolone 0.1% Ointment 1 Application(s) Topical every 12 hours  trimethoprim  160 mG/sulfamethoxazole 800 mG 1 Tablet(s) Oral every 12 hours      Diet, Consistent Carbohydrate/No Snacks (03-19-23 @ 10:22)      A1C with Estimated Average Glucose Result: 7.6 % (03-20-23 @ 08:10)  A1C with Estimated Average Glucose Result: 7.1 % (12-20-22 @ 05:21)  A1C with Estimated Average Glucose Result: 8.5 % (09-12-22 @ 00:19)    Discussed with primary team - surgery  Inocencia Padgett  Nurse Practitioner  Division of Endocrinology & Diabetes  In house pager #[phone]/long range pager #[phone]    If before 9AM or after 6PM, or on weekends/holidays, please call endocrine answering service for assistance ([phone]).  For nonurgent matters email [email or web address] for assistance.

## 2023-03-29 NOTE — PROGRESS NOTE ADULT - PROVIDER SPECIALTY LIST ADULT
Anesthesia
Surgery
Endocrinology
Endocrinology
Surgery
Endocrinology

## 2023-03-29 NOTE — DIETITIAN INITIAL EVALUATION ADULT - OTHER INFO
RD visited with patient for length of stay.    Patient is a 52 year old female with a PMHx of DM2, HTN, incarcerated ventral hernia (S/P repair 9/22 - complicated by infected seroma 10/22) and SBO with abdominal wall abscess (S/P IR drain 12/22) who presented with purulent drainage from umbilicus x2 weeks.  Patient is now S/P sinus tract excision and wound VAC placement on 3/22/23.    Patient endorses good appetite at this time.  No chewing or swallowing difficulties reported. No GI distress reported i.e. nausea, vomiting, diarrhea. Patient stated she does not follow diabetic diet / consistent CHO diet at home and exhibits no interest in receiving diet education, deferred education (verbal and written) at this time. Reported usual body weight of 207 pounds but reported weight is not consistent with physical appearance (Appears above stated weight); current weight 153.3kg (337 pounds); 3/27 - 153.8kg, 3/26 - 155.8kg; question accuracy of weight recorded 3/20 - 140.6kg.

## 2023-03-29 NOTE — PROGRESS NOTE ADULT - ATTENDING COMMENTS
Draining sinus  a.  s/p IR drainage in Dec 22, with exchange in Jan 23  b.  Unclear event surrounding drain  c.  Persistent low volume seropurulent drainage noted  d.  Will pouch  e.  Discuss possible tract excision  f.  On IV abx, review recent cultures  g.  CT reviewed    Lichen simplex chronicus  a,.  Steroid ointment per dermatology    Candidiasis  a.  Start diflucan
Patient with ventral hernia repair complicated by surgical site infection, s/p ir drainage, and now s/p umbilical sinus tract excision. Patient doing well, tolerating diet, wound vac in place.  plan  po abx  wound vac  reg diet  dispo planning    I have personally interviewed and examined this patient, reviewed pertinent labs and imaging, and discussed the case with colleagues, residents, and physician assistants on B Team rounds.    The active care issues are:  1. s/p umbilical sinus tract excision    The Acute Care Surgery (B Team) Attending Group Practice:  Dr. Sarah Rodrigues, Dr. Jin Gonzalez, Dr. Isael Chung, Dr. Graeme Yeh,     urgent issues - spectra [phone]  nonurgent issues - [phone]  patient appointments or afterhours - [phone]
The patient was seen and examined, chart and notes reviewed.  The current diagnosis, plan of care and alternatives have been discussed with the patient.  All questions have been answered and updates have been discussed.  The case was discussed with B team residents/PA's and medical students at morning B surgical rounds and throughout the course of the day.    Agree with plan  VAC in place  Continue Bactrim  May give enema/laxative  Diet as tolerated  Discharge planning
The patient was seen and examined, chart and notes reviewed.  The current diagnosis, plan of care and alternatives have been discussed with the patient.  All questions have been answered and updates have been discussed.  The case was discussed with B team residents/PA's and medical students at morning B surgical rounds and throughout the course of the day.    Drainin sinus  a, Plan for excision+VAC placement  b.  Continue IV abx  c.  Discuss options/ risks and benefits  d.  NPO post MN
The patient was seen and examined, chart and notes reviewed.  The current diagnosis, plan of care and alternatives have been discussed with the patient.  All questions have been answered and updates have been discussed.  The case was discussed with B team residents/PA's and medical students at morning B surgical rounds and throughout the course of the day.    Plan for VAC change  Continue bactrim po  Discharge planning
Draining sinus tract r/o mesh infection  a.  Remains afebrile with normal WBC count  b.  CT reviewed from 3/19 vs 1/23  c.  On IV zosyn/vancomycin  d.  Discuss operative management to excise sinus tract with vac dressing  e.  Continue steroid cream    DM, poorly controlled  a.  On Lantus  b.  Endo consult for adjustments
Patient with surgical site infection s/p umbilical sinus resection after ventral hernia repair.  plan  discharge with wound vac  2 weeks of bactrim    I have personally interviewed and examined this patient, reviewed pertinent labs and imaging, and discussed the case with colleagues, residents, and physician assistants on B Team rounds.    The active care issues are:  1. wound care    The Acute Care Surgery (B Team) Attending Group Practice:  Dr. Sarah Rodrigues, Dr. Jin Gonzalez, Dr. Isael Chung, Dr. Graeme Yeh,     urgent issues - spectra [phone]  nonurgent issues - [phone]  patient appointments or afterhours - [phone]
Patient with ventral hernia repair complicated by surgical site infection, s/p ir drainage, and now s/p umbilical sinus tract excision. Patient doing well, tolerating diet, wound vac in place.  plan  po abx  wound vac  reg diet  dispo planning    I have personally interviewed and examined this patient, reviewed pertinent labs and imaging, and discussed the case with colleagues, residents, and physician assistants on B Team rounds.    The active care issues are:  1. s/p umbilical sinus tract excision    The Acute Care Surgery (B Team) Attending Group Practice:  Dr. Sarah Rodrigues, Dr. Jin Gonzalez, Dr. Isael Chung, Dr. Graeme Yeh,     urgent issues - spectra [phone]  nonurgent issues - [phone]  patient appointments or afterhours - [phone]
The patient was seen and examined, chart and notes reviewed.  The current diagnosis, plan of care and alternatives have been discussed with the patient.  All questions have been answered and updates have been discussed.  The case was discussed with B team residents/PA's and medical students at morning B surgical rounds and throughout the course of the day.    Draining sinus, abdominal wall abscess r/o mesh infection  a.  Patient denies pain, change in urine/bowel habits.  b.  Patient notes draining sinus near umbilicus draining clear fluid  c.  History of abdominal abscess requiring IR drainage, last in Jan which fell out on arrival at home.  Patient did not follow up with above noted fact.  Chose to come into the ED and eventually admitted.  d.  Patient imaging was reviewed, a linda discussed with both IR/GI/radiology and ventral hernia specialist re: further management  e.  Patient offered exploration, tract excision and VAC placement.  Risks and benefits discussed which include but not limited to bleeding, infection, post-op pain, injury to adjacent organs, need for further management,  Other options offered including observation, outpatient management, elective surgery and a second opinion.  She has opted for excision+ VAC understanding the prolonged healing course with VAC dressing  f. Patient remained aggressive throughout conversation and declaring that IJin was not same Dr. Gonzalez who operated on her in September.,  Facts were corrected but patient refuses to acknowledge despite presentation of hospital ID.   Will have behavioral reassess as she previously had an episode of adjustment disorder.    I spent over 50 minutes in patient care
The patient was seen and examined, chart and notes reviewed.  The current diagnosis, plan of care and alternatives have been discussed with the patient.  All questions have been answered and updates have been discussed.  The case was discussed with B team residents/PA's and medical students at morning B surgical rounds and throughout the course of the day.    VAC in place  Diet as tolerated, endo for recommendations  Transition to Bactrim x 14 days  PT assessment  Discontinue zosyn, vancomycin

## 2023-03-29 NOTE — CHART NOTE - NSCHARTNOTESELECT_GEN_ALL_CORE
Event Note
Post Op Check
Chart Note/Nutrition Services
Endocrinology
Endocrinology/Event Note
IR
Pre Operative Note

## 2023-03-29 NOTE — PROGRESS NOTE ADULT - REASON FOR ADMISSION
drainage from umbilicus

## 2023-03-29 NOTE — DIETITIAN INITIAL EVALUATION ADULT - ETIOLOGY
Endocrine dysfunction, h/o poor diet compliance, limited interest in receiving/applying diet recommendations Likely in setting of poor diet compliance and lifestyle factors

## 2023-03-29 NOTE — PROGRESS NOTE ADULT - SUBJECTIVE AND OBJECTIVE BOX
Surgery Daily Progress Note  =====================================================  Interval / Overnight Events: No acute events overnight.      HPI:  Patient is a 52 year old female with a PMHx of DM2, HTN, incarcerated ventral hernia (S/P repair 9/22 - complicated by infected seroma 10/22) and SBO with abdominal wall abscess (S/P IR drain 12/22) who presented with purulent drainage from umbilicus x2 weeks. (19 Mar 2023 09:15)      PAST MEDICAL & SURGICAL HISTORY:  HTN (hypertension)  DM (diabetes mellitus)  Asthma  S/P ovarian cystectomy  S/P repair of ventral hernia      ALLERGIES:  cefpodoxime (Rash)  estrogens (Hives; Pruritus)  peanuts (Short breath)    --------------------------------------------------------------------------------------    MEDICATIONS:    Neurologic Medications  acetaminophen     Tablet .. 975 milliGRAM(s) Oral every 8 hours  ondansetron    Tablet 4 milliGRAM(s) Oral every 6 hours PRN Nausea and/or Vomiting  oxyCODONE    IR 5 milliGRAM(s) Oral every 4 hours PRN Moderate Pain (4 - 6)  oxyCODONE    IR 10 milliGRAM(s) Oral every 4 hours PRN Severe Pain (7 - 10)    Respiratory Medications  albuterol    90 MICROgram(s) HFA Inhaler 2 Puff(s) Inhalation every 6 hours PRN Shortness of Breath and/or Wheezing  budesonide 160 MICROgram(s)/formoterol 4.5 MICROgram(s) Inhaler 2 Puff(s) Inhalation two times a day    Cardiovascular Medications  lisinopril 20 milliGRAM(s) Oral daily  torsemide 10 milliGRAM(s) Oral daily    Gastrointestinal Medications  dextrose 5%. 1000 milliLiter(s) IV Continuous <Continuous>  dextrose 5%. 1000 milliLiter(s) IV Continuous <Continuous>  polyethylene glycol 3350 17 Gram(s) Oral two times a day  saline laxative (FLEET) Rectal Enema 1 Enema Rectal daily PRN Constipation  senna 2 Tablet(s) Oral at bedtime    Hematologic/Oncologic Medications  enoxaparin Injectable 40 milliGRAM(s) SubCutaneous every 24 hours  influenza   Vaccine 0.5 milliLiter(s) IntraMuscular once    Antimicrobial/Immunologic Medications  trimethoprim  160 mG/sulfamethoxazole 800 mG 1 Tablet(s) Oral every 12 hours    Endocrine/Metabolic Medications  dextrose 50% Injectable 25 Gram(s) IV Push once  dextrose 50% Injectable 12.5 Gram(s) IV Push once  dextrose Oral Gel 15 Gram(s) Oral once  glucagon  Injectable 1 milliGRAM(s) IntraMuscular once  insulin glargine Injectable (LANTUS) 10 Unit(s) SubCutaneous at bedtime  insulin lispro (ADMELOG) corrective regimen sliding scale   SubCutaneous at bedtime  insulin lispro (ADMELOG) corrective regimen sliding scale   SubCutaneous three times a day before meals  insulin lispro Injectable (ADMELOG) 10 Unit(s) SubCutaneous three times a day before meals  simvastatin 20 milliGRAM(s) Oral at bedtime    Topical/Other Medications  lidocaine   4% Patch 1 Patch Transdermal daily  triamcinolone 0.1% Ointment 1 Application(s) Topical every 12 hours    --------------------------------------------------------------------------------------    VITAL SIGNS:  T(C): 36.9 (29 Mar 2023 01:29), Max: 37 (28 Mar 2023 22:34)  T(F): 98.5 (29 Mar 2023 01:29), Max: 98.6 (28 Mar 2023 22:34)  HR: 72 (29 Mar 2023 04:58) (61 - 89)  BP: 133/86 (29 Mar 2023 01:29) (116/58 - 133/86)  RR: 18 (29 Mar 2023 01:29) (16 - 18)  SpO2: 99% (29 Mar 2023 04:58) (95% - 100%)    --------------------------------------------------------------------------------------    INS AND OUTS:    28 Mar 2023 07:01  -  29 Mar 2023 07:00  --------------------------------------------------------  IN:    Oral Fluid: 650 mL  Total IN: 650 mL    OUT:    Drain (mL): 0 mL    Voided (mL): 700 mL  Total OUT: 700 mL    Total NET: -50 mL    --------------------------------------------------------------------------------------    EXAM    NEUROLOGY  Exam: Normal, in no acute distress.    HEENT  Exam: Normocephalic, atraumatic.    RESPIRATORY  Exam: Normal expansion / effort.    CARDIOVASCULAR  Exam: S1, S2.  Regular rate and rhythm.    GI/NUTRITION  Exam: Abdomen soft, Non-tender, Non-distended.  VAC in place.  Current Diet: Regular diet    MUSCULOSKELETAL  Exam: All extremities moving spontaneously without limitations.      METABOLIC / FLUIDS / ELECTROLYTES  dextrose 5%. 1000 milliLiter(s) IV Continuous <Continuous>  dextrose 5%. 1000 milliLiter(s) IV Continuous <Continuous>      HEMATOLOGIC  [x] VTE Prophylaxis: enoxaparin Injectable 40 milliGRAM(s) SubCutaneous every 24 hours      INFECTIOUS DISEASE  Antimicrobials/Immunologic Medications:  influenza   Vaccine 0.5 milliLiter(s) IntraMuscular once  trimethoprim  160 mG/sulfamethoxazole 800 mG 1 Tablet(s) Oral every 12 hours    --------------------------------------------------------------------------------------

## 2023-03-29 NOTE — PROGRESS NOTE ADULT - ASSESSMENT
Patient is a 52 year old female with a PMHx of DM2, HTN, incarcerated ventral hernia (S/P repair 9/22 - complicated by infected seroma 10/22) and SBO with abdominal wall abscess (S/P IR drain 12/22) who presented with purulent drainage from umbilicus x2 weeks.  Patient is now S/P sinus tract excision and wound VAC placement on 3/22/23.      PLAN:  - Regular diet  - Continue with PO Bactrim  - Out of bed  - Pain control  - VTE prophylaxis with Lovenox subcutaneous  - Discharge planning with VAC today      #[id number]  B Team Surgery Patient is a 52 year old female with a PMHx of DM2, HTN, incarcerated ventral hernia (S/P repair 9/22 - complicated by infected seroma 10/22) and SBO with abdominal wall abscess (S/P IR drain 12/22) who presented with purulent drainage from umbilicus x2 weeks.  Patient is now S/P sinus tract excision and wound VAC placement on 3/22/23.      PLAN:  - Regular diet  - Continue with PO Bactrim - dc w/ additional 2 week course  - Out of bed  - Pain control  - appreciate endo dc recs - patient to continue home regimen, declined any hypoglycemia PRNs  - VTE prophylaxis with Lovenox subcutaneous  - Discharge planning with VAC today      #[id number]  B Team Surgery

## 2023-03-30 LAB — SURGICAL PATHOLOGY STUDY: SIGNIFICANT CHANGE UP

## 2023-04-12 ENCOUNTER — INPATIENT (INPATIENT)
Facility: HOSPITAL | Age: 53
LOS: 4 days | Discharge: HOME HEALTH | End: 2023-04-17
Attending: SURGERY | Admitting: SURGERY
Payer: MEDICAID

## 2023-04-12 VITALS
OXYGEN SATURATION: 100 % | TEMPERATURE: 98 F | RESPIRATION RATE: 18 BRPM | DIASTOLIC BLOOD PRESSURE: 61 MMHG | HEART RATE: 93 BPM | SYSTOLIC BLOOD PRESSURE: 122 MMHG

## 2023-04-12 DIAGNOSIS — Z98.890 OTHER SPECIFIED POSTPROCEDURAL STATES: Chronic | ICD-10-CM

## 2023-04-12 DIAGNOSIS — L02.211 CUTANEOUS ABSCESS OF ABDOMINAL WALL: ICD-10-CM

## 2023-04-12 LAB
ALBUMIN SERPL ELPH-MCNC: 4.6 G/DL — SIGNIFICANT CHANGE UP (ref 3.3–5)
ALBUMIN SERPL ELPH-MCNC: 4.8 G/DL — SIGNIFICANT CHANGE UP (ref 3.3–5)
ALP SERPL-CCNC: 83 U/L — SIGNIFICANT CHANGE UP (ref 40–120)
ALP SERPL-CCNC: 86 U/L — SIGNIFICANT CHANGE UP (ref 40–120)
ALT FLD-CCNC: 15 U/L — SIGNIFICANT CHANGE UP (ref 4–33)
ALT FLD-CCNC: SIGNIFICANT CHANGE UP U/L (ref 4–33)
ANION GAP SERPL CALC-SCNC: 12 MMOL/L — SIGNIFICANT CHANGE UP (ref 7–14)
ANION GAP SERPL CALC-SCNC: 14 MMOL/L — SIGNIFICANT CHANGE UP (ref 7–14)
AST SERPL-CCNC: 19 U/L — SIGNIFICANT CHANGE UP (ref 4–32)
AST SERPL-CCNC: SIGNIFICANT CHANGE UP U/L (ref 4–32)
BASOPHILS # BLD AUTO: 0.03 K/UL — SIGNIFICANT CHANGE UP (ref 0–0.2)
BASOPHILS NFR BLD AUTO: 0.4 % — SIGNIFICANT CHANGE UP (ref 0–2)
BILIRUB SERPL-MCNC: 0.3 MG/DL — SIGNIFICANT CHANGE UP (ref 0.2–1.2)
BILIRUB SERPL-MCNC: 0.4 MG/DL — SIGNIFICANT CHANGE UP (ref 0.2–1.2)
BLOOD GAS VENOUS COMPREHENSIVE RESULT: SIGNIFICANT CHANGE UP
BLOOD GAS VENOUS COMPREHENSIVE RESULT: SIGNIFICANT CHANGE UP
BUN SERPL-MCNC: 20 MG/DL — SIGNIFICANT CHANGE UP (ref 7–23)
BUN SERPL-MCNC: 22 MG/DL — SIGNIFICANT CHANGE UP (ref 7–23)
CALCIUM SERPL-MCNC: 10.1 MG/DL — SIGNIFICANT CHANGE UP (ref 8.4–10.5)
CALCIUM SERPL-MCNC: 9.8 MG/DL — SIGNIFICANT CHANGE UP (ref 8.4–10.5)
CHLORIDE SERPL-SCNC: 96 MMOL/L — LOW (ref 98–107)
CHLORIDE SERPL-SCNC: 97 MMOL/L — LOW (ref 98–107)
CO2 SERPL-SCNC: 25 MMOL/L — SIGNIFICANT CHANGE UP (ref 22–31)
CO2 SERPL-SCNC: 30 MMOL/L — SIGNIFICANT CHANGE UP (ref 22–31)
CREAT SERPL-MCNC: 0.87 MG/DL — SIGNIFICANT CHANGE UP (ref 0.5–1.3)
CREAT SERPL-MCNC: 0.91 MG/DL — SIGNIFICANT CHANGE UP (ref 0.5–1.3)
EGFR: 76 ML/MIN/1.73M2 — SIGNIFICANT CHANGE UP
EGFR: 81 ML/MIN/1.73M2 — SIGNIFICANT CHANGE UP
EOSINOPHIL # BLD AUTO: 0.22 K/UL — SIGNIFICANT CHANGE UP (ref 0–0.5)
EOSINOPHIL NFR BLD AUTO: 2.9 % — SIGNIFICANT CHANGE UP (ref 0–6)
FLUAV AG NPH QL: SIGNIFICANT CHANGE UP
FLUBV AG NPH QL: SIGNIFICANT CHANGE UP
GLUCOSE BLDC GLUCOMTR-MCNC: 118 MG/DL — HIGH (ref 70–99)
GLUCOSE BLDC GLUCOMTR-MCNC: 137 MG/DL — HIGH (ref 70–99)
GLUCOSE BLDC GLUCOMTR-MCNC: 94 MG/DL — SIGNIFICANT CHANGE UP (ref 70–99)
GLUCOSE SERPL-MCNC: 184 MG/DL — HIGH (ref 70–99)
GLUCOSE SERPL-MCNC: 85 MG/DL — SIGNIFICANT CHANGE UP (ref 70–99)
HCT VFR BLD CALC: 33.8 % — LOW (ref 34.5–45)
HGB BLD-MCNC: 10.7 G/DL — LOW (ref 11.5–15.5)
IANC: 3.4 K/UL — SIGNIFICANT CHANGE UP (ref 1.8–7.4)
IMM GRANULOCYTES NFR BLD AUTO: 0.1 % — SIGNIFICANT CHANGE UP (ref 0–0.9)
LYMPHOCYTES # BLD AUTO: 3.13 K/UL — SIGNIFICANT CHANGE UP (ref 1–3.3)
LYMPHOCYTES # BLD AUTO: 41.7 % — SIGNIFICANT CHANGE UP (ref 13–44)
MCHC RBC-ENTMCNC: 24.2 PG — LOW (ref 27–34)
MCHC RBC-ENTMCNC: 31.7 GM/DL — LOW (ref 32–36)
MCV RBC AUTO: 76.3 FL — LOW (ref 80–100)
MONOCYTES # BLD AUTO: 0.71 K/UL — SIGNIFICANT CHANGE UP (ref 0–0.9)
MONOCYTES NFR BLD AUTO: 9.5 % — SIGNIFICANT CHANGE UP (ref 2–14)
NEUTROPHILS # BLD AUTO: 3.4 K/UL — SIGNIFICANT CHANGE UP (ref 1.8–7.4)
NEUTROPHILS NFR BLD AUTO: 45.4 % — SIGNIFICANT CHANGE UP (ref 43–77)
NRBC # BLD: 0 /100 WBCS — SIGNIFICANT CHANGE UP (ref 0–0)
NRBC # FLD: 0 K/UL — SIGNIFICANT CHANGE UP (ref 0–0)
PLATELET # BLD AUTO: 237 K/UL — SIGNIFICANT CHANGE UP (ref 150–400)
POTASSIUM SERPL-MCNC: 4.4 MMOL/L — SIGNIFICANT CHANGE UP (ref 3.5–5.3)
POTASSIUM SERPL-MCNC: SIGNIFICANT CHANGE UP MMOL/L (ref 3.5–5.3)
POTASSIUM SERPL-SCNC: 4.4 MMOL/L — SIGNIFICANT CHANGE UP (ref 3.5–5.3)
POTASSIUM SERPL-SCNC: SIGNIFICANT CHANGE UP MMOL/L (ref 3.5–5.3)
PROT SERPL-MCNC: 8.1 G/DL — SIGNIFICANT CHANGE UP (ref 6–8.3)
PROT SERPL-MCNC: 8.5 G/DL — HIGH (ref 6–8.3)
RBC # BLD: 4.43 M/UL — SIGNIFICANT CHANGE UP (ref 3.8–5.2)
RBC # FLD: 17.2 % — HIGH (ref 10.3–14.5)
RSV RNA NPH QL NAA+NON-PROBE: SIGNIFICANT CHANGE UP
SARS-COV-2 RNA SPEC QL NAA+PROBE: SIGNIFICANT CHANGE UP
SODIUM SERPL-SCNC: 135 MMOL/L — SIGNIFICANT CHANGE UP (ref 135–145)
SODIUM SERPL-SCNC: 139 MMOL/L — SIGNIFICANT CHANGE UP (ref 135–145)
WBC # BLD: 7.5 K/UL — SIGNIFICANT CHANGE UP (ref 3.8–10.5)
WBC # FLD AUTO: 7.5 K/UL — SIGNIFICANT CHANGE UP (ref 3.8–10.5)

## 2023-04-12 PROCEDURE — 99222 1ST HOSP IP/OBS MODERATE 55: CPT | Mod: 24

## 2023-04-12 PROCEDURE — 74177 CT ABD & PELVIS W/CONTRAST: CPT | Mod: 26,MA

## 2023-04-12 PROCEDURE — 99285 EMERGENCY DEPT VISIT HI MDM: CPT

## 2023-04-12 RX ORDER — INSULIN LISPRO 100/ML
VIAL (ML) SUBCUTANEOUS
Refills: 0 | Status: DISCONTINUED | OUTPATIENT
Start: 2023-04-12 | End: 2023-04-17

## 2023-04-12 RX ORDER — DEXTROSE 50 % IN WATER 50 %
15 SYRINGE (ML) INTRAVENOUS ONCE
Refills: 0 | Status: DISCONTINUED | OUTPATIENT
Start: 2023-04-12 | End: 2023-04-17

## 2023-04-12 RX ORDER — ALBUTEROL 90 UG/1
2 AEROSOL, METERED ORAL EVERY 6 HOURS
Refills: 0 | Status: DISCONTINUED | OUTPATIENT
Start: 2023-04-12 | End: 2023-04-17

## 2023-04-12 RX ORDER — SENNA PLUS 8.6 MG/1
2 TABLET ORAL AT BEDTIME
Refills: 0 | Status: DISCONTINUED | OUTPATIENT
Start: 2023-04-12 | End: 2023-04-17

## 2023-04-12 RX ORDER — INSULIN LISPRO 100/ML
VIAL (ML) SUBCUTANEOUS AT BEDTIME
Refills: 0 | Status: DISCONTINUED | OUTPATIENT
Start: 2023-04-12 | End: 2023-04-17

## 2023-04-12 RX ORDER — SODIUM CHLORIDE 9 MG/ML
1000 INJECTION, SOLUTION INTRAVENOUS
Refills: 0 | Status: DISCONTINUED | OUTPATIENT
Start: 2023-04-12 | End: 2023-04-13

## 2023-04-12 RX ORDER — LISINOPRIL 2.5 MG/1
20 TABLET ORAL DAILY
Refills: 0 | Status: DISCONTINUED | OUTPATIENT
Start: 2023-04-12 | End: 2023-04-17

## 2023-04-12 RX ORDER — SIMVASTATIN 20 MG/1
20 TABLET, FILM COATED ORAL AT BEDTIME
Refills: 0 | Status: DISCONTINUED | OUTPATIENT
Start: 2023-04-12 | End: 2023-04-17

## 2023-04-12 RX ORDER — PIPERACILLIN AND TAZOBACTAM 4; .5 G/20ML; G/20ML
3.38 INJECTION, POWDER, LYOPHILIZED, FOR SOLUTION INTRAVENOUS ONCE
Refills: 0 | Status: COMPLETED | OUTPATIENT
Start: 2023-04-12 | End: 2023-04-12

## 2023-04-12 RX ORDER — POLYETHYLENE GLYCOL 3350 17 G/17G
17 POWDER, FOR SOLUTION ORAL DAILY
Refills: 0 | Status: DISCONTINUED | OUTPATIENT
Start: 2023-04-12 | End: 2023-04-17

## 2023-04-12 RX ORDER — SODIUM CHLORIDE 9 MG/ML
1000 INJECTION INTRAMUSCULAR; INTRAVENOUS; SUBCUTANEOUS ONCE
Refills: 0 | Status: COMPLETED | OUTPATIENT
Start: 2023-04-12 | End: 2023-04-12

## 2023-04-12 RX ORDER — BUDESONIDE AND FORMOTEROL FUMARATE DIHYDRATE 160; 4.5 UG/1; UG/1
2 AEROSOL RESPIRATORY (INHALATION)
Refills: 0 | Status: DISCONTINUED | OUTPATIENT
Start: 2023-04-12 | End: 2023-04-17

## 2023-04-12 RX ORDER — DEXTROSE 50 % IN WATER 50 %
25 SYRINGE (ML) INTRAVENOUS ONCE
Refills: 0 | Status: DISCONTINUED | OUTPATIENT
Start: 2023-04-12 | End: 2023-04-17

## 2023-04-12 RX ORDER — PIPERACILLIN AND TAZOBACTAM 4; .5 G/20ML; G/20ML
3.38 INJECTION, POWDER, LYOPHILIZED, FOR SOLUTION INTRAVENOUS EVERY 8 HOURS
Refills: 0 | Status: DISCONTINUED | OUTPATIENT
Start: 2023-04-13 | End: 2023-04-17

## 2023-04-12 RX ORDER — GLUCAGON INJECTION, SOLUTION 0.5 MG/.1ML
1 INJECTION, SOLUTION SUBCUTANEOUS ONCE
Refills: 0 | Status: DISCONTINUED | OUTPATIENT
Start: 2023-04-12 | End: 2023-04-17

## 2023-04-12 RX ORDER — DEXTROSE 50 % IN WATER 50 %
12.5 SYRINGE (ML) INTRAVENOUS ONCE
Refills: 0 | Status: DISCONTINUED | OUTPATIENT
Start: 2023-04-12 | End: 2023-04-17

## 2023-04-12 RX ADMIN — SODIUM CHLORIDE 1000 MILLILITER(S): 9 INJECTION INTRAMUSCULAR; INTRAVENOUS; SUBCUTANEOUS at 18:00

## 2023-04-12 RX ADMIN — SIMVASTATIN 20 MILLIGRAM(S): 20 TABLET, FILM COATED ORAL at 23:47

## 2023-04-12 RX ADMIN — PIPERACILLIN AND TAZOBACTAM 200 GRAM(S): 4; .5 INJECTION, POWDER, LYOPHILIZED, FOR SOLUTION INTRAVENOUS at 21:42

## 2023-04-12 RX ADMIN — BUDESONIDE AND FORMOTEROL FUMARATE DIHYDRATE 2 PUFF(S): 160; 4.5 AEROSOL RESPIRATORY (INHALATION) at 23:47

## 2023-04-12 NOTE — ED ADULT NURSE NOTE - OBJECTIVE STATEMENT
Patient received in stretcher. AOX4. Respirations even and unlabored. Spontaneous movement of all extremities noted. Presents to ER c/o of odor from a abd wound. Patient reports testing + for MRSA and having multiple procedures done to "clear MRSA" Noted with wound vac in place. Patient reports dressing was last changed this AM. Foul odor noted. Denies N/V/D, fevers, chills Evaluated by ER MD. pending further interventions Comfort and safety maintained. All current care needs met. Care plan continued Jane EDMONDS

## 2023-04-12 NOTE — ED PROVIDER NOTE - NS ED ATTENDING STATEMENT MOD
This was a shared visit with the CAYDEN. I reviewed and verified the documentation and independently performed the documented:

## 2023-04-12 NOTE — ED PROVIDER NOTE - ATTENDING APP SHARED VISIT CONTRIBUTION OF CARE
Dr. Jack:  I performed a face to face bedside interview with patient regarding history of present illness, review of symptoms and past medical history. I completed an independent physical exam.  I have discussed patient's plan of care with PA.   I agree with note as stated above, having amended the EMR as needed to reflect my findings.   This includes HISTORY OF PRESENT ILLNESS, HIV, PAST MEDICAL/SURGICAL/FAMILY/SOCIAL HISTORY, ALLERGIES AND HOME MEDICATIONS, REVIEW OF SYSTEMS, PHYSICAL EXAM, and any PROGRESS NOTES during the time I functioned as the attending physician for this patient.    52 year old female with a PMHx of DM2, HTN, incarcerated ventral hernia (S/P repair 9/22 - complicated by infected seroma 10/22) and SBO with abdominal wall abscess (S/P IR drain 12/22) who presented with purulent drainage from umbilicus x2 weeks last month and had CT showing On 3/19 showing ventral wall abscess containing fluid and foci of air.  Significant inflammatory fatty stranding.  Questionable sinus tract the umbilicus.  Patient was started on IV Vancomycin and IV Zosyn, discharged on Bactrim.    Pt c/o 3 days of malodorous discharge to abdomen.  Feels burning sensation to skin around the area.  Denies fevers and other associated sx.  Still currently on abx at home.      Exam:  - nad  - rrr  - ctab  - abd non-tender    A/P  - malodorous discharge, eval for acute infection  - cbc, cmp, vbg, CT abd/pelvis  - surgery consult Dr. Jack:  I performed a face to face bedside interview with patient regarding history of present illness, review of symptoms and past medical history. I completed an independent physical exam.  I have discussed patient's plan of care with PA.   I agree with note as stated above, having amended the EMR as needed to reflect my findings.   This includes HISTORY OF PRESENT ILLNESS, HIV, PAST MEDICAL/SURGICAL/FAMILY/SOCIAL HISTORY, ALLERGIES AND HOME MEDICATIONS, REVIEW OF SYSTEMS, PHYSICAL EXAM, and any PROGRESS NOTES during the time I functioned as the attending physician for this patient.    52 year old female with a PMHx of DM2, HTN, incarcerated ventral hernia (S/P repair 9/22 - complicated by infected seroma 10/22) and SBO with abdominal wall abscess (S/P IR drain 12/22) who presented with purulent drainage from umbilicus x2 weeks last month and had CT showing On 3/19 showing ventral wall abscess containing fluid and foci of air.  Significant inflammatory fatty stranding.  Questionable sinus tract the umbilicus.  Patient was started on IV Vancomycin and IV Zosyn, discharged on Bactrim.    Pt c/o 3 days of malodorous discharge to abdomen.  Feels burning sensation to skin around the area.  Denies fevers and other associated sx.  Still currently on abx at home.      Exam:  - nad  - rrr  - ctab  - abd non-tender, wound vac and dressing in place with malodorous fluid    A/P  - malodorous discharge, eval for acute infection  - cbc, cmp, vbg, CT abd/pelvis  - surgery consult

## 2023-04-12 NOTE — H&P ADULT - ASSESSMENT
52F with a PMHx of morbid obesity, DM2, HTN, incarcerated ventral hernia (S/P repair 9/22 - complicated by infected seroma 10/22) and SBO with abdominal wall abscess (S/P IR drain 12/22),  purulent drainage from umbilicus s/p 3/23 excision of umbilical sinus tract and scar. Discharged with a abdomin VAC, changed MWF. Presents with malodorous vac output. Pt denies fever, chills, nausea, vomiting, CP, SOB, distension, constipation, dysuria.    PLAN  - ABx: zosyn  - Diet: reg cc, NPO md for any procedures  - ISS for now  - Insulin regimen on prior hospitalization: BG target 100-180 mg/dl; Lantus 10 units SQ qHS; Admelog to 10 units SQ TID before meals (Hold if NPO/skips meal)  - DVT ppx once weight entered  - Consult wound care for vac reapplication    Discussed with Dr. Gonzalez    Surgery Team B  [id number]

## 2023-04-12 NOTE — ED PROVIDER NOTE - CADM POA CENTRAL LINE
Patient Education        Carpal Tunnel Syndrome: Exercises  Introduction  Here are some examples of exercises for you to try. The exercises may be suggested for a condition or for rehabilitation. Start each exercise slowly. Ease off the exercises if you start to have pain. You will be told when to start these exercises and which ones will work best for you. Warm-up stretches  When you no longer have pain or numbness, you can do exercises to help prevent carpal tunnel syndrome from coming back. Do not do any stretch or movement that is uncomfortable or painful. 1. Rotate your wrist up, down, and from side to side. Repeat 4 times. 2. Stretch your fingers far apart. Relax them, and then stretch them again. Repeat 4 times. 3. Stretch your thumb by pulling it back gently, holding it, and then releasing it. Repeat 4 times. How to do the exercises  Prayer stretch   1. Start with your palms together in front of your chest just below your chin. 2. Slowly lower your hands toward your waistline, keeping your hands close to your stomach and your palms together until you feel a mild to moderate stretch under your forearms. 3. Hold for at least 15 to 30 seconds. Repeat 2 to 4 times. Wrist flexor stretch   1. Extend your arm in front of you with your palm up. 2. Bend your wrist, pointing your hand toward the floor. 3. With your other hand, gently bend your wrist farther until you feel a mild to moderate stretch in your forearm. 4. Hold for at least 15 to 30 seconds. Repeat 2 to 4 times. Wrist extensor stretch   1. Repeat steps 1 through 4 of the stretch above, but begin with your extended hand palm down. Follow-up care is a key part of your treatment and safety. Be sure to make and go to all appointments, and call your doctor if you are having problems. It's also a good idea to know your test results and keep a list of the medicines you take. Where can you learn more?   Go to
No

## 2023-04-12 NOTE — ED PROVIDER NOTE - PROGRESS NOTE DETAILS
DESTINEE Bah: Surgery evaluated pt at bedside, awaiting CT results. DESTINEE Bah: Surgery evaluated pt at bedside, awaiting CT results. On exam with surgery, wound appears to be well healing, slight ooze of blood from lower portion, no surrounding erythema or swelling. DESTINEE Bah: pt signed out awaiting surgery recommendations Jory Stringer DO (PGY2): Patient signed out to me by day team. Spoke with surgery patient to be admitted to Dr. Perez.

## 2023-04-12 NOTE — ED ADULT NURSE REASSESSMENT NOTE - NS ED NURSE REASSESS COMMENT FT1
Received report from GREY Delacruz. Pt is A&Ox4 and resting in stretcher with complaints of a burning sensation due to wound vac drainage bag being full. DESTINEE Carrillo made aware. Neuro/sensory intact. Respirations even and unlabored, chest rise equal b/l. Abdomen distended and foul odor noted from wound vac. No redness or discoloration noted to surrounding area. VS as noted in flow sheets. Safety maintained throughout. Will continue to monitor.

## 2023-04-12 NOTE — H&P ADULT - NSHPPHYSICALEXAM_GEN_ALL_CORE
General: NAD, resting comfortably, obese  HEENT: NC/AT, EOMI, normal hearing  Pulmonary: normal resp effort, patent airway  Cardiovascular: NSR, well perfused  Abdominal: soft, vac removed, midabd defect, malodorous seropurulent discharge, well granulated, mild bleeding at edges, redressed with wet-to-dry packing  Extremities: WWP, normal strength  Neuro: A/O x 3, CNs II-XII grossly intact

## 2023-04-12 NOTE — H&P ADULT - NSHPLABSRESULTS_GEN_ALL_CORE
Vital Signs Last 24 Hrs  T(C): 36.9 (12 Apr 2023 15:42), Max: 36.9 (12 Apr 2023 15:42)  T(F): 98.5 (12 Apr 2023 15:42), Max: 98.5 (12 Apr 2023 15:42)  HR: 94 (12 Apr 2023 15:42) (93 - 94)  BP: 118/81 (12 Apr 2023 15:42) (118/81 - 122/61)  BP(mean): --  RR: 16 (12 Apr 2023 15:42) (16 - 18)  SpO2: 100% (12 Apr 2023 15:42) (100% - 100%)    Parameters below as of 12 Apr 2023 15:42  Patient On (Oxygen Delivery Method): room air        LABS:                        10.7   7.50  )-----------( 237      ( 12 Apr 2023 14:40 )             33.8     04-12    135  |  96<L>  |  22  ----------------------------<  184<H>  TNP   |  25  |  0.87    Ca    9.8      12 Apr 2023 14:40    TPro  8.5<H>  /  Alb  4.6  /  TBili  0.3  /  DBili  x   /  AST  TNP  /  ALT  TNP  /  AlkPhos  86  04-12        CAPILLARY BLOOD GLUCOSE      POCT Blood Glucose.: 210 mg/dL (12 Apr 2023 13:03)    LIVER FUNCTIONS - ( 12 Apr 2023 14:40 )  Alb: 4.6 g/dL / Pro: 8.5 g/dL / ALK PHOS: 86 U/L / ALT: TNP U/L / AST: TNP U/L / GGT: x

## 2023-04-12 NOTE — ED ADULT TRIAGE NOTE - CHIEF COMPLAINT QUOTE
pt with wound vac to abdomen s/p hernia repair this past september. pt states has malodorous smell from site x 3 days. Pt with low grade temp. Pt denies pain. fis 210.

## 2023-04-12 NOTE — ED PROVIDER NOTE - OBJECTIVE STATEMENT
52 year old female with a PMHx of DM2, HTN, incarcerated ventral hernia (S/P repair 9/22 - complicated by infected seroma 10/22) and SBO with abdominal wall abscess (S/P IR drain 12/22) presenting to the ED with 3 days of malodorous discharge from wound and burning sensation. 52 year old female with a PMHx of DM2, HTN, incarcerated ventral hernia (S/P repair 9/22 - complicated by infected seroma 10/22) and SBO with abdominal wall abscess (S/P IR drain 12/22) presenting to the ED with 3 days of malodorous discharge from wound and burning sensation. Pt states since last week she was concerned about smelling an odor, this week her wound nurse also expressed concern. She was unable to be seen in her surgeons office today, came to ED. Pt denies fever/chills, headahce, dizziness, abd pain, n/v/d, cough, sob or any other concerns. States she is still on Bactrim from recent admission for abdominal wall abscess.

## 2023-04-12 NOTE — H&P ADULT - HISTORY OF PRESENT ILLNESS
52F with a PMHx of morbid obesity, DM2, HTN, incarcerated ventral hernia (S/P repair 9/22 - complicated by infected seroma 10/22) and SBO with abdominal wall abscess (S/P IR drain 12/22),  purulent drainage from umbilicus s/p 3/23 excision of umbilical sinus tract and scar. Discharged with a abdomin VAC, changed MWF. Presents with malodorous vac output. Pt denies fever, chills, nausea, vomiting, CP, SOB, distension, constipation, dysuria.

## 2023-04-12 NOTE — H&P ADULT - NSICDXPASTMEDICALHX_GEN_ALL_CORE_FT
Hpi Title: Evaluation of Skin Lesions
PAST MEDICAL HISTORY:  Asthma     DM (diabetes mellitus)     HTN (hypertension)     Obesity

## 2023-04-13 LAB
ANION GAP SERPL CALC-SCNC: 13 MMOL/L — SIGNIFICANT CHANGE UP (ref 7–14)
APTT BLD: 32.3 SEC — SIGNIFICANT CHANGE UP (ref 27–36.3)
BUN SERPL-MCNC: 15 MG/DL — SIGNIFICANT CHANGE UP (ref 7–23)
CALCIUM SERPL-MCNC: 9.3 MG/DL — SIGNIFICANT CHANGE UP (ref 8.4–10.5)
CHLORIDE SERPL-SCNC: 97 MMOL/L — LOW (ref 98–107)
CO2 SERPL-SCNC: 26 MMOL/L — SIGNIFICANT CHANGE UP (ref 22–31)
CREAT SERPL-MCNC: 0.7 MG/DL — SIGNIFICANT CHANGE UP (ref 0.5–1.3)
EGFR: 105 ML/MIN/1.73M2 — SIGNIFICANT CHANGE UP
GLUCOSE BLDC GLUCOMTR-MCNC: 169 MG/DL — HIGH (ref 70–99)
GLUCOSE BLDC GLUCOMTR-MCNC: 201 MG/DL — HIGH (ref 70–99)
GLUCOSE BLDC GLUCOMTR-MCNC: 210 MG/DL — HIGH (ref 70–99)
GLUCOSE BLDC GLUCOMTR-MCNC: 261 MG/DL — HIGH (ref 70–99)
GLUCOSE SERPL-MCNC: 183 MG/DL — HIGH (ref 70–99)
HCT VFR BLD CALC: 31 % — LOW (ref 34.5–45)
HGB BLD-MCNC: 10.1 G/DL — LOW (ref 11.5–15.5)
INR BLD: 1.13 RATIO — SIGNIFICANT CHANGE UP (ref 0.88–1.16)
MAGNESIUM SERPL-MCNC: 1.7 MG/DL — SIGNIFICANT CHANGE UP (ref 1.6–2.6)
MCHC RBC-ENTMCNC: 24.8 PG — LOW (ref 27–34)
MCHC RBC-ENTMCNC: 32.6 GM/DL — SIGNIFICANT CHANGE UP (ref 32–36)
MCV RBC AUTO: 76.2 FL — LOW (ref 80–100)
NRBC # BLD: 0 /100 WBCS — SIGNIFICANT CHANGE UP (ref 0–0)
NRBC # FLD: 0 K/UL — SIGNIFICANT CHANGE UP (ref 0–0)
PHOSPHATE SERPL-MCNC: 3.7 MG/DL — SIGNIFICANT CHANGE UP (ref 2.5–4.5)
PLATELET # BLD AUTO: 211 K/UL — SIGNIFICANT CHANGE UP (ref 150–400)
POTASSIUM SERPL-MCNC: 4.1 MMOL/L — SIGNIFICANT CHANGE UP (ref 3.5–5.3)
POTASSIUM SERPL-SCNC: 4.1 MMOL/L — SIGNIFICANT CHANGE UP (ref 3.5–5.3)
PROTHROM AB SERPL-ACNC: 13.1 SEC — SIGNIFICANT CHANGE UP (ref 10.5–13.4)
RBC # BLD: 4.07 M/UL — SIGNIFICANT CHANGE UP (ref 3.8–5.2)
RBC # FLD: 17.1 % — HIGH (ref 10.3–14.5)
SODIUM SERPL-SCNC: 136 MMOL/L — SIGNIFICANT CHANGE UP (ref 135–145)
WBC # BLD: 5.77 K/UL — SIGNIFICANT CHANGE UP (ref 3.8–10.5)
WBC # FLD AUTO: 5.77 K/UL — SIGNIFICANT CHANGE UP (ref 3.8–10.5)

## 2023-04-13 RX ORDER — ENOXAPARIN SODIUM 100 MG/ML
40 INJECTION SUBCUTANEOUS EVERY 12 HOURS
Refills: 0 | Status: DISCONTINUED | OUTPATIENT
Start: 2023-04-13 | End: 2023-04-17

## 2023-04-13 RX ORDER — DEXTROSE MONOHYDRATE, SODIUM CHLORIDE, AND POTASSIUM CHLORIDE 50; .745; 4.5 G/1000ML; G/1000ML; G/1000ML
1000 INJECTION, SOLUTION INTRAVENOUS
Refills: 0 | Status: DISCONTINUED | OUTPATIENT
Start: 2023-04-13 | End: 2023-04-15

## 2023-04-13 RX ADMIN — Medication 10 MILLIGRAM(S): at 06:29

## 2023-04-13 RX ADMIN — SIMVASTATIN 20 MILLIGRAM(S): 20 TABLET, FILM COATED ORAL at 22:32

## 2023-04-13 RX ADMIN — Medication 1: at 07:29

## 2023-04-13 RX ADMIN — ENOXAPARIN SODIUM 40 MILLIGRAM(S): 100 INJECTION SUBCUTANEOUS at 06:29

## 2023-04-13 RX ADMIN — LISINOPRIL 20 MILLIGRAM(S): 2.5 TABLET ORAL at 06:28

## 2023-04-13 RX ADMIN — Medication 1: at 22:32

## 2023-04-13 RX ADMIN — Medication 2: at 18:15

## 2023-04-13 RX ADMIN — SENNA PLUS 2 TABLET(S): 8.6 TABLET ORAL at 22:32

## 2023-04-13 RX ADMIN — BUDESONIDE AND FORMOTEROL FUMARATE DIHYDRATE 2 PUFF(S): 160; 4.5 AEROSOL RESPIRATORY (INHALATION) at 22:32

## 2023-04-13 RX ADMIN — ENOXAPARIN SODIUM 40 MILLIGRAM(S): 100 INJECTION SUBCUTANEOUS at 18:15

## 2023-04-13 RX ADMIN — DEXTROSE MONOHYDRATE, SODIUM CHLORIDE, AND POTASSIUM CHLORIDE 150 MILLILITER(S): 50; .745; 4.5 INJECTION, SOLUTION INTRAVENOUS at 04:18

## 2023-04-13 NOTE — PATIENT PROFILE ADULT - MEDICATIONS/VISITS
Pt states she left work because she was not feeling well, and her lymph nodes are very swollen. Pt said almost everyone at her job has the flu, and that also made pt leave work, because it made her very nervous. Pt is asking if she needs to come in to get checked out?    Please advise: [phone]         no

## 2023-04-13 NOTE — ADVANCED PRACTICE NURSE CONSULT - ASSESSMENT
Plan discussed with patient. Patient with abdominal wound JERRY, wound cleansed and dressed. See A&I flowsheet for full assessment details.  Plan discussed with patient. Patient with abdominal wound JERRY, wound cleansed, wet to dry dressing placed. See A&I flowsheet for full assessment details.

## 2023-04-13 NOTE — PROGRESS NOTE ADULT - ASSESSMENT
52F with a PMHx of morbid obesity, DM2, HTN, incarcerated ventral hernia (S/P repair 9/22 - complicated by infected seroma 10/22) and SBO with abdominal wall abscess (S/P IR drain 12/22),  purulent drainage from umbilicus s/p 3/23 excision of umbilical sinus tract and scar. Discharged with a abdomin VAC, changed MWF. Presents with malodorous vac output. Pt denies fever, chills, nausea, vomiting, CP, SOB, distension, constipation, dysuria.    PLAN  - ABx: zosyn  - Diet: reg cc  - Insulin regimen on prior hospitalization: BG target 100-180 mg/dl; Lantus 10 units SQ qHS; Admelog to 10 units SQ TID before meals (Hold if NPO/skips meal)  - Consult wound care for vac reapplication, likely tomorrow  - pt may shower today  - dressing to be applied until vac placed      Surgery Team B  [id number]       52F with a PMHx of morbid obesity, DM2, HTN, incarcerated ventral hernia (S/P repair 9/22 - complicated by infected seroma 10/22) and SBO with abdominal wall abscess (S/P IR drain 12/22),  purulent drainage from umbilicus s/p 3/23 excision of umbilical sinus tract and scar. Discharged with a abdomin VAC, changed MWF. Presents with malodorous vac output. Pt denies fever, chills, nausea, vomiting, CP, SOB, distension, constipation, dysuria.    PLAN  - ABx: zosyn  - Diet: reg cc  - Consult wound care for vac reapplication, likely tomorrow  - pt may shower today  - dressing to be applied until vac placed      Surgery Team B  [id number]

## 2023-04-13 NOTE — PROGRESS NOTE ADULT - SUBJECTIVE AND OBJECTIVE BOX
Surgery Progress Note     24hour Events:     Subjective:  Patient seen and examined.       Vital Signs:  Vital Signs Last 24 Hrs  T(C): 36.7 (13 Apr 2023 09:57), Max: 37.4 (13 Apr 2023 01:26)  T(F): 98.1 (13 Apr 2023 09:57), Max: 99.3 (13 Apr 2023 01:26)  HR: 81 (13 Apr 2023 09:57) (81 - 94)  BP: 121/68 (13 Apr 2023 09:57) (115/64 - 142/88)  BP(mean): --  RR: 18 (13 Apr 2023 09:57) (16 - 19)  SpO2: 98% (13 Apr 2023 09:57) (96% - 100%)    Parameters below as of 13 Apr 2023 09:57  Patient On (Oxygen Delivery Method): room air        CAPILLARY BLOOD GLUCOSE      POCT Blood Glucose.: 169 mg/dL (13 Apr 2023 07:23)  POCT Blood Glucose.: 137 mg/dL (12 Apr 2023 23:00)  POCT Blood Glucose.: 118 mg/dL (12 Apr 2023 22:12)  POCT Blood Glucose.: 94 mg/dL (12 Apr 2023 21:05)  POCT Blood Glucose.: 210 mg/dL (12 Apr 2023 13:03)      I&O's Detail        Physical Exam:  General: NAD, resting comfortably in bed  HEENT: Normocephalic atraumatic  Respiratory: Nonlabored respirations  Cardio: pulse present  Abdomen: softly distended, appropriately tender, surgical incisions are c/d/i. NGT/OGT*  Vascular: extremities are warm and well perfused.       Labs:    04-13    136  |  97<L>  |  15  ----------------------------<  183<H>  4.1   |  26  |  0.70    Ca    9.3      13 Apr 2023 06:44  Phos  3.7     04-13  Mg     1.70     04-13    TPro  8.1  /  Alb  4.8  /  TBili  0.4  /  DBili  x   /  AST  19  /  ALT  15  /  AlkPhos  83  04-12    LIVER FUNCTIONS - ( 12 Apr 2023 19:46 )  Alb: 4.8 g/dL / Pro: 8.1 g/dL / ALK PHOS: 83 U/L / ALT: 15 U/L / AST: 19 U/L / GGT: x                                 10.1   5.77  )-----------( 211      ( 13 Apr 2023 06:44 )             31.0     PT/INR - ( 13 Apr 2023 06:44 )   PT: 13.1 sec;   INR: 1.13 ratio         PTT - ( 13 Apr 2023 06:44 )  PTT:32.3 sec     Surgery Progress Note     Subjective:  Patient seen and examined. Pt would like to shower. Is concerned about foul smelling vac output at home.      Vital Signs:  Vital Signs Last 24 Hrs  T(C): 36.7 (13 Apr 2023 09:57), Max: 37.4 (13 Apr 2023 01:26)  T(F): 98.1 (13 Apr 2023 09:57), Max: 99.3 (13 Apr 2023 01:26)  HR: 81 (13 Apr 2023 09:57) (81 - 94)  BP: 121/68 (13 Apr 2023 09:57) (115/64 - 142/88)  BP(mean): --  RR: 18 (13 Apr 2023 09:57) (16 - 19)  SpO2: 98% (13 Apr 2023 09:57) (96% - 100%)    Parameters below as of 13 Apr 2023 09:57  Patient On (Oxygen Delivery Method): room air        CAPILLARY BLOOD GLUCOSE      POCT Blood Glucose.: 169 mg/dL (13 Apr 2023 07:23)  POCT Blood Glucose.: 137 mg/dL (12 Apr 2023 23:00)  POCT Blood Glucose.: 118 mg/dL (12 Apr 2023 22:12)  POCT Blood Glucose.: 94 mg/dL (12 Apr 2023 21:05)  POCT Blood Glucose.: 210 mg/dL (12 Apr 2023 13:03)      I&O's Detail        Physical Exam:  General: NAD, resting comfortably, obese  HEENT: NC/AT, EOMI, normal hearing  Pulmonary: normal resp effort, patent airway  Abdominal: soft, midabd defect appears clean w/ fibrinous exudate, well granulated  Extremities: WWP, normal strength  Neuro: A/O x 3, CNs II-XII grossly intact      Labs:    04-13    136  |  97<L>  |  15  ----------------------------<  183<H>  4.1   |  26  |  0.70    Ca    9.3      13 Apr 2023 06:44  Phos  3.7     04-13  Mg     1.70     04-13    TPro  8.1  /  Alb  4.8  /  TBili  0.4  /  DBili  x   /  AST  19  /  ALT  15  /  AlkPhos  83  04-12    LIVER FUNCTIONS - ( 12 Apr 2023 19:46 )  Alb: 4.8 g/dL / Pro: 8.1 g/dL / ALK PHOS: 83 U/L / ALT: 15 U/L / AST: 19 U/L / GGT: x                                 10.1   5.77  )-----------( 211      ( 13 Apr 2023 06:44 )             31.0     PT/INR - ( 13 Apr 2023 06:44 )   PT: 13.1 sec;   INR: 1.13 ratio         PTT - ( 13 Apr 2023 06:44 )  PTT:32.3 sec

## 2023-04-14 ENCOUNTER — APPOINTMENT (OUTPATIENT)
Dept: TRAUMA SURGERY | Facility: HOSPITAL | Age: 53
End: 2023-04-14

## 2023-04-14 LAB
GLUCOSE BLDC GLUCOMTR-MCNC: 202 MG/DL — HIGH (ref 70–99)
GLUCOSE BLDC GLUCOMTR-MCNC: 211 MG/DL — HIGH (ref 70–99)
GLUCOSE BLDC GLUCOMTR-MCNC: 241 MG/DL — HIGH (ref 70–99)
GLUCOSE BLDC GLUCOMTR-MCNC: 264 MG/DL — HIGH (ref 70–99)
GLUCOSE BLDC GLUCOMTR-MCNC: 301 MG/DL — HIGH (ref 70–99)

## 2023-04-14 PROCEDURE — 74176 CT ABD & PELVIS W/O CONTRAST: CPT | Mod: 26

## 2023-04-14 PROCEDURE — 99232 SBSQ HOSP IP/OBS MODERATE 35: CPT | Mod: GC

## 2023-04-14 RX ADMIN — Medication 2: at 07:34

## 2023-04-14 RX ADMIN — Medication 2: at 22:03

## 2023-04-14 RX ADMIN — SENNA PLUS 2 TABLET(S): 8.6 TABLET ORAL at 23:04

## 2023-04-14 RX ADMIN — Medication 10 MILLIGRAM(S): at 06:33

## 2023-04-14 RX ADMIN — BUDESONIDE AND FORMOTEROL FUMARATE DIHYDRATE 2 PUFF(S): 160; 4.5 AEROSOL RESPIRATORY (INHALATION) at 11:43

## 2023-04-14 RX ADMIN — LISINOPRIL 20 MILLIGRAM(S): 2.5 TABLET ORAL at 06:34

## 2023-04-14 RX ADMIN — ENOXAPARIN SODIUM 40 MILLIGRAM(S): 100 INJECTION SUBCUTANEOUS at 18:52

## 2023-04-14 RX ADMIN — DEXTROSE MONOHYDRATE, SODIUM CHLORIDE, AND POTASSIUM CHLORIDE 150 MILLILITER(S): 50; .745; 4.5 INJECTION, SOLUTION INTRAVENOUS at 23:00

## 2023-04-14 RX ADMIN — SIMVASTATIN 20 MILLIGRAM(S): 20 TABLET, FILM COATED ORAL at 22:07

## 2023-04-14 RX ADMIN — BUDESONIDE AND FORMOTEROL FUMARATE DIHYDRATE 2 PUFF(S): 160; 4.5 AEROSOL RESPIRATORY (INHALATION) at 22:05

## 2023-04-14 RX ADMIN — Medication 2: at 18:51

## 2023-04-14 RX ADMIN — Medication 3: at 11:42

## 2023-04-14 RX ADMIN — PIPERACILLIN AND TAZOBACTAM 25 GRAM(S): 4; .5 INJECTION, POWDER, LYOPHILIZED, FOR SOLUTION INTRAVENOUS at 23:00

## 2023-04-14 RX ADMIN — ENOXAPARIN SODIUM 40 MILLIGRAM(S): 100 INJECTION SUBCUTANEOUS at 06:34

## 2023-04-14 NOTE — ADVANCED PRACTICE NURSE CONSULT - REASON FOR CONSULT
Patient seen on skin care rounds for NPWT/VAC placement to midline abdomen. 
Wound care consulted for vac placement to abdominal wound. As per surgical MD Inocencia Chow, plan for NPWT VAC placement tomorrow.

## 2023-04-14 NOTE — ADVANCED PRACTICE NURSE CONSULT - RECOMMEDATIONS
Wound care team to follow up for NPWT VAC placement tomorrow, discussed with patient and primary RN Pardeep Pretty. 
Wound care team will continue to see patient on M W F schedule.

## 2023-04-14 NOTE — PROGRESS NOTE ADULT - ATTENDING COMMENTS
management plan d/w primary surgeon  will continue to encourage patient and offer empathetic listening  supportive care patient lying comfortably in bed undergoing wound care by wound care nurse specialist and surgical intern  abd soft, mild hyperpigmentation around shallow based healthy granulation tissue bed of wound healing by secondary intention, no signs of infection (erythema/edema/odor/drainage)                          10.1   5.77  )-----------( 211      ( 13 Apr 2023 06:44 )             31.0     CT scan also consistent with expected postsurgical changes    impression is slowly healing wound without signs of hernia recurrence or infection  patient is belligerent, refusing medical therapies and additional diagnostics    management plan d/w primary surgeon  will continue to encourage patient and offer empathetic listening; patient refused for me to call her sister during visit for update  consult patient/family relations for patient advocacy  supportive care patient lying comfortably in bed undergoing wound care by wound care nurse specialist and surgical intern  abd soft, mild hyperpigmentation around shallow based healthy granulation tissue bed of wound healing by secondary intention, no signs of infection (erythema/edema/odor/drainage)                          10.1   5.77  )-----------( 211      ( 13 Apr 2023 06:44 )             31.0     CT scan also consistent with expected postsurgical changes    impression is slowly healing wound without signs of infection  patient is belligerent, refusing medical therapies and additional diagnostics, specifically a repeat CT scan this time with oral and rectal contrast to better elucidate hernia repair integrity and concern for CCF    management plan d/w primary surgeon, who has also spoken with her on multiple previous occasions  will continue to encourage patient and offer empathetic listening; patient refused for me to call her sister during visit for update  consult patient/family relations for patient advocacy  supportive care

## 2023-04-14 NOTE — PROGRESS NOTE ADULT - SUBJECTIVE AND OBJECTIVE BOX
B TEAM SURGERY DAILY PROGRESS NOTE    SUBJECTIVE:     Overnight: no acute events   Patient seen and evaluated on AM rounds. ***.        OBJECTIVE:  Vital Signs Last 24 Hrs  T(C): 36.9 (2023 02:00), Max: 37.1 (2023 14:13)  T(F): 98.5 (2023 02:00), Max: 98.8 (2023 14:13)  HR: 68 (2023 02:00) (68 - 90)  BP: 94/58 (2023 02:00) (94/58 - 128/76)  RR: 17 (2023 02:00) (17 - 18)  SpO2: 98% (2023 02:00) (95% - 98%)    Parameters below as of 2023 02:00  Patient On (Oxygen Delivery Method): room air      Daily     Daily Weight in k.6 (2023 02:00)    STANDING  budesonide 160 MICROgram(s)/formoterol 4.5 MICROgram(s) Inhaler 2 Puff(s) Inhalation two times a day  dextrose 50% Injectable 25 Gram(s) IV Push once  dextrose 50% Injectable 12.5 Gram(s) IV Push once  dextrose 50% Injectable 25 Gram(s) IV Push once  enoxaparin Injectable 40 milliGRAM(s) SubCutaneous every 12 hours  glucagon  Injectable 1 milliGRAM(s) IntraMuscular once  insulin lispro (ADMELOG) corrective regimen sliding scale   SubCutaneous three times a day before meals  insulin lispro (ADMELOG) corrective regimen sliding scale   SubCutaneous at bedtime  lisinopril 20 milliGRAM(s) Oral daily  piperacillin/tazobactam IVPB.. 3.375 Gram(s) IV Intermittent every 8 hours  senna 2 Tablet(s) Oral at bedtime  simvastatin 20 milliGRAM(s) Oral at bedtime  sodium chloride 0.9% with potassium chloride 20 mEq/L 1000 milliLiter(s) (150 mL/Hr) IV Continuous <Continuous>  torsemide 10 milliGRAM(s) Oral daily    PRN  albuterol    90 MICROgram(s) HFA Inhaler 2 Puff(s) Inhalation every 6 hours PRN Shortness of Breath and/or Wheezing  dextrose Oral Gel 15 Gram(s) Oral once PRN Blood Glucose LESS THAN 70 milliGRAM(s)/deciliter  polyethylene glycol 3350 17 Gram(s) Oral daily PRN Constipation      Labs:  136  |  97<L>  |  15  ----------------------------<  183<H>    ()  4.1   |  26  |  0.70            Ca    9.3        Mg    1.70  Phos  3.7          PT: 13.1 sec       aPTT: 32.3 sec   INR: 1.13 ratio           Physical Exam:  General: NAD, resting comfortably, obese  HEENT: NC/AT, EOMI, normal hearing  Pulmonary: normal resp effort, patent airway  Abdominal: soft, midabd defect appears clean w/ fibrinous exudate, well granulated  Extremities: normal strength  Neuro: A/O x 3   B TEAM SURGERY DAILY PROGRESS NOTE    SUBJECTIVE:     Overnight: no acute events   Patient seen and evaluated on AM rounds. Pt continues to refuse abx, medications, and won't go to her CT scan until she is seen by an attending. Reports pruritis along her abdomen.       OBJECTIVE:  Vital Signs Last 24 Hrs  T(C): 36.9 (2023 02:00), Max: 37.1 (2023 14:13)  T(F): 98.5 (2023 02:00), Max: 98.8 (2023 14:13)  HR: 68 (2023 02:00) (68 - 90)  BP: 94/58 (2023 02:00) (94/58 - 128/76)  RR: 17 (2023 02:00) (17 - 18)  SpO2: 98% (2023 02:00) (95% - 98%)    Parameters below as of 2023 02:00  Patient On (Oxygen Delivery Method): room air      Daily     Daily Weight in k.6 (2023 02:00)    STANDING  budesonide 160 MICROgram(s)/formoterol 4.5 MICROgram(s) Inhaler 2 Puff(s) Inhalation two times a day  dextrose 50% Injectable 25 Gram(s) IV Push once  dextrose 50% Injectable 12.5 Gram(s) IV Push once  dextrose 50% Injectable 25 Gram(s) IV Push once  enoxaparin Injectable 40 milliGRAM(s) SubCutaneous every 12 hours  glucagon  Injectable 1 milliGRAM(s) IntraMuscular once  insulin lispro (ADMELOG) corrective regimen sliding scale   SubCutaneous three times a day before meals  insulin lispro (ADMELOG) corrective regimen sliding scale   SubCutaneous at bedtime  lisinopril 20 milliGRAM(s) Oral daily  piperacillin/tazobactam IVPB.. 3.375 Gram(s) IV Intermittent every 8 hours  senna 2 Tablet(s) Oral at bedtime  simvastatin 20 milliGRAM(s) Oral at bedtime  sodium chloride 0.9% with potassium chloride 20 mEq/L 1000 milliLiter(s) (150 mL/Hr) IV Continuous <Continuous>  torsemide 10 milliGRAM(s) Oral daily    PRN  albuterol    90 MICROgram(s) HFA Inhaler 2 Puff(s) Inhalation every 6 hours PRN Shortness of Breath and/or Wheezing  dextrose Oral Gel 15 Gram(s) Oral once PRN Blood Glucose LESS THAN 70 milliGRAM(s)/deciliter  polyethylene glycol 3350 17 Gram(s) Oral daily PRN Constipation      Labs:  136  |  97<L>  |  15  ----------------------------<  183<H>    ()  4.1   |  26  |  0.70            Ca    9.3        Mg    1.70  Phos  3.7          PT: 13.1 sec       aPTT: 32.3 sec   INR: 1.13 ratio           Physical Exam:  General: NAD, resting comfortably, obese  HEENT: NC/AT, EOMI, normal hearing  Pulmonary: normal resp effort, patent airway  Abdominal: soft, midabd defect appears clean w/ fibrinous exudate, well granulated  Extremities: normal strength  Neuro: A/O x 3

## 2023-04-14 NOTE — PROGRESS NOTE ADULT - ASSESSMENT
52F with a PMHx of morbid obesity, DM2, HTN, incarcerated ventral hernia (S/P repair 9/22 - complicated by infected seroma 10/22) and SBO with abdominal wall abscess (S/P IR drain 12/22),  purulent drainage from umbilicus s/p 3/23 excision of umbilical sinus tract and scar. Discharged with a abdomin VAC, changed MWF. Presents with malodorous vac output. Pt denies fever, chills, nausea, vomiting, CP, SOB, distension, constipation, dysuria.    Plan:   - Appreciate wound c/s     - Vac re-application today   - Diet: Reg, CC/IVF  - Continue abx: Zosyn   - DVT ppx: LVX   - Dispo: pending       B Team Surgery   [id number] 52F with a PMHx of morbid obesity, DM2, HTN, incarcerated ventral hernia (S/P repair 9/22 - complicated by infected seroma 10/22) and SBO with abdominal wall abscess (S/P IR drain 12/22),  purulent drainage from umbilicus s/p 3/23 excision of umbilical sinus tract and scar. Discharged with a abdomin VAC, changed MWF. Presents with malodorous vac output. Pt denies fever, chills, nausea, vomiting, CP, SOB, distension, constipation, dysuria.    Plan:   - Appreciate wound c/s     - Vac re-application today   - pending CT w/ PO and IV contrast to evaluate for fistula  - Diet: Reg, CC/IVF  - Continue abx: Zosyn   - DVT ppx: LVX   - Dispo: pending       B Team Surgery   [id number]

## 2023-04-14 NOTE — ADVANCED PRACTICE NURSE CONSULT - ASSESSMENT
Patient is a&ox4, NPWT VAC therapy and dressing changes explained to patient, patient verbalized understanding. All questions answered. Wound assessed and dressing placed with surgical MD Loli Cramer at bedside. See A&I flowsheet for full assessment details.

## 2023-04-15 LAB
ANION GAP SERPL CALC-SCNC: 12 MMOL/L — SIGNIFICANT CHANGE UP (ref 7–14)
BUN SERPL-MCNC: 16 MG/DL — SIGNIFICANT CHANGE UP (ref 7–23)
CALCIUM SERPL-MCNC: 9.2 MG/DL — SIGNIFICANT CHANGE UP (ref 8.4–10.5)
CHLORIDE SERPL-SCNC: 97 MMOL/L — LOW (ref 98–107)
CO2 SERPL-SCNC: 23 MMOL/L — SIGNIFICANT CHANGE UP (ref 22–31)
CREAT SERPL-MCNC: 0.59 MG/DL — SIGNIFICANT CHANGE UP (ref 0.5–1.3)
EGFR: 109 ML/MIN/1.73M2 — SIGNIFICANT CHANGE UP
GLUCOSE BLDC GLUCOMTR-MCNC: 230 MG/DL — HIGH (ref 70–99)
GLUCOSE BLDC GLUCOMTR-MCNC: 249 MG/DL — HIGH (ref 70–99)
GLUCOSE BLDC GLUCOMTR-MCNC: 263 MG/DL — HIGH (ref 70–99)
GLUCOSE BLDC GLUCOMTR-MCNC: 340 MG/DL — HIGH (ref 70–99)
GLUCOSE SERPL-MCNC: 227 MG/DL — HIGH (ref 70–99)
HCT VFR BLD CALC: 34.1 % — LOW (ref 34.5–45)
HGB BLD-MCNC: 10.9 G/DL — LOW (ref 11.5–15.5)
MAGNESIUM SERPL-MCNC: 2 MG/DL — SIGNIFICANT CHANGE UP (ref 1.6–2.6)
MCHC RBC-ENTMCNC: 24.5 PG — LOW (ref 27–34)
MCHC RBC-ENTMCNC: 32 GM/DL — SIGNIFICANT CHANGE UP (ref 32–36)
MCV RBC AUTO: 76.8 FL — LOW (ref 80–100)
NRBC # BLD: 0 /100 WBCS — SIGNIFICANT CHANGE UP (ref 0–0)
NRBC # FLD: 0 K/UL — SIGNIFICANT CHANGE UP (ref 0–0)
PHOSPHATE SERPL-MCNC: 3.4 MG/DL — SIGNIFICANT CHANGE UP (ref 2.5–4.5)
PLATELET # BLD AUTO: 192 K/UL — SIGNIFICANT CHANGE UP (ref 150–400)
POTASSIUM SERPL-MCNC: 4.1 MMOL/L — SIGNIFICANT CHANGE UP (ref 3.5–5.3)
POTASSIUM SERPL-SCNC: 4.1 MMOL/L — SIGNIFICANT CHANGE UP (ref 3.5–5.3)
RBC # BLD: 4.44 M/UL — SIGNIFICANT CHANGE UP (ref 3.8–5.2)
RBC # FLD: 16.5 % — HIGH (ref 10.3–14.5)
SODIUM SERPL-SCNC: 132 MMOL/L — LOW (ref 135–145)
WBC # BLD: 5.41 K/UL — SIGNIFICANT CHANGE UP (ref 3.8–10.5)
WBC # FLD AUTO: 5.41 K/UL — SIGNIFICANT CHANGE UP (ref 3.8–10.5)

## 2023-04-15 PROCEDURE — 99232 SBSQ HOSP IP/OBS MODERATE 35: CPT | Mod: GC

## 2023-04-15 RX ADMIN — ENOXAPARIN SODIUM 40 MILLIGRAM(S): 100 INJECTION SUBCUTANEOUS at 17:18

## 2023-04-15 RX ADMIN — PIPERACILLIN AND TAZOBACTAM 25 GRAM(S): 4; .5 INJECTION, POWDER, LYOPHILIZED, FOR SOLUTION INTRAVENOUS at 07:02

## 2023-04-15 RX ADMIN — PIPERACILLIN AND TAZOBACTAM 25 GRAM(S): 4; .5 INJECTION, POWDER, LYOPHILIZED, FOR SOLUTION INTRAVENOUS at 22:18

## 2023-04-15 RX ADMIN — LISINOPRIL 20 MILLIGRAM(S): 2.5 TABLET ORAL at 07:03

## 2023-04-15 RX ADMIN — Medication 4: at 11:20

## 2023-04-15 RX ADMIN — PIPERACILLIN AND TAZOBACTAM 25 GRAM(S): 4; .5 INJECTION, POWDER, LYOPHILIZED, FOR SOLUTION INTRAVENOUS at 13:44

## 2023-04-15 RX ADMIN — Medication 10 MILLIGRAM(S): at 07:02

## 2023-04-15 RX ADMIN — ENOXAPARIN SODIUM 40 MILLIGRAM(S): 100 INJECTION SUBCUTANEOUS at 07:03

## 2023-04-15 RX ADMIN — SIMVASTATIN 20 MILLIGRAM(S): 20 TABLET, FILM COATED ORAL at 22:17

## 2023-04-15 RX ADMIN — SENNA PLUS 2 TABLET(S): 8.6 TABLET ORAL at 22:17

## 2023-04-15 RX ADMIN — BUDESONIDE AND FORMOTEROL FUMARATE DIHYDRATE 2 PUFF(S): 160; 4.5 AEROSOL RESPIRATORY (INHALATION) at 22:18

## 2023-04-15 RX ADMIN — Medication 2: at 16:30

## 2023-04-15 RX ADMIN — Medication 2: at 07:55

## 2023-04-15 RX ADMIN — Medication 1: at 22:33

## 2023-04-15 NOTE — PROGRESS NOTE ADULT - ATTENDING COMMENTS
Patient no overnight issues. Awaiting wound vac supplies. Dispo planning, maintain on abx    I have personally interviewed and examined this patient, reviewed pertinent labs and imaging, and discussed the case with colleagues, residents, and physician assistants on B Team rounds.    The active care issues are:  1. chronic abdominal wound    The Acute Care Surgery (B Team) Attending Group Practice:  Dr. Sarah Rodrigues, Dr. Jin Gonzalez, Dr. Isael Chung, Dr. Graeme Yeh,     urgent issues - spectra [phone]  nonurgent issues - [phone]  patient appointments or afterhours - [phone]

## 2023-04-15 NOTE — PROGRESS NOTE ADULT - ASSESSMENT
52F with a PMHx of morbid obesity, DM2, HTN, incarcerated ventral hernia (S/P repair 9/22 - complicated by infected seroma 10/22) and SBO with abdominal wall abscess (S/P IR drain 12/22),  purulent drainage from umbilicus s/p 3/23 excision of umbilical sinus tract and scar. Discharged with a abdomin VAC, changed MWF. Presents with malodorous vac output. Pt denies fever, chills, nausea, vomiting, CP, SOB, distension, constipation, dysuria.    Plan:   - F/u final CT A/P read   - Appreciate wound c/s, continue vac   - Diet: Reg, CC/IVF  - Continue abx: Zosyn   - DVT ppx: LVX   - Dispo: pending       B Team Surgery   [id number]

## 2023-04-15 NOTE — PROGRESS NOTE ADULT - SUBJECTIVE AND OBJECTIVE BOX
B TEAM SURGERY DAILY PROGRESS NOTE    SUBJECTIVE:     Overnight: no acute events   Patient seen and evaluated on AM rounds. Feeling well this AM     OBJECTIVE:  Vital Signs Last 24 Hrs  T(C): 36.7 (15 Apr 2023 09:49), Max: 37.7 (14 Apr 2023 14:42)  T(F): 98.1 (15 Apr 2023 09:49), Max: 99.8 (14 Apr 2023 14:42)  HR: 91 (15 Apr 2023 09:49) (68 - 93)  BP: 104/66 (15 Apr 2023 09:49) (93/42 - 127/75)  BP(mean): --  RR: 18 (15 Apr 2023 09:49) (17 - 18)  SpO2: 99% (15 Apr 2023 09:49) (96% - 100%)    Parameters below as of 15 Apr 2023 09:49  Patient On (Oxygen Delivery Method): room air      Daily         04-14  -  04-15  --------------------------------------------------------  IN:    IV PiggyBack: 100 mL    Oral Fluid: 450 mL    sodium chloride 0.9% w/ Additives: 200 mL  Total IN: 750 mL    OUT:    Voided (mL): 950 mL  Total OUT: 950 mL          STANDING  budesonide 160 MICROgram(s)/formoterol 4.5 MICROgram(s) Inhaler 2 Puff(s) Inhalation two times a day  dextrose 50% Injectable 25 Gram(s) IV Push once  dextrose 50% Injectable 12.5 Gram(s) IV Push once  dextrose 50% Injectable 25 Gram(s) IV Push once  enoxaparin Injectable 40 milliGRAM(s) SubCutaneous every 12 hours  glucagon  Injectable 1 milliGRAM(s) IntraMuscular once  insulin lispro (ADMELOG) corrective regimen sliding scale   SubCutaneous three times a day before meals  insulin lispro (ADMELOG) corrective regimen sliding scale   SubCutaneous at bedtime  lisinopril 20 milliGRAM(s) Oral daily  piperacillin/tazobactam IVPB.. 3.375 Gram(s) IV Intermittent every 8 hours  senna 2 Tablet(s) Oral at bedtime  simvastatin 20 milliGRAM(s) Oral at bedtime  sodium chloride 0.9% with potassium chloride 20 mEq/L 1000 milliLiter(s) (150 mL/Hr) IV Continuous <Continuous>  torsemide 10 milliGRAM(s) Oral daily    PRN  albuterol    90 MICROgram(s) HFA Inhaler 2 Puff(s) Inhalation every 6 hours PRN Shortness of Breath and/or Wheezing  dextrose Oral Gel 15 Gram(s) Oral once PRN Blood Glucose LESS THAN 70 milliGRAM(s)/deciliter  polyethylene glycol 3350 17 Gram(s) Oral daily PRN Constipation      Labs:  132<L>  |  97<L>  |  16  ----------------------------<  227<H>    (04-15)  4.1   |  23  |  0.59            Ca    9.2      04-15  Mg    2.00  Phos  3.4                Physical Exam:  General: well developed, well nourished, NAD  Cardiovascular: appears well perfused   Respiratory: respirations non labored  Gastrointestinal: soft, nontender, nondistended. Vac in place holding suction   Extremities: FROM, warm  Neurological: A+Ox3

## 2023-04-16 LAB
ANION GAP SERPL CALC-SCNC: 11 MMOL/L — SIGNIFICANT CHANGE UP (ref 7–14)
BUN SERPL-MCNC: 13 MG/DL — SIGNIFICANT CHANGE UP (ref 7–23)
CALCIUM SERPL-MCNC: 9.3 MG/DL — SIGNIFICANT CHANGE UP (ref 8.4–10.5)
CHLORIDE SERPL-SCNC: 98 MMOL/L — SIGNIFICANT CHANGE UP (ref 98–107)
CO2 SERPL-SCNC: 26 MMOL/L — SIGNIFICANT CHANGE UP (ref 22–31)
CREAT SERPL-MCNC: 0.57 MG/DL — SIGNIFICANT CHANGE UP (ref 0.5–1.3)
EGFR: 110 ML/MIN/1.73M2 — SIGNIFICANT CHANGE UP
GLUCOSE BLDC GLUCOMTR-MCNC: 199 MG/DL — HIGH (ref 70–99)
GLUCOSE BLDC GLUCOMTR-MCNC: 251 MG/DL — HIGH (ref 70–99)
GLUCOSE BLDC GLUCOMTR-MCNC: 253 MG/DL — HIGH (ref 70–99)
GLUCOSE BLDC GLUCOMTR-MCNC: 281 MG/DL — HIGH (ref 70–99)
GLUCOSE SERPL-MCNC: 217 MG/DL — HIGH (ref 70–99)
HCT VFR BLD CALC: 33 % — LOW (ref 34.5–45)
HGB BLD-MCNC: 10.3 G/DL — LOW (ref 11.5–15.5)
MAGNESIUM SERPL-MCNC: 1.9 MG/DL — SIGNIFICANT CHANGE UP (ref 1.6–2.6)
MCHC RBC-ENTMCNC: 23.9 PG — LOW (ref 27–34)
MCHC RBC-ENTMCNC: 31.2 GM/DL — LOW (ref 32–36)
MCV RBC AUTO: 76.6 FL — LOW (ref 80–100)
NRBC # BLD: 0 /100 WBCS — SIGNIFICANT CHANGE UP (ref 0–0)
NRBC # FLD: 0 K/UL — SIGNIFICANT CHANGE UP (ref 0–0)
PHOSPHATE SERPL-MCNC: 3.3 MG/DL — SIGNIFICANT CHANGE UP (ref 2.5–4.5)
PLATELET # BLD AUTO: 202 K/UL — SIGNIFICANT CHANGE UP (ref 150–400)
POTASSIUM SERPL-MCNC: 4.2 MMOL/L — SIGNIFICANT CHANGE UP (ref 3.5–5.3)
POTASSIUM SERPL-SCNC: 4.2 MMOL/L — SIGNIFICANT CHANGE UP (ref 3.5–5.3)
RBC # BLD: 4.31 M/UL — SIGNIFICANT CHANGE UP (ref 3.8–5.2)
RBC # FLD: 16.3 % — HIGH (ref 10.3–14.5)
SODIUM SERPL-SCNC: 135 MMOL/L — SIGNIFICANT CHANGE UP (ref 135–145)
WBC # BLD: 5.53 K/UL — SIGNIFICANT CHANGE UP (ref 3.8–10.5)
WBC # FLD AUTO: 5.53 K/UL — SIGNIFICANT CHANGE UP (ref 3.8–10.5)

## 2023-04-16 RX ORDER — MAGNESIUM SULFATE 500 MG/ML
1 VIAL (ML) INJECTION ONCE
Refills: 0 | Status: COMPLETED | OUTPATIENT
Start: 2023-04-16 | End: 2023-04-16

## 2023-04-16 RX ADMIN — LISINOPRIL 20 MILLIGRAM(S): 2.5 TABLET ORAL at 07:45

## 2023-04-16 RX ADMIN — SIMVASTATIN 20 MILLIGRAM(S): 20 TABLET, FILM COATED ORAL at 21:51

## 2023-04-16 RX ADMIN — PIPERACILLIN AND TAZOBACTAM 25 GRAM(S): 4; .5 INJECTION, POWDER, LYOPHILIZED, FOR SOLUTION INTRAVENOUS at 14:51

## 2023-04-16 RX ADMIN — Medication 3: at 16:56

## 2023-04-16 RX ADMIN — PIPERACILLIN AND TAZOBACTAM 25 GRAM(S): 4; .5 INJECTION, POWDER, LYOPHILIZED, FOR SOLUTION INTRAVENOUS at 06:28

## 2023-04-16 RX ADMIN — ENOXAPARIN SODIUM 40 MILLIGRAM(S): 100 INJECTION SUBCUTANEOUS at 06:28

## 2023-04-16 RX ADMIN — BUDESONIDE AND FORMOTEROL FUMARATE DIHYDRATE 2 PUFF(S): 160; 4.5 AEROSOL RESPIRATORY (INHALATION) at 14:50

## 2023-04-16 RX ADMIN — Medication 100 GRAM(S): at 19:34

## 2023-04-16 RX ADMIN — SENNA PLUS 2 TABLET(S): 8.6 TABLET ORAL at 21:51

## 2023-04-16 RX ADMIN — Medication 1: at 23:05

## 2023-04-16 RX ADMIN — Medication 3: at 11:56

## 2023-04-16 RX ADMIN — Medication 10 MILLIGRAM(S): at 11:55

## 2023-04-16 RX ADMIN — PIPERACILLIN AND TAZOBACTAM 25 GRAM(S): 4; .5 INJECTION, POWDER, LYOPHILIZED, FOR SOLUTION INTRAVENOUS at 21:50

## 2023-04-16 RX ADMIN — BUDESONIDE AND FORMOTEROL FUMARATE DIHYDRATE 2 PUFF(S): 160; 4.5 AEROSOL RESPIRATORY (INHALATION) at 21:50

## 2023-04-16 RX ADMIN — Medication 1: at 07:46

## 2023-04-16 RX ADMIN — ENOXAPARIN SODIUM 40 MILLIGRAM(S): 100 INJECTION SUBCUTANEOUS at 19:35

## 2023-04-16 NOTE — PROGRESS NOTE ADULT - SUBJECTIVE AND OBJECTIVE BOX
B TEAM SURGERY DAILY PROGRESS NOTE    SUBJECTIVE:   Overnight: no acute events   Patient seen and evaluated on AM rounds. No new concerns.       OBJECTIVE:  Vital Signs Last 24 Hrs  T(C): 36.4 (16 Apr 2023 06:24), Max: 37.2 (15 Apr 2023 17:33)  T(F): 97.5 (16 Apr 2023 06:24), Max: 99 (15 Apr 2023 17:33)  HR: 73 (16 Apr 2023 06:24) (73 - 91)  BP: 110/67 (16 Apr 2023 06:24) (104/66 - 116/70)  RR: 17 (16 Apr 2023 06:24) (16 - 18)  SpO2: 98% (16 Apr 2023 06:24) (97% - 99%)    Parameters below as of 16 Apr 2023 06:24  Patient On (Oxygen Delivery Method): BiPAP/CPAP      Daily     04-15  -  04-16  --------------------------------------------------------  IN:    Oral Fluid: 500 mL  Total IN: 500 mL    OUT:    Voided (mL): 800 mL  Total OUT: 800 mL          STANDING  budesonide 160 MICROgram(s)/formoterol 4.5 MICROgram(s) Inhaler 2 Puff(s) Inhalation two times a day  dextrose 50% Injectable 25 Gram(s) IV Push once  dextrose 50% Injectable 12.5 Gram(s) IV Push once  dextrose 50% Injectable 25 Gram(s) IV Push once  enoxaparin Injectable 40 milliGRAM(s) SubCutaneous every 12 hours  glucagon  Injectable 1 milliGRAM(s) IntraMuscular once  insulin lispro (ADMELOG) corrective regimen sliding scale   SubCutaneous at bedtime  insulin lispro (ADMELOG) corrective regimen sliding scale   SubCutaneous three times a day before meals  lisinopril 20 milliGRAM(s) Oral daily  magnesium sulfate  IVPB 1 Gram(s) IV Intermittent once  piperacillin/tazobactam IVPB.. 3.375 Gram(s) IV Intermittent every 8 hours  senna 2 Tablet(s) Oral at bedtime  simvastatin 20 milliGRAM(s) Oral at bedtime  torsemide 10 milliGRAM(s) Oral daily    PRN  albuterol    90 MICROgram(s) HFA Inhaler 2 Puff(s) Inhalation every 6 hours PRN Shortness of Breath and/or Wheezing  dextrose Oral Gel 15 Gram(s) Oral once PRN Blood Glucose LESS THAN 70 milliGRAM(s)/deciliter  polyethylene glycol 3350 17 Gram(s) Oral daily PRN Constipation      Labs:  135  |  98  |  13  ----------------------------<  217<H>    (04-16)  4.2   |  26  |  0.57            Ca    9.3      04-16  Mg    1.90  Phos  3.3              Physical Exam:  General: well developed, well nourished, NAD  Cardiovascular: appears well perfused   Respiratory: respirations non labored  Gastrointestinal: soft, nontender, nondistended. Vac in place holding suction   Extremities: FROM, warm  Neurological: A+Ox3

## 2023-04-16 NOTE — PROGRESS NOTE ADULT - ASSESSMENT
52F with a PMHx of morbid obesity, DM2, HTN, incarcerated ventral hernia (S/P repair 9/22 - complicated by infected seroma 10/22) and SBO with abdominal wall abscess (S/P IR drain 12/22),  purulent drainage from umbilicus s/p 3/23 excision of umbilical sinus tract and scar. Discharged with a abdomin VAC, changed MWF. Presents with malodorous vac output. Pt denies fever, chills, nausea, vomiting, CP, SOB, distension, constipation, dysuria.    Plan:   - Appreciate wound c/s, continue vac   - Diet: Reg, CC  - Continue abx: Zosyn   - DVT ppx: LVX   - Dispo: pending home vac supplies approval. Paperwork filled, in chart.       B Team Surgery   [id number]

## 2023-04-17 ENCOUNTER — TRANSCRIPTION ENCOUNTER (OUTPATIENT)
Age: 53
End: 2023-04-17

## 2023-04-17 VITALS
SYSTOLIC BLOOD PRESSURE: 114 MMHG | TEMPERATURE: 99 F | OXYGEN SATURATION: 97 % | DIASTOLIC BLOOD PRESSURE: 66 MMHG | HEART RATE: 77 BPM | RESPIRATION RATE: 16 BRPM

## 2023-04-17 LAB
ANION GAP SERPL CALC-SCNC: 12 MMOL/L — SIGNIFICANT CHANGE UP (ref 7–14)
BUN SERPL-MCNC: 14 MG/DL — SIGNIFICANT CHANGE UP (ref 7–23)
CALCIUM SERPL-MCNC: 9.7 MG/DL — SIGNIFICANT CHANGE UP (ref 8.4–10.5)
CHLORIDE SERPL-SCNC: 97 MMOL/L — LOW (ref 98–107)
CO2 SERPL-SCNC: 27 MMOL/L — SIGNIFICANT CHANGE UP (ref 22–31)
CREAT SERPL-MCNC: 0.68 MG/DL — SIGNIFICANT CHANGE UP (ref 0.5–1.3)
EGFR: 105 ML/MIN/1.73M2 — SIGNIFICANT CHANGE UP
GLUCOSE BLDC GLUCOMTR-MCNC: 226 MG/DL — HIGH (ref 70–99)
GLUCOSE BLDC GLUCOMTR-MCNC: 266 MG/DL — HIGH (ref 70–99)
GLUCOSE SERPL-MCNC: 223 MG/DL — HIGH (ref 70–99)
HCT VFR BLD CALC: 35.9 % — SIGNIFICANT CHANGE UP (ref 34.5–45)
HGB BLD-MCNC: 11.3 G/DL — LOW (ref 11.5–15.5)
MAGNESIUM SERPL-MCNC: 2.1 MG/DL — SIGNIFICANT CHANGE UP (ref 1.6–2.6)
MCHC RBC-ENTMCNC: 23.9 PG — LOW (ref 27–34)
MCHC RBC-ENTMCNC: 31.5 GM/DL — LOW (ref 32–36)
MCV RBC AUTO: 75.9 FL — LOW (ref 80–100)
NRBC # BLD: 0 /100 WBCS — SIGNIFICANT CHANGE UP (ref 0–0)
NRBC # FLD: 0 K/UL — SIGNIFICANT CHANGE UP (ref 0–0)
PHOSPHATE SERPL-MCNC: 3.3 MG/DL — SIGNIFICANT CHANGE UP (ref 2.5–4.5)
PLATELET # BLD AUTO: 209 K/UL — SIGNIFICANT CHANGE UP (ref 150–400)
POTASSIUM SERPL-MCNC: 4.2 MMOL/L — SIGNIFICANT CHANGE UP (ref 3.5–5.3)
POTASSIUM SERPL-SCNC: 4.2 MMOL/L — SIGNIFICANT CHANGE UP (ref 3.5–5.3)
RBC # BLD: 4.73 M/UL — SIGNIFICANT CHANGE UP (ref 3.8–5.2)
RBC # FLD: 16.3 % — HIGH (ref 10.3–14.5)
SODIUM SERPL-SCNC: 136 MMOL/L — SIGNIFICANT CHANGE UP (ref 135–145)
WBC # BLD: 6.12 K/UL — SIGNIFICANT CHANGE UP (ref 3.8–10.5)
WBC # FLD AUTO: 6.12 K/UL — SIGNIFICANT CHANGE UP (ref 3.8–10.5)

## 2023-04-17 PROCEDURE — 99231 SBSQ HOSP IP/OBS SF/LOW 25: CPT

## 2023-04-17 RX ADMIN — ENOXAPARIN SODIUM 40 MILLIGRAM(S): 100 INJECTION SUBCUTANEOUS at 06:09

## 2023-04-17 RX ADMIN — BUDESONIDE AND FORMOTEROL FUMARATE DIHYDRATE 2 PUFF(S): 160; 4.5 AEROSOL RESPIRATORY (INHALATION) at 08:59

## 2023-04-17 RX ADMIN — PIPERACILLIN AND TAZOBACTAM 25 GRAM(S): 4; .5 INJECTION, POWDER, LYOPHILIZED, FOR SOLUTION INTRAVENOUS at 06:02

## 2023-04-17 RX ADMIN — Medication 2: at 07:40

## 2023-04-17 RX ADMIN — Medication 10 MILLIGRAM(S): at 06:10

## 2023-04-17 RX ADMIN — Medication 3: at 12:05

## 2023-04-17 RX ADMIN — LISINOPRIL 20 MILLIGRAM(S): 2.5 TABLET ORAL at 06:10

## 2023-04-17 NOTE — DISCHARGE NOTE PROVIDER - HOSPITAL COURSE
52F with a PMHx of morbid obesity, DM2, HTN, incarcerated ventral hernia (S/P repair 9/22 - complicated by infected seroma 10/22) and SBO with abdominal wall abscess (S/P IR drain 12/22),  purulent drainage from umbilicus s/p 3/23 excision of umbilical sinus tract and scar. Discharged with a abdomin VAC, changed MWF. Presents with malodorous vac output. Patient admitted to surgery, and started on IVF and antibiotics. She went for a CT revealing:  Interval increase in size of a collection at the site of ventral abdominal wall dehiscence abutting the adjacent transverse colon.   Wound care was consulted and replaced vac. Impression of wound is slowly healing wound without signs of infection. At this time patient is stable to be discharged home with vac

## 2023-04-17 NOTE — PROGRESS NOTE ADULT - ASSESSMENT
52F with a PMHx of morbid obesity, DM2, HTN, incarcerated ventral hernia (S/P repair 9/22 - complicated by infected seroma 10/22) and SBO with abdominal wall abscess (S/P IR drain 12/22),  purulent drainage from umbilicus s/p 3/23 excision of umbilical sinus tract and scar. Discharged with a abdomin VAC, changed MWF. Presents with malodorous vac output. Pt denies fever, chills, nausea, vomiting, CP, SOB, distension, constipation, dysuria.    Plan:   - Appreciate wound c/s, continue vac   - Diet: Reg, CC  - Continue abx: Zosyn   - DVT ppx: LVX   - Dispo: pending home vac supplies approval. Paperwork filled, in chart.       B Team Surgery   [id number]

## 2023-04-17 NOTE — DISCHARGE NOTE NURSING/CASE MANAGEMENT/SOCIAL WORK - NSDCPNINST_GEN_ALL_CORE
Make a follow up appointment with DR. Perez. Wound VAC care as instructed. Your A1C= 7.6. Continue to follow a consistent carbohydrate diet and take your medications for diabetes.

## 2023-04-17 NOTE — DISCHARGE NOTE PROVIDER - NSDCMRMEDTOKEN_GEN_ALL_CORE_FT
budesonide-formoterol 160 mcg-4.5 mcg/inh inhalation aerosol: 2 puff(s) inhaled 2 times a day  exenatide 2 mg subcutaneous injection, extended release: 2 milligram(s) subcutaneously once a week  glimepiride 4 mg oral tablet: 1 tab(s) orally 2 times a day with meals  lisinopril 20 mg oral tablet: 1 tab(s) orally once a day  metFORMIN 1000 mg oral tablet: 1 tab(s) orally 2 times a day  polyethylene glycol 3350 oral powder for reconstitution: 17 gram(s) orally once a day, As Needed   senna leaf extract oral tablet: 2 tab(s) orally once a day (at bedtime)  simvastatin 20 mg oral tablet: 1 tab(s) orally once a day (at bedtime)  torsemide 10 mg oral tablet: 1 tab(s) orally once a day  Ventolin HFA 90 mcg/inh inhalation aerosol: 2 puff(s) inhaled every 6 hours, As Needed -for shortness of breath and/or wheezing    amoxicillin-clavulanate 875 mg-125 mg oral tablet: 1 tab(s) orally 2 times a day  budesonide-formoterol 160 mcg-4.5 mcg/inh inhalation aerosol: 2 puff(s) inhaled 2 times a day  exenatide 2 mg subcutaneous injection, extended release: 2 milligram(s) subcutaneously once a week  glimepiride 4 mg oral tablet: 1 tab(s) orally 2 times a day with meals  lisinopril 20 mg oral tablet: 1 tab(s) orally once a day  metFORMIN 1000 mg oral tablet: 1 tab(s) orally 2 times a day  polyethylene glycol 3350 oral powder for reconstitution: 17 gram(s) orally once a day, As Needed   senna leaf extract oral tablet: 2 tab(s) orally once a day (at bedtime)  simvastatin 20 mg oral tablet: 1 tab(s) orally once a day (at bedtime)  torsemide 10 mg oral tablet: 1 tab(s) orally once a day  Ventolin HFA 90 mcg/inh inhalation aerosol: 2 puff(s) inhaled every 6 hours, As Needed -for shortness of breath and/or wheezing

## 2023-04-17 NOTE — DISCHARGE NOTE NURSING/CASE MANAGEMENT/SOCIAL WORK - NSDCPEFALRISK_GEN_ALL_CORE
For information on Fall & Injury Prevention, visit: [email or web address] OR  [email or web address] OR  https://www.cdc.gov/steadi/patient.html

## 2023-04-17 NOTE — DISCHARGE NOTE NURSING/CASE MANAGEMENT/SOCIAL WORK - PATIENT PORTAL LINK FT
You can access the FollowMyHealth Patient Portal offered by [facility] by registering at the following website: [email or web address]. By joining [facility]’s FollowMyHealth portal, you will also be able to view your health information using other applications (apps) compatible with our system.

## 2023-04-17 NOTE — PROGRESS NOTE ADULT - SUBJECTIVE AND OBJECTIVE BOX
TEAM [ B ] Surgery Daily Progress Note  =====================================================    SUBJECTIVE: Patient seen and examined at bedside on AM rounds. Patient reports that they're feeling well. Tolerating diet, denies nausea, vomiting.   OOB/Amublating as tolerated. Denies fever, chills.    ALLERGIES:  cefpodoxime (Rash)  estrogens (Hives; Pruritus)  peanuts (Short breath)      --------------------------------------------------------------------------------------    MEDICATIONS:    Neurologic Medications    Respiratory Medications  albuterol    90 MICROgram(s) HFA Inhaler 2 Puff(s) Inhalation every 6 hours PRN Shortness of Breath and/or Wheezing  budesonide 160 MICROgram(s)/formoterol 4.5 MICROgram(s) Inhaler 2 Puff(s) Inhalation two times a day    Cardiovascular Medications  lisinopril 20 milliGRAM(s) Oral daily  torsemide 10 milliGRAM(s) Oral daily    Gastrointestinal Medications  polyethylene glycol 3350 17 Gram(s) Oral daily PRN Constipation  senna 2 Tablet(s) Oral at bedtime    Genitourinary Medications    Hematologic/Oncologic Medications  enoxaparin Injectable 40 milliGRAM(s) SubCutaneous every 12 hours    Antimicrobial/Immunologic Medications  piperacillin/tazobactam IVPB.. 3.375 Gram(s) IV Intermittent every 8 hours    Endocrine/Metabolic Medications  dextrose 50% Injectable 25 Gram(s) IV Push once  dextrose 50% Injectable 12.5 Gram(s) IV Push once  dextrose 50% Injectable 25 Gram(s) IV Push once  dextrose Oral Gel 15 Gram(s) Oral once PRN Blood Glucose LESS THAN 70 milliGRAM(s)/deciliter  glucagon  Injectable 1 milliGRAM(s) IntraMuscular once  insulin lispro (ADMELOG) corrective regimen sliding scale   SubCutaneous at bedtime  insulin lispro (ADMELOG) corrective regimen sliding scale   SubCutaneous three times a day before meals  simvastatin 20 milliGRAM(s) Oral at bedtime    Topical/Other Medications    --------------------------------------------------------------------------------------    VITAL SIGNS:  T(C): 36.4 (04-17-23 @ 06:00), Max: 37.6 (04-16-23 @ 21:34)  HR: 87 (04-17-23 @ 06:00) (83 - 97)  BP: 120/72 (04-17-23 @ 06:00) (107/65 - 149/70)  RR: 18 (04-17-23 @ 06:00) (18 - 18)  SpO2: 97% (04-17-23 @ 06:00) (97% - 100%)  --------------------------------------------------------------------------------------    EXAM    General: NAD, resting in bed comfortably.  Cardiac: regular rate, warm and well perfused  Respiratory: Nonlabored respirations, normal cw expansion.  Abdomen: soft, nontender, nondistended Vac in place holding suction   Extremities: normal strength, FROM, no deformities    --------------------------------------------------------------------------------------      INS AND OUTS:    04-16-23 @ 07:01  -  04-17-23 @ 07:00  --------------------------------------------------------  IN: 450 mL / OUT: 0 mL / NET: 450 mL      --------------------------------------------------------------------------------------

## 2023-04-17 NOTE — DISCHARGE NOTE PROVIDER - NSDCCPCAREPLAN_GEN_ALL_CORE_FT
PRINCIPAL DISCHARGE DIAGNOSIS  Diagnosis: Abdominal wall abscess  Assessment and Plan of Treatment: Chronic abdominal wound  WOUND CARE:  Please keep incisions clean and dry. Please do not Scrub or rub incisions. DO NOT use lotion or powder on incisions.   Change wound vac 3 times a week  BATHING: You may shower and/or sponge bathe. You may use warm soapy water in the shower and rinse, pat dry. NO baths no pools for 6 weeks.  DIET: Return to your usual diet.  NOTIFY YOUR SURGEON IF YOU HAVE: any bleeding that does not stop, any pus draining from your wound(s), any fever (over 100.4 F) persistent nausea/vomiting, or if your pain is not controlled on your discharge pain medications, unable to urinate.  Please follow up with your primary care physician in one week regarding your hospitalization, bring copies of your discharge paperwork.  Please follow up with your surgeon,      PRINCIPAL DISCHARGE DIAGNOSIS  Diagnosis: Abdominal wall abscess  Assessment and Plan of Treatment: Chronic abdominal wound  Continue wound vac with changes MWF.  Wet to dry dressing if wound vac removed and not replaced.  WOUND CARE:  Please keep incisions clean and dry. Please do not Scrub or rub incisions. DO NOT use lotion or powder on incisions.   Change wound vac 3 times a week  BATHING: You may shower and/or sponge bathe. You may use warm soapy water in the shower and rinse, pat dry. NO baths no pools for 6 weeks.  DIET: Return to your usual diet.  NOTIFY YOUR SURGEON IF YOU HAVE: any bleeding that does not stop, any pus draining from your wound(s), any fever (over 100.4 F) persistent nausea/vomiting, or if your pain is not controlled on your discharge pain medications, unable to urinate.  Please follow up with your primary care physician in one week regarding your hospitalization, bring copies of your discharge paperwork.  Please follow up with your surgeon, Dr. MICHAUD within 2 weeks of dsicharge.

## 2023-04-17 NOTE — DISCHARGE NOTE PROVIDER - CARE PROVIDER_API CALL
Jin Gonzalez)  Surgery; Surgical Critical Care  [address] [place] [address]  Phone: [phone]  Fax: [phone]  Follow Up Time: 1 week

## 2023-04-17 NOTE — DISCHARGE NOTE NURSING/CASE MANAGEMENT/SOCIAL WORK - NSSCTYPOFSERV_GEN_ALL_CORE
A visiting nurse will visit you 4/18, the day after discharge. The nurse will call you in the morning to set up a visit time. If you do not hear from the nurse, please call the agency.

## 2023-04-25 ENCOUNTER — APPOINTMENT (OUTPATIENT)
Dept: TRAUMA SURGERY | Facility: HOSPITAL | Age: 53
End: 2023-04-25

## 2023-04-25 VITALS
HEART RATE: 99 BPM | BODY MASS INDEX: 57.52 KG/M2 | TEMPERATURE: 97.9 F | SYSTOLIC BLOOD PRESSURE: 122 MMHG | DIASTOLIC BLOOD PRESSURE: 92 MMHG | WEIGHT: 293 LBS | HEIGHT: 60 IN

## 2023-04-25 PROBLEM — E66.9 OBESITY, UNSPECIFIED: Chronic | Status: ACTIVE | Noted: 2023-04-12

## 2023-05-01 NOTE — ED ADULT TRIAGE NOTE - TEMPERATURE IN CELSIUS (DEGREES C)
36.8 Ketoconazole Pregnancy And Lactation Text: This medication is Pregnancy Category C and it isn't know if it is safe during pregnancy. It is also excreted in breast milk and breast feeding isn't recommended.

## 2023-05-10 ENCOUNTER — APPOINTMENT (OUTPATIENT)
Dept: ENDOCRINOLOGY | Facility: CLINIC | Age: 53
End: 2023-05-10

## 2023-05-19 ENCOUNTER — APPOINTMENT (OUTPATIENT)
Dept: SURGERY | Facility: HOSPITAL | Age: 53
End: 2023-05-19
Payer: MEDICAID

## 2023-05-19 VITALS
SYSTOLIC BLOOD PRESSURE: 153 MMHG | WEIGHT: 293 LBS | HEIGHT: 61 IN | BODY MASS INDEX: 55.32 KG/M2 | DIASTOLIC BLOOD PRESSURE: 73 MMHG | HEART RATE: 88 BPM | TEMPERATURE: 97.9 F

## 2023-05-19 PROCEDURE — 99212 OFFICE O/P EST SF 10 MIN: CPT

## 2023-06-27 ENCOUNTER — APPOINTMENT (OUTPATIENT)
Dept: TRAUMA SURGERY | Facility: HOSPITAL | Age: 53
End: 2023-06-27

## 2023-06-27 VITALS
SYSTOLIC BLOOD PRESSURE: 143 MMHG | TEMPERATURE: 97.9 F | HEIGHT: 61 IN | DIASTOLIC BLOOD PRESSURE: 88 MMHG | HEART RATE: 92 BPM | WEIGHT: 293 LBS | BODY MASS INDEX: 55.32 KG/M2

## 2023-07-17 ENCOUNTER — APPOINTMENT (OUTPATIENT)
Dept: DERMATOLOGY | Facility: CLINIC | Age: 53
End: 2023-07-17
Payer: MEDICAID

## 2023-07-17 DIAGNOSIS — L81.0 POSTINFLAMMATORY HYPERPIGMENTATION: ICD-10-CM

## 2023-07-17 DIAGNOSIS — L90.5 SCAR CONDITIONS AND FIBROSIS OF SKIN: ICD-10-CM

## 2023-07-17 PROCEDURE — 99214 OFFICE O/P EST MOD 30 MIN: CPT

## 2023-07-17 RX ORDER — MUPIROCIN 20 MG/G
2 OINTMENT TOPICAL TWICE DAILY
Qty: 60 | Refills: 0 | Status: ACTIVE | COMMUNITY
Start: 2023-07-17 | End: 1900-01-01

## 2023-09-13 RX ORDER — DULAGLUTIDE 1.5 MG/.5ML
1.5 INJECTION, SOLUTION SUBCUTANEOUS
Qty: 5 | Refills: 3 | Status: DISCONTINUED | COMMUNITY
Start: 2023-01-11 | End: 2023-09-13

## 2023-09-13 RX ORDER — EXENATIDE 2 MG/.85ML
2 INJECTION, SUSPENSION, EXTENDED RELEASE SUBCUTANEOUS
Qty: 1 | Refills: 2 | Status: ACTIVE | COMMUNITY
Start: 2023-01-18 | End: 1900-01-01

## 2023-09-13 RX ORDER — SEMAGLUTIDE 0.68 MG/ML
2 INJECTION, SOLUTION SUBCUTANEOUS
Qty: 6 | Refills: 3 | Status: DISCONTINUED | COMMUNITY
Start: 2023-01-17 | End: 2023-09-13

## 2023-10-18 ENCOUNTER — APPOINTMENT (OUTPATIENT)
Dept: DERMATOLOGY | Facility: CLINIC | Age: 53
End: 2023-10-18

## 2023-11-03 ENCOUNTER — NON-APPOINTMENT (OUTPATIENT)
Age: 53
End: 2023-11-03

## 2023-11-20 NOTE — PHYSICAL THERAPY INITIAL EVALUATION ADULT - ASSISTIVE DEVICE:SUPINE/SIT, REHAB EVAL
Orientation: ANN-unresponsive  Activity: Not OOB-pt unresponsive  Diet/BS Checks: NPO  Tele:  N/A  IV Access/Drains: PICC-SL, Peg tube, and Trach  Pain Management: Morphine and Ativan  Abnormal VS/Results: VS deferred-Comfort cares.  Bowel/Bladder: Incontinent-purewick in place. No BM this shift  Skin/Wounds: Scattered bruising  Consults: GIP  D/C Disposition: Pending  Other Info: Biotene spray ordered for dry mouth. T/R and oral cares Q2 hr. Took boots off and elevated feet on pillows for change-boots still available in room. Music playing for relaxation.     bed rails

## 2024-04-24 NOTE — PROGRESS NOTE ADULT - PROBLEM SELECTOR PLAN 1
-switched vanco--> Zosyn 10/10   -abscess cultures + pseudamonas, group B strep   -Benadryl prn for pruritus  -Aquaphor for dry skin - abscess cultures + pseudamonas, group B strep   - switched vanco--> Zosyn   - ID consult placed, will f/up ID recommendations  -Benadryl prn for pruritus  -Aquaphor for dry skin - abscess cultures + pseudamonas, group B strep   - switched vanco--> Zosyn   - ID consult placed, will f/up ID recommendations --> switch pt to cipro 400mg q8, amp1g q6  -Benadryl prn for pruritus  -Aquaphor for dry skin Visit for suture removal

## 2024-05-03 ENCOUNTER — APPOINTMENT (OUTPATIENT)
Dept: DERMATOLOGY | Facility: CLINIC | Age: 54
End: 2024-05-03
Payer: MEDICARE

## 2024-05-03 DIAGNOSIS — L30.9 DERMATITIS, UNSPECIFIED: ICD-10-CM

## 2024-05-03 DIAGNOSIS — L85.3 XEROSIS CUTIS: ICD-10-CM

## 2024-05-03 DIAGNOSIS — L28.0 LICHEN SIMPLEX CHRONICUS: ICD-10-CM

## 2024-05-03 PROCEDURE — 99214 OFFICE O/P EST MOD 30 MIN: CPT

## 2024-05-04 PROBLEM — L28.0 LICHEN SIMPLEX CHRONICUS: Status: ACTIVE | Noted: 2023-07-17

## 2024-05-04 PROBLEM — L30.9 ECZEMA, UNSPECIFIED TYPE: Status: ACTIVE | Noted: 2024-05-04

## 2024-05-04 PROBLEM — L85.3 XEROSIS CUTIS: Status: ACTIVE | Noted: 2024-05-04

## 2024-05-04 RX ORDER — TRIAMCINOLONE ACETONIDE 1 MG/G
0.1 OINTMENT TOPICAL
Qty: 1 | Refills: 3 | Status: ACTIVE | COMMUNITY
Start: 2023-07-17 | End: 1900-01-01

## 2024-05-04 NOTE — PHYSICAL EXAM
[Alert] : alert [Oriented x 3] : ~L oriented x 3 [Well Nourished] : well nourished [Declined] : declined [FreeTextEntry3] : Focused exam only (see below) per patient request:  healed scar on abdomen Surrounding the scar are eczematous scaly plaques with severe xerosis, few areas of lichenification

## 2024-05-04 NOTE — HISTORY OF PRESENT ILLNESS
[FreeTextEntry1] : itchy abdomen [de-identified] : Ms. FRANCESCA MCCORD  is a 53 year AA woman here for evaluation of below  Problem: Itchy scar 3-4 months s/p abdominal wound excision Location: abdomen Duration: months Associated symptoms/Aggravating Factors: history of infection of surgical wound and multiple revision surgeries Modifying factors/Treatments: rubbing garlic, applying vaseline and bandages - July 2023: rx'ed triamcinolone - May 2024: flaring, doesn't like to moisturize but uses vaseline after shower. Needs refill triamcinolone  Per last hospital discharge summary (April 2023): "52F with a PMHx of morbid obesity, DM2, HTN, incarcerated ventral hernia (S/P  repair 9/22 - complicated by infected seroma 10/22) and SBO with abdominal wall  abscess (S/P IR drain 12/22),  purulent drainage from umbilicus s/p 3/23  excision of umbilical sinus tract and scar. Discharged with a abdomin VAC,  changed MWF. Presents with malodorous vac output. Patient admitted to surgery,  and started on IVF and antibiotics. She went for a CT revealing:  Interval increase in size of a collection at the site of ventral abdominal wall  dehiscence abutting the adjacent transverse colon.  Wound care was consulted and replaced vac. Impression of wound is slowly  healing wound without signs of infection. At this time patient is stable to be  discharged home with vac."

## 2024-05-04 NOTE — ASSESSMENT
[FreeTextEntry1] : #Lichen simplex chronicus, abdomen - chronic; exacerbation  #Asteatotic Eczema #Xerosis - Discussed nature and chronic course along with goals/expectations of therapy - Dry skin care: Cerave cream TID  - Restart triamcinolone 0.1% ointment to affected areas twice a day PRN itchiness or bumpiness for 2 weeks, then take 1 week off cycles as needed.  Avoid on face/groin/skin folds  RTC 6 months

## 2024-06-04 NOTE — PHYSICAL THERAPY INITIAL EVALUATION ADULT - LEVEL OF INDEPENDENCE, REHAB EVAL
supervision
Alert-The patient is alert, awake and responds to voice. The patient is oriented to time, place, and person. The triage nurse is able to obtain subjective information.

## 2024-11-04 ENCOUNTER — APPOINTMENT (OUTPATIENT)
Dept: DERMATOLOGY | Facility: CLINIC | Age: 54
End: 2024-11-04
Payer: MEDICARE

## 2024-11-04 DIAGNOSIS — L85.3 XEROSIS CUTIS: ICD-10-CM

## 2024-11-04 DIAGNOSIS — L28.0 LICHEN SIMPLEX CHRONICUS: ICD-10-CM

## 2024-11-04 DIAGNOSIS — L30.9 DERMATITIS, UNSPECIFIED: ICD-10-CM

## 2024-11-04 PROCEDURE — G2211 COMPLEX E/M VISIT ADD ON: CPT

## 2024-11-04 PROCEDURE — 99214 OFFICE O/P EST MOD 30 MIN: CPT

## 2024-11-04 RX ORDER — AMMONIUM LACTATE 12 %
12 CREAM (GRAM) TOPICAL TWICE DAILY
Qty: 1 | Refills: 11 | Status: ACTIVE | COMMUNITY
Start: 2024-11-04 | End: 1900-01-01

## 2024-11-04 RX ORDER — TACROLIMUS 1 MG/G
0.1 OINTMENT TOPICAL
Qty: 1 | Refills: 11 | Status: ACTIVE | COMMUNITY
Start: 2024-11-04 | End: 1900-01-01

## 2024-11-04 RX ORDER — CLOBETASOL PROPIONATE 0.5 MG/G
0.05 OINTMENT TOPICAL
Qty: 1 | Refills: 0 | Status: ACTIVE | COMMUNITY
Start: 2024-11-04 | End: 1900-01-01

## 2025-03-21 NOTE — PROGRESS NOTE ADULT - REASON FOR ADMISSION
cellulitis
none
